# Patient Record
Sex: FEMALE | Race: WHITE | NOT HISPANIC OR LATINO | Employment: OTHER | URBAN - METROPOLITAN AREA
[De-identification: names, ages, dates, MRNs, and addresses within clinical notes are randomized per-mention and may not be internally consistent; named-entity substitution may affect disease eponyms.]

---

## 2018-06-25 ENCOUNTER — APPOINTMENT (OUTPATIENT)
Dept: GENERAL RADIOLOGY | Facility: HOSPITAL | Age: 83
End: 2018-06-25

## 2018-06-25 ENCOUNTER — APPOINTMENT (OUTPATIENT)
Dept: CT IMAGING | Facility: HOSPITAL | Age: 83
End: 2018-06-25

## 2018-06-25 ENCOUNTER — HOSPITAL ENCOUNTER (INPATIENT)
Facility: HOSPITAL | Age: 83
LOS: 2 days | Discharge: ANOTHER HOSPITAL | End: 2018-06-27
Attending: EMERGENCY MEDICINE | Admitting: INTERNAL MEDICINE

## 2018-06-25 DIAGNOSIS — D72.819 LEUKOPENIA, UNSPECIFIED TYPE: ICD-10-CM

## 2018-06-25 DIAGNOSIS — R41.82 ALTERED MENTAL STATUS, UNSPECIFIED ALTERED MENTAL STATUS TYPE: Primary | ICD-10-CM

## 2018-06-25 DIAGNOSIS — E87.1 HYPONATREMIA: ICD-10-CM

## 2018-06-25 DIAGNOSIS — K80.43 CALCULUS OF BILE DUCT WITH ACUTE CHOLECYSTITIS AND OBSTRUCTION: ICD-10-CM

## 2018-06-25 DIAGNOSIS — A41.9 SEPSIS, DUE TO UNSPECIFIED ORGANISM: ICD-10-CM

## 2018-06-25 DIAGNOSIS — M62.82 NON-TRAUMATIC RHABDOMYOLYSIS: ICD-10-CM

## 2018-06-25 PROBLEM — I10 HYPERTENSION: Status: ACTIVE | Noted: 2018-06-25

## 2018-06-25 PROBLEM — E86.0 DEHYDRATION: Status: ACTIVE | Noted: 2018-06-25

## 2018-06-25 PROBLEM — I63.9 STROKE (HCC): Status: ACTIVE | Noted: 2018-06-25

## 2018-06-25 PROBLEM — N39.0 UTI (URINARY TRACT INFECTION): Status: ACTIVE | Noted: 2018-06-25

## 2018-06-25 PROBLEM — R74.8 ELEVATED LIVER ENZYMES: Status: ACTIVE | Noted: 2018-06-25

## 2018-06-25 LAB
ALBUMIN SERPL-MCNC: 3.58 G/DL (ref 3.2–4.8)
ALBUMIN/GLOB SERPL: 1.5 G/DL (ref 1.5–2.5)
ALP SERPL-CCNC: 299 U/L (ref 25–100)
ALT SERPL W P-5'-P-CCNC: 97 U/L (ref 7–40)
AMORPH URATE CRY URNS QL MICRO: ABNORMAL /HPF
AMPHET+METHAMPHET UR QL: NEGATIVE
AMPHETAMINES UR QL: NEGATIVE
ANION GAP SERPL CALCULATED.3IONS-SCNC: 20 MMOL/L (ref 3–11)
APAP SERPL-MCNC: <10 MCG/ML (ref 0–30)
AST SERPL-CCNC: 153 U/L (ref 0–33)
BACTERIA UR QL AUTO: ABNORMAL /HPF
BARBITURATES UR QL SCN: NEGATIVE
BASOPHILS # BLD AUTO: 0.01 10*3/MM3 (ref 0–0.2)
BASOPHILS NFR BLD AUTO: 0.4 % (ref 0–1)
BENZODIAZ UR QL SCN: NEGATIVE
BILIRUB SERPL-MCNC: 2 MG/DL (ref 0.3–1.2)
BILIRUB UR QL STRIP: NEGATIVE
BUN BLD-MCNC: 33 MG/DL (ref 9–23)
BUN/CREAT SERPL: 42.3 (ref 7–25)
BUPRENORPHINE SERPL-MCNC: NEGATIVE NG/ML
CALCIUM SPEC-SCNC: 8.4 MG/DL (ref 8.7–10.4)
CANNABINOIDS SERPL QL: NEGATIVE
CHLORIDE SERPL-SCNC: 96 MMOL/L (ref 99–109)
CK SERPL-CCNC: 601 U/L (ref 26–174)
CLARITY UR: ABNORMAL
CO2 SERPL-SCNC: 13 MMOL/L (ref 20–31)
COCAINE UR QL: NEGATIVE
COLOR UR: ABNORMAL
CREAT BLD-MCNC: 0.78 MG/DL (ref 0.6–1.3)
D-LACTATE SERPL-SCNC: 7.1 MMOL/L (ref 0.5–2)
D-LACTATE SERPL-SCNC: 7.4 MMOL/L (ref 0.5–2)
DEPRECATED RDW RBC AUTO: 37.8 FL (ref 37–54)
EOSINOPHIL # BLD AUTO: 0.02 10*3/MM3 (ref 0–0.3)
EOSINOPHIL NFR BLD AUTO: 0.8 % (ref 0–3)
ERYTHROCYTE [DISTWIDTH] IN BLOOD BY AUTOMATED COUNT: 12.2 % (ref 11.3–14.5)
ETHANOL BLD-MCNC: <10 MG/DL (ref 0–10)
GFR SERPL CREATININE-BSD FRML MDRD: 71 ML/MIN/1.73
GLOBULIN UR ELPH-MCNC: 2.4 GM/DL
GLUCOSE BLD-MCNC: 129 MG/DL (ref 70–100)
GLUCOSE UR STRIP-MCNC: NEGATIVE MG/DL
HBA1C MFR BLD: 6 % (ref 4.8–5.6)
HCT VFR BLD AUTO: 36.3 % (ref 34.5–44)
HGB BLD-MCNC: 13 G/DL (ref 11.5–15.5)
HGB UR QL STRIP.AUTO: ABNORMAL
HOLD SPECIMEN: NORMAL
HYALINE CASTS UR QL AUTO: ABNORMAL /LPF
IMM GRANULOCYTES # BLD: 0.01 10*3/MM3 (ref 0–0.03)
IMM GRANULOCYTES NFR BLD: 0.4 % (ref 0–0.6)
INR PPP: 1.15 (ref 0.91–1.09)
KETONES UR QL STRIP: NEGATIVE
LEUKOCYTE ESTERASE UR QL STRIP.AUTO: NEGATIVE
LYMPHOCYTES # BLD AUTO: 0.23 10*3/MM3 (ref 0.6–4.8)
LYMPHOCYTES NFR BLD AUTO: 9.2 % (ref 24–44)
MAGNESIUM SERPL-MCNC: 2 MG/DL (ref 1.3–2.7)
MCH RBC QN AUTO: 30.4 PG (ref 27–31)
MCHC RBC AUTO-ENTMCNC: 35.8 G/DL (ref 32–36)
MCV RBC AUTO: 85 FL (ref 80–99)
METHADONE UR QL SCN: NEGATIVE
MONOCYTES # BLD AUTO: 0.27 10*3/MM3 (ref 0–1)
MONOCYTES NFR BLD AUTO: 10.8 % (ref 0–12)
NEUTROPHILS # BLD AUTO: 1.96 10*3/MM3 (ref 1.5–8.3)
NEUTROPHILS NFR BLD AUTO: 78.4 % (ref 41–71)
NITRITE UR QL STRIP: NEGATIVE
OPIATES UR QL: NEGATIVE
OXYCODONE UR QL SCN: NEGATIVE
PCP UR QL SCN: NEGATIVE
PH UR STRIP.AUTO: <=5 [PH] (ref 5–8)
PLATELET # BLD AUTO: 135 10*3/MM3 (ref 150–450)
PMV BLD AUTO: 10.7 FL (ref 6–12)
POTASSIUM BLD-SCNC: 3.6 MMOL/L (ref 3.5–5.5)
PROCALCITONIN SERPL-MCNC: 6.19 NG/ML
PROPOXYPH UR QL: NEGATIVE
PROT SERPL-MCNC: 6 G/DL (ref 5.7–8.2)
PROT UR QL STRIP: ABNORMAL
PROTHROMBIN TIME: 12.1 SECONDS (ref 9.6–11.5)
RBC # BLD AUTO: 4.27 10*6/MM3 (ref 3.89–5.14)
RBC # UR: ABNORMAL /HPF
REF LAB TEST METHOD: ABNORMAL
SALICYLATES SERPL-MCNC: 2 MG/DL (ref 0–29)
SODIUM BLD-SCNC: 129 MMOL/L (ref 132–146)
SP GR UR STRIP: 1.05 (ref 1–1.03)
SQUAMOUS #/AREA URNS HPF: ABNORMAL /HPF
T4 FREE SERPL-MCNC: 1.24 NG/DL (ref 0.89–1.76)
TRICYCLICS UR QL SCN: NEGATIVE
TSH SERPL DL<=0.05 MIU/L-ACNC: 0.51 MIU/ML (ref 0.35–5.35)
UROBILINOGEN UR QL STRIP: ABNORMAL
WBC NRBC COR # BLD: 2.5 10*3/MM3 (ref 3.5–10.8)
WBC UR QL AUTO: ABNORMAL /HPF

## 2018-06-25 PROCEDURE — 80307 DRUG TEST PRSMV CHEM ANLYZR: CPT | Performed by: EMERGENCY MEDICINE

## 2018-06-25 PROCEDURE — 93005 ELECTROCARDIOGRAM TRACING: CPT | Performed by: EMERGENCY MEDICINE

## 2018-06-25 PROCEDURE — 87040 BLOOD CULTURE FOR BACTERIA: CPT | Performed by: EMERGENCY MEDICINE

## 2018-06-25 PROCEDURE — 83605 ASSAY OF LACTIC ACID: CPT | Performed by: INTERNAL MEDICINE

## 2018-06-25 PROCEDURE — 81015 MICROSCOPIC EXAM OF URINE: CPT | Performed by: EMERGENCY MEDICINE

## 2018-06-25 PROCEDURE — 0 IOPAMIDOL PER 1 ML: Performed by: EMERGENCY MEDICINE

## 2018-06-25 PROCEDURE — 71045 X-RAY EXAM CHEST 1 VIEW: CPT

## 2018-06-25 PROCEDURE — P9046 ALBUMIN (HUMAN), 25%, 20 ML: HCPCS | Performed by: INTERNAL MEDICINE

## 2018-06-25 PROCEDURE — 84443 ASSAY THYROID STIM HORMONE: CPT | Performed by: EMERGENCY MEDICINE

## 2018-06-25 PROCEDURE — 82533 TOTAL CORTISOL: CPT | Performed by: INTERNAL MEDICINE

## 2018-06-25 PROCEDURE — 83605 ASSAY OF LACTIC ACID: CPT | Performed by: EMERGENCY MEDICINE

## 2018-06-25 PROCEDURE — 80053 COMPREHEN METABOLIC PANEL: CPT | Performed by: EMERGENCY MEDICINE

## 2018-06-25 PROCEDURE — 87150 DNA/RNA AMPLIFIED PROBE: CPT | Performed by: EMERGENCY MEDICINE

## 2018-06-25 PROCEDURE — 85025 COMPLETE CBC W/AUTO DIFF WBC: CPT | Performed by: EMERGENCY MEDICINE

## 2018-06-25 PROCEDURE — 80306 DRUG TEST PRSMV INSTRMNT: CPT | Performed by: EMERGENCY MEDICINE

## 2018-06-25 PROCEDURE — 25010000002 ALBUMIN HUMAN 25% PER 50 ML: Performed by: INTERNAL MEDICINE

## 2018-06-25 PROCEDURE — 81003 URINALYSIS AUTO W/O SCOPE: CPT | Performed by: EMERGENCY MEDICINE

## 2018-06-25 PROCEDURE — 82550 ASSAY OF CK (CPK): CPT | Performed by: EMERGENCY MEDICINE

## 2018-06-25 PROCEDURE — P9612 CATHETERIZE FOR URINE SPEC: HCPCS

## 2018-06-25 PROCEDURE — 25010000002 HEPARIN (PORCINE) PER 1000 UNITS: Performed by: NURSE PRACTITIONER

## 2018-06-25 PROCEDURE — 70496 CT ANGIOGRAPHY HEAD: CPT

## 2018-06-25 PROCEDURE — 84145 PROCALCITONIN (PCT): CPT | Performed by: EMERGENCY MEDICINE

## 2018-06-25 PROCEDURE — 25010000002 VANCOMYCIN PER 500 MG: Performed by: EMERGENCY MEDICINE

## 2018-06-25 PROCEDURE — 85610 PROTHROMBIN TIME: CPT | Performed by: EMERGENCY MEDICINE

## 2018-06-25 PROCEDURE — 99285 EMERGENCY DEPT VISIT HI MDM: CPT

## 2018-06-25 PROCEDURE — 0042T HC CT CEREBRAL PERFUSION W/WO CONTRAST: CPT

## 2018-06-25 PROCEDURE — 25010000002 PIPERACILLIN SOD-TAZOBACTAM PER 1 G: Performed by: INTERNAL MEDICINE

## 2018-06-25 PROCEDURE — 25010000002 PIPERACILLIN SOD-TAZOBACTAM PER 1 G: Performed by: EMERGENCY MEDICINE

## 2018-06-25 PROCEDURE — 70498 CT ANGIOGRAPHY NECK: CPT

## 2018-06-25 PROCEDURE — 83036 HEMOGLOBIN GLYCOSYLATED A1C: CPT | Performed by: INTERNAL MEDICINE

## 2018-06-25 PROCEDURE — 70450 CT HEAD/BRAIN W/O DYE: CPT

## 2018-06-25 PROCEDURE — 99291 CRITICAL CARE FIRST HOUR: CPT | Performed by: INTERNAL MEDICINE

## 2018-06-25 PROCEDURE — 83735 ASSAY OF MAGNESIUM: CPT | Performed by: EMERGENCY MEDICINE

## 2018-06-25 PROCEDURE — 84439 ASSAY OF FREE THYROXINE: CPT | Performed by: EMERGENCY MEDICINE

## 2018-06-25 PROCEDURE — 87186 SC STD MICRODIL/AGAR DIL: CPT | Performed by: EMERGENCY MEDICINE

## 2018-06-25 RX ORDER — ACETAMINOPHEN 650 MG/1
650 SUPPOSITORY RECTAL ONCE
Status: DISCONTINUED | OUTPATIENT
Start: 2018-06-25 | End: 2018-06-25 | Stop reason: SDUPTHER

## 2018-06-25 RX ORDER — HEPARIN SODIUM 5000 [USP'U]/ML
5000 INJECTION, SOLUTION INTRAVENOUS; SUBCUTANEOUS EVERY 12 HOURS SCHEDULED
Status: DISCONTINUED | OUTPATIENT
Start: 2018-06-25 | End: 2018-06-27 | Stop reason: HOSPADM

## 2018-06-25 RX ORDER — SODIUM CHLORIDE, SODIUM LACTATE, POTASSIUM CHLORIDE, CALCIUM CHLORIDE 600; 310; 30; 20 MG/100ML; MG/100ML; MG/100ML; MG/100ML
50 INJECTION, SOLUTION INTRAVENOUS CONTINUOUS
Status: DISCONTINUED | OUTPATIENT
Start: 2018-06-25 | End: 2018-06-27 | Stop reason: HOSPADM

## 2018-06-25 RX ORDER — LISINOPRIL AND HYDROCHLOROTHIAZIDE 20; 12.5 MG/1; MG/1
1 TABLET ORAL DAILY
COMMUNITY
End: 2020-02-16 | Stop reason: HOSPADM

## 2018-06-25 RX ORDER — ACETAMINOPHEN 500 MG
1000 TABLET ORAL ONCE
Status: DISCONTINUED | OUTPATIENT
Start: 2018-06-25 | End: 2018-06-25

## 2018-06-25 RX ORDER — ACETAMINOPHEN 325 MG/1
650 TABLET ORAL EVERY 4 HOURS PRN
Status: DISCONTINUED | OUTPATIENT
Start: 2018-06-25 | End: 2018-06-27 | Stop reason: HOSPADM

## 2018-06-25 RX ORDER — ASPIRIN 81 MG/1
81 TABLET ORAL DAILY
COMMUNITY

## 2018-06-25 RX ORDER — CEFTRIAXONE SODIUM 2 G/50ML
2 INJECTION, SOLUTION INTRAVENOUS EVERY 24 HOURS
Status: DISCONTINUED | OUTPATIENT
Start: 2018-06-25 | End: 2018-06-25

## 2018-06-25 RX ORDER — ALENDRONATE SODIUM 70 MG/1
70 TABLET ORAL
COMMUNITY
End: 2020-02-16 | Stop reason: HOSPADM

## 2018-06-25 RX ORDER — ALBUMIN (HUMAN) 12.5 G/50ML
50 SOLUTION INTRAVENOUS ONCE
Status: COMPLETED | OUTPATIENT
Start: 2018-06-25 | End: 2018-06-25

## 2018-06-25 RX ORDER — LISINOPRIL 10 MG/1
10 TABLET ORAL DAILY
COMMUNITY
End: 2020-02-16 | Stop reason: HOSPADM

## 2018-06-25 RX ORDER — FENOFIBRATE 145 MG/1
145 TABLET, COATED ORAL DAILY
COMMUNITY

## 2018-06-25 RX ORDER — SODIUM CHLORIDE 0.9 % (FLUSH) 0.9 %
1-10 SYRINGE (ML) INJECTION AS NEEDED
Status: DISCONTINUED | OUTPATIENT
Start: 2018-06-25 | End: 2018-06-27 | Stop reason: HOSPADM

## 2018-06-25 RX ORDER — VANCOMYCIN HYDROCHLORIDE 1 G/200ML
20 INJECTION, SOLUTION INTRAVENOUS ONCE
Status: COMPLETED | OUTPATIENT
Start: 2018-06-25 | End: 2018-06-25

## 2018-06-25 RX ORDER — ACETAMINOPHEN 650 MG/1
650 SUPPOSITORY RECTAL ONCE
Status: COMPLETED | OUTPATIENT
Start: 2018-06-25 | End: 2018-06-25

## 2018-06-25 RX ADMIN — SODIUM CHLORIDE 1000 ML: 9 INJECTION, SOLUTION INTRAVENOUS at 19:50

## 2018-06-25 RX ADMIN — SODIUM CHLORIDE 1000 ML: 9 INJECTION, SOLUTION INTRAVENOUS at 17:09

## 2018-06-25 RX ADMIN — SODIUM CHLORIDE 1000 ML: 9 INJECTION, SOLUTION INTRAVENOUS at 23:05

## 2018-06-25 RX ADMIN — NOREPINEPHRINE BITARTRATE 0.06 MCG/KG/MIN: 8 SOLUTION at 20:51

## 2018-06-25 RX ADMIN — HEPARIN SODIUM 5000 UNITS: 5000 INJECTION, SOLUTION INTRAVENOUS; SUBCUTANEOUS at 20:10

## 2018-06-25 RX ADMIN — ACETAMINOPHEN 650 MG: 650 SUPPOSITORY RECTAL at 17:09

## 2018-06-25 RX ADMIN — TAZOBACTAM SODIUM AND PIPERACILLIN SODIUM 4.5 G: 500; 4 INJECTION, SOLUTION INTRAVENOUS at 17:45

## 2018-06-25 RX ADMIN — ALBUMIN HUMAN 50 G: 0.25 SOLUTION INTRAVENOUS at 20:51

## 2018-06-25 RX ADMIN — SODIUM CHLORIDE 500 ML: 9 INJECTION, SOLUTION INTRAVENOUS at 18:51

## 2018-06-25 RX ADMIN — TAZOBACTAM SODIUM AND PIPERACILLIN SODIUM 2.25 G: 250; 2 INJECTION, SOLUTION INTRAVENOUS at 20:10

## 2018-06-25 RX ADMIN — IOPAMIDOL 115 ML: 755 INJECTION, SOLUTION INTRAVENOUS at 16:49

## 2018-06-25 RX ADMIN — VANCOMYCIN HYDROCHLORIDE 1000 MG: 1 INJECTION, SOLUTION INTRAVENOUS at 18:50

## 2018-06-25 RX ADMIN — ACETAMINOPHEN 650 MG: 325 TABLET ORAL at 22:10

## 2018-06-25 RX ADMIN — SODIUM CHLORIDE, POTASSIUM CHLORIDE, SODIUM LACTATE AND CALCIUM CHLORIDE 150 ML/HR: 600; 310; 30; 20 INJECTION, SOLUTION INTRAVENOUS at 19:58

## 2018-06-25 NOTE — ED PROVIDER NOTES
Subjective   83-year-old female presents for evaluation of altered mental status.  The patient is an extremely limited historian at this time and history is obtained from EMS.  According to EMS, they were called to the patient's home earlier this morning when the patient was noted by family to be lying on the floor.  She refused transport at that time.  Later this afternoon, they were called again by the patient's family for increased altered mental status and return to the patient's home where she was still lying in the floor, and she was subsequently brought to our facility for further evaluation and treatment.  Glucose normal.  She was noted to be febrile and tachycardic in route.        History provided by:  EMS personnel  History limited by:  Mental status change  Altered Mental Status   Presenting symptoms: confusion, disorientation and partial responsiveness    Severity:  Moderate  Most recent episode:  Today  Episode history:  Continuous  Duration:  2 hours  Timing:  Constant  Progression:  Unchanged  Chronicity:  New      Review of Systems   Unable to perform ROS: Mental status change   Psychiatric/Behavioral: Positive for confusion.       No past medical history on file.    Allergies not on file    No past surgical history on file.    No family history on file.    Social History     Social History Main Topics   • Drug use: Unknown     Other Topics Concern   • Not on file         Objective   Physical Exam   Constitutional: She appears well-developed and well-nourished. No distress.   Confused appearing elderly female   HENT:   Head: Normocephalic and atraumatic.   Mouth/Throat: Oropharynx is clear and moist.   Eyes: EOM are normal. Pupils are equal, round, and reactive to light.   Crosses midline with extraocular movements, no nystagmus   Neck: Normal range of motion. Neck supple. No JVD present.   Cardiovascular: Regular rhythm and normal heart sounds.  Exam reveals no gallop and no friction rub.    No  murmur heard.  Tachycardic   Pulmonary/Chest: Effort normal and breath sounds normal. No respiratory distress. She has no wheezes. She has no rales.   Abdominal: Soft. Bowel sounds are normal. She exhibits no distension and no mass. There is no tenderness. There is no rebound and no guarding.   Neurological:   Patient appears quite confused and is not alert to person, place, or time, moving all fours, following commands, global weakness noted in all fours with 4/5 strength, unable to assess finger to nose and heel to shin secondary to confusion, unable to answer questions and responding with nonsensical syllables and words, GCS of 12 (E-4, M-6, V-3)   Skin: Skin is warm and dry. No rash noted. She is not diaphoretic. No erythema.   Psychiatric: She has a normal mood and affect. Judgment and thought content normal.   Nursing note and vitals reviewed.      Critical Care  Performed by: SERENA MC  Authorized by: YUSEF MAK     Critical care provider statement:     Critical care time (minutes):  40    Critical care was necessary to treat or prevent imminent or life-threatening deterioration of the following conditions:  Sepsis and CNS failure or compromise    Critical care was time spent personally by me on the following activities:  Development of treatment plan with patient or surrogate, discussions with consultants, evaluation of patient's response to treatment, examination of patient, obtaining history from patient or surrogate, re-evaluation of patient's condition, pulse oximetry, ordering and review of radiographic studies, ordering and review of laboratory studies and ordering and performing treatments and interventions             ED Course  ED Course as of Jun 25 1921 Mon Jun 25, 2018   1710 83-year-old female presents via EMS for evaluation of altered mental status.  EMS was reportedly called to the patient's home earlier this morning but she refused transport.  Later this afternoon, the  patient's family called EMS once again for altered mental status.  On their arrival, the patient was still lying in the floor and appeared much more altered.  She was noted to be febrile and tachycardic as well as was subsequently brought to our facility for further evaluation and treatment.  Glucose normal.  On arrival to the ED, patient appears quite confused with a GCS of 13.  She is protecting her airway.  She is febrile and tachycardic.  IV fluids administered.  Antipyretics administered.  Exam remarkable for global confusion and global weakness but no focal or lateralizing neurological deficits.  [DD]   1712 CT head negative for acute emergent process.  CT perfusion studies negative.  We will obtain further imaging and labs and reassess after initial interventions.  The patient's initial EKG revealed sinus tachycardia with a heart rate of 125.  [DD]   1830 Labs consistent with leukopenia, severe sepsis, mild hyponatremia, and rhabdomyolysis.  30 cc per KG of crystalloid administered.  Broad-spectrum antibiotics administered.  Blood and urine cultures pending.  Upon reevaluation, patient's vital signs improved.  I discussed her case with Dr. Baird and will admit for further evaluation and treatment.  She is hemodynamically stable at this time and family is aware/agreeable with the plan.  [DD]      ED Course User Index  [DD] Jayy Navas MD                 Recent Results (from the past 24 hour(s))   Protime-INR    Collection Time: 06/25/18  4:46 PM   Result Value Ref Range    Protime 12.1 (H) 9.6 - 11.5 Seconds    INR 1.15 (H) 0.91 - 1.09   Lactic Acid, Plasma    Collection Time: 06/25/18  4:46 PM   Result Value Ref Range    Lactate 7.4 (C) 0.5 - 2.0 mmol/L   Procalcitonin    Collection Time: 06/25/18  4:46 PM   Result Value Ref Range    Procalcitonin 6.19 (H) <=0.25 ng/mL   CBC Auto Differential    Collection Time: 06/25/18  4:46 PM   Result Value Ref Range    WBC 2.50 (L) 3.50 - 10.80 10*3/mm3    RBC  4.27 3.89 - 5.14 10*6/mm3    Hemoglobin 13.0 11.5 - 15.5 g/dL    Hematocrit 36.3 34.5 - 44.0 %    MCV 85.0 80.0 - 99.0 fL    MCH 30.4 27.0 - 31.0 pg    MCHC 35.8 32.0 - 36.0 g/dL    RDW 12.2 11.3 - 14.5 %    RDW-SD 37.8 37.0 - 54.0 fl    MPV 10.7 6.0 - 12.0 fL    Platelets 135 (L) 150 - 450 10*3/mm3    Neutrophil % 78.4 (H) 41.0 - 71.0 %    Lymphocyte % 9.2 (L) 24.0 - 44.0 %    Monocyte % 10.8 0.0 - 12.0 %    Eosinophil % 0.8 0.0 - 3.0 %    Basophil % 0.4 0.0 - 1.0 %    Immature Grans % 0.4 0.0 - 0.6 %    Neutrophils, Absolute 1.96 1.50 - 8.30 10*3/mm3    Lymphocytes, Absolute 0.23 (L) 0.60 - 4.80 10*3/mm3    Monocytes, Absolute 0.27 0.00 - 1.00 10*3/mm3    Eosinophils, Absolute 0.02 0.00 - 0.30 10*3/mm3    Basophils, Absolute 0.01 0.00 - 0.20 10*3/mm3    Immature Grans, Absolute 0.01 0.00 - 0.03 10*3/mm3   Comprehensive Metabolic Panel    Collection Time: 06/25/18  4:47 PM   Result Value Ref Range    Glucose 129 (H) 70 - 100 mg/dL    BUN 33 (H) 9 - 23 mg/dL    Creatinine 0.78 0.60 - 1.30 mg/dL    Sodium 129 (L) 132 - 146 mmol/L    Potassium 3.6 3.5 - 5.5 mmol/L    Chloride 96 (L) 99 - 109 mmol/L    CO2 13.0 (L) 20.0 - 31.0 mmol/L    Calcium 8.4 (L) 8.7 - 10.4 mg/dL    Total Protein 6.0 5.7 - 8.2 g/dL    Albumin 3.58 3.20 - 4.80 g/dL    ALT (SGPT) 97 (H) 7 - 40 U/L    AST (SGOT) 153 (H) 0 - 33 U/L    Alkaline Phosphatase 299 (H) 25 - 100 U/L    Total Bilirubin 2.0 (H) 0.3 - 1.2 mg/dL    eGFR Non African Amer 71 >60 mL/min/1.73    Globulin 2.4 gm/dL    A/G Ratio 1.5 1.5 - 2.5 g/dL    BUN/Creatinine Ratio 42.3 (H) 7.0 - 25.0    Anion Gap 20.0 (H) 3.0 - 11.0 mmol/L   TSH    Collection Time: 06/25/18  4:47 PM   Result Value Ref Range    TSH 0.510 0.350 - 5.350 mIU/mL   T4, Free    Collection Time: 06/25/18  4:47 PM   Result Value Ref Range    Free T4 1.24 0.89 - 1.76 ng/dL   Magnesium    Collection Time: 06/25/18  4:47 PM   Result Value Ref Range    Magnesium 2.0 1.3 - 2.7 mg/dL   Acetaminophen Level    Collection Time:  06/25/18  4:47 PM   Result Value Ref Range    Acetaminophen <10.0 0.0 - 30.0 mcg/mL   Ethanol    Collection Time: 06/25/18  4:47 PM   Result Value Ref Range    Ethanol <10 0 - 10 mg/dL   Salicylate Level    Collection Time: 06/25/18  4:47 PM   Result Value Ref Range    Salicylate 2.0 0.0 - 29.0 mg/dL   CK    Collection Time: 06/25/18  4:47 PM   Result Value Ref Range    Creatine Kinase 601 (H) 26 - 174 U/L   Urinalysis With / Culture If Indicated - Urine, Catheter    Collection Time: 06/25/18  5:37 PM   Result Value Ref Range    Color, UA Dark Yellow (A) Yellow, Straw    Appearance, UA Cloudy (A) Clear    pH, UA <=5.0 5.0 - 8.0    Specific Gravity, UA 1.048 (H) 1.001 - 1.030    Glucose, UA Negative Negative    Ketones, UA Negative Negative    Bilirubin, UA Negative Negative    Blood, UA Moderate (2+) (A) Negative    Protein,  mg/dL (2+) (A) Negative    Leuk Esterase, UA Negative Negative    Nitrite, UA Negative Negative    Urobilinogen, UA 2.0 E.U./dL (A) 0.2 - 1.0 E.U./dL   Urinalysis, Microscopic Only - Urine, Clean Catch    Collection Time: 06/25/18  5:37 PM   Result Value Ref Range    RBC, UA 21-30 (A) None Seen, 0-2 /HPF    WBC, UA 3-5 (A) None Seen, 0-2 /HPF    Bacteria, UA None Seen None Seen, Trace /HPF    Squamous Epithelial Cells, UA 7-12 (A) None Seen, 0-2 /HPF    Hyaline Casts, UA 0-6 0 - 6 /LPF    Amorphous Crystals, UA Small/1+ None Seen /HPF    Methodology Manual Light Microscopy      Note: In addition to lab results from this visit, the labs listed above may include labs taken at another facility or during a different encounter within the last 24 hours. Please correlate lab times with ED admission and discharge times for further clarification of the services performed during this visit.    CT Head Without Contrast Stroke Protocol   Preliminary Result   1. Age-indeterminate lacunar infarct right cerebellar hemisphere.   2. No intracranial hemorrhage.       Findings were communicated with the  stroke team at 4:42 PM on   06/25/2018.       D:  06/25/2018   E:  06/25/2018                      CT Cerebral Perfusion With & Without Contrast    (Results Pending)   CT Angiogram Neck With & Without Contrast    (Results Pending)   CT Angiogram Head With & Without Contrast    (Results Pending)   XR Chest 1 View    (Results Pending)   XR Chest 1 View    (Results Pending)     Vitals:    06/25/18 1830 06/25/18 1850 06/25/18 1900 06/25/18 1905   BP: 102/95  (!) 79/50 (!) 84/48   BP Location:       Pulse: 110  112 (!) 121   Resp:       Temp:  (!) 100.6 °F (38.1 °C)     TempSrc:  Oral     SpO2: 94%  95% 96%   Weight:       Height:         Medications   vancomycin (VANCOCIN) in iso-osmotic dextrose IVPB 1 g (premix) 200 mL (1,000 mg Intravenous New Bag 6/25/18 1850)   sodium chloride 0.9 % flush 1-10 mL (not administered)   heparin (porcine) 5000 UNIT/ML injection 5,000 Units (not administered)   lactated ringers infusion (not administered)   sodium chloride 0.9 % bolus 1,000 mL (0 mL Intravenous Stopped 6/25/18 1836)   iopamidol (ISOVUE-370) 76 % injection 150 mL (115 mL Intravenous Given 6/25/18 1649)   acetaminophen (TYLENOL) suppository 650 mg (650 mg Rectal Given 6/25/18 1709)   piperacillin-tazobactam (ZOSYN) 4.5 g in iso-osmotic dextrose 100 mL IVPB (premix) (0 g Intravenous Stopped 6/25/18 1836)   sodium chloride 0.9 % bolus 650 mL (500 mL Intravenous New Bag 6/25/18 1851)     ECG/EMG Results (last 24 hours)     Procedure Component Value Units Date/Time    ECG 12 Lead [[id number]] Collected:  06/25/18 1707     Updated:  06/25/18 1721          Recent Results (from the past 24 hour(s))   Protime-INR    Collection Time: 06/25/18  4:46 PM   Result Value Ref Range    Protime 12.1 (H) 9.6 - 11.5 Seconds    INR 1.15 (H) 0.91 - 1.09   Lactic Acid, Plasma    Collection Time: 06/25/18  4:46 PM   Result Value Ref Range    Lactate 7.4 (C) 0.5 - 2.0 mmol/L   Procalcitonin    Collection Time: 06/25/18  4:46 PM   Result Value Ref  Range    Procalcitonin 6.19 (H) <=0.25 ng/mL   CBC Auto Differential    Collection Time: 06/25/18  4:46 PM   Result Value Ref Range    WBC 2.50 (L) 3.50 - 10.80 10*3/mm3    RBC 4.27 3.89 - 5.14 10*6/mm3    Hemoglobin 13.0 11.5 - 15.5 g/dL    Hematocrit 36.3 34.5 - 44.0 %    MCV 85.0 80.0 - 99.0 fL    MCH 30.4 27.0 - 31.0 pg    MCHC 35.8 32.0 - 36.0 g/dL    RDW 12.2 11.3 - 14.5 %    RDW-SD 37.8 37.0 - 54.0 fl    MPV 10.7 6.0 - 12.0 fL    Platelets 135 (L) 150 - 450 10*3/mm3    Neutrophil % 78.4 (H) 41.0 - 71.0 %    Lymphocyte % 9.2 (L) 24.0 - 44.0 %    Monocyte % 10.8 0.0 - 12.0 %    Eosinophil % 0.8 0.0 - 3.0 %    Basophil % 0.4 0.0 - 1.0 %    Immature Grans % 0.4 0.0 - 0.6 %    Neutrophils, Absolute 1.96 1.50 - 8.30 10*3/mm3    Lymphocytes, Absolute 0.23 (L) 0.60 - 4.80 10*3/mm3    Monocytes, Absolute 0.27 0.00 - 1.00 10*3/mm3    Eosinophils, Absolute 0.02 0.00 - 0.30 10*3/mm3    Basophils, Absolute 0.01 0.00 - 0.20 10*3/mm3    Immature Grans, Absolute 0.01 0.00 - 0.03 10*3/mm3   Comprehensive Metabolic Panel    Collection Time: 06/25/18  4:47 PM   Result Value Ref Range    Glucose 129 (H) 70 - 100 mg/dL    BUN 33 (H) 9 - 23 mg/dL    Creatinine 0.78 0.60 - 1.30 mg/dL    Sodium 129 (L) 132 - 146 mmol/L    Potassium 3.6 3.5 - 5.5 mmol/L    Chloride 96 (L) 99 - 109 mmol/L    CO2 13.0 (L) 20.0 - 31.0 mmol/L    Calcium 8.4 (L) 8.7 - 10.4 mg/dL    Total Protein 6.0 5.7 - 8.2 g/dL    Albumin 3.58 3.20 - 4.80 g/dL    ALT (SGPT) 97 (H) 7 - 40 U/L    AST (SGOT) 153 (H) 0 - 33 U/L    Alkaline Phosphatase 299 (H) 25 - 100 U/L    Total Bilirubin 2.0 (H) 0.3 - 1.2 mg/dL    eGFR Non African Amer 71 >60 mL/min/1.73    Globulin 2.4 gm/dL    A/G Ratio 1.5 1.5 - 2.5 g/dL    BUN/Creatinine Ratio 42.3 (H) 7.0 - 25.0    Anion Gap 20.0 (H) 3.0 - 11.0 mmol/L   TSH    Collection Time: 06/25/18  4:47 PM   Result Value Ref Range    TSH 0.510 0.350 - 5.350 mIU/mL   T4, Free    Collection Time: 06/25/18  4:47 PM   Result Value Ref Range     Free T4 1.24 0.89 - 1.76 ng/dL   Magnesium    Collection Time: 06/25/18  4:47 PM   Result Value Ref Range    Magnesium 2.0 1.3 - 2.7 mg/dL   Acetaminophen Level    Collection Time: 06/25/18  4:47 PM   Result Value Ref Range    Acetaminophen <10.0 0.0 - 30.0 mcg/mL   Ethanol    Collection Time: 06/25/18  4:47 PM   Result Value Ref Range    Ethanol <10 0 - 10 mg/dL   Salicylate Level    Collection Time: 06/25/18  4:47 PM   Result Value Ref Range    Salicylate 2.0 0.0 - 29.0 mg/dL   CK    Collection Time: 06/25/18  4:47 PM   Result Value Ref Range    Creatine Kinase 601 (H) 26 - 174 U/L   Urinalysis With / Culture If Indicated - Urine, Catheter    Collection Time: 06/25/18  5:37 PM   Result Value Ref Range    Color, UA Dark Yellow (A) Yellow, Straw    Appearance, UA Cloudy (A) Clear    pH, UA <=5.0 5.0 - 8.0    Specific Gravity, UA 1.048 (H) 1.001 - 1.030    Glucose, UA Negative Negative    Ketones, UA Negative Negative    Bilirubin, UA Negative Negative    Blood, UA Moderate (2+) (A) Negative    Protein,  mg/dL (2+) (A) Negative    Leuk Esterase, UA Negative Negative    Nitrite, UA Negative Negative    Urobilinogen, UA 2.0 E.U./dL (A) 0.2 - 1.0 E.U./dL   Urinalysis, Microscopic Only - Urine, Clean Catch    Collection Time: 06/25/18  5:37 PM   Result Value Ref Range    RBC, UA 21-30 (A) None Seen, 0-2 /HPF    WBC, UA 3-5 (A) None Seen, 0-2 /HPF    Bacteria, UA None Seen None Seen, Trace /HPF    Squamous Epithelial Cells, UA 7-12 (A) None Seen, 0-2 /HPF    Hyaline Casts, UA 0-6 0 - 6 /LPF    Amorphous Crystals, UA Small/1+ None Seen /HPF    Methodology Manual Light Microscopy      Note: In addition to lab results from this visit, the labs listed above may include labs taken at another facility or during a different encounter within the last 24 hours. Please correlate lab times with ED admission and discharge times for further clarification of the services performed during this visit.    CT Head Without Contrast  Stroke Protocol   Preliminary Result   1. Age-indeterminate lacunar infarct right cerebellar hemisphere.   2. No intracranial hemorrhage.       Findings were communicated with the stroke team at 4:42 PM on   06/25/2018.       D:  06/25/2018   E:  06/25/2018                      CT Cerebral Perfusion With & Without Contrast    (Results Pending)   CT Angiogram Neck With & Without Contrast    (Results Pending)   CT Angiogram Head With & Without Contrast    (Results Pending)   XR Chest 1 View    (Results Pending)   XR Chest 1 View    (Results Pending)     Vitals:    06/25/18 1850 06/25/18 1900 06/25/18 1905 06/25/18 1931   BP:  (!) 79/50 (!) 84/48    BP Location:       Pulse:  112 (!) 121 99   Resp:    18   Temp: (!) 100.6 °F (38.1 °C)   (!) 101.6 °F (38.7 °C)   TempSrc: Oral   Oral   SpO2:  95% 96%    Weight:       Height:         Medications   sodium chloride 0.9 % flush 1-10 mL (not administered)   heparin (porcine) 5000 UNIT/ML injection 5,000 Units (not administered)   lactated ringers infusion (not administered)   piperacillin-tazobactam (ZOSYN) in iso-osmotic dextrose IVPB 2.25 g (premix) (not administered)   sodium chloride 0.9 % bolus 1,000 mL (1,000 mL Intravenous New Bag 6/25/18 1950)   sodium chloride 0.9 % bolus 1,000 mL (0 mL Intravenous Stopped 6/25/18 1836)   iopamidol (ISOVUE-370) 76 % injection 150 mL (115 mL Intravenous Given 6/25/18 1649)   acetaminophen (TYLENOL) suppository 650 mg (650 mg Rectal Given 6/25/18 1709)   vancomycin (VANCOCIN) in iso-osmotic dextrose IVPB 1 g (premix) 200 mL (1,000 mg Intravenous New Bag 6/25/18 1850)   piperacillin-tazobactam (ZOSYN) 4.5 g in iso-osmotic dextrose 100 mL IVPB (premix) (0 g Intravenous Stopped 6/25/18 1836)   sodium chloride 0.9 % bolus 650 mL (500 mL Intravenous New Bag 6/25/18 1851)     ECG/EMG Results (last 24 hours)     Procedure Component Value Units Date/Time    ECG 12 Lead [[id number]] Collected:  06/25/18 1707     Updated:  06/25/18 172           MDM    Final diagnoses:   Altered mental status, unspecified altered mental status type   Sepsis, due to unspecified organism   Leukopenia, unspecified type   Hyponatremia   Non-traumatic rhabdomyolysis       Documentation assistance provided by john Crews.  Information recorded by the scribe was done at my direction and has been verified and validated by me.     Zeyad Crews  06/25/18 1642       Jayy Navas MD  06/25/18 1921       Jayy Navas MD  06/25/18 1951

## 2018-06-25 NOTE — ED PROVIDER NOTES
Subjective   History of Present Illness    Review of Systems    Past Medical History:   Diagnosis Date   • Hypertension    • Stroke        No Known Allergies    History reviewed. No pertinent surgical history.    History reviewed. No pertinent family history.    Social History     Social History   • Marital status:      Social History Main Topics   • Smoking status: Never Smoker   • Smokeless tobacco: Never Used   • Alcohol use No   • Drug use: No     Other Topics Concern   • Not on file         Objective   Physical Exam    Critical Care  Performed by: YUSEF MAK  Authorized by: YUSEF MAK     Critical care provider statement:     Critical care time (minutes):  40    Critical care time was exclusive of:  Separately billable procedures and treating other patients    Critical care was necessary to treat or prevent imminent or life-threatening deterioration of the following conditions:  Sepsis and CNS failure or compromise    Critical care was time spent personally by me on the following activities:  Blood draw for specimens, development of treatment plan with patient or surrogate, discussions with consultants, evaluation of patient's response to treatment, examination of patient, obtaining history from patient or surrogate, ordering and performing treatments and interventions, ordering and review of laboratory studies, ordering and review of radiographic studies, pulse oximetry, re-evaluation of patient's condition, review of old charts and interpretation of cardiac output measurements             ED Course  ED Course as of Jun 25 1949   Mon Jun 25, 2018   1710 83-year-old female presents via EMS for evaluation of altered mental status.  EMS was reportedly called to the patient's home earlier this morning but she refused transport.  Later this afternoon, the patient's family called EMS once again for altered mental status.  On their arrival, the patient was still lying in the floor and appeared much  more altered.  She was noted to be febrile and tachycardic as well as was subsequently brought to our facility for further evaluation and treatment.  Glucose normal.  On arrival to the ED, patient appears quite confused with a GCS of 13.  She is protecting her airway.  She is febrile and tachycardic.  IV fluids administered.  Antipyretics administered.  Exam remarkable for global confusion and global weakness but no focal or lateralizing neurological deficits.  [DD]   1712 CT head negative for acute emergent process.  CT perfusion studies negative.  We will obtain further imaging and labs and reassess after initial interventions.  The patient's initial EKG revealed sinus tachycardia with a heart rate of 125.  [DD]   1830 Labs consistent with leukopenia, severe sepsis, mild hyponatremia, and rhabdomyolysis.  30 cc per KG of crystalloid administered.  Broad-spectrum antibiotics administered.  Blood and urine cultures pending.  Upon reevaluation, patient's vital signs improved.  I discussed her case with Dr. Baird and will admit for further evaluation and treatment.  She is hemodynamically stable at this time and family is aware/agreeable with the plan.  [DD]      ED Course User Index  [DD] Jayy Navas MD                     MDM  Number of Diagnoses or Management Options  Altered mental status, unspecified altered mental status type:   Hyponatremia:   Leukopenia, unspecified type:   Non-traumatic rhabdomyolysis:   Sepsis, due to unspecified organism:   Critical Care  Total time providing critical care: 30-74 minutes      Final diagnoses:   Altered mental status, unspecified altered mental status type   Sepsis, due to unspecified organism   Leukopenia, unspecified type   Hyponatremia   Non-traumatic rhabdomyolysis       Documentation assistance provided by john Crews.  Information recorded by the john was done at my direction and has been verified and validated by me.     Zeyad Crews  06/25/18  1949

## 2018-06-25 NOTE — H&P
INTENSIVIST   HOSPITAL VISIT NOTE     Hospital:  LOS: 0 days     Ms. Susan Tucker, 83 y.o. female is seen for:      Sepsis    AMS    As an Intensivist, we provide an integrated approach to the ICU patient and family, medical management of comorbid conditions, lead interdisciplinary rounds and coordinate the care with all other services, including those from other specialists.     Steffen Tucker is a 83 y.o. female,  who presented to [facility] ED by EMS due to AMS.     The patient has apparently been ill the past 2 weeks but she had not discussed this with her family.  The time of arrival information was obtained via emergency services due to altered mental status.      This morning the patient was found lying on the floor by family emergency services were notified and upon arrival she refused transport.  Later this afternoon emergency services returned due to increased altered mental status, she would continue to be lying on the floor, and she was brought to the emergency department.      She was found to be febrile and tachycardic in route.  CTA and CT perfusion were obtained which were noted to be negative.  Urinalysis showed UTI.  She was given Zosyn and vancomycin emergency department.  Temperature in the emergency department was 104°F.    Patient is seen in the emergency department, family is present and the patient is much more awake and alert.  She is able to answer questions and purchase patient in her care. She was able to tell me what happened today, and how she thought she did not need to come to the Hospital, at least during the first visit of the paramedics.      Patient states her desire to be DNR/DNI.  Patient has passed Julian history significant for hypertension and CVA.      She denies smoking, alcohol, or illicit drug use.       PMH: She  has a past medical history of Hypertension and Stroke.   PSxH: She  has no past surgical history on file.       Medications:  ASA  Lisinopril    Allergies: She has No Known Allergies.   FH: Her family history is not on file.   SH: She  reports that she has never smoked. She has never used smokeless tobacco. She reports that she does not drink alcohol or use drugs.     ROS (14):   All systems reviewed and negative except noted in HPI.    Objective   O     Vitals:  Temp: (!) 100.6 °F (38.1 °C) (06/25/18 1850) Temp  Min: 100.6 °F (38.1 °C)  Max: 103.8 °F (39.9 °C)   BP: (!) 84/48 (06/25/18 1905) BP  Min: 79/50  Max: 142/80   Pulse: (!) 121 (06/25/18 1905) Pulse  Min: 108  Max: 126   Resp: 24 (06/25/18 1716) Resp  Min: 24  Max: 24   SpO2: 96 % (06/25/18 1905) SpO2  Min: 94 %  Max: 98 %   Device: room air (06/25/18 1716)    Flow Rate:   No Data Recorded     I/O 24 hrs (7:00AM - 6:59 AM)  Intake/Output       06/24/18 0700 - 06/25/18 0659 06/25/18 0700 - 06/26/18 0659    Intake (ml) 0 1100    Output (ml) -- --    Net (ml) 0 1100    Last Weight  --  54.4 kg (120 lb)          Medications (drips):    lactated ringers       Physical Examination    Telemetry: Rhythm: sinus tachycardia (06/25/18 1700)   Constitutional:  No acute distress.  Conversant.   HEENT: Normocephalic and atraumatic.   Neck:  Normal range of motion. Neck supple.   No JVD present.   No thyromegaly.    Cardiovascular: Regular rate and rhythm.  No murmurs, rub or gallop.  No peripheral edema.   Respiratory: Normal respiratory effort.  Clear to auscultation.  Clear to percussion.    Abdominal:  Soft. No masses.   Non-tender. No distension.   No hepatosplenomegaly.   MSK: Normal muscle strength and tone.   Extremities: No digital cyanosis or clubbing.   Skin: Skin is warm and dry.  No rashes, lesions or ulcers noted.    Neurological:   Alert and Oriented to person, place, and time.   Moves all extremities.   Psychiatric:  Normal affect.   Normal judgment.   Lines/Drains/Airways: Peripheral IV(s)       Hematology:    Results from last 7 days  Lab Units 06/25/18  0865    WBC 10*3/mm3 2.50*   HEMOGLOBIN g/dL 13.0   MCV fL 85.0   PLATELETS 10*3/mm3 135*     Electrolytes, Magnesium and Phosphorus:    Results from last 7 days  Lab Units 06/25/18  1647   SODIUM mmol/L 129*   POTASSIUM mmol/L 3.6   CO2 mmol/L 13.0*   MAGNESIUM mg/dL 2.0     Renal:    Results from last 7 days  Lab Units 06/25/18  1647   CREATININE mg/dL 0.78   BUN mg/dL 33*     Estimated Creatinine Clearance: 46.6 mL/min (by C-G formula based on SCr of 0.78 mg/dL).  Hepatic:    Results from last 7 days  Lab Units 06/25/18  1647   ALK PHOS U/L 299*   BILIRUBIN mg/dL 2.0*   ALT (SGPT) U/L 97*   AST (SGOT) U/L 153*       Lab Results  Lab Value Date/Time   PROCALCITO 6.19 (H) 06/25/2018 1646       Lab Results  Lab Value Date/Time   LACTATE 7.4 (C) 06/25/2018 1646        Images:    Ct Head Without Contrast Stroke Protocol    Result Date: 6/25/2018  1. Age-indeterminate lacunar infarct right cerebellar hemisphere. 2. No intracranial hemorrhage.  Findings were communicated with the stroke team at 4:42 PM on 06/25/2018.  D:  06/25/2018 E:  06/25/2018           I reviewed the patient's new laboratory and imaging results.  I independently reviewed the patient's new images.  Assessment/Plan   A / P     83 y.o.female, admitted on 6/25/2018 with Sepsis [A41.9]:     1. Probable Sepsis  1. Source: Unknown, although Urinary tract, suspect.  2. No infiltrates in her CXR, but this may change once rehydrated.  2. Leukopenia  3. Lactic Acidosis  4. Mild elevation of transaminases, T Maury and ALKP  5. Mild Thrombocytopenia  6. HTN on ACEI at home.  7. Previous CVA  8. No h/o DM    Nutrition:  NPO Diet    No active supplement orders     Advance Directives: Code Status and Medical Interventions:   Ordered at: 06/25/18 1918     Limited Support to NOT Include:    Intubation     Level Of Support Discussed With:    Patient    Next of Kin (If No Surrogate)     Code Status:    No CPR     Medical Interventions (Level of Support Prior to Arrest):     Limited          Assessment / Plan:    1. Discussed with Dr. Navas.  2. ICU Admission.  3. Broad spectrum abs.  4. Rehydration  5. Follow Lactic acid, and LFTs in AM  6. Check  A1c  7. Monitor I and Os. Hourly.  8. Discussed with patient and family, she has a living will:  1. She does not want to be connected to machines - M Ventilator  2. If her heart stops, she does not want to be revived.    Plan of care and goals reviewed during interdisciplinary rounds.  I discussed the patient's findings and my recommendations with patient, family and nursing staff    Time: was greater than 45 minutes.    (This excludes time spent performing separately reportable procedures and services).  Patient was at high risk of imminent or life-threatening deterioration in her condition due to CNS dysfunction, Metabolic acidosis and sepsis.     JOESPH Quintanilla, [facility]-BC  Pulmonary and Critical Care Service  6/25/2018 7:21 PM     I have personally seen, interviewed and examined the patient and verified all the key components of the history, physical examination, assessment and plan with JOESPH Armstrong and reviewed the note, which reflects my changes and contributions.      Lorne Baird MD, FACP, FCCP, CNSC  Intensive Care Medicine, Nutrition Support and Pulmonary Medicine

## 2018-06-26 ENCOUNTER — APPOINTMENT (OUTPATIENT)
Dept: GENERAL RADIOLOGY | Facility: HOSPITAL | Age: 83
End: 2018-06-26

## 2018-06-26 ENCOUNTER — APPOINTMENT (OUTPATIENT)
Dept: ULTRASOUND IMAGING | Facility: HOSPITAL | Age: 83
End: 2018-06-26

## 2018-06-26 ENCOUNTER — APPOINTMENT (OUTPATIENT)
Dept: CARDIOLOGY | Facility: HOSPITAL | Age: 83
End: 2018-06-26
Attending: INTERNAL MEDICINE

## 2018-06-26 ENCOUNTER — APPOINTMENT (OUTPATIENT)
Dept: CT IMAGING | Facility: HOSPITAL | Age: 83
End: 2018-06-26
Attending: INTERNAL MEDICINE

## 2018-06-26 PROBLEM — K80.43 CALCULUS OF BILE DUCT WITH ACUTE CHOLECYSTITIS AND OBSTRUCTION: Status: ACTIVE | Noted: 2018-06-25

## 2018-06-26 LAB
ALBUMIN SERPL-MCNC: 3.63 G/DL (ref 3.2–4.8)
ALBUMIN/GLOB SERPL: 1.6 G/DL (ref 1.5–2.5)
ALP SERPL-CCNC: 137 U/L (ref 25–100)
ALT SERPL W P-5'-P-CCNC: 98 U/L (ref 7–40)
ANION GAP SERPL CALCULATED.3IONS-SCNC: 15 MMOL/L (ref 3–11)
AST SERPL-CCNC: 185 U/L (ref 0–33)
BACTERIA BLD CULT: ABNORMAL
BASOPHILS # BLD AUTO: 0.03 10*3/MM3 (ref 0–0.2)
BASOPHILS # BLD MANUAL: 0 10*3/MM3 (ref 0–0.2)
BASOPHILS NFR BLD AUTO: 0 % (ref 0–1)
BASOPHILS NFR BLD AUTO: 0.1 % (ref 0–1)
BH CV ECHO MEAS - AO ROOT AREA (BSA CORRECTED): 1.4
BH CV ECHO MEAS - AO ROOT AREA: 3.6 CM^2
BH CV ECHO MEAS - AO ROOT DIAM: 2.2 CM
BH CV ECHO MEAS - ASC AORTA: 1.9 CM
BH CV ECHO MEAS - BSA(HAYCOCK): 1.6 M^2
BH CV ECHO MEAS - BSA: 1.5 M^2
BH CV ECHO MEAS - BZI_BMI: 23.6 KILOGRAMS/M^2
BH CV ECHO MEAS - BZI_METRIC_HEIGHT: 154.9 CM
BH CV ECHO MEAS - BZI_METRIC_WEIGHT: 56.7 KG
BH CV ECHO MEAS - CONTRAST EF (2CH): 60.6 ML/M^2
BH CV ECHO MEAS - CONTRAST EF 4CH: 62.2 ML/M^2
BH CV ECHO MEAS - EDV(CUBED): 59.3 ML
BH CV ECHO MEAS - EDV(MOD-SP2): 33 ML
BH CV ECHO MEAS - EDV(MOD-SP4): 37 ML
BH CV ECHO MEAS - EDV(TEICH): 65.9 ML
BH CV ECHO MEAS - EF(CUBED): 73 %
BH CV ECHO MEAS - EF(MOD-BP): 62 %
BH CV ECHO MEAS - EF(MOD-SP2): 60.6 %
BH CV ECHO MEAS - EF(MOD-SP4): 62.2 %
BH CV ECHO MEAS - EF(TEICH): 65.4 %
BH CV ECHO MEAS - ESV(CUBED): 16 ML
BH CV ECHO MEAS - ESV(MOD-SP2): 13 ML
BH CV ECHO MEAS - ESV(MOD-SP4): 14 ML
BH CV ECHO MEAS - ESV(TEICH): 22.8 ML
BH CV ECHO MEAS - FS: 35.3 %
BH CV ECHO MEAS - IVS/LVPW: 0.85
BH CV ECHO MEAS - IVSD: 1.1 CM
BH CV ECHO MEAS - LAT PEAK E' VEL: 5.8 CM/SEC
BH CV ECHO MEAS - LV DIASTOLIC VOL/BSA (35-75): 23.9 ML/M^2
BH CV ECHO MEAS - LV MASS(C)D: 126.6 GRAMS
BH CV ECHO MEAS - LV MASS(C)DI: 81.8 GRAMS/M^2
BH CV ECHO MEAS - LV SYSTOLIC VOL/BSA (12-30): 9 ML/M^2
BH CV ECHO MEAS - LVIDD: 3.9 CM
BH CV ECHO MEAS - LVIDS: 2.6 CM
BH CV ECHO MEAS - LVLD AP2: 6.4 CM
BH CV ECHO MEAS - LVLD AP4: 6 CM
BH CV ECHO MEAS - LVLS AP2: 5.4 CM
BH CV ECHO MEAS - LVLS AP4: 4.9 CM
BH CV ECHO MEAS - LVOT AREA (M): 3.1 CM^2
BH CV ECHO MEAS - LVOT AREA: 3 CM^2
BH CV ECHO MEAS - LVOT DIAM: 2 CM
BH CV ECHO MEAS - LVPWD: 1.1 CM
BH CV ECHO MEAS - MED PEAK E' VEL: 5.9 CM/SEC
BH CV ECHO MEAS - MV A MAX VEL: 81.4 CM/SEC
BH CV ECHO MEAS - MV DEC TIME: 0.19 SEC
BH CV ECHO MEAS - MV E MAX VEL: 94.3 CM/SEC
BH CV ECHO MEAS - MV E/A: 1.2
BH CV ECHO MEAS - PA ACC SLOPE: 363 CM/SEC^2
BH CV ECHO MEAS - PA ACC TIME: 0.14 SEC
BH CV ECHO MEAS - PA MAX PG: 1 MMHG
BH CV ECHO MEAS - PA PR(ACCEL): 17.2 MMHG
BH CV ECHO MEAS - PA V2 MAX: 50.3 CM/SEC
BH CV ECHO MEAS - RAP SYSTOLE: 5 MMHG
BH CV ECHO MEAS - RVSP: 29 MMHG
BH CV ECHO MEAS - SI(CUBED): 28 ML/M^2
BH CV ECHO MEAS - SI(MOD-SP2): 12.9 ML/M^2
BH CV ECHO MEAS - SI(MOD-SP4): 14.9 ML/M^2
BH CV ECHO MEAS - SI(TEICH): 27.8 ML/M^2
BH CV ECHO MEAS - SV(CUBED): 43.2 ML
BH CV ECHO MEAS - SV(MOD-SP2): 20 ML
BH CV ECHO MEAS - SV(MOD-SP4): 23 ML
BH CV ECHO MEAS - SV(TEICH): 43.1 ML
BH CV ECHO MEAS - TAPSE (>1.6): 2.73 CM2
BH CV ECHO MEAS - TR MAX VEL: 243 CM/SEC
BH CV ECHO MEASUREMENTS AVERAGE E/E' RATIO: 16.12
BH CV XLRA - RV BASE: 2.3 CM
BH CV XLRA - RV LENGTH: 4.8 CM
BH CV XLRA - RV MID: 1.4 CM
BH CV XLRA - TDI S': 14.8 CM/SEC
BILIRUB SERPL-MCNC: 2.2 MG/DL (ref 0.3–1.2)
BUN BLD-MCNC: 30 MG/DL (ref 9–23)
BUN/CREAT SERPL: 27.8 (ref 7–25)
CALCIUM SPEC-SCNC: 7.9 MG/DL (ref 8.7–10.4)
CHLORIDE SERPL-SCNC: 103 MMOL/L (ref 99–109)
CO2 SERPL-SCNC: 15 MMOL/L (ref 20–31)
CORTIS PM SERPL-MCNC: 88.76 MCG/DL (ref 3.1–16.7)
CREAT BLD-MCNC: 1.08 MG/DL (ref 0.6–1.3)
D-LACTATE SERPL-SCNC: 3.6 MMOL/L (ref 0.5–2)
D-LACTATE SERPL-SCNC: 6 MMOL/L (ref 0.5–2)
DEPRECATED RDW RBC AUTO: 39 FL (ref 37–54)
EOSINOPHIL # BLD AUTO: 0.01 10*3/MM3 (ref 0–0.3)
EOSINOPHIL # BLD MANUAL: 0 10*3/MM3 (ref 0.1–0.3)
EOSINOPHIL NFR BLD AUTO: 0 % (ref 0–3)
EOSINOPHIL NFR BLD MANUAL: 0 % (ref 0–3)
ERYTHROCYTE [DISTWIDTH] IN BLOOD BY AUTOMATED COUNT: 12.6 % (ref 11.3–14.5)
GFR SERPL CREATININE-BSD FRML MDRD: 49 ML/MIN/1.73
GLOBULIN UR ELPH-MCNC: 2.3 GM/DL
GLUCOSE BLD-MCNC: 75 MG/DL (ref 70–100)
HCT VFR BLD AUTO: 32.8 % (ref 34.5–44)
HGB BLD-MCNC: 11.7 G/DL (ref 11.5–15.5)
IMM GRANULOCYTES # BLD: 2.46 10*3/MM3 (ref 0–0.03)
IMM GRANULOCYTES NFR BLD: 8.3 % (ref 0–0.6)
LEFT ATRIUM VOLUME INDEX: 13.5 ML/M2
LV EF 2D ECHO EST: 65 %
LYMPHOCYTES # BLD AUTO: 0.4 10*3/MM3 (ref 0.6–4.8)
LYMPHOCYTES # BLD MANUAL: 1.18 10*3/MM3 (ref 0.6–4.8)
LYMPHOCYTES NFR BLD AUTO: 1.4 % (ref 24–44)
LYMPHOCYTES NFR BLD MANUAL: 0 % (ref 0–12)
LYMPHOCYTES NFR BLD MANUAL: 4 % (ref 24–44)
MAGNESIUM SERPL-MCNC: 1.9 MG/DL (ref 1.3–2.7)
MAXIMAL PREDICTED HEART RATE: 138 BPM
MCH RBC QN AUTO: 30.5 PG (ref 27–31)
MCHC RBC AUTO-ENTMCNC: 35.7 G/DL (ref 32–36)
MCV RBC AUTO: 85.6 FL (ref 80–99)
MONOCYTES # BLD AUTO: 0 10*3/MM3 (ref 0–1)
MONOCYTES # BLD AUTO: 1.08 10*3/MM3 (ref 0–1)
MONOCYTES NFR BLD AUTO: 3.7 % (ref 0–12)
MRSA DNA SPEC QL NAA+PROBE: NEGATIVE
NEUTROPHILS # BLD AUTO: 27.96 10*3/MM3 (ref 1.5–8.3)
NEUTROPHILS # BLD AUTO: 28.3 10*3/MM3 (ref 1.5–8.3)
NEUTROPHILS NFR BLD AUTO: 94.8 % (ref 41–71)
NEUTROPHILS NFR BLD MANUAL: 86 % (ref 41–71)
NEUTS BAND NFR BLD MANUAL: 10 % (ref 0–5)
PHOSPHATE SERPL-MCNC: 1.8 MG/DL (ref 2.4–5.1)
PLAT MORPH BLD: NORMAL
PLATELET # BLD AUTO: 150 10*3/MM3 (ref 150–450)
PMV BLD AUTO: 11.3 FL (ref 6–12)
POTASSIUM BLD-SCNC: 3 MMOL/L (ref 3.5–5.5)
PROT SERPL-MCNC: 5.9 G/DL (ref 5.7–8.2)
RBC # BLD AUTO: 3.83 10*6/MM3 (ref 3.89–5.14)
RBC MORPH BLD: NORMAL
SODIUM BLD-SCNC: 133 MMOL/L (ref 132–146)
STRESS TARGET HR: 117 BPM
VANCOMYCIN SERPL-MCNC: 11.5 MCG/ML (ref 5–40)
WBC MORPH BLD: NORMAL
WBC NRBC COR # BLD: 29.48 10*3/MM3 (ref 3.5–10.8)

## 2018-06-26 PROCEDURE — 99291 CRITICAL CARE FIRST HOUR: CPT | Performed by: INTERNAL MEDICINE

## 2018-06-26 PROCEDURE — 25010000002 IOPAMIDOL 61 % SOLUTION: Performed by: INTERNAL MEDICINE

## 2018-06-26 PROCEDURE — 25010000002 MAGNESIUM SULFATE 2 GM/50ML SOLUTION: Performed by: NURSE PRACTITIONER

## 2018-06-26 PROCEDURE — 74178 CT ABD&PLV WO CNTR FLWD CNTR: CPT

## 2018-06-26 PROCEDURE — 25010000002 HEPARIN (PORCINE) PER 1000 UNITS: Performed by: NURSE PRACTITIONER

## 2018-06-26 PROCEDURE — 25010000002 VANCOMYCIN

## 2018-06-26 PROCEDURE — 84100 ASSAY OF PHOSPHORUS: CPT | Performed by: NURSE PRACTITIONER

## 2018-06-26 PROCEDURE — 71045 X-RAY EXAM CHEST 1 VIEW: CPT

## 2018-06-26 PROCEDURE — 80053 COMPREHEN METABOLIC PANEL: CPT | Performed by: NURSE PRACTITIONER

## 2018-06-26 PROCEDURE — 85025 COMPLETE CBC W/AUTO DIFF WBC: CPT | Performed by: NURSE PRACTITIONER

## 2018-06-26 PROCEDURE — 93306 TTE W/DOPPLER COMPLETE: CPT | Performed by: INTERNAL MEDICINE

## 2018-06-26 PROCEDURE — 25010000002 PIPERACILLIN SOD-TAZOBACTAM PER 1 G: Performed by: INTERNAL MEDICINE

## 2018-06-26 PROCEDURE — 83605 ASSAY OF LACTIC ACID: CPT | Performed by: INTERNAL MEDICINE

## 2018-06-26 PROCEDURE — 83735 ASSAY OF MAGNESIUM: CPT | Performed by: NURSE PRACTITIONER

## 2018-06-26 PROCEDURE — 76705 ECHO EXAM OF ABDOMEN: CPT

## 2018-06-26 PROCEDURE — 99223 1ST HOSP IP/OBS HIGH 75: CPT | Performed by: INTERNAL MEDICINE

## 2018-06-26 PROCEDURE — 93306 TTE W/DOPPLER COMPLETE: CPT

## 2018-06-26 PROCEDURE — 80202 ASSAY OF VANCOMYCIN: CPT | Performed by: INTERNAL MEDICINE

## 2018-06-26 PROCEDURE — 87641 MR-STAPH DNA AMP PROBE: CPT | Performed by: INTERNAL MEDICINE

## 2018-06-26 RX ORDER — POTASSIUM CHLORIDE 1.5 G/1.77G
40 POWDER, FOR SOLUTION ORAL ONCE
Status: COMPLETED | OUTPATIENT
Start: 2018-06-26 | End: 2018-06-26

## 2018-06-26 RX ORDER — ASPIRIN 81 MG/1
81 TABLET, CHEWABLE ORAL DAILY
Status: DISCONTINUED | OUTPATIENT
Start: 2018-06-26 | End: 2018-06-27 | Stop reason: HOSPADM

## 2018-06-26 RX ORDER — POTASSIUM CHLORIDE 750 MG/1
40 CAPSULE, EXTENDED RELEASE ORAL EVERY 4 HOURS
Status: DISCONTINUED | OUTPATIENT
Start: 2018-06-26 | End: 2018-06-26 | Stop reason: CLARIF

## 2018-06-26 RX ORDER — MIDODRINE HYDROCHLORIDE 5 MG/1
10 TABLET ORAL EVERY 8 HOURS
Status: DISCONTINUED | OUTPATIENT
Start: 2018-06-26 | End: 2018-06-27 | Stop reason: HOSPADM

## 2018-06-26 RX ORDER — MAGNESIUM SULFATE HEPTAHYDRATE 40 MG/ML
2 INJECTION, SOLUTION INTRAVENOUS ONCE
Status: COMPLETED | OUTPATIENT
Start: 2018-06-26 | End: 2018-06-26

## 2018-06-26 RX ADMIN — ASPIRIN 81 MG CHEWABLE TABLET 81 MG: 81 TABLET CHEWABLE at 09:43

## 2018-06-26 RX ADMIN — TAZOBACTAM SODIUM AND PIPERACILLIN SODIUM 4.5 G: 500; 4 INJECTION, SOLUTION INTRAVENOUS at 22:10

## 2018-06-26 RX ADMIN — IOPAMIDOL 75 ML: 612 INJECTION, SOLUTION INTRAVENOUS at 11:15

## 2018-06-26 RX ADMIN — NOREPINEPHRINE BITARTRATE 0.24 MCG/KG/MIN: 8 SOLUTION at 04:05

## 2018-06-26 RX ADMIN — SODIUM CHLORIDE, POTASSIUM CHLORIDE, SODIUM LACTATE AND CALCIUM CHLORIDE 150 ML/HR: 600; 310; 30; 20 INJECTION, SOLUTION INTRAVENOUS at 03:51

## 2018-06-26 RX ADMIN — POTASSIUM PHOSPHATE, MONOBASIC AND POTASSIUM PHOSPHATE, DIBASIC 15 MMOL: 224; 236 INJECTION, SOLUTION INTRAVENOUS at 11:45

## 2018-06-26 RX ADMIN — TAZOBACTAM SODIUM AND PIPERACILLIN SODIUM 2.25 G: 250; 2 INJECTION, SOLUTION INTRAVENOUS at 02:04

## 2018-06-26 RX ADMIN — TAZOBACTAM SODIUM AND PIPERACILLIN SODIUM 4.5 G: 500; 4 INJECTION, SOLUTION INTRAVENOUS at 14:21

## 2018-06-26 RX ADMIN — TAZOBACTAM SODIUM AND PIPERACILLIN SODIUM 2.25 G: 250; 2 INJECTION, SOLUTION INTRAVENOUS at 08:22

## 2018-06-26 RX ADMIN — HEPARIN SODIUM 5000 UNITS: 5000 INJECTION, SOLUTION INTRAVENOUS; SUBCUTANEOUS at 22:10

## 2018-06-26 RX ADMIN — POTASSIUM CHLORIDE 40 MEQ: 1.5 POWDER, FOR SOLUTION ORAL at 14:21

## 2018-06-26 RX ADMIN — VANCOMYCIN HYDROCHLORIDE 750 MG: 750 INJECTION, POWDER, LYOPHILIZED, FOR SOLUTION INTRAVENOUS at 17:02

## 2018-06-26 RX ADMIN — MAGNESIUM SULFATE HEPTAHYDRATE 2 G: 40 INJECTION, SOLUTION INTRAVENOUS at 08:23

## 2018-06-26 RX ADMIN — SODIUM CHLORIDE, POTASSIUM CHLORIDE, SODIUM LACTATE AND CALCIUM CHLORIDE 150 ML/HR: 600; 310; 30; 20 INJECTION, SOLUTION INTRAVENOUS at 18:52

## 2018-06-26 RX ADMIN — SODIUM CHLORIDE, POTASSIUM CHLORIDE, SODIUM LACTATE AND CALCIUM CHLORIDE 1000 ML: 600; 310; 30; 20 INJECTION, SOLUTION INTRAVENOUS at 09:42

## 2018-06-26 RX ADMIN — POTASSIUM CHLORIDE 40 MEQ: 750 CAPSULE, EXTENDED RELEASE ORAL at 08:22

## 2018-06-26 RX ADMIN — MIDODRINE HYDROCHLORIDE 10 MG: 5 TABLET ORAL at 17:02

## 2018-06-26 RX ADMIN — MIDODRINE HYDROCHLORIDE 10 MG: 5 TABLET ORAL at 09:43

## 2018-06-26 RX ADMIN — HEPARIN SODIUM 5000 UNITS: 5000 INJECTION, SOLUTION INTRAVENOUS; SUBCUTANEOUS at 08:22

## 2018-06-26 NOTE — PLAN OF CARE
Problem: Skin Injury Risk (Adult)  Goal: Identify Related Risk Factors and Signs and Symptoms  Outcome: Outcome(s) achieved Date Met: 06/26/18

## 2018-06-26 NOTE — PROGRESS NOTES
Clinical Nutrition Note      Patient Name: Susan Tucker  MRN: [id number]  Admission date: 6/25/2018      Reason for Assessment:  Multidisciplinary Rounds    Additional information obtained during MDR: RN reports pt adm w/ sepsis , UTI, fever and confusion; weaning levophed,K+, phos, Mg++ replaced; tests pending including CT of abdomen.    Current diet: NPO Diet    Pertinent medical data reviewed    Intervention:  Plan of care and goals reviewed    Monitor:  RD to follow per protocol      Aishwarya Freedman RD  06/26/18 10:18 AM  Time: 20min

## 2018-06-26 NOTE — PROGRESS NOTES
Discharge Planning Assessment  [facility]     Patient Name: Susan Tucker  MRN: [id number]  Today's Date: 6/26/2018    Admit Date: 6/25/2018          Discharge Needs Assessment     Row Name 06/26/18 0950       Living Environment    Lives With alone    Current Living Arrangements home/apartment/condo   Lives in 1 level home in [place] alone.  Her family live close and can assist her.    Primary Care Provided by self    Provides Primary Care For no one, unable/limited ability to care for self    Family Caregiver if Needed none    Quality of Family Relationships supportive       Resource/Environmental Concerns    Resource/Environmental Concerns none    Transportation Concerns car, none       Transition Planning    Patient/Family Anticipates Transition to home with family    Patient/Family Anticipated Services at Transition none    Transportation Anticipated family or friend will provide       Discharge Needs Assessment    Readmission Within the Last 30 Days no previous admission in last 30 days    Concerns to be Addressed adjustment to diagnosis/illness;basic needs    Equipment Currently Used at Home none            Discharge Plan     Row Name 06/26/18 0952       Plan    Plan Home c family    Patient/Family in Agreement with Plan yes    Plan Comments Talked to Ms. Tucker at .  She lives alone in [place], but large family live nearby.  Her daughter works at [employer].   She is independent c ADL's.  She has never used/needed DME, HH or Home O2.  Her pharmacy is [address]  Her insurance covers cost of her medications.  Her PCP is Dr. Marita George.  Her goal is to return home c assist from family.  We discussed possible need for rehab and she is in agreement to go to rehab facility if needed.   CM will follow for needs.     Final Discharge Disposition Code 01 - home or self-care        Destination     No service coordination in this encounter.      Durable Medical Equipment     No service coordination in this  encounter.      Dialysis/Infusion     No service coordination in this encounter.      Home Medical Care     No service coordination in this encounter.      Social Care     No service coordination in this encounter.                Demographic Summary     Row Name 06/26/18 0738       General Information    Admission Type inpatient    Arrived From emergency department    Referral Source admission list    Reason for Consult discharge planning    Preferred Language English    General Information Comments His PCP is Dr. Marita George.       Contact Information    Permission Granted to Share Info With             Functional Status     Row Name 06/26/18 0949       Functional Status    Usual Activity Tolerance good    Current Activity Tolerance fair       Functional Status, IADL    Medications independent    Meal Preparation independent    Housekeeping independent    Laundry independent    Shopping independent       Mental Status Summary    Recent Changes in Mental Status/Cognitive Functioning ability to speak clearly       Employment/    Employment Status retired            Psychosocial    No documentation.           Abuse/Neglect    No documentation.           Legal    No documentation.           Substance Abuse    No documentation.           Patient Forms    No documentation.         Nanci Shirley RN

## 2018-06-26 NOTE — PROGRESS NOTES
INTENSIVIST / PULMONARY FOLLOW UP NOTE     Hospital:  LOS: 1 day   Ms. Susan Tucker, 83 y.o. female is followed for:   Principal Problem:    Sepsis  Active Problems:    Elevated liver enzymes    Dehydration    Hypertension    History of Stroke    UTI (urinary tract infection)          SUBJECTIVE   Feeling better, more alert, remains on levophed    The patient's relevant past medical, surgical, family, and social history were reviewed    Allergies and medications were reviewed    ROS:  Per subjective, all other systems were reviewed and were negative        OBJECTIVE     Vital Sign Min/Max for last 24 hours:  Temp  Min: 97.8 °F (36.6 °C)  Max: 103.8 °F (39.9 °C)   BP  Min: 63/30  Max: 179/67   Pulse  Min: 78  Max: 126   Resp  Min: 16  Max: 24   SpO2  Min: 89 %  Max: 100 %   No Data Recorded     Physical Exam:  General Appearance:  Conversant, in no acute distress  Eyes:  No scleral icterus or pallor, pupils normal  Ears, Nose, Mouth, Throat:  Atraumatic, oropharynx clear  Neck:  Trachea midline, thyroid normal  Respiratory:  Clear to auscultation bilaterally, normal effort, no tenderness to palpation  Cardiovascular:  Regular rate and rhythm, no murmurs, no peripheral edema, no thrill  Gastrointestinal:  Soft, non-tender, non-distended, no hepatosplenomegaly  Skin:  Normal temperature, no rash  Psychiatric:  Alert, confused  Neuro:  No new focal neurologic deficits observed    Telemetry:              Hemodynamics:   CVP:     PAP:     PAOP:     CO:     CI:     SVI:     SVR:       SpO2: 99 % SpO2  Min: 89 %  Max: 100 %   Device:      Flow Rate:   No Data Recorded     Mechanical Ventilator Settings:                                         Intake/Ouptut 24 hrs (7:00AM - 6:59 AM)  Intake & Output (last 3 days)       06/23 0701 - 06/24 0700 06/24 0701 - 06/25 0700 06/25 0701 - 06/26 0700 06/26 0701 - 06/27 0700    I.V. (mL/kg)   2386.2 (41.8)     IV Piggyback   2719.6     Total Intake(mL/kg)   5105.8 (89.4)     Urine  (mL/kg/hr)   580     Stool   0     Total Output     580      Net     +4525.8              Unmeasured Stool Occurrence   1 x           Lines, Drains & Airways    Active LDAs     Name:   Placement date:   Placement time:   Site:   Days:    Peripheral IV 06/25/18 1935 Right Forearm  06/25/18 1935    Forearm    less than 1    Peripheral IV 06/26/18 0015 Right Antecubital  06/26/18 0015    Antecubital    less than 1    Urethral Catheter Temperature probe  06/25/18 1945      less than 1                Hematology:    Results from last 7 days  Lab Units 06/26/18  0431 06/25/18  1646   WBC 10*3/mm3 29.48* 2.50*   HEMOGLOBIN g/dL 11.7 13.0   HEMATOCRIT % 32.8* 36.3   PLATELETS 10*3/mm3 150 135*   MONOCYTES % % 0.0  --      Electrolytes, Magnesium and Phosphorus:    Results from last 7 days  Lab Units 06/26/18  0431 06/25/18  1647   SODIUM mmol/L 133 129*   CHLORIDE mmol/L 103 96*   POTASSIUM mmol/L 3.0* 3.6   CO2 mmol/L 15.0* 13.0*   MAGNESIUM mg/dL 1.9 2.0   PHOSPHORUS mg/dL 1.8*  --      Renal:    Results from last 7 days  Lab Units 06/26/18  0431 06/25/18  1647   CREATININE mg/dL 1.08 0.78   BUN mg/dL 30* 33*     Estimated Creatinine Clearance: 36.2 mL/min (by C-G formula based on SCr of 1.08 mg/dL).  Hepatic:    Results from last 7 days  Lab Units 06/26/18  0431 06/25/18  1647   ALK PHOS U/L 137* 299*   BILIRUBIN mg/dL 2.2* 2.0*   ALT (SGPT) U/L 98* 97*   AST (SGOT) U/L 185* 153*     Arterial Blood Gases:          Results from last 7 days  Lab Units 06/25/18  1646   HEMOGLOBIN A1C % 6.00*       Lab Results   Component Value Date    LACTATE 3.6 (C) 06/26/2018       Relevant imaging studies and labs from 06/26/18 were reviewed and interpreted by me    Medications (drips):    lactated ringers Last Rate: 150 mL/hr (06/26/18 0351)   norepinephrine Last Rate: 0.07 mcg/kg/min (06/26/18 0823)         heparin (porcine) 5,000 Units Subcutaneous Q12H   lactated ringers 1,000 mL Intravenous Once   midodrine 10 mg Oral Q8H    piperacillin-tazobactam 2.25 g Intravenous Q6H   potassium chloride 40 mEq Oral Q4H   potassium phosphate 15 mmol Intravenous Once       Assessment/Plan   IMPRESSION / PLAN     Inpatient Problem List:  83 y.o.female:  Hospital Problem List     * (Principal)Sepsis    Elevated liver enzymes    Dehydration    Hypertension    History of Stroke    UTI (urinary tract infection)           Impression:  83 y.o.female with relevant PMH of HTN & CVA admitted 6/25/2018 w/ fever of 104, altered mental status, and septic shock.  UTI vs other etiologies being considered.    Plan:  Septic Shock - give additional 1 liter bolus of LR, try to wean off levophed, if not get PICC and start vit C protocol and fixed dose vaso.  Start po midodrine.  MRSA PCR if neg stop Vanco.  We don't have a clear source (UA contained squamous cells), she is hyperbilirubinemic will check RUQ US and CT Abdomen/Pelvis.    Encephalopathy - probably from underling sepsis, if no clear etiology found may end up needing LP    DVT/GI prophylaxis    Nutrition - NPO Diet    Plan of care and goals reviewed with mulitdisciplinary team at daily rounds    Critical Care time spent in direct patient care: 45 minutes (excluding procedure time, if applicable) including high complexity decision making to assess, manipulate, and support vital organ system failure in this individual who has impairment of one or more vital organ systems such that there is a high probability of imminent or life threatening deterioration in the patient’s condition.       Peter Jovel MD  Intensive Care Medicine  06/26/18 8:41 AM     Addendum:    Imaging c/w cholangitis / cholecystitis.  Fortunately she is responding to resuscitation, is on appropriate abx, and is now off pressors.  Will consult GI / Surgery for consideration of ERCP, Myra, etc at some point.    Peter Jovel MD  Pulmonology and Critical Care Medicine  06/26/18 1:26 PM  Electronically Signed

## 2018-06-26 NOTE — CONSULTS
[facility]    Referring Provider: No ref. provider found    Primary Care Provider: Marita George MD    Reason for Consultation: Choledocholithiasis    Chief complaint : Mental status changes    History of present illness:  Susan Tucker is a 83 y.o. female who is admitted with mental status changes and sepsis.  She was found have elevated LFTs and AST of 185 ALT 98 bilirubin of 2.2.  She subsequently underwent CT scan which showed common bile duct dilation as well as stones in common bile duct.  She is felt to have and hepatic abscess.  There was markedly gallbladder wall thickening.  She was found to have perinephric stranding and a right-sided hydroureter without obstructing focus identified.  She denies abdominal pain.  She denies fever and chills.  2 weeks ago she had 1 episode of vomiting.  No diarrhea.  Denies chest pain or shortness of breath.    Allergies:  Patient has no known allergies.    Scheduled Meds:    [START ON 6/27/2018] Pharmacy Consult  Does not apply Once   aspirin 81 mg Oral Daily   heparin (porcine) 5,000 Units Subcutaneous Q12H   midodrine 10 mg Oral Q8H   piperacillin-tazobactam 4.5 g Intravenous Q8H   vancomycin (dosing per levels)  Does not apply Daily   vancomycin 750 mg Intravenous Once        Infusions:    lactated ringers 150 mL/hr Last Rate: 150 mL/hr (06/26/18 0351)   norepinephrine 0.02-0.3 mcg/kg/min Last Rate: Stopped (06/26/18 1219)   Pharmacy to dose vancomycin         PRN Meds:  acetaminophen  •  Pharmacy to dose vancomycin  •  sodium chloride    Home Meds:  Prescriptions Prior to Admission   Medication Sig Dispense Refill Last Dose   • alendronate (FOSAMAX) 70 MG tablet Take 70 mg by mouth Every 7 (Seven) Days.      • aspirin 81 MG EC tablet Take 81 mg by mouth Daily.      • fenofibrate (TRICOR) 145 MG tablet Take 145 mg by mouth Daily.      • lisinopril-hydrochlorothiazide (PRINZIDE,ZESTORETIC) 20-12.5 MG per tablet Take 1 tablet by mouth Daily.      • lisinopril  "(PRINIVIL,ZESTRIL) 10 MG tablet Take 10 mg by mouth Daily.          ROS: Review of Systems   Gastrointestinal: Positive for nausea and vomiting.   Psychiatric/Behavioral: Positive for confusion.   All other systems reviewed and are negative.      PAST MED HX: Pt  has a past medical history of Hypertension and Stroke.  PAST SURG HX: Pt  has no past surgical history on file.  FAM HX: family history is not on file.  SOC HX: Pt  reports that she has never smoked. She has never used smokeless tobacco. She reports that she does not drink alcohol or use drugs.    /71   Pulse 77   Temp 98.1 °F (36.7 °C) (Oral)   Resp 18   Ht 154.9 cm (60.98\")   Wt 56.7 kg (125 lb)   SpO2 94%   BMI 23.63 kg/m²     Physical Exam  Wt Readings from Last 3 Encounters:   06/26/18 56.7 kg (125 lb)   ,body mass index is 23.63 kg/m².    General Well developed; well nourished; no acute distress.   ENT  Oral mucosa pink & moist without thrush or lesions.    Neck Neck supple; trachea midline. No thyromegaly  Resp CTA; no rhonchi, rales, or wheezes.  Respiration effort normal  CV RRR; ; no M/R/G. No lower extremity edema  GI Abd soft, NT, ND, normal active bowel sounds.  No HSM.  No abd hernia  Skin No rash; no lesions; no bruises.  Skin turgor normal  Musc No clubbing; no cyanosis.    Psych Oriented to time, place, and person.  Appropriate affect      Results Review:   I reviewed the patient's new clinical results.  I reviewed the patient's new imaging results and agree with the interpretation.    Lab Results   Component Value Date    WBC 29.48 (H) 06/26/2018    HGB 11.7 06/26/2018    HCT 32.8 (L) 06/26/2018    MCV 85.6 06/26/2018     06/26/2018       Lab Results   Component Value Date    GLUCOSE 75 06/26/2018    BUN 30 (H) 06/26/2018    CREATININE 1.08 06/26/2018    EGFRIFNONA 49 (L) 06/26/2018    BCR 27.8 (H) 06/26/2018    CO2 15.0 (L) 06/26/2018    CALCIUM 7.9 (L) 06/26/2018    ALBUMIN 3.63 06/26/2018    LABIL2 1.6 06/26/2018    "  (H) 06/26/2018    ALT 98 (H) 06/26/2018       ASSESSMENTS/PLANS    #1 sepsis  #2 choledocholithiasis/cholelithiasis  #3 cholecystitis  #4 hepatic abscess      Patient looks remarkably well considering her laboratory and CT  studies.  Will plan ERCP for tomorrow.  Risk and benefits explained in detail including incomplete cannulation, reaction to medicine, bleeding, perforation, pancreatitis with associated one in 1000 risk of death.  Will need aspiration and drain tube of liver abscess tomorrow after ERCP.  I reviewed films with radiologist.        I discussed the patients findings and my recommendations with patient and nursing staff    Liam Manning MD  06/26/18  3:53 PM

## 2018-06-26 NOTE — PROGRESS NOTES
Pharmacy Consult-Vancomycin Dosing  Susan Tucker is a  83 y.o. female receiving vancomycin therapy with relevant PMH of HTN & CVA admitted 6/25/2018 w/ fever of 104, altered mental status, and septic shock.  UTI vs other etiologies being considered.    Indication: sepsis  Consulting Provider:  Dr SALLIE Jovel  ID Consult: no  Goal Trough: 15-20    Current Antimicrobial Therapy  Anti-Infectives       Ordered     Dose/Rate Route Frequency Start Stop    06/26/18 0925  piperacillin-tazobactam (ZOSYN) 4.5 g in iso-osmotic dextrose 100 mL IVPB (premix)     Ordering Provider:  Peter Jovel MD    4.5 g  over 4 Hours Intravenous Every 8 Hours 06/26/18 1400 07/05/18 1359    06/26/18 0922  vancomycin (dosing per levels)     Ordering Provider:  Melina Lee [facility]     Does not apply Daily 06/26/18 1000 07/10/18 0859    06/26/18 0922  Pharmacy to dose vancomycin     Ordering Provider:  Peter Jovel MD     Does not apply Continuous PRN 06/26/18 0921 07/10/18 0920    06/25/18 1730  vancomycin (VANCOCIN) in iso-osmotic dextrose IVPB 1 g (premix) 200 mL     Ordering Provider:  Jayy Navas MD    20 mg/kg × 54.4 kg  over 60 Minutes Intravenous Once 06/25/18 1732 06/25/18 1950    06/25/18 1730  piperacillin-tazobactam (ZOSYN) 4.5 g in iso-osmotic dextrose 100 mL IVPB (premix)     Ordering Provider:  Jayy Navas MD    4.5 g  over 30 Minutes Intravenous Once 06/25/18 1732 06/25/18 1836          Allergies  Allergies as of 06/25/2018   • (No Known Allergies)     Labs    Results from last 7 days     Lab Units 06/26/18  0431 06/25/18  1647   BUN mg/dL 30* 33*   CREATININE mg/dL 1.08 0.78   Serum creatinine: 1.08 mg/dL 06/26/18 0431  Estimated creatinine clearance: 36.2 mL/min    Results from last 7 days     Lab Units 06/26/18  0431 06/25/18  1646   WBC 10*3/mm3 29.48* 2.50*   Evaluation of Dosing     Last Dose Received in the ED/Outside Facility:   Vancomycin 1000 mg IV 6/25 at 1850    Ht - 154.9 cm  "(61\")  Wt - 57.1 kg (125 lb 14.1 oz)  I/O last 3 completed shifts:  In: 5105.8 [I.V.:2386.2; IV Piggyback:2719.6]  Out: 580 [Urine:580]    Microbiology and Radiology  Microbiology Results (last 10 days)       Procedure Component Value - Date/Time    Blood Culture - Blood, [[id number]]  (Normal) Collected:  06/25/18 1715    Lab Status:  Preliminary result Specimen:  Blood from Arm, Right Updated:  06/26/18 0530     Blood Culture No growth at less than 24 hours    Blood Culture - Blood, [[id number]]  (Normal) Collected:  06/25/18 1700    Lab Status:  Preliminary result Specimen:  Blood from Arm, Left Updated:  06/26/18 0530     Blood Culture No growth at less than 24 hours          Evaluation of Level    Results from last 7 days     Lab Units 06/26/18  0431   VANCOMYCIN RM mcg/mL 11.50     Doses Received:  Vancomycin 1000 mg IV 6/25 at 1850    Assessment/Plan:    1. Patient's SCr increasing, monitor renal function; septic shock; minimal UOP  NOTE:  MRSA PCR if neg stop Vanco.  Unclear etiology.  2. Patient received vancomycin last pm in ED  3. Random vancomycin level today 11.5  Recommend vancomycin 15 mg/kg dose today; recheck vancomycin level 6/27.  4. Pharmacy will follow closely and make adjustments as needed.    Thank you for consult,  Melina Lee, PharmD      "

## 2018-06-27 ENCOUNTER — APPOINTMENT (OUTPATIENT)
Dept: CT IMAGING | Facility: HOSPITAL | Age: 83
End: 2018-06-27

## 2018-06-27 ENCOUNTER — ANESTHESIA (OUTPATIENT)
Dept: GASTROENTEROLOGY | Facility: HOSPITAL | Age: 83
End: 2018-06-27

## 2018-06-27 ENCOUNTER — APPOINTMENT (OUTPATIENT)
Dept: GENERAL RADIOLOGY | Facility: HOSPITAL | Age: 83
End: 2018-06-27

## 2018-06-27 ENCOUNTER — ANESTHESIA EVENT (OUTPATIENT)
Dept: GASTROENTEROLOGY | Facility: HOSPITAL | Age: 83
End: 2018-06-27

## 2018-06-27 VITALS
BODY MASS INDEX: 23.6 KG/M2 | WEIGHT: 125 LBS | SYSTOLIC BLOOD PRESSURE: 171 MMHG | TEMPERATURE: 97.9 F | OXYGEN SATURATION: 97 % | HEIGHT: 61 IN | RESPIRATION RATE: 18 BRPM | HEART RATE: 82 BPM | DIASTOLIC BLOOD PRESSURE: 101 MMHG

## 2018-06-27 LAB
ALBUMIN SERPL-MCNC: 3.3 G/DL (ref 3.2–4.8)
ALBUMIN/GLOB SERPL: 1.6 G/DL (ref 1.5–2.5)
ALP SERPL-CCNC: 115 U/L (ref 25–100)
ALT SERPL W P-5'-P-CCNC: 104 U/L (ref 7–40)
ANION GAP SERPL CALCULATED.3IONS-SCNC: 9 MMOL/L (ref 3–11)
APTT PPP: 33.6 SECONDS (ref 55–70)
AST SERPL-CCNC: 148 U/L (ref 0–33)
BASOPHILS # BLD AUTO: 0.01 10*3/MM3 (ref 0–0.2)
BASOPHILS NFR BLD AUTO: 0 % (ref 0–1)
BILIRUB SERPL-MCNC: 1.1 MG/DL (ref 0.3–1.2)
BUN BLD-MCNC: 20 MG/DL (ref 9–23)
BUN/CREAT SERPL: 36.4 (ref 7–25)
CALCIUM SPEC-SCNC: 8.3 MG/DL (ref 8.7–10.4)
CHLORIDE SERPL-SCNC: 105 MMOL/L (ref 99–109)
CO2 SERPL-SCNC: 20 MMOL/L (ref 20–31)
CREAT BLD-MCNC: 0.55 MG/DL (ref 0.6–1.3)
D-LACTATE SERPL-SCNC: 1.5 MMOL/L (ref 0.5–2)
DEPRECATED RDW RBC AUTO: 41.7 FL (ref 37–54)
EOSINOPHIL # BLD AUTO: 0.01 10*3/MM3 (ref 0–0.3)
EOSINOPHIL NFR BLD AUTO: 0 % (ref 0–3)
ERYTHROCYTE [DISTWIDTH] IN BLOOD BY AUTOMATED COUNT: 13 % (ref 11.3–14.5)
GFR SERPL CREATININE-BSD FRML MDRD: 106 ML/MIN/1.73
GLOBULIN UR ELPH-MCNC: 2.1 GM/DL
GLUCOSE BLD-MCNC: 63 MG/DL (ref 70–100)
GLUCOSE BLDC GLUCOMTR-MCNC: 132 MG/DL (ref 70–130)
GLUCOSE BLDC GLUCOMTR-MCNC: 80 MG/DL (ref 70–130)
HCT VFR BLD AUTO: 30.4 % (ref 34.5–44)
HGB BLD-MCNC: 10.5 G/DL (ref 11.5–15.5)
IMM GRANULOCYTES # BLD: 2.48 10*3/MM3 (ref 0–0.03)
IMM GRANULOCYTES NFR BLD: 9.8 % (ref 0–0.6)
INR PPP: 1.44 (ref 0.91–1.09)
LYMPHOCYTES # BLD AUTO: 0.67 10*3/MM3 (ref 0.6–4.8)
LYMPHOCYTES NFR BLD AUTO: 2.6 % (ref 24–44)
MAGNESIUM SERPL-MCNC: 2.5 MG/DL (ref 1.3–2.7)
MCH RBC QN AUTO: 29.6 PG (ref 27–31)
MCHC RBC AUTO-ENTMCNC: 34.5 G/DL (ref 32–36)
MCV RBC AUTO: 85.6 FL (ref 80–99)
MONOCYTES # BLD AUTO: 1.13 10*3/MM3 (ref 0–1)
MONOCYTES NFR BLD AUTO: 4.4 % (ref 0–12)
NEUTROPHILS # BLD AUTO: 23.6 10*3/MM3 (ref 1.5–8.3)
NEUTROPHILS NFR BLD AUTO: 93 % (ref 41–71)
PHOSPHATE SERPL-MCNC: 1.6 MG/DL (ref 2.4–5.1)
PLAT MORPH BLD: NORMAL
PLATELET # BLD AUTO: 154 10*3/MM3 (ref 150–450)
PMV BLD AUTO: 11.7 FL (ref 6–12)
POTASSIUM BLD-SCNC: 3.9 MMOL/L (ref 3.5–5.5)
PROCALCITONIN SERPL-MCNC: >100 NG/ML
PROT SERPL-MCNC: 5.4 G/DL (ref 5.7–8.2)
PROTHROMBIN TIME: 15.1 SECONDS (ref 9.6–11.5)
RBC # BLD AUTO: 3.55 10*6/MM3 (ref 3.89–5.14)
RBC MORPH BLD: NORMAL
SODIUM BLD-SCNC: 134 MMOL/L (ref 132–146)
WBC MORPH BLD: NORMAL
WBC NRBC COR # BLD: 25.42 10*3/MM3 (ref 3.5–10.8)

## 2018-06-27 PROCEDURE — 85730 THROMBOPLASTIN TIME PARTIAL: CPT | Performed by: INTERNAL MEDICINE

## 2018-06-27 PROCEDURE — 25010000002 PROPOFOL 10 MG/ML EMULSION: Performed by: NURSE ANESTHETIST, CERTIFIED REGISTERED

## 2018-06-27 PROCEDURE — 83605 ASSAY OF LACTIC ACID: CPT | Performed by: INTERNAL MEDICINE

## 2018-06-27 PROCEDURE — 87077 CULTURE AEROBIC IDENTIFY: CPT

## 2018-06-27 PROCEDURE — 25010000002 PIPERACILLIN SOD-TAZOBACTAM PER 1 G: Performed by: INTERNAL MEDICINE

## 2018-06-27 PROCEDURE — 85007 BL SMEAR W/DIFF WBC COUNT: CPT | Performed by: INTERNAL MEDICINE

## 2018-06-27 PROCEDURE — 80053 COMPREHEN METABOLIC PANEL: CPT | Performed by: INTERNAL MEDICINE

## 2018-06-27 PROCEDURE — C1769 GUIDE WIRE: HCPCS | Performed by: INTERNAL MEDICINE

## 2018-06-27 PROCEDURE — 25010000002 DEXAMETHASONE PER 1 MG: Performed by: NURSE ANESTHETIST, CERTIFIED REGISTERED

## 2018-06-27 PROCEDURE — 99291 CRITICAL CARE FIRST HOUR: CPT | Performed by: INTERNAL MEDICINE

## 2018-06-27 PROCEDURE — 84100 ASSAY OF PHOSPHORUS: CPT | Performed by: INTERNAL MEDICINE

## 2018-06-27 PROCEDURE — 83735 ASSAY OF MAGNESIUM: CPT | Performed by: INTERNAL MEDICINE

## 2018-06-27 PROCEDURE — 25010000002 NEOSTIGMINE 10 MG/10ML SOLUTION: Performed by: NURSE ANESTHETIST, CERTIFIED REGISTERED

## 2018-06-27 PROCEDURE — 74330 X-RAY BILE/PANC ENDOSCOPY: CPT

## 2018-06-27 PROCEDURE — 87181 SC STD AGAR DILUTION PER AGT: CPT

## 2018-06-27 PROCEDURE — 85610 PROTHROMBIN TIME: CPT | Performed by: INTERNAL MEDICINE

## 2018-06-27 PROCEDURE — 82962 GLUCOSE BLOOD TEST: CPT

## 2018-06-27 PROCEDURE — 25010000002 ONDANSETRON PER 1 MG: Performed by: NURSE ANESTHETIST, CERTIFIED REGISTERED

## 2018-06-27 PROCEDURE — 85025 COMPLETE CBC W/AUTO DIFF WBC: CPT | Performed by: INTERNAL MEDICINE

## 2018-06-27 PROCEDURE — 84145 PROCALCITONIN (PCT): CPT | Performed by: INTERNAL MEDICINE

## 2018-06-27 PROCEDURE — 25010000002 HEPARIN (PORCINE) PER 1000 UNITS: Performed by: NURSE PRACTITIONER

## 2018-06-27 PROCEDURE — 0FJD8ZZ INSPECTION OF PANCREATIC DUCT, VIA NATURAL OR ARTIFICIAL OPENING ENDOSCOPIC: ICD-10-PCS | Performed by: INTERNAL MEDICINE

## 2018-06-27 RX ORDER — PROPOFOL 10 MG/ML
VIAL (ML) INTRAVENOUS AS NEEDED
Status: DISCONTINUED | OUTPATIENT
Start: 2018-06-27 | End: 2018-06-27 | Stop reason: SURG

## 2018-06-27 RX ORDER — LIDOCAINE HYDROCHLORIDE 10 MG/ML
INJECTION, SOLUTION EPIDURAL; INFILTRATION; INTRACAUDAL; PERINEURAL AS NEEDED
Status: DISCONTINUED | OUTPATIENT
Start: 2018-06-27 | End: 2018-06-27 | Stop reason: SURG

## 2018-06-27 RX ORDER — LIDOCAINE HYDROCHLORIDE 10 MG/ML
0.5 INJECTION, SOLUTION EPIDURAL; INFILTRATION; INTRACAUDAL; PERINEURAL ONCE AS NEEDED
Status: DISCONTINUED | OUTPATIENT
Start: 2018-06-27 | End: 2018-06-27 | Stop reason: HOSPADM

## 2018-06-27 RX ORDER — GLYCOPYRROLATE 0.2 MG/ML
INJECTION INTRAMUSCULAR; INTRAVENOUS AS NEEDED
Status: DISCONTINUED | OUTPATIENT
Start: 2018-06-27 | End: 2018-06-27 | Stop reason: SURG

## 2018-06-27 RX ORDER — NEOSTIGMINE METHYLSULFATE 1 MG/ML
INJECTION, SOLUTION INTRAVENOUS AS NEEDED
Status: DISCONTINUED | OUTPATIENT
Start: 2018-06-27 | End: 2018-06-27 | Stop reason: SURG

## 2018-06-27 RX ORDER — DEXAMETHASONE SODIUM PHOSPHATE 4 MG/ML
INJECTION, SOLUTION INTRA-ARTICULAR; INTRALESIONAL; INTRAMUSCULAR; INTRAVENOUS; SOFT TISSUE AS NEEDED
Status: DISCONTINUED | OUTPATIENT
Start: 2018-06-27 | End: 2018-06-27 | Stop reason: SURG

## 2018-06-27 RX ORDER — ATRACURIUM BESYLATE 10 MG/ML
INJECTION, SOLUTION INTRAVENOUS AS NEEDED
Status: DISCONTINUED | OUTPATIENT
Start: 2018-06-27 | End: 2018-06-27 | Stop reason: SURG

## 2018-06-27 RX ORDER — DEXTROSE MONOHYDRATE 25 G/50ML
25 INJECTION, SOLUTION INTRAVENOUS
Status: DISCONTINUED | OUTPATIENT
Start: 2018-06-27 | End: 2018-06-27 | Stop reason: HOSPADM

## 2018-06-27 RX ORDER — HYDROMORPHONE HYDROCHLORIDE 1 MG/ML
0.5 INJECTION, SOLUTION INTRAMUSCULAR; INTRAVENOUS; SUBCUTANEOUS
Status: CANCELLED | OUTPATIENT
Start: 2018-06-27

## 2018-06-27 RX ORDER — SODIUM CHLORIDE 0.9 % (FLUSH) 0.9 %
1-10 SYRINGE (ML) INJECTION AS NEEDED
Status: DISCONTINUED | OUTPATIENT
Start: 2018-06-27 | End: 2018-06-27 | Stop reason: HOSPADM

## 2018-06-27 RX ORDER — FENTANYL CITRATE 50 UG/ML
50 INJECTION, SOLUTION INTRAMUSCULAR; INTRAVENOUS
Status: CANCELLED | OUTPATIENT
Start: 2018-06-27

## 2018-06-27 RX ORDER — ONDANSETRON 2 MG/ML
INJECTION INTRAMUSCULAR; INTRAVENOUS AS NEEDED
Status: DISCONTINUED | OUTPATIENT
Start: 2018-06-27 | End: 2018-06-27 | Stop reason: SURG

## 2018-06-27 RX ORDER — SODIUM CHLORIDE, SODIUM LACTATE, POTASSIUM CHLORIDE, CALCIUM CHLORIDE 600; 310; 30; 20 MG/100ML; MG/100ML; MG/100ML; MG/100ML
INJECTION, SOLUTION INTRAVENOUS CONTINUOUS PRN
Status: DISCONTINUED | OUTPATIENT
Start: 2018-06-27 | End: 2018-06-27 | Stop reason: SURG

## 2018-06-27 RX ORDER — NICOTINE POLACRILEX 4 MG
15 LOZENGE BUCCAL
Status: DISCONTINUED | OUTPATIENT
Start: 2018-06-27 | End: 2018-06-27 | Stop reason: HOSPADM

## 2018-06-27 RX ORDER — DEXTROSE MONOHYDRATE 100 MG/ML
50 INJECTION, SOLUTION INTRAVENOUS CONTINUOUS
Status: DISCONTINUED | OUTPATIENT
Start: 2018-06-27 | End: 2018-06-27 | Stop reason: HOSPADM

## 2018-06-27 RX ORDER — SODIUM CHLORIDE, SODIUM LACTATE, POTASSIUM CHLORIDE, CALCIUM CHLORIDE 600; 310; 30; 20 MG/100ML; MG/100ML; MG/100ML; MG/100ML
9 INJECTION, SOLUTION INTRAVENOUS CONTINUOUS
Status: DISCONTINUED | OUTPATIENT
Start: 2018-06-27 | End: 2018-06-27 | Stop reason: HOSPADM

## 2018-06-27 RX ORDER — ONDANSETRON 2 MG/ML
4 INJECTION INTRAMUSCULAR; INTRAVENOUS ONCE AS NEEDED
Status: CANCELLED | OUTPATIENT
Start: 2018-06-27

## 2018-06-27 RX ORDER — FAMOTIDINE 20 MG/1
20 TABLET, FILM COATED ORAL ONCE
Status: COMPLETED | OUTPATIENT
Start: 2018-06-27 | End: 2018-06-27

## 2018-06-27 RX ORDER — ESMOLOL HYDROCHLORIDE 10 MG/ML
INJECTION INTRAVENOUS AS NEEDED
Status: DISCONTINUED | OUTPATIENT
Start: 2018-06-27 | End: 2018-06-27 | Stop reason: SURG

## 2018-06-27 RX ADMIN — TAZOBACTAM SODIUM AND PIPERACILLIN SODIUM 4.5 G: 500; 4 INJECTION, SOLUTION INTRAVENOUS at 05:53

## 2018-06-27 RX ADMIN — GLYCOPYRROLATE 0.4 MG: 0.2 INJECTION, SOLUTION INTRAMUSCULAR; INTRAVENOUS at 09:18

## 2018-06-27 RX ADMIN — PROPOFOL 120 MG: 10 INJECTION, EMULSION INTRAVENOUS at 07:42

## 2018-06-27 RX ADMIN — MIDODRINE HYDROCHLORIDE 10 MG: 5 TABLET ORAL at 00:12

## 2018-06-27 RX ADMIN — SODIUM CHLORIDE, POTASSIUM CHLORIDE, SODIUM LACTATE AND CALCIUM CHLORIDE: 600; 310; 30; 20 INJECTION, SOLUTION INTRAVENOUS at 07:33

## 2018-06-27 RX ADMIN — NEOSTIGMINE METHYLSULFATE 3 MG: 1 INJECTION, SOLUTION INTRAVENOUS at 09:18

## 2018-06-27 RX ADMIN — ONDANSETRON 4 MG: 2 INJECTION INTRAMUSCULAR; INTRAVENOUS at 07:50

## 2018-06-27 RX ADMIN — ATRACURIUM BESYLATE 30 MG: 10 INJECTION, SOLUTION INTRAVENOUS at 07:42

## 2018-06-27 RX ADMIN — INDOMETHACIN 100 MG: 50 SUPPOSITORY RECTAL at 09:31

## 2018-06-27 RX ADMIN — DEXAMETHASONE SODIUM PHOSPHATE 8 MG: 4 INJECTION, SOLUTION INTRAMUSCULAR; INTRAVENOUS at 07:50

## 2018-06-27 RX ADMIN — FAMOTIDINE 20 MG: 20 TABLET, FILM COATED ORAL at 11:47

## 2018-06-27 RX ADMIN — HEPARIN SODIUM 5000 UNITS: 5000 INJECTION, SOLUTION INTRAVENOUS; SUBCUTANEOUS at 09:53

## 2018-06-27 RX ADMIN — LIDOCAINE HYDROCHLORIDE 50 MG: 10 INJECTION, SOLUTION EPIDURAL; INFILTRATION; INTRACAUDAL; PERINEURAL at 07:42

## 2018-06-27 RX ADMIN — ESMOLOL HYDROCHLORIDE 20 MG: 10 INJECTION INTRAVENOUS at 07:42

## 2018-06-27 RX ADMIN — ASPIRIN 81 MG CHEWABLE TABLET 81 MG: 81 TABLET CHEWABLE at 11:47

## 2018-06-27 RX ADMIN — DEXTROSE MONOHYDRATE 50 ML/HR: 100 INJECTION, SOLUTION INTRAVENOUS at 09:39

## 2018-06-27 NOTE — PROGRESS NOTES
Continued Stay Note  [facility]     Patient Name: Susan uTcker  MRN: [id number]  Today's Date: 6/27/2018    Admit Date: 6/25/2018          Discharge Plan     Row Name 06/27/18 1348       Plan    Plan [facility]    Plan Comments Transferring to [facility] [address], Rm # [id number] today at 1530 via [facility] ambulance.  Staff have ph # to call report.  Transferring for higher level care.      Final Discharge Disposition Code 02 - short term hospital for  care    Final Note Transferring to [facility] for higher level care via [facility] ambulance at 1530.               Discharge Codes    No documentation.       Expected Discharge Date and Time     Expected Discharge Date Expected Discharge Time    Jun 30, 2018             Nanci Shirley RN

## 2018-06-27 NOTE — PROGRESS NOTES
Clinical Nutrition Note      Patient Name: Susan Tucker  MRN: [id number]  Admission date: 6/25/2018      Reason for Assessment:  Multidisciplinary Rounds    Additional information obtained during MDR:  Pt in endo for ERCP  w/ probable CT guided drain of hepatic abscess afterwards.  Pt left unit prior to phosphorus replacement.  Plans for transfer to [facility] noted.    Current diet:  NPO    Pertinent medical data reviewed    Intervention:  Plan of care and goals reviewed    Monitor:  RD to follow per protocol      Aishwarya Freedman RD  06/27/18 2:25 PM  Time: 20min     NPO

## 2018-06-27 NOTE — CONSULTS
General Surgery Consultation Note    Date of Service: 6/26/2018    Susan Tucker  [id number]  1935      Referring Provider: Peter Jovel    Location of Consult: [facility]     Reason for Consultation: Gallbladder wall thickening       History of Present Illness:  I am seeing, Susan Tucker, in consultation for Peter Jovel regarding gallbladder wall thickening concerning for cholecystitis.  The patient is an 83 year old female admitted to [facility] on 6.25.2018.  On the morning of presentation, the patient developed altered mental status and was transported to the ED.  On presentation, she was found to be febrile and tachycardic with a clinical picture concerning for sepsis.  Bloodwork showed a mild transaminitis and hyperbilirubinemia (2.0).  Urinalysis was concerning for UTI.  She was admitted to the ICU with presumed sepsis of unknown etiology and started on pressors.  Subsequent CT scan performed today for evaluation of an infectious source showed a septated lesion in the posterior aspect of the right lobe, concerning for liver abscess.  There was a thickened gallbladder with surrounding pericholecystic fluid along with a dilated common bile duct with abrupt cut-off, concerning for choledocholithiasis.  On discussion with the patient, she denies any abdominal pain or discomfort.  There has been mild nausea without recent emesis.  She denies any recent travel out of the country.  She also denies any recent interaction with farm animals, including sheep.      Past Medical History:   Diagnosis Date   • Hypertension    • Stroke        Past Surgical History  Hysterectomy    Allergies  No Known Allergies    No current facility-administered medications on file prior to encounter.      No current outpatient prescriptions on file prior to encounter.         Current Facility-Administered Medications:   •  [START ON 6/27/2018] ! Nursing: Vancomycin trough due on 6/27 at 1700. Please hold 6/27 at 1800 dose  until notified by Pharmacy., , Does not apply, Once, Melina Lee [facility]  •  acetaminophen (TYLENOL) tablet 650 mg, 650 mg, Oral, Q4H PRN, Lonre Baird MD, 650 mg at 06/25/18 2210  •  aspirin chewable tablet 81 mg, 81 mg, Oral, Daily, Peter Jovel MD, 81 mg at 06/26/18 0943  •  heparin (porcine) 5000 UNIT/ML injection 5,000 Units, 5,000 Units, Subcutaneous, Q12H, JOESPH Hargrove, 5,000 Units at 06/26/18 0822  •  lactated ringers infusion, 150 mL/hr, Intravenous, Continuous, JOESPH Hargrove, Last Rate: 150 mL/hr at 06/26/18 1852, 150 mL/hr at 06/26/18 1852  •  midodrine (PROAMATINE) tablet 10 mg, 10 mg, Oral, Q8H, Peter Jovel MD, 10 mg at 06/26/18 1702  •  norepinephrine (LEVOPHED) 8 mg/250 mL (32 mcg/mL) in sodium chloride 0.9% infusion (premix), 0.02-0.3 mcg/kg/min, Intravenous, Titrated, Lorne Baird MD, Stopped at 06/26/18 1219  •  Pharmacy to dose vancomycin, , Does not apply, Continuous PRN, Peter Jovel MD  •  piperacillin-tazobactam (ZOSYN) 4.5 g in iso-osmotic dextrose 100 mL IVPB (premix), 4.5 g, Intravenous, Q8H, Peter Jovel MD, 4.5 g at 06/26/18 1421  •  sodium chloride 0.9 % flush 1-10 mL, 1-10 mL, Intravenous, PRN, JOESPH Hargrove  •  vancomycin (dosing per levels), , Does not apply, Daily, Melina Lee [facility]    Family History  History reviewed. No pertinent family history.    Social History     Social History   • Marital status:      Spouse name: N/A   • Number of children: N/A   • Years of education: N/A     Occupational History   • Not on file.     Social History Main Topics   • Smoking status: Never Smoker   • Smokeless tobacco: Never Used   • Alcohol use No   • Drug use: No   • Sexual activity: Not on file     Other Topics Concern   • Not on file     Social History Narrative   • No narrative on file       Review of Systems:  Review of Systems   Constitutional: Positive for appetite change and  "chills. Negative for fever.   Eyes: Negative.    Respiratory: Negative.    Cardiovascular: Negative.    Gastrointestinal: Positive for nausea and vomiting. Negative for abdominal pain.   Endocrine: Negative.    Genitourinary: Negative.    Musculoskeletal: Negative.    Skin: Negative.    Allergic/Immunologic: Negative.    Neurological: Negative.    Hematological: Negative.    Psychiatric/Behavioral: Negative.        /66 (BP Location: Right arm, Patient Position: Lying)   Pulse 79   Temp 98 °F (36.7 °C) (Oral)   Resp 22   Ht 154.9 cm (60.98\")   Wt 56.7 kg (125 lb)   SpO2 99%   BMI 23.63 kg/m²   Body mass index is 23.63 kg/m².    General: NAD, AAO x 3   HEENT: PER, no icterus, normal sclera.  Mucus membranes moist.  Neck supple without adenopathy.  Cardiac: Regular rate and rhythm without murmurs, rubs, or gallops.  Pulmonary: Clear to auscultation bilaterally without rales, ronchi, or wheezes.  Abdominal: Soft, tender to palpation in the epigastrium, ND.  Neurologic: CN II - XII grossly intact.  No focal neuro deficits.  Extremities: Warm and well perfused.  No edema.  Skin: No obvious rashes or worrisome lesions noted.    CBC    Results from last 7 days  Lab Units 06/26/18  0431   WBC 10*3/mm3 29.48*   HEMOGLOBIN g/dL 11.7   HEMATOCRIT % 32.8*   PLATELETS 10*3/mm3 150       CMP    Results from last 7 days  Lab Units 06/26/18  0431   SODIUM mmol/L 133   POTASSIUM mmol/L 3.0*   CHLORIDE mmol/L 103   CO2 mmol/L 15.0*   BUN mg/dL 30*   CREATININE mg/dL 1.08   CALCIUM mg/dL 7.9*   BILIRUBIN mg/dL 2.2*   ALK PHOS U/L 137*   ALT (SGPT) U/L 98*   AST (SGOT) U/L 185*   GLUCOSE mg/dL 75       Radiology  Imaging Results (last 72 hours)     Procedure Component Value Units Date/Time    US Gallbladder [[id number]] Collected:  06/26/18 1529     Updated:  06/26/18 1644    Narrative:       EXAMINATION: US GALLBLADDER- 06/26/2018     INDICATION: sepsis, hyperbilirubinemia; R41.82-Altered mental status,  unspecified; " A41.9-Sepsis, unspecified organism; D72.819-Decreased white  blood cell count, unspecified; E87.3-Mwcj-aqgitjdvfr and hyponatremia;  M62.82-Rhabdomyolysis         TECHNIQUE: Ultrasound of the right quadrant was performed in the  longitudinal and transverse planes.     COMPARISON: NONE     FINDINGS: The pancreas is normal. Liver reveals a normal and uniform  echogenic pattern.  There is activity within the gallbladder producing  acoustical shadowing consistent with stones. There is mild edema of the  gallbladder wall measuring 7 mm. The common bile duct is normal. The  right kidney measures 11.2 cm in length without evidence of mass, stone  or obstruction.       Impression:       Cholelithiasis with edema of the gallbladder wall measuring  7 mm.     D:  06/26/2018  E:  06/26/2018         This report was finalized on 6/26/2018 4:42 PM by Dr. Tristan aLy MD.       CT Abdomen Pelvis With & Without Contrast [[id number]] Collected:  06/26/18 1221     Updated:  06/26/18 1256    Narrative:       EXAMINATION: CT ABDOMEN AND PELVIS W WO CONTRAST-      INDICATION: R41.82-Altered mental status, unspecified; A41.9-Sepsis,  unspecified organism; D72.819-Decreased white blood cell count,  unspecified; E87.6-Dxbf-xnnqdmrszx and hyponatremia;  M62.82-Rhabdomyolysis.     TECHNIQUE:  Axial CT data of the abdomen and pelvis were acquired  helically before and following IV contrast administration. Multiplanar  reformatted images were generated and reviewed. The radiation dose  reduction device was turned on for each scan per the ALARA (As Low as  Reasonably Achievable) protocol     COMPARISONS:  None.     FINDINGS:  Cholelithiasis is noted with marked gallbladder wall  thickening. There are stones in the common bile duct which is dilated.  There is a large abscess in the posterior segment of the liver measuring  almost 7 cm. There is bilateral perinephric stranding, greater on the  right. There is mild right-sided hydroureter without  obstructing focus  identified. Excreted contrast in both kidneys and the urinary bladder  from prior administration noted. Alberts catheter is seen in the urinary  bladder. Pancreas, adrenal glands, and spleen are unremarkable. Status  post hysterectomy. No dilated small or large bowel. Mild low density  free fluid. Atherosclerosis. Small bilateral pleural effusions and  calcified granulomas in the lung bases. Mild body wall edema.       Impression:       1. Constellation of findings including gallbladder inflammatory change,  common bile duct dilatation and hepatic abscess compatible with  cholecystitis and ascending cholangitis.  2. Discussed with patient's nurse at 11:23 AM on 06/26/2018.     D:  06/26/2018  E:  06/26/2018     This report was finalized on 6/26/2018 12:54 PM by Brandt Dale.       XR Chest 1 View [[id number]] Collected:  06/26/18 0905     Updated:  06/26/18 1050    Narrative:       EXAMINATION: XR CHEST 1 VW- 06/26/2018     INDICATION: r/o infiltrates; R41.82-Altered mental status, unspecified;  A41.9-Sepsis, unspecified organism; D72.819-Decreased white blood cell  count, unspecified; E87.6-Agow-xyqbiklobs and hyponatremia;  M62.82-Rhabdomyolysis     TECHNIQUE:  Single view frontal chest.     COMPARISONS: 06/25/2018     FINDINGS:      Lungs are without focal opacity. No pleural effusion or  pneumothorax. Cardiomediastinal silhouette is unchanged.        Impression:       Stable exam without acute finding.     D:  06/26/2018  E:  06/26/2018     This report was finalized on 6/26/2018 10:48 AM by Brandt Dale.       XR Chest 1 View [[id number]] Collected:  06/26/18 0807     Updated:  06/26/18 0847    Narrative:       EXAMINATION: XR CHEST 1 VW-      INDICATION:  Altered mental status, stroke.     TECHNIQUE:  Single view frontal chest.     COMPARISONS:  None.     FINDINGS:  Lungs are without focal abnormality. No pleural effusion or  pneumothorax. Cardiomediastinal silhouette is within normal limits.        Impression:       No acute cardiopulmonary abnormality.     D:  06/25/2018  E:  06/26/2018     This report was finalized on 6/26/2018 8:45 AM by Brandt Dale.       CT Head Without Contrast Stroke Protocol [[id number]] Collected:  06/25/18 1644     Updated:  06/26/18 0844    Narrative:       EXAMINATION: CT HEAD WO CONTRAST, STROKE PROTOCOL-06/25/2018:      INDICATION:  Stroke.     TECHNIQUE:  Axial CT data of the head were acquired helically without  contrast. Multiplanar reformatted images were generated and reviewed.      The radiation dose reduction device was turned on for each scan per the  ALARA (As Low as Reasonably Achievable) protocol.     COMPARISONS:  NONE.     FINDINGS: Age-indeterminate lacunar infarcts noted in the right  cerebellar hemisphere. There is no CT evidence of sizable acute  territorial infarct or intracranial hemorrhage. There is no mass effect.  No calvarial destructive lesion or fracture. No sinus fluid levels.       Impression:       1. Age-indeterminate lacunar infarct right cerebellar hemisphere.  2. No intracranial hemorrhage.     Findings were communicated with the stroke team at 4:42 PM on  06/25/2018.     D:  06/25/2018  E:  06/25/2018        This report was finalized on 6/26/2018 8:42 AM by Brandt Dale.       CT Cerebral Perfusion With & Without Contrast [[id number]] Collected:  06/26/18 0811     Updated:  06/26/18 0840    Narrative:       EXAMINATION: CT ANGIOGRAM HEAD W WO CONTRAST-, CT CEREBRAL PERFUSION W  WO CONTRAST-, CT ANGIOGRAM NECK W WO CONTRAST-      INDICATION: Stroke.     TECHNIQUE:  CT brain perfusion was performed followed by CTA of the head  and neck without and with contrast. Cerebral perfusion analysis was  performed using computed tomography with contrast administration,  including postprocessing of parametric maps with determination of  cerebral blood flow, cerebral blood volume, and mean transit time. MPR,  MIP and 3-D volume rendered images were generated and  reviewed.      The radiation dose reduction device was turned on for each scan per the  ALARA (As Low as Reasonably Achievable) protocol     COMPARISONS:  None.     FINDINGS:  CT PERFUSION: No perfusion abnormality identified.     CTA NECK: Variant aortic arch branching pattern with widely patent great  vessel origins; the left common carotid and innominate artery have  shared arch origin. The right common carotid system is remarkable for  moderate plaque at the bifurcation without significant narrowing of the  cervical right internal carotid artery using NASCET criteria. The left  common carotid system is remarkable for moderate plaque at the  bifurcation with less than 50% narrowing of the proximal left cervical  internal carotid artery using NASCET criteria. The vertebral arteries  originate from their respective subclavian arteries and are patent  throughout their cervical courses; the right is dominant. Emphysematous  change is noted at the lung apices. Multinodular thyroid goiter noted.  No acute/aggressive bone lesion is seen.      CTA HEAD: The intracranial internal carotid segments are remarkable for  atherosclerotic calcifications of the cavernous segments without  significant narrowing. The anterior cerebral, posterior cerebral and the  middle cerebral arteries are normal; left PCA origin is fetal. Dominant  right vertebral artery continues as a normal basilar artery. The  nondominant left vertebral artery occludes before anastomosing with the  basilar but the left PICA is normal. There is no evidence of  intracranial aneurysm.        Impression:       1. No significant cervical internal carotid artery narrowing.  2. No intracranial large vessel occlusion.  3. The nondominant left vertebral artery occludes prior to its  anastomosis with the basilar, but the basilar and left PICA are normal.     D:  06/25/2018  E:  06/26/2018     This report was finalized on 6/26/2018 8:38 AM by Brandt Dale.       CT  Angiogram Head With & Without Contrast [[id number]] Collected:  06/26/18 0811     Updated:  06/26/18 0840    Narrative:       EXAMINATION: CT ANGIOGRAM HEAD W WO CONTRAST-, CT CEREBRAL PERFUSION W  WO CONTRAST-, CT ANGIOGRAM NECK W WO CONTRAST-      INDICATION: Stroke.     TECHNIQUE:  CT brain perfusion was performed followed by CTA of the head  and neck without and with contrast. Cerebral perfusion analysis was  performed using computed tomography with contrast administration,  including postprocessing of parametric maps with determination of  cerebral blood flow, cerebral blood volume, and mean transit time. MPR,  MIP and 3-D volume rendered images were generated and reviewed.      The radiation dose reduction device was turned on for each scan per the  ALARA (As Low as Reasonably Achievable) protocol     COMPARISONS:  None.     FINDINGS:  CT PERFUSION: No perfusion abnormality identified.     CTA NECK: Variant aortic arch branching pattern with widely patent great  vessel origins; the left common carotid and innominate artery have  shared arch origin. The right common carotid system is remarkable for  moderate plaque at the bifurcation without significant narrowing of the  cervical right internal carotid artery using NASCET criteria. The left  common carotid system is remarkable for moderate plaque at the  bifurcation with less than 50% narrowing of the proximal left cervical  internal carotid artery using NASCET criteria. The vertebral arteries  originate from their respective subclavian arteries and are patent  throughout their cervical courses; the right is dominant. Emphysematous  change is noted at the lung apices. Multinodular thyroid goiter noted.  No acute/aggressive bone lesion is seen.      CTA HEAD: The intracranial internal carotid segments are remarkable for  atherosclerotic calcifications of the cavernous segments without  significant narrowing. The anterior cerebral, posterior cerebral and  the  middle cerebral arteries are normal; left PCA origin is fetal. Dominant  right vertebral artery continues as a normal basilar artery. The  nondominant left vertebral artery occludes before anastomosing with the  basilar but the left PICA is normal. There is no evidence of  intracranial aneurysm.        Impression:       1. No significant cervical internal carotid artery narrowing.  2. No intracranial large vessel occlusion.  3. The nondominant left vertebral artery occludes prior to its  anastomosis with the basilar, but the basilar and left PICA are normal.     D:  06/25/2018  E:  06/26/2018     This report was finalized on 6/26/2018 8:38 AM by Brandt Dale.       CT Angiogram Neck With & Without Contrast [[id number]] Collected:  06/26/18 0811     Updated:  06/26/18 0840    Narrative:       EXAMINATION: CT ANGIOGRAM HEAD W WO CONTRAST-, CT CEREBRAL PERFUSION W  WO CONTRAST-, CT ANGIOGRAM NECK W WO CONTRAST-      INDICATION: Stroke.     TECHNIQUE:  CT brain perfusion was performed followed by CTA of the head  and neck without and with contrast. Cerebral perfusion analysis was  performed using computed tomography with contrast administration,  including postprocessing of parametric maps with determination of  cerebral blood flow, cerebral blood volume, and mean transit time. MPR,  MIP and 3-D volume rendered images were generated and reviewed.      The radiation dose reduction device was turned on for each scan per the  ALARA (As Low as Reasonably Achievable) protocol     COMPARISONS:  None.     FINDINGS:  CT PERFUSION: No perfusion abnormality identified.     CTA NECK: Variant aortic arch branching pattern with widely patent great  vessel origins; the left common carotid and innominate artery have  shared arch origin. The right common carotid system is remarkable for  moderate plaque at the bifurcation without significant narrowing of the  cervical right internal carotid artery using NASCET criteria. The  left  common carotid system is remarkable for moderate plaque at the  bifurcation with less than 50% narrowing of the proximal left cervical  internal carotid artery using NASCET criteria. The vertebral arteries  originate from their respective subclavian arteries and are patent  throughout their cervical courses; the right is dominant. Emphysematous  change is noted at the lung apices. Multinodular thyroid goiter noted.  No acute/aggressive bone lesion is seen.      CTA HEAD: The intracranial internal carotid segments are remarkable for  atherosclerotic calcifications of the cavernous segments without  significant narrowing. The anterior cerebral, posterior cerebral and the  middle cerebral arteries are normal; left PCA origin is fetal. Dominant  right vertebral artery continues as a normal basilar artery. The  nondominant left vertebral artery occludes before anastomosing with the  basilar but the left PICA is normal. There is no evidence of  intracranial aneurysm.        Impression:       1. No significant cervical internal carotid artery narrowing.  2. No intracranial large vessel occlusion.  3. The nondominant left vertebral artery occludes prior to its  anastomosis with the basilar, but the basilar and left PICA are normal.     D:  06/25/2018  E:  06/26/2018     This report was finalized on 6/26/2018 8:38 AM by Brandt Dale.             Assessment:  83 year old female admitted with mental status changes and fevers concerning for sepsis.  Work-up with CT scan demonstrated a right posterior liver lesion, dilated CBD with abrupt cut-off concerning for stone, and thickened gallbladder wall concerning for cholecystitis. She will be taken tomorrow for ERCP.  Agree with percutaneous drain of liver abscess, as it is likely a pyogenic abscess.  Would recommend a HIDA scan tomorrow following her procedures.  Continue intravenous antibiotics tailored to cultures from liver abscess.  Will follow.    Plan:  - GI for ERCP  tomorrow  - Agree with drainage of liver abscess  - Would check HIDA scan after procedures tomorrow to definitively rule out cholecystitis  - Continue IV antibiotics  - Will follow    Mena Calvert MD  06/26/18  8:54 PM

## 2018-06-27 NOTE — NURSING NOTE
Patient in the room and about to scan, surgeon has called Dr. Dale and posed the question about doing this procedure at  with her transfer tomorrow. On hold in the room, Dr. Dale to call back.

## 2018-06-27 NOTE — PROGRESS NOTES
"General Surgery Daily Progress Note    Subjective:  ERCP performed today; unable to cannulate CBD.  Patient afebrile.  No complaints.    Objective:  /86   Pulse 73   Temp 98.4 °F (36.9 °C) (Axillary)   Resp 20   Ht 154.9 cm (60.98\")   Wt 56.7 kg (125 lb)   SpO2 90%   BMI 23.63 kg/m²     Physical Exam:  General: NAD, Awake and alert  Abdomen:  Soft, NT, ND.      Imaging Results (last 24 hours)     Procedure Component Value Units Date/Time    FL ERCP pancreatic and biliary ducts [[id number]] Updated:  06/27/18 0928    US Gallbladder [[id number]] Collected:  06/26/18 1529     Updated:  06/26/18 1644    Narrative:       EXAMINATION: US GALLBLADDER- 06/26/2018     INDICATION: sepsis, hyperbilirubinemia; R41.82-Altered mental status,  unspecified; A41.9-Sepsis, unspecified organism; D72.819-Decreased white  blood cell count, unspecified; E87.0-Ydrm-qhzcmzxqex and hyponatremia;  M62.82-Rhabdomyolysis         TECHNIQUE: Ultrasound of the right quadrant was performed in the  longitudinal and transverse planes.     COMPARISON: NONE     FINDINGS: The pancreas is normal. Liver reveals a normal and uniform  echogenic pattern.  There is activity within the gallbladder producing  acoustical shadowing consistent with stones. There is mild edema of the  gallbladder wall measuring 7 mm. The common bile duct is normal. The  right kidney measures 11.2 cm in length without evidence of mass, stone  or obstruction.       Impression:       Cholelithiasis with edema of the gallbladder wall measuring  7 mm.     D:  06/26/2018  E:  06/26/2018         This report was finalized on 6/26/2018 4:42 PM by Dr. Tristan Lay MD.       CT Abdomen Pelvis With & Without Contrast [[id number]] Collected:  06/26/18 1221     Updated:  06/26/18 1256    Narrative:       EXAMINATION: CT ABDOMEN AND PELVIS W WO CONTRAST-      INDICATION: R41.82-Altered mental status, unspecified; A41.9-Sepsis,  unspecified organism; D72.819-Decreased white blood cell " count,  unspecified; E87.8-Vdva-pgantjgoxz and hyponatremia;  M62.82-Rhabdomyolysis.     TECHNIQUE:  Axial CT data of the abdomen and pelvis were acquired  helically before and following IV contrast administration. Multiplanar  reformatted images were generated and reviewed. The radiation dose  reduction device was turned on for each scan per the ALARA (As Low as  Reasonably Achievable) protocol     COMPARISONS:  None.     FINDINGS:  Cholelithiasis is noted with marked gallbladder wall  thickening. There are stones in the common bile duct which is dilated.  There is a large abscess in the posterior segment of the liver measuring  almost 7 cm. There is bilateral perinephric stranding, greater on the  right. There is mild right-sided hydroureter without obstructing focus  identified. Excreted contrast in both kidneys and the urinary bladder  from prior administration noted. Alberts catheter is seen in the urinary  bladder. Pancreas, adrenal glands, and spleen are unremarkable. Status  post hysterectomy. No dilated small or large bowel. Mild low density  free fluid. Atherosclerosis. Small bilateral pleural effusions and  calcified granulomas in the lung bases. Mild body wall edema.       Impression:       1. Constellation of findings including gallbladder inflammatory change,  common bile duct dilatation and hepatic abscess compatible with  cholecystitis and ascending cholangitis.  2. Discussed with patient's nurse at 11:23 AM on 06/26/2018.     D:  06/26/2018  E:  06/26/2018     This report was finalized on 6/26/2018 12:54 PM by Brandt Dale.       XR Chest 1 View [[id number]] Collected:  06/26/18 0905     Updated:  06/26/18 1050    Narrative:       EXAMINATION: XR CHEST 1 VW- 06/26/2018     INDICATION: r/o infiltrates; R41.82-Altered mental status, unspecified;  A41.9-Sepsis, unspecified organism; D72.819-Decreased white blood cell  count, unspecified; E87.0-Yfjm-trxdaercam and hyponatremia;  M62.82-Rhabdomyolysis      TECHNIQUE:  Single view frontal chest.     COMPARISONS: 06/25/2018     FINDINGS:      Lungs are without focal opacity. No pleural effusion or  pneumothorax. Cardiomediastinal silhouette is unchanged.        Impression:       Stable exam without acute finding.     D:  06/26/2018  E:  06/26/2018     This report was finalized on 6/26/2018 10:48 AM by Brandt Dale.             CBC:    Results from last 7 days  Lab Units 06/27/18  0547   WBC 10*3/mm3 25.42*   HEMOGLOBIN g/dL 10.5*   HEMATOCRIT % 30.4*   PLATELETS 10*3/mm3 154       CMP:    Results from last 7 days  Lab Units 06/27/18  0547   SODIUM mmol/L 134   POTASSIUM mmol/L 3.9   CHLORIDE mmol/L 105   CO2 mmol/L 20.0   BUN mg/dL 20   CREATININE mg/dL 0.55*   CALCIUM mg/dL 8.3*   BILIRUBIN mg/dL 1.1   ALK PHOS U/L 115*   ALT (SGPT) U/L 104*   AST (SGOT) U/L 148*   GLUCOSE mg/dL 63*         Assessment  HD #2 for 83 year old female admitted with sepsis found to have cholangitis, right posterior lobe liver abscess and gallbladder wall thickening concerning for cholecystitis.  Unable to cannulate CBD during ERCP today; plans will be made for transfer to .  If patient will be transferred to , the remainder of her work-up, including drainage of liver abscess could be performed there as patient is clinically stable, off pressors, and with a decreasing WBC.  Would hold off on additional studies (including HIDA) pending potential transfer to .  There is no need for acute surgical intervention of her gallbladder.  Continue antibiotics.  Will follow.    Plan:   - Continue IV antibiotics  - Plan to hold off on HIDA scan  - Potential transfer to  per Dr. Manning  - Will follow    Mena Calvert MD - 6/27/2018, 10:49 AM

## 2018-06-27 NOTE — CONSULTS
Case Management Discharge Note    Final Note: Transferring to [facility] for higher level care via [facility] ambulance at 1530.     Destination     No service has been selected for the patient.      Durable Medical Equipment     No service has been selected for the patient.      Dialysis/Infusion     No service has been selected for the patient.      Home Medical Care     No service has been selected for the patient.      Social Care     No service has been selected for the patient.             Final Discharge Disposition Code: 02 - short term hospital for [facility]

## 2018-06-27 NOTE — ANESTHESIA POSTPROCEDURE EVALUATION
Patient: Susan Tucker    Procedure Summary     Date:  06/27/18 Room / Location:   [facility] /  [facility]    Anesthesia Start:  0733 Anesthesia Stop:      Procedure:  ENDOSCOPIC RETROGRADE CHOLANGIOPANCREATOGRAPHY (N/A ) Diagnosis:       Calculus of bile duct with acute cholecystitis and obstruction      (Calculus of bile duct with acute cholecystitis and obstruction [K80.43])    Surgeon:  Liam Manning MD Provider:      Anesthesia Type:  general ASA Status:  3          Anesthesia Type: general  Last vitals  BP 92/52   98   98.4 °F (36.9 °C) (06/27/18 0400)   Pulse 78   Resp 18   SpO2 94     Post Anesthesia Care and Evaluation    Patient location during evaluation: PACU  Patient participation: waiting for patient participation  Level of consciousness: awake and alert and responsive to light touch  Pain score: 0  Pain management: adequate  Airway patency: patent  Anesthetic complications: No anesthetic complications  PONV Status: none  Cardiovascular status: hemodynamically stable and acceptable  Respiratory status: nonlabored ventilation, acceptable and nasal cannula  Hydration status: acceptable

## 2018-06-27 NOTE — NURSING NOTE
Dr. Dale spoke will surgical staff and cancelled per their discussion. Dr. Dale will notify Dr. Manning.

## 2018-06-27 NOTE — ANESTHESIA PREPROCEDURE EVALUATION
Anesthesia Evaluation     Patient summary reviewed and Nursing notes reviewed                Airway   Mallampati: I  TM distance: >3 FB  Neck ROM: full  no difficulty expected  Dental - normal exam     Pulmonary - negative pulmonary ROS and normal exam   Cardiovascular - normal exam    (+) hypertension,       Neuro/Psych  (+) CVA,     GI/Hepatic/Renal/Endo    (+)   liver disease (biliary obstruction with elevated LFTs),     Musculoskeletal (-) negative ROS    Abdominal  - normal exam    Bowel sounds: normal.   Substance History - negative use     OB/GYN negative ob/gyn ROS         Other                        Anesthesia Plan    ASA 3     general     intravenous induction   Anesthetic plan and risks discussed with patient.    Plan discussed with CRNA.

## 2018-06-27 NOTE — DISCHARGE SUMMARY
Transfer Summary    Patient name: Susan Tucker     CSN: [id number]     MRN: [id number]     : 1935     Today's date: 2018     Date of Admission: 2018     Date of Discharge:  2018    Admitting Physician:  Peter Jovel MD    Primary Care Provider: Marita George MD     Consultations: Liam Manning MD, Gastroenterology              Mena Calvert MD, Surgery      Admission Diagnosis: Sepsis     Transfer Diagnoses:   Hospital Problem List     * (Principal)Sepsis    Elevated liver enzymes    Dehydration    Hypertension    History of Stroke    UTI (urinary tract infection)    Calculus of bile duct with acute cholecystitis and obstruction    Overview Signed 2018  3:57 PM by Liam Manning MD     Added automatically from request for surgery [id number]               Allergies:  Patient has no known allergies.    Code Status:  Code Status and Medical Interventions:   Ordered at: 18 1918     Limited Support to NOT Include:    Intubation     Level Of Support Discussed With:    Patient    Next of Kin (If No Surrogate)     Code Status:    No CPR     Medical Interventions (Level of Support Prior to Arrest):    Limited       Procedures:  Procedure(s):  ENDOSCOPIC RETROGRADE CHOLANGIOPANCREATOGRAPHY  714 ERCP     CT Abdomen / Pelvis 18:    EXAMINATION: CT ABDOMEN AND PELVIS W WO CONTRAST-      INDICATION: R41.82-Altered mental status, unspecified; A41.9-Sepsis,  unspecified organism; D72.819-Decreased white blood cell count,  unspecified; E87.1-Lflj-jieabkxiaz and hyponatremia;  M62.82-Rhabdomyolysis.     TECHNIQUE:  Axial CT data of the abdomen and pelvis were acquired  helically before and following IV contrast administration. Multiplanar  reformatted images were generated and reviewed. The radiation dose  reduction device was turned on for each scan per the ALARA (As Low as  Reasonably Achievable) protocol     COMPARISONS:  None.     FINDINGS:  Cholelithiasis is noted with  marked gallbladder wall  thickening. There are stones in the common bile duct which is dilated.  There is a large abscess in the posterior segment of the liver measuring  almost 7 cm. There is bilateral perinephric stranding, greater on the  right. There is mild right-sided hydroureter without obstructing focus  identified. Excreted contrast in both kidneys and the urinary bladder  from prior administration noted. Alberts catheter is seen in the urinary  bladder. Pancreas, adrenal glands, and spleen are unremarkable. Status  post hysterectomy. No dilated small or large bowel. Mild low density  free fluid. Atherosclerosis. Small bilateral pleural effusions and  calcified granulomas in the lung bases. Mild body wall edema.     IMPRESSION:  1. Constellation of findings including gallbladder inflammatory change,  common bile duct dilatation and hepatic abscess compatible with  cholecystitis and ascending cholangitis.  2. Discussed with patient's nurse at 11:23 AM on 06/26/2018.     D:  06/26/2018  E:  06/26/2018     This report was finalized on 6/26/2018 12:54 PM by Brandt Dale.     EXAMINATION: US GALLBLADDER- 06/26/2018     INDICATION: sepsis, hyperbilirubinemia; R41.82-Altered mental status,  unspecified; A41.9-Sepsis, unspecified organism; D72.819-Decreased white  blood cell count, unspecified; E87.9-Wbsz-znpsjajdgf and hyponatremia;  M62.82-Rhabdomyolysis         TECHNIQUE: Ultrasound of the right quadrant was performed in the  longitudinal and transverse planes.     COMPARISON: NONE     FINDINGS: The pancreas is normal. Liver reveals a normal and uniform  echogenic pattern.  There is activity within the gallbladder producing  acoustical shadowing consistent with stones. There is mild edema of the  gallbladder wall measuring 7 mm. The common bile duct is normal. The  right kidney measures 11.2 cm in length without evidence of mass, stone  or obstruction.     IMPRESSION:  Cholelithiasis with edema of the gallbladder  wall measuring  7 mm.     D:  06/26/2018  E:  06/26/2018      EXAMINATION: CT HEAD WO CONTRAST, STROKE PROTOCOL-06/25/2018:      INDICATION:  Stroke.     TECHNIQUE:  Axial CT data of the head were acquired helically without  contrast. Multiplanar reformatted images were generated and reviewed.      The radiation dose reduction device was turned on for each scan per the  ALARA (As Low as Reasonably Achievable) protocol.     COMPARISONS:  NONE.     FINDINGS: Age-indeterminate lacunar infarcts noted in the right  cerebellar hemisphere. There is no CT evidence of sizable acute  territorial infarct or intracranial hemorrhage. There is no mass effect.  No calvarial destructive lesion or fracture. No sinus fluid levels.     IMPRESSION:  1. Age-indeterminate lacunar infarct right cerebellar hemisphere.  2. No intracranial hemorrhage.     Findings were communicated with the stroke team at 4:42 PM on  06/25/2018.     D:  06/25/2018  E:  06/25/2018     EXAMINATION: CT ANGIOGRAM HEAD W WO CONTRAST-, CT CEREBRAL PERFUSION W  WO CONTRAST-, CT ANGIOGRAM NECK W WO CONTRAST-      INDICATION: Stroke.     TECHNIQUE:  CT brain perfusion was performed followed by CTA of the head  and neck without and with contrast. Cerebral perfusion analysis was  performed using computed tomography with contrast administration,  including postprocessing of parametric maps with determination of  cerebral blood flow, cerebral blood volume, and mean transit time. MPR,  MIP and 3-D volume rendered images were generated and reviewed.      The radiation dose reduction device was turned on for each scan per the  ALARA (As Low as Reasonably Achievable) protocol     COMPARISONS:  None.     FINDINGS:  CT PERFUSION: No perfusion abnormality identified.     CTA NECK: Variant aortic arch branching pattern with widely patent great  vessel origins; the left common carotid and innominate artery have  shared arch origin. The right common carotid system is remarkable  for  moderate plaque at the bifurcation without significant narrowing of the  cervical right internal carotid artery using NASCET criteria. The left  common carotid system is remarkable for moderate plaque at the  bifurcation with less than 50% narrowing of the proximal left cervical  internal carotid artery using NASCET criteria. The vertebral arteries  originate from their respective subclavian arteries and are patent  throughout their cervical courses; the right is dominant. Emphysematous  change is noted at the lung apices. Multinodular thyroid goiter noted.  No acute/aggressive bone lesion is seen.      CTA HEAD: The intracranial internal carotid segments are remarkable for  atherosclerotic calcifications of the cavernous segments without  significant narrowing. The anterior cerebral, posterior cerebral and the  middle cerebral arteries are normal; left PCA origin is fetal. Dominant  right vertebral artery continues as a normal basilar artery. The  nondominant left vertebral artery occludes before anastomosing with the  basilar but the left PICA is normal. There is no evidence of  intracranial aneurysm.      IMPRESSION:  1. No significant cervical internal carotid artery narrowing.  2. No intracranial large vessel occlusion.  3. The nondominant left vertebral artery occludes prior to its  anastomosis with the basilar, but the basilar and left PICA are normal.     D:  06/25/2018  E:  06/26/2018     This report was finalized on 6/26/2018 8:38 AM by Brandt Dale.      History of Present Illness:    Susan Tucker is a 83 y.o. female,  who presented to [facility] ED by EMS due to AMS.      The patient has apparently been ill the past 2 weeks but she had not discussed this with her family.  The time of arrival information was obtained via emergency services due to altered mental status.       This morning the patient was found lying on the floor by family emergency services were notified and upon arrival she refused transport.   "Later this afternoon emergency services returned due to increased altered mental status, she would continue to be lying on the floor, and she was brought to the emergency department.       She was found to be febrile and tachycardic in route.  CTA and CT perfusion were obtained which were noted to be negative.  Urinalysis showed UTI.  She was given Zosyn and vancomycin emergency department.  Temperature in the emergency department was 104°F.     Patient is seen in the emergency department, family is present and the patient is much more awake and alert.  She is able to answer questions and purchase patient in her care. She was able to tell me what happened today, and how she thought she did not need to come to the Hospital, at least during the first visit of the paramedics.       Patient states her desire to be DNR/DNI.  Patient has passed Julian history significant for hypertension and CVA.       She denies smoking, alcohol, or illicit drug use.    Hospital Course:    The patient was admitted to the ICU and started on broad spectrum antibiotics, given IVF resuscitation, and supported with vasopressors < 24 hours. LFT's & Bilirubin were elevated and CT scan of the abdomen and pelvis showed common bile duct dilation, liver abscess, GB wall thickening, and stones in the common bile duct. She was diagnosed with cholangitis, liver abscess, and possible cholecystitis and gastroenterology & surgery were consulted. She underwent an ERCP on 6/26 but the common bile duct was not able to be cannulated and she was recommended for transfer to [facility] for a percutaneous drain of liver abscess and re-attempt at ERCP. She will be transferred to [address] room 733 via ambulance today at 1530.     Vitals:  /81   Pulse 71   Temp 98.2 °F (36.8 °C) (Axillary)   Resp 20   Ht 154.9 cm (60.98\")   Wt 56.7 kg (125 lb)   SpO2 97%   BMI 23.63 kg/m²       Physical Exam:  General Appearance:  Conversant, in no acute distress  Eyes:  " No scleral icterus or pallor, pupils normal  Ears, Nose, Mouth, Throat:  Atraumatic, oropharynx clear  Neck:  Trachea midline, thyroid normal  Respiratory:  Clear to auscultation bilaterally, normal effort, no tenderness to palpation  Cardiovascular:  Regular rate and rhythm, no murmurs, no peripheral edema, no thrill  Gastrointestinal:  Soft, non-tender, non-distended, no hepatosplenomegaly  Skin:  Normal temperature, no rash  Psychiatric:  Alert, confused  Neuro:  No new focal neurologic deficits observed    Labs:    Results from last 7 days  Lab Units 06/27/18  0547   WBC 10*3/mm3 25.42*   HEMOGLOBIN g/dL 10.5*   HEMATOCRIT % 30.4*   PLATELETS 10*3/mm3 154       Results from last 7 days  Lab Units 06/27/18  0547   SODIUM mmol/L 134   POTASSIUM mmol/L 3.9   CHLORIDE mmol/L 105   CO2 mmol/L 20.0   BUN mg/dL 20   CREATININE mg/dL 0.55*   CALCIUM mg/dL 8.3*   BILIRUBIN mg/dL 1.1   ALK PHOS U/L 115*   ALT (SGPT) U/L 104*   AST (SGOT) U/L 148*   GLUCOSE mg/dL 63*         Magnesium   Date Value Ref Range Status   06/27/2018 2.5 1.3 - 2.7 mg/dL Final   06/26/2018 1.9 1.3 - 2.7 mg/dL Final   06/25/2018 2.0 1.3 - 2.7 mg/dL Final     Phosphorus   Date Value Ref Range Status   06/27/2018 1.6 (L) 2.4 - 5.1 mg/dL Final   06/26/2018 1.8 (L) 2.4 - 5.1 mg/dL Final        Transfer Medications:     Discharge Medications      New Medications      Instructions Start Date   piperacillin-tazobactam 4-0.5 GM/100ML solution IVPB  Commonly known as:  ZOSYN   4.5 g, Intravenous, Every 8 Hours         Continue These Medications      Instructions Start Date   alendronate 70 MG tablet  Commonly known as:  FOSAMAX   70 mg, Oral, Every 7 Days      aspirin 81 MG EC tablet   81 mg, Oral, Daily      fenofibrate 145 MG tablet  Commonly known as:  TRICOR   145 mg, Oral, Daily      lisinopril 10 MG tablet  Commonly known as:  PRINIVIL,ZESTRIL   10 mg, Oral, Daily      lisinopril-hydrochlorothiazide 20-12.5 MG per tablet  Commonly known as:   PRINZIDE,ZESTORETIC   1 tablet, Oral, Daily             Diet:   Diet Instructions     Diet: Nothing By Mouth       Discharge Diet:  Nothing By Mouth          Activity at Transfer:    Activity Instructions     Activity as Tolerated             Follow-up Appointments  No future appointments.      Transfer Instructions:  Transfer to [facility] via ambulance today       JOESPH Goodman, ACNP-BC  Pulmonary & Critical Care Medicine      Time: Discharge 30 min    CC: Marita George MD           .

## 2018-06-27 NOTE — POST-PROCEDURE NOTE
ERCP  PD normal .  Unable to cannulate CBD.  Will arrange transfer to [facility]. Hopefully tomorrow.  Will proceed with IR drain

## 2018-06-27 NOTE — PLAN OF CARE
Problem: Fall Risk (Adult)  Goal: Identify Related Risk Factors and Signs and Symptoms  Outcome: Ongoing (interventions implemented as appropriate)    Goal: Absence of Fall  Outcome: Ongoing (interventions implemented as appropriate)      Problem: Skin Injury Risk (Adult)  Goal: Skin Health and Integrity  Outcome: Ongoing (interventions implemented as appropriate)      Problem: Sepsis/Septic Shock (Adult)  Goal: Signs and Symptoms of Listed Potential Problems Will be Absent, Minimized or Managed (Sepsis/Septic Shock)  Outcome: Ongoing (interventions implemented as appropriate)      Problem: Patient Care Overview  Goal: Plan of Care Review  Outcome: Ongoing (interventions implemented as appropriate)

## 2018-06-27 NOTE — PROGRESS NOTES
INTENSIVIST / PULMONARY FOLLOW UP NOTE     Hospital:  LOS: 2 days   Ms. Susan Tucker, 83 y.o. female is followed for:   Principal Problem:    Sepsis  Active Problems:    Elevated liver enzymes    Dehydration    Hypertension    History of Stroke    UTI (urinary tract infection)    Calculus of bile duct with acute cholecystitis and obstruction          SUBJECTIVE   S/p ERCP, GI unable to access bile duct due to angle/anatomy    The patient's relevant past medical, surgical, family, and social history were reviewed    Allergies and medications were reviewed    ROS:  Per subjective, all other systems were reviewed and were negative        OBJECTIVE     Vital Sign Min/Max for last 24 hours:  Temp  Min: 97.7 °F (36.5 °C)  Max: 98.5 °F (36.9 °C)   BP  Min: 92/65  Max: 137/68   Pulse  Min: 73  Max: 83   Resp  Min: 16  Max: 22   SpO2  Min: 89 %  Max: 100 %   No Data Recorded     Physical Exam:  General Appearance:  Conversant, in no acute distress  Eyes:  No scleral icterus or pallor, pupils normal  Ears, Nose, Mouth, Throat:  Atraumatic, oropharynx clear  Neck:  Trachea midline, thyroid normal  Respiratory:  Clear to auscultation bilaterally, normal effort, no tenderness to palpation  Cardiovascular:  Regular rate and rhythm, no murmurs, no peripheral edema, no thrill  Gastrointestinal:  Soft, non-tender, non-distended, no hepatosplenomegaly  Skin:  Normal temperature, no rash  Psychiatric:  Alert, confused  Neuro:  No new focal neurologic deficits observed    Telemetry:              Hemodynamics:   CVP:     PAP:     PAOP:     CO:     CI:     SVI:     SVR:       SpO2: 90 % SpO2  Min: 89 %  Max: 100 %   Device:      Flow Rate:   No Data Recorded     Mechanical Ventilator Settings:                                         Intake/Ouptut 24 hrs (7:00AM - 6:59 AM)  Intake & Output (last 3 days)       06/24 0701 - 06/25 0700 06/25 0701 - 06/26 0700 06/26 0701 - 06/27 0700 06/27 0701 - 06/28 0700    I.V. (mL/kg)  2386.2 (41.8) 2124.4  (37.5) 500 (8.8)    IV Piggyback  2719.6 124.8     Total Intake(mL/kg)  5105.8 (89.4) 2249.2 (39.7) 500 (8.8)    Urine (mL/kg/hr)  580 1485 (1.1) 60 (0.3)    Stool  0      Blood    0 (0)    Total Output   580 1485 60    Net   +4525.8 +764.2 +440            Unmeasured Stool Occurrence  1 x            Lines, Drains & Airways    Active LDAs     Name:   Placement date:   Placement time:   Site:   Days:    Peripheral IV 06/25/18 1935 Right Forearm  06/25/18 1935    Forearm    less than 1    Peripheral IV 06/26/18 0015 Right Antecubital  06/26/18 0015    Antecubital    less than 1    Urethral Catheter Temperature probe  06/25/18 1945      less than 1                Hematology:    Results from last 7 days  Lab Units 06/27/18  0547 06/26/18 0431 06/25/18  1646   WBC 10*3/mm3 25.42* 29.48* 2.50*   HEMOGLOBIN g/dL 10.5* 11.7 13.0   HEMATOCRIT % 30.4* 32.8* 36.3   PLATELETS 10*3/mm3 154 150 135*   MONOCYTES % %  --  0.0  --      Electrolytes, Magnesium and Phosphorus:    Results from last 7 days  Lab Units 06/27/18  0547 06/26/18 0431 06/25/18  1647   SODIUM mmol/L 134 133 129*   CHLORIDE mmol/L 105 103 96*   POTASSIUM mmol/L 3.9 3.0* 3.6   CO2 mmol/L 20.0 15.0* 13.0*   MAGNESIUM mg/dL 2.5 1.9 2.0   PHOSPHORUS mg/dL 1.6* 1.8*  --      Renal:    Results from last 7 days  Lab Units 06/27/18  0547 06/26/18  0431 06/25/18  1647   CREATININE mg/dL 0.55* 1.08 0.78   BUN mg/dL 20 30* 33*     Estimated Creatinine Clearance: 48.5 mL/min (A) (by C-G formula based on SCr of 0.55 mg/dL (L)).  Hepatic:    Results from last 7 days  Lab Units 06/27/18  0547 06/26/18  0431 06/25/18  1647   ALK PHOS U/L 115* 137* 299*   BILIRUBIN mg/dL 1.1 2.2* 2.0*   ALT (SGPT) U/L 104* 98* 97*   AST (SGOT) U/L 148* 185* 153*     Arterial Blood Gases:          Results from last 7 days  Lab Units 06/25/18  1646   HEMOGLOBIN A1C % 6.00*       Lab Results   Component Value Date    LACTATE 1.5 06/27/2018       Relevant imaging studies and labs from  06/27/18 were reviewed and interpreted by me    Medications (drips):    dextrose Last Rate: 50 mL/hr (06/27/18 0939)   lactated ringers Last Rate: 50 mL/hr (06/27/18 0951)   lactated ringers    norepinephrine Last Rate: Stopped (06/26/18 1219)         aspirin 81 mg Oral Daily   famotidine 20 mg Oral Once   heparin (porcine) 5,000 Units Subcutaneous Q12H   insulin regular 0-7 Units Subcutaneous Q4H   midodrine 10 mg Oral Q8H   piperacillin-tazobactam 4.5 g Intravenous Q8H       Assessment/Plan   IMPRESSION / PLAN     Inpatient Problem List:  83 y.o.female:  Hospital Problem List     * (Principal)Sepsis    Elevated liver enzymes    Dehydration    Hypertension    History of Stroke    UTI (urinary tract infection)    Calculus of bile duct with acute cholecystitis and obstruction    Overview Signed 6/26/2018  3:57 PM by Liam Manning MD     Added automatically from request for surgery [id number]                Impression:  83 y.o.female with relevant PMH of HTN & CVA admitted 6/25/2018 w/ fever of 104, altered mental status, and septic shock.  Found to have liver abscess, cholecystitis, & cholangitis.    Plan:  Septic Shock / Liver Abscess / Cholecystitis / Cholangitis - responded to resuscitation and abx.  Strep in blood, final organism & sensitivities pending.  D/c Vanco.  Cut down IVF.  Per Dr. Manning may need to go to  today for repeat ERCP.  Off pressors, wean midodrine as tolerated.    Encephalopathy - probably from underling sepsis, monitor    Hypoglycemia - D10    DVT/GI prophylaxis    Nutrition - NPO Diet    Plan of care and goals reviewed with mulitdisciplinary team at daily rounds    Critical Care time spent in direct patient care: 45 minutes (excluding procedure time, if applicable) including high complexity decision making to assess, manipulate, and support vital organ system failure in this individual who has impairment of one or more vital organ systems such that there is a high probability of imminent  or life threatening deterioration in the patient’s condition.       Peter Jovel MD  Intensive Care Medicine  06/27/18 10:35 AM

## 2018-07-02 LAB
BACTERIA SPEC AEROBE CULT: ABNORMAL
BACTERIA SPEC AEROBE CULT: ABNORMAL
GRAM STN SPEC: ABNORMAL
ISOLATED FROM: ABNORMAL
ISOLATED FROM: ABNORMAL

## 2020-02-09 ENCOUNTER — HOSPITAL ENCOUNTER (INPATIENT)
Facility: HOSPITAL | Age: 85
LOS: 7 days | Discharge: HOME HEALTH | End: 2020-02-16
Attending: EMERGENCY MEDICINE | Admitting: FAMILY MEDICINE

## 2020-02-09 ENCOUNTER — APPOINTMENT (OUTPATIENT)
Dept: CT IMAGING | Facility: HOSPITAL | Age: 85
End: 2020-02-09

## 2020-02-09 ENCOUNTER — APPOINTMENT (OUTPATIENT)
Dept: GENERAL RADIOLOGY | Facility: HOSPITAL | Age: 85
End: 2020-02-09

## 2020-02-09 ENCOUNTER — APPOINTMENT (OUTPATIENT)
Dept: ULTRASOUND IMAGING | Facility: HOSPITAL | Age: 85
End: 2020-02-09

## 2020-02-09 DIAGNOSIS — K63.9 DISORDER OF JEJUNUM: ICD-10-CM

## 2020-02-09 DIAGNOSIS — A41.9 SEPSIS, DUE TO UNSPECIFIED ORGANISM, UNSPECIFIED WHETHER ACUTE ORGAN DYSFUNCTION PRESENT (HCC): ICD-10-CM

## 2020-02-09 DIAGNOSIS — K52.9 COLITIS: Primary | ICD-10-CM

## 2020-02-09 DIAGNOSIS — Z78.9 IMPAIRED MOBILITY AND ADLS: ICD-10-CM

## 2020-02-09 DIAGNOSIS — K56.3 GALLSTONE ILEUS OF SMALL INTESTINE (HCC): ICD-10-CM

## 2020-02-09 DIAGNOSIS — R11.2 NAUSEA AND VOMITING, INTRACTABILITY OF VOMITING NOT SPECIFIED, UNSPECIFIED VOMITING TYPE: ICD-10-CM

## 2020-02-09 DIAGNOSIS — R10.9 ACUTE ABDOMINAL PAIN: ICD-10-CM

## 2020-02-09 DIAGNOSIS — Z74.09 IMPAIRED MOBILITY AND ADLS: ICD-10-CM

## 2020-02-09 LAB
ALBUMIN SERPL-MCNC: 4.4 G/DL (ref 3.5–5.2)
ALBUMIN/GLOB SERPL: 1.2 G/DL
ALP SERPL-CCNC: 44 U/L (ref 39–117)
ALT SERPL W P-5'-P-CCNC: 10 U/L (ref 1–33)
ANION GAP SERPL CALCULATED.3IONS-SCNC: 16 MMOL/L (ref 5–15)
AST SERPL-CCNC: 24 U/L (ref 1–32)
BACTERIA UR QL AUTO: ABNORMAL /HPF
BASOPHILS # BLD AUTO: 0.03 10*3/MM3 (ref 0–0.2)
BASOPHILS NFR BLD AUTO: 0.2 % (ref 0–1.5)
BILIRUB SERPL-MCNC: 0.9 MG/DL (ref 0.2–1.2)
BILIRUB UR QL STRIP: NEGATIVE
BUN BLD-MCNC: 16 MG/DL (ref 8–23)
BUN/CREAT SERPL: 24.6 (ref 7–25)
CALCIUM SPEC-SCNC: 9.9 MG/DL (ref 8.6–10.5)
CHLORIDE SERPL-SCNC: 98 MMOL/L (ref 98–107)
CLARITY UR: CLEAR
CO2 SERPL-SCNC: 24 MMOL/L (ref 22–29)
COLOR UR: YELLOW
CREAT BLD-MCNC: 0.65 MG/DL (ref 0.57–1)
D-LACTATE SERPL-SCNC: 1.7 MMOL/L (ref 0.5–2)
DEPRECATED RDW RBC AUTO: 42.9 FL (ref 37–54)
EOSINOPHIL # BLD AUTO: 0.02 10*3/MM3 (ref 0–0.4)
EOSINOPHIL NFR BLD AUTO: 0.2 % (ref 0.3–6.2)
ERYTHROCYTE [DISTWIDTH] IN BLOOD BY AUTOMATED COUNT: 12.8 % (ref 12.3–15.4)
GFR SERPL CREATININE-BSD FRML MDRD: 87 ML/MIN/1.73
GLOBULIN UR ELPH-MCNC: 3.7 GM/DL
GLUCOSE BLD-MCNC: 157 MG/DL (ref 65–99)
GLUCOSE UR STRIP-MCNC: NEGATIVE MG/DL
HCT VFR BLD AUTO: 45.4 % (ref 34–46.6)
HGB BLD-MCNC: 15.1 G/DL (ref 12–15.9)
HGB UR QL STRIP.AUTO: ABNORMAL
HOLD SPECIMEN: NORMAL
HOLD SPECIMEN: NORMAL
HYALINE CASTS UR QL AUTO: ABNORMAL /LPF
IMM GRANULOCYTES # BLD AUTO: 0.04 10*3/MM3 (ref 0–0.05)
IMM GRANULOCYTES NFR BLD AUTO: 0.3 % (ref 0–0.5)
KETONES UR QL STRIP: ABNORMAL
LEUKOCYTE ESTERASE UR QL STRIP.AUTO: NEGATIVE
LIPASE SERPL-CCNC: 104 U/L (ref 13–60)
LYMPHOCYTES # BLD AUTO: 0.58 10*3/MM3 (ref 0.7–3.1)
LYMPHOCYTES NFR BLD AUTO: 4.8 % (ref 19.6–45.3)
MCH RBC QN AUTO: 30.3 PG (ref 26.6–33)
MCHC RBC AUTO-ENTMCNC: 33.3 G/DL (ref 31.5–35.7)
MCV RBC AUTO: 91 FL (ref 79–97)
MONOCYTES # BLD AUTO: 0.42 10*3/MM3 (ref 0.1–0.9)
MONOCYTES NFR BLD AUTO: 3.5 % (ref 5–12)
NEUTROPHILS # BLD AUTO: 11.01 10*3/MM3 (ref 1.7–7)
NEUTROPHILS NFR BLD AUTO: 91 % (ref 42.7–76)
NITRITE UR QL STRIP: NEGATIVE
NRBC BLD AUTO-RTO: 0 /100 WBC (ref 0–0.2)
PH UR STRIP.AUTO: <=5 [PH] (ref 5–8)
PLATELET # BLD AUTO: 290 10*3/MM3 (ref 140–450)
PMV BLD AUTO: 10.1 FL (ref 6–12)
POTASSIUM BLD-SCNC: 3.3 MMOL/L (ref 3.5–5.2)
PROCALCITONIN SERPL-MCNC: 0.91 NG/ML (ref 0.1–0.25)
PROT SERPL-MCNC: 8.1 G/DL (ref 6–8.5)
PROT UR QL STRIP: NEGATIVE
RBC # BLD AUTO: 4.99 10*6/MM3 (ref 3.77–5.28)
RBC # UR: ABNORMAL /HPF
REF LAB TEST METHOD: ABNORMAL
SODIUM BLD-SCNC: 138 MMOL/L (ref 136–145)
SP GR UR STRIP: 1.05 (ref 1–1.03)
SQUAMOUS #/AREA URNS HPF: ABNORMAL /HPF
UROBILINOGEN UR QL STRIP: ABNORMAL
WBC NRBC COR # BLD: 12.1 10*3/MM3 (ref 3.4–10.8)
WBC UR QL AUTO: ABNORMAL /HPF
WHOLE BLOOD HOLD SPECIMEN: NORMAL
WHOLE BLOOD HOLD SPECIMEN: NORMAL

## 2020-02-09 PROCEDURE — 81001 URINALYSIS AUTO W/SCOPE: CPT | Performed by: NURSE PRACTITIONER

## 2020-02-09 PROCEDURE — 84145 PROCALCITONIN (PCT): CPT | Performed by: NURSE PRACTITIONER

## 2020-02-09 PROCEDURE — 83605 ASSAY OF LACTIC ACID: CPT | Performed by: EMERGENCY MEDICINE

## 2020-02-09 PROCEDURE — 71045 X-RAY EXAM CHEST 1 VIEW: CPT

## 2020-02-09 PROCEDURE — 25010000002 ONDANSETRON PER 1 MG: Performed by: NURSE PRACTITIONER

## 2020-02-09 PROCEDURE — 76705 ECHO EXAM OF ABDOMEN: CPT

## 2020-02-09 PROCEDURE — P9612 CATHETERIZE FOR URINE SPEC: HCPCS

## 2020-02-09 PROCEDURE — 25010000002 IOPAMIDOL 61 % SOLUTION: Performed by: EMERGENCY MEDICINE

## 2020-02-09 PROCEDURE — 87040 BLOOD CULTURE FOR BACTERIA: CPT | Performed by: EMERGENCY MEDICINE

## 2020-02-09 PROCEDURE — 25010000002 VANCOMYCIN PER 500 MG: Performed by: NURSE PRACTITIONER

## 2020-02-09 PROCEDURE — 99222 1ST HOSP IP/OBS MODERATE 55: CPT | Performed by: FAMILY MEDICINE

## 2020-02-09 PROCEDURE — 25010000002 PIPERACILLIN SOD-TAZOBACTAM PER 1 G: Performed by: NURSE PRACTITIONER

## 2020-02-09 PROCEDURE — 85025 COMPLETE CBC W/AUTO DIFF WBC: CPT | Performed by: EMERGENCY MEDICINE

## 2020-02-09 PROCEDURE — 80053 COMPREHEN METABOLIC PANEL: CPT | Performed by: EMERGENCY MEDICINE

## 2020-02-09 PROCEDURE — 74177 CT ABD & PELVIS W/CONTRAST: CPT

## 2020-02-09 PROCEDURE — 99284 EMERGENCY DEPT VISIT MOD MDM: CPT

## 2020-02-09 PROCEDURE — 83690 ASSAY OF LIPASE: CPT | Performed by: EMERGENCY MEDICINE

## 2020-02-09 RX ORDER — SODIUM CHLORIDE 9 MG/ML
75 INJECTION, SOLUTION INTRAVENOUS CONTINUOUS
Status: ACTIVE | OUTPATIENT
Start: 2020-02-09 | End: 2020-02-10

## 2020-02-09 RX ORDER — ONDANSETRON 2 MG/ML
4 INJECTION INTRAMUSCULAR; INTRAVENOUS EVERY 6 HOURS PRN
Status: DISCONTINUED | OUTPATIENT
Start: 2020-02-09 | End: 2020-02-16 | Stop reason: HOSPADM

## 2020-02-09 RX ORDER — POTASSIUM CHLORIDE 750 MG/1
40 CAPSULE, EXTENDED RELEASE ORAL AS NEEDED
Status: DISCONTINUED | OUTPATIENT
Start: 2020-02-09 | End: 2020-02-16 | Stop reason: HOSPADM

## 2020-02-09 RX ORDER — ONDANSETRON 4 MG/1
4 TABLET, FILM COATED ORAL EVERY 6 HOURS PRN
Status: DISCONTINUED | OUTPATIENT
Start: 2020-02-09 | End: 2020-02-16 | Stop reason: HOSPADM

## 2020-02-09 RX ORDER — LISINOPRIL 10 MG/1
10 TABLET ORAL DAILY
Status: DISCONTINUED | OUTPATIENT
Start: 2020-02-10 | End: 2020-02-14

## 2020-02-09 RX ORDER — POTASSIUM CHLORIDE 7.45 MG/ML
10 INJECTION INTRAVENOUS
Status: DISCONTINUED | OUTPATIENT
Start: 2020-02-09 | End: 2020-02-16 | Stop reason: HOSPADM

## 2020-02-09 RX ORDER — SODIUM CHLORIDE 0.9 % (FLUSH) 0.9 %
10 SYRINGE (ML) INJECTION AS NEEDED
Status: DISCONTINUED | OUTPATIENT
Start: 2020-02-09 | End: 2020-02-16 | Stop reason: HOSPADM

## 2020-02-09 RX ORDER — ACETAMINOPHEN 160 MG/5ML
650 SOLUTION ORAL EVERY 4 HOURS PRN
Status: DISCONTINUED | OUTPATIENT
Start: 2020-02-09 | End: 2020-02-10

## 2020-02-09 RX ORDER — POTASSIUM CHLORIDE 1.5 G/1.77G
40 POWDER, FOR SOLUTION ORAL AS NEEDED
Status: DISCONTINUED | OUTPATIENT
Start: 2020-02-09 | End: 2020-02-16 | Stop reason: HOSPADM

## 2020-02-09 RX ORDER — VANCOMYCIN HYDROCHLORIDE 1 G/200ML
20 INJECTION, SOLUTION INTRAVENOUS ONCE
Status: COMPLETED | OUTPATIENT
Start: 2020-02-09 | End: 2020-02-09

## 2020-02-09 RX ORDER — ACETAMINOPHEN 650 MG/1
650 SUPPOSITORY RECTAL EVERY 4 HOURS PRN
Status: DISCONTINUED | OUTPATIENT
Start: 2020-02-09 | End: 2020-02-16 | Stop reason: HOSPADM

## 2020-02-09 RX ORDER — ACETAMINOPHEN 325 MG/1
650 TABLET ORAL EVERY 4 HOURS PRN
Status: DISCONTINUED | OUTPATIENT
Start: 2020-02-09 | End: 2020-02-10

## 2020-02-09 RX ORDER — ONDANSETRON 2 MG/ML
4 INJECTION INTRAMUSCULAR; INTRAVENOUS ONCE
Status: COMPLETED | OUTPATIENT
Start: 2020-02-09 | End: 2020-02-09

## 2020-02-09 RX ORDER — ASPIRIN 81 MG/1
81 TABLET ORAL DAILY
Status: DISCONTINUED | OUTPATIENT
Start: 2020-02-10 | End: 2020-02-10

## 2020-02-09 RX ADMIN — VANCOMYCIN HYDROCHLORIDE 1000 MG: 1 INJECTION, SOLUTION INTRAVENOUS at 18:35

## 2020-02-09 RX ADMIN — IOPAMIDOL 75 ML: 612 INJECTION, SOLUTION INTRAVENOUS at 17:59

## 2020-02-09 RX ADMIN — SODIUM CHLORIDE 1000 ML: 9 INJECTION, SOLUTION INTRAVENOUS at 19:56

## 2020-02-09 RX ADMIN — SODIUM CHLORIDE 75 ML/HR: 9 INJECTION, SOLUTION INTRAVENOUS at 21:27

## 2020-02-09 RX ADMIN — TAZOBACTAM SODIUM AND PIPERACILLIN SODIUM 3.38 G: 375; 3 INJECTION, SOLUTION INTRAVENOUS at 18:00

## 2020-02-09 RX ADMIN — SODIUM CHLORIDE 1000 ML: 9 INJECTION, SOLUTION INTRAVENOUS at 16:46

## 2020-02-09 RX ADMIN — ONDANSETRON 4 MG: 2 INJECTION INTRAMUSCULAR; INTRAVENOUS at 16:46

## 2020-02-09 NOTE — ED PROVIDER NOTES
Steffen Tucker is a 84 y.o. female who presents to the ED with c/o abdominal pain. The patient has reportedly been experiencing dizziness for a few days, and today she complains of abdominal pain, chills, and vomiting. The patient's daughter states that she experienced a similar episode in June 2018 and was diagnosed with sepsis and cholecystitis. The family states that she had a tumor removed near her gallbladder during this episode but denies a cholecystectomy. She reports a medical history of hypertension and hyperlipidemia but she denies congestive heart failure. There are no other acute complaints at this time.      History provided by:  Patient and relative  Abdominal Pain   Pain location:  Generalized  Pain radiates to:  Does not radiate  Pain severity:  Moderate  Onset quality:  Sudden  Timing:  Constant  Progression:  Unchanged  Chronicity:  Recurrent  Associated symptoms: chills and vomiting        Review of Systems   Constitutional: Positive for chills.   Gastrointestinal: Positive for abdominal pain and vomiting.   Neurological: Positive for dizziness.   All other systems reviewed and are negative.      Past Medical History:   Diagnosis Date   • Hypertension    • Stroke (CMS/HCC)        No Known Allergies    Past Surgical History:   Procedure Laterality Date   • ERCP N/A 6/27/2018    Procedure: ENDOSCOPIC RETROGRADE CHOLANGIOPANCREATOGRAPHY;  Surgeon: Liam Manning MD;  Location: [facility];  Service: Gastroenterology       History reviewed. No pertinent family history.    Social History     Socioeconomic History   • Marital status:      Spouse name: Not on file   • Number of children: Not on file   • Years of education: Not on file   • Highest education level: Not on file   Tobacco Use   • Smoking status: Never Smoker   • Smokeless tobacco: Never Used   Substance and Sexual Activity   • Alcohol use: No   • Drug use: No         Objective   Physical Exam   Constitutional: She is  oriented to person, place, and time. She appears well-developed. She appears distressed.   The patient is frail and ill appearing.    HENT:   Head: Normocephalic and atraumatic.   Nose: Nose normal.   Eyes: Conjunctivae are normal. No scleral icterus.   Neck: Normal range of motion. Neck supple.   Cardiovascular: Normal rate, regular rhythm and normal heart sounds.   Pulmonary/Chest: Effort normal and breath sounds normal. No respiratory distress.   Abdominal: Soft. There is tenderness.   Diffuse abdominal tenderness.   Musculoskeletal: Normal range of motion.   Neurological: She is alert and oriented to person, place, and time.   Skin: Skin is warm and dry. There is pallor.   Psychiatric: She has a normal mood and affect. Her behavior is normal.   Nursing note and vitals reviewed.      Critical Care  Performed by: Genaro Herrera APRN  Authorized by: Osbaldo Umanzor MD     Critical care provider statement:     Critical care time (minutes):  35    Critical care was necessary to treat or prevent imminent or life-threatening deterioration of the following conditions:  Sepsis    Critical care was time spent personally by me on the following activities:  Ordering and review of laboratory studies, ordering and review of radiographic studies, pulse oximetry, re-evaluation of patient's condition, review of old charts, obtaining history from patient or surrogate, examination of patient, evaluation of patient's response to treatment, discussions with consultants and development of treatment plan with patient or surrogate             ED Course  ED Course as of Feb 09 1931   Sun Feb 09, 2020 1901 POC with Dr. Umanzor. Hospitalist sanjeev.    []   1928 I spoke with Dr. Hurst who will admit.    [JM]      ED Course User Index  [JM] Genaro Herrera APRN     Recent Results (from the past 24 hour(s))   Lactic Acid, Plasma    Collection Time: 02/09/20  4:37 PM   Result Value Ref Range    Lactate 1.7 0.5 - 2.0 mmol/L    Comprehensive Metabolic Panel    Collection Time: 02/09/20  4:37 PM   Result Value Ref Range    Glucose 157 (H) 65 - 99 mg/dL    BUN 16 8 - 23 mg/dL    Creatinine 0.65 0.57 - 1.00 mg/dL    Sodium 138 136 - 145 mmol/L    Potassium 3.3 (L) 3.5 - 5.2 mmol/L    Chloride 98 98 - 107 mmol/L    CO2 24.0 22.0 - 29.0 mmol/L    Calcium 9.9 8.6 - 10.5 mg/dL    Total Protein 8.1 6.0 - 8.5 g/dL    Albumin 4.40 3.50 - 5.20 g/dL    ALT (SGPT) 10 1 - 33 U/L    AST (SGOT) 24 1 - 32 U/L    Alkaline Phosphatase 44 39 - 117 U/L    Total Bilirubin 0.9 0.2 - 1.2 mg/dL    eGFR Non African Amer 87 >60 mL/min/1.73    Globulin 3.7 gm/dL    A/G Ratio 1.2 g/dL    BUN/Creatinine Ratio 24.6 7.0 - 25.0    Anion Gap 16.0 (H) 5.0 - 15.0 mmol/L   Lipase    Collection Time: 02/09/20  4:37 PM   Result Value Ref Range    Lipase 104 (H) 13 - 60 U/L   Light Blue Top    Collection Time: 02/09/20  4:37 PM   Result Value Ref Range    Extra Tube hold for add-on    Green Top (Gel)    Collection Time: 02/09/20  4:37 PM   Result Value Ref Range    Extra Tube Hold for add-ons.    Lavender Top    Collection Time: 02/09/20  4:37 PM   Result Value Ref Range    Extra Tube hold for add-on    Gold Top - SST    Collection Time: 02/09/20  4:37 PM   Result Value Ref Range    Extra Tube Hold for add-ons.    CBC Auto Differential    Collection Time: 02/09/20  4:37 PM   Result Value Ref Range    WBC 12.10 (H) 3.40 - 10.80 10*3/mm3    RBC 4.99 3.77 - 5.28 10*6/mm3    Hemoglobin 15.1 12.0 - 15.9 g/dL    Hematocrit 45.4 34.0 - 46.6 %    MCV 91.0 79.0 - 97.0 fL    MCH 30.3 26.6 - 33.0 pg    MCHC 33.3 31.5 - 35.7 g/dL    RDW 12.8 12.3 - 15.4 %    RDW-SD 42.9 37.0 - 54.0 fl    MPV 10.1 6.0 - 12.0 fL    Platelets 290 140 - 450 10*3/mm3    Neutrophil % 91.0 (H) 42.7 - 76.0 %    Lymphocyte % 4.8 (L) 19.6 - 45.3 %    Monocyte % 3.5 (L) 5.0 - 12.0 %    Eosinophil % 0.2 (L) 0.3 - 6.2 %    Basophil % 0.2 0.0 - 1.5 %    Immature Grans % 0.3 0.0 - 0.5 %    Neutrophils, Absolute 11.01  (H) 1.70 - 7.00 10*3/mm3    Lymphocytes, Absolute 0.58 (L) 0.70 - 3.10 10*3/mm3    Monocytes, Absolute 0.42 0.10 - 0.90 10*3/mm3    Eosinophils, Absolute 0.02 0.00 - 0.40 10*3/mm3    Basophils, Absolute 0.03 0.00 - 0.20 10*3/mm3    Immature Grans, Absolute 0.04 0.00 - 0.05 10*3/mm3    nRBC 0.0 0.0 - 0.2 /100 WBC   Procalcitonin    Collection Time: 02/09/20  4:37 PM   Result Value Ref Range    Procalcitonin 0.91 (H) 0.10 - 0.25 ng/mL     Note: In addition to lab results from this visit, the labs listed above may include labs taken at another facility or during a different encounter within the last 24 hours. Please correlate lab times with ED admission and discharge times for further clarification of the services performed during this visit.    CT Abdomen Pelvis With Contrast   Preliminary Result   There is abnormal appearing bowel within both the abdomen   and pelvis involving the duodenum and the second portion as it courses   near the gallbladder fossa with some stranding identified in the   gallbladder fossa and fluid with wall thickening of the bowel. There is   also a question of small amount of air within the lumen of the bowel in   the gallbladder fossa. Findings per history of cholecystitis. There is   also abnormal small bowel identified within the pelvis as well as   abnormal colon in the a sending colon with wall thickening and   surrounding stranding. Findings can be seen in inflammatory bowel   disease such as Crohn's and clinical correlation is needed. An   infectious or ischemic process at this time cannot be completely   completely excluded and continued follow-up recommended as indicated.   There is no evidence of obvious obstruction.              XR Chest 1 View   Preliminary Result   No acute cardiopulmonary disease                Vitals:    02/09/20 1604 02/09/20 1730 02/09/20 1830 02/09/20 1900   BP: (!) 185/89 (!) 191/84 174/83    BP Location: Right arm      Patient Position: Sitting      Pulse:  "(!) 128 97 93 94   Resp: 22      Temp: 98.8 °F (37.1 °C)      TempSrc: Oral      SpO2: 97% 98% 97% 95%   Weight: 48.1 kg (106 lb)      Height: 154.9 cm (61\")        Medications   sodium chloride 0.9 % flush 10 mL (has no administration in time range)   sodium chloride 0.9 % bolus 1,000 mL (has no administration in time range)   sodium chloride 0.9 % bolus 1,000 mL (0 mL Intravenous Stopped 2/9/20 1820)   ondansetron (ZOFRAN) injection 4 mg (4 mg Intravenous Given 2/9/20 1646)   piperacillin-tazobactam (ZOSYN) 3.375 g in iso-osmotic dextrose 50 ml (premix) (0 g Intravenous Stopped 2/9/20 1835)   vancomycin (VANCOCIN) in iso-osmotic dextrose IVPB 1 g (premix) 200 mL (1,000 mg Intravenous New Bag 2/9/20 1835)   iopamidol (ISOVUE-300) 61 % injection 100 mL (75 mL Intravenous Given 2/9/20 1759)     ECG/EMG Results (last 24 hours)     ** No results found for the last 24 hours. **        No orders to display                                                   MDM    Final diagnoses:   Colitis   Sepsis, due to unspecified organism, unspecified whether acute organ dysfunction present (CMS/HCC)   Acute abdominal pain   Nausea and vomiting, intractability of vomiting not specified, unspecified vomiting type       Documentation assistance provided by john Escobar.  Information recorded by the scribe was done at my direction and has been verified and validated by me.     Mayra Escobar  02/09/20 7182       Genaro Herrera APRN  02/09/20 1931    "

## 2020-02-10 ENCOUNTER — APPOINTMENT (OUTPATIENT)
Dept: GENERAL RADIOLOGY | Facility: HOSPITAL | Age: 85
End: 2020-02-10

## 2020-02-10 ENCOUNTER — ANESTHESIA (OUTPATIENT)
Dept: PERIOP | Facility: HOSPITAL | Age: 85
End: 2020-02-10

## 2020-02-10 ENCOUNTER — ANESTHESIA EVENT (OUTPATIENT)
Dept: PERIOP | Facility: HOSPITAL | Age: 85
End: 2020-02-10

## 2020-02-10 PROBLEM — K56.3 GALLSTONE ILEUS OF SMALL INTESTINE (HCC): Status: ACTIVE | Noted: 2020-02-10

## 2020-02-10 LAB
ALBUMIN SERPL-MCNC: 3.3 G/DL (ref 3.5–5.2)
ALBUMIN/GLOB SERPL: 1.2 G/DL
ALP SERPL-CCNC: 34 U/L (ref 39–117)
ALT SERPL W P-5'-P-CCNC: 10 U/L (ref 1–33)
ANION GAP SERPL CALCULATED.3IONS-SCNC: 14 MMOL/L (ref 5–15)
AST SERPL-CCNC: 27 U/L (ref 1–32)
BASOPHILS # BLD AUTO: 0.02 10*3/MM3 (ref 0–0.2)
BASOPHILS NFR BLD AUTO: 0.1 % (ref 0–1.5)
BILIRUB SERPL-MCNC: 0.9 MG/DL (ref 0.2–1.2)
BUN BLD-MCNC: 13 MG/DL (ref 8–23)
BUN/CREAT SERPL: 22 (ref 7–25)
CALCIUM SPEC-SCNC: 8.8 MG/DL (ref 8.6–10.5)
CHLORIDE SERPL-SCNC: 104 MMOL/L (ref 98–107)
CO2 SERPL-SCNC: 22 MMOL/L (ref 22–29)
CREAT BLD-MCNC: 0.59 MG/DL (ref 0.57–1)
CRP SERPL-MCNC: 12.48 MG/DL (ref 0–0.5)
D-LACTATE SERPL-SCNC: 0.9 MMOL/L (ref 0.5–2)
DEPRECATED RDW RBC AUTO: 46.4 FL (ref 37–54)
EOSINOPHIL # BLD AUTO: 0 10*3/MM3 (ref 0–0.4)
EOSINOPHIL NFR BLD AUTO: 0 % (ref 0.3–6.2)
ERYTHROCYTE [DISTWIDTH] IN BLOOD BY AUTOMATED COUNT: 13 % (ref 12.3–15.4)
ERYTHROCYTE [SEDIMENTATION RATE] IN BLOOD: >130 MM/HR (ref 0–30)
GFR SERPL CREATININE-BSD FRML MDRD: 97 ML/MIN/1.73
GLOBULIN UR ELPH-MCNC: 2.8 GM/DL
GLUCOSE BLD-MCNC: 131 MG/DL (ref 65–99)
HCT VFR BLD AUTO: 39.7 % (ref 34–46.6)
HGB BLD-MCNC: 12.6 G/DL (ref 12–15.9)
IMM GRANULOCYTES # BLD AUTO: 0.06 10*3/MM3 (ref 0–0.05)
IMM GRANULOCYTES NFR BLD AUTO: 0.4 % (ref 0–0.5)
LYMPHOCYTES # BLD AUTO: 0.57 10*3/MM3 (ref 0.7–3.1)
LYMPHOCYTES NFR BLD AUTO: 3.9 % (ref 19.6–45.3)
MAGNESIUM SERPL-MCNC: 1.7 MG/DL (ref 1.6–2.4)
MCH RBC QN AUTO: 30.7 PG (ref 26.6–33)
MCHC RBC AUTO-ENTMCNC: 31.7 G/DL (ref 31.5–35.7)
MCV RBC AUTO: 96.6 FL (ref 79–97)
MONOCYTES # BLD AUTO: 0.57 10*3/MM3 (ref 0.1–0.9)
MONOCYTES NFR BLD AUTO: 3.9 % (ref 5–12)
NEUTROPHILS # BLD AUTO: 13.34 10*3/MM3 (ref 1.7–7)
NEUTROPHILS NFR BLD AUTO: 91.7 % (ref 42.7–76)
NRBC BLD AUTO-RTO: 0 /100 WBC (ref 0–0.2)
PLAT MORPH BLD: NORMAL
PLATELET # BLD AUTO: 244 10*3/MM3 (ref 140–450)
PMV BLD AUTO: 10 FL (ref 6–12)
POTASSIUM BLD-SCNC: 3.8 MMOL/L (ref 3.5–5.2)
PROT SERPL-MCNC: 6.1 G/DL (ref 6–8.5)
RBC # BLD AUTO: 4.11 10*6/MM3 (ref 3.77–5.28)
RBC MORPH BLD: NORMAL
SODIUM BLD-SCNC: 140 MMOL/L (ref 136–145)
WBC MORPH BLD: NORMAL
WBC NRBC COR # BLD: 14.56 10*3/MM3 (ref 3.4–10.8)

## 2020-02-10 PROCEDURE — 85025 COMPLETE CBC W/AUTO DIFF WBC: CPT | Performed by: NURSE PRACTITIONER

## 2020-02-10 PROCEDURE — 0DN80ZZ RELEASE SMALL INTESTINE, OPEN APPROACH: ICD-10-PCS | Performed by: SURGERY

## 2020-02-10 PROCEDURE — 99232 SBSQ HOSP IP/OBS MODERATE 35: CPT | Performed by: HOSPITALIST

## 2020-02-10 PROCEDURE — 25010000002 DIAZEPAM PER 5 MG: Performed by: HOSPITALIST

## 2020-02-10 PROCEDURE — 99222 1ST HOSP IP/OBS MODERATE 55: CPT | Performed by: PHYSICIAN ASSISTANT

## 2020-02-10 PROCEDURE — 25010000002 PROPOFOL 10 MG/ML EMULSION: Performed by: NURSE ANESTHETIST, CERTIFIED REGISTERED

## 2020-02-10 PROCEDURE — 83735 ASSAY OF MAGNESIUM: CPT | Performed by: NURSE PRACTITIONER

## 2020-02-10 PROCEDURE — 0DB80ZZ EXCISION OF SMALL INTESTINE, OPEN APPROACH: ICD-10-PCS | Performed by: SURGERY

## 2020-02-10 PROCEDURE — 25010000002 DEXAMETHASONE SODIUM PHOSPHATE 10 MG/ML SOLUTION: Performed by: ANESTHESIOLOGY

## 2020-02-10 PROCEDURE — 25010000002 PIPERACILLIN SOD-TAZOBACTAM PER 1 G: Performed by: NURSE PRACTITIONER

## 2020-02-10 PROCEDURE — 25010000002 HYDROMORPHONE PER 4 MG: Performed by: INTERNAL MEDICINE

## 2020-02-10 PROCEDURE — 80053 COMPREHEN METABOLIC PANEL: CPT | Performed by: NURSE PRACTITIONER

## 2020-02-10 PROCEDURE — 85652 RBC SED RATE AUTOMATED: CPT | Performed by: PHYSICIAN ASSISTANT

## 2020-02-10 PROCEDURE — 86140 C-REACTIVE PROTEIN: CPT | Performed by: PHYSICIAN ASSISTANT

## 2020-02-10 PROCEDURE — 83605 ASSAY OF LACTIC ACID: CPT | Performed by: PHYSICIAN ASSISTANT

## 2020-02-10 PROCEDURE — 88307 TISSUE EXAM BY PATHOLOGIST: CPT | Performed by: SURGERY

## 2020-02-10 PROCEDURE — 25010000002 MORPHINE PER 10 MG: Performed by: SURGERY

## 2020-02-10 PROCEDURE — 25010000002 HEPARIN (PORCINE) PER 1000 UNITS: Performed by: SURGERY

## 2020-02-10 PROCEDURE — 74018 RADEX ABDOMEN 1 VIEW: CPT

## 2020-02-10 PROCEDURE — 25010000002 VANCOMYCIN PER 500 MG

## 2020-02-10 PROCEDURE — 85007 BL SMEAR W/DIFF WBC COUNT: CPT | Performed by: NURSE PRACTITIONER

## 2020-02-10 PROCEDURE — 25010000002 ONDANSETRON PER 1 MG: Performed by: NURSE PRACTITIONER

## 2020-02-10 DEVICE — PROXIMATE LINEAR CUTTER  RELOAD (THICK)
Type: IMPLANTABLE DEVICE | Site: ASCENDING COLON | Status: FUNCTIONAL
Brand: PROXIMATE

## 2020-02-10 DEVICE — PROXIMATE RELOADABLE LINEAR CUTTER WITH SAFETY LOCK-OUT.  75MM LINEAR CUTTER.
Type: IMPLANTABLE DEVICE | Site: ASCENDING COLON | Status: FUNCTIONAL
Brand: PROXIMATE

## 2020-02-10 RX ORDER — SODIUM CHLORIDE 0.9 % (FLUSH) 0.9 %
10 SYRINGE (ML) INJECTION AS NEEDED
Status: DISCONTINUED | OUTPATIENT
Start: 2020-02-10 | End: 2020-02-10 | Stop reason: HOSPADM

## 2020-02-10 RX ORDER — ONDANSETRON 2 MG/ML
4 INJECTION INTRAMUSCULAR; INTRAVENOUS ONCE AS NEEDED
Status: DISCONTINUED | OUTPATIENT
Start: 2020-02-10 | End: 2020-02-10 | Stop reason: HOSPADM

## 2020-02-10 RX ORDER — PROMETHAZINE HYDROCHLORIDE 25 MG/1
25 SUPPOSITORY RECTAL ONCE AS NEEDED
Status: DISCONTINUED | OUTPATIENT
Start: 2020-02-10 | End: 2020-02-10 | Stop reason: HOSPADM

## 2020-02-10 RX ORDER — SODIUM CHLORIDE, SODIUM LACTATE, POTASSIUM CHLORIDE, CALCIUM CHLORIDE 600; 310; 30; 20 MG/100ML; MG/100ML; MG/100ML; MG/100ML
9 INJECTION, SOLUTION INTRAVENOUS CONTINUOUS
Status: DISCONTINUED | OUTPATIENT
Start: 2020-02-10 | End: 2020-02-13

## 2020-02-10 RX ORDER — HYDROMORPHONE HYDROCHLORIDE 1 MG/ML
0.5 INJECTION, SOLUTION INTRAMUSCULAR; INTRAVENOUS; SUBCUTANEOUS
Status: DISCONTINUED | OUTPATIENT
Start: 2020-02-10 | End: 2020-02-10 | Stop reason: HOSPADM

## 2020-02-10 RX ORDER — SODIUM CHLORIDE, SODIUM LACTATE, POTASSIUM CHLORIDE, CALCIUM CHLORIDE 600; 310; 30; 20 MG/100ML; MG/100ML; MG/100ML; MG/100ML
9 INJECTION, SOLUTION INTRAVENOUS CONTINUOUS PRN
Status: DISCONTINUED | OUTPATIENT
Start: 2020-02-10 | End: 2020-02-16 | Stop reason: HOSPADM

## 2020-02-10 RX ORDER — ROCURONIUM BROMIDE 10 MG/ML
INJECTION, SOLUTION INTRAVENOUS AS NEEDED
Status: DISCONTINUED | OUTPATIENT
Start: 2020-02-10 | End: 2020-02-10 | Stop reason: SURG

## 2020-02-10 RX ORDER — PROMETHAZINE HYDROCHLORIDE 25 MG/ML
6.25 INJECTION, SOLUTION INTRAMUSCULAR; INTRAVENOUS ONCE AS NEEDED
Status: DISCONTINUED | OUTPATIENT
Start: 2020-02-10 | End: 2020-02-10 | Stop reason: HOSPADM

## 2020-02-10 RX ORDER — PROPOFOL 10 MG/ML
VIAL (ML) INTRAVENOUS AS NEEDED
Status: DISCONTINUED | OUTPATIENT
Start: 2020-02-10 | End: 2020-02-10 | Stop reason: SURG

## 2020-02-10 RX ORDER — DEXAMETHASONE SODIUM PHOSPHATE 10 MG/ML
INJECTION, SOLUTION INTRAMUSCULAR; INTRAVENOUS
Status: COMPLETED | OUTPATIENT
Start: 2020-02-10 | End: 2020-02-10

## 2020-02-10 RX ORDER — SODIUM CHLORIDE 0.9 % (FLUSH) 0.9 %
10 SYRINGE (ML) INJECTION EVERY 12 HOURS SCHEDULED
Status: DISCONTINUED | OUTPATIENT
Start: 2020-02-10 | End: 2020-02-10 | Stop reason: HOSPADM

## 2020-02-10 RX ORDER — DIAZEPAM 5 MG/ML
5 INJECTION, SOLUTION INTRAMUSCULAR; INTRAVENOUS ONCE
Status: COMPLETED | OUTPATIENT
Start: 2020-02-10 | End: 2020-02-10

## 2020-02-10 RX ORDER — FENTANYL CITRATE 50 UG/ML
50 INJECTION, SOLUTION INTRAMUSCULAR; INTRAVENOUS
Status: DISCONTINUED | OUTPATIENT
Start: 2020-02-10 | End: 2020-02-10 | Stop reason: HOSPADM

## 2020-02-10 RX ORDER — NALOXONE HCL 0.4 MG/ML
0.1 VIAL (ML) INJECTION
Status: DISCONTINUED | OUTPATIENT
Start: 2020-02-10 | End: 2020-02-16 | Stop reason: HOSPADM

## 2020-02-10 RX ORDER — HYDROMORPHONE HYDROCHLORIDE 1 MG/ML
0.25 INJECTION, SOLUTION INTRAMUSCULAR; INTRAVENOUS; SUBCUTANEOUS
Status: DISCONTINUED | OUTPATIENT
Start: 2020-02-10 | End: 2020-02-14

## 2020-02-10 RX ORDER — BUPIVACAINE HYDROCHLORIDE 2.5 MG/ML
INJECTION, SOLUTION EPIDURAL; INFILTRATION; INTRACAUDAL
Status: COMPLETED | OUTPATIENT
Start: 2020-02-10 | End: 2020-02-10

## 2020-02-10 RX ORDER — SODIUM CHLORIDE 9 MG/ML
75 INJECTION, SOLUTION INTRAVENOUS CONTINUOUS
Status: ACTIVE | OUTPATIENT
Start: 2020-02-10 | End: 2020-02-11

## 2020-02-10 RX ORDER — LIDOCAINE HYDROCHLORIDE 10 MG/ML
INJECTION, SOLUTION EPIDURAL; INFILTRATION; INTRACAUDAL; PERINEURAL AS NEEDED
Status: DISCONTINUED | OUTPATIENT
Start: 2020-02-10 | End: 2020-02-10 | Stop reason: SURG

## 2020-02-10 RX ORDER — ASPIRIN 81 MG/1
81 TABLET, CHEWABLE ORAL DAILY
Status: DISCONTINUED | OUTPATIENT
Start: 2020-02-11 | End: 2020-02-16 | Stop reason: HOSPADM

## 2020-02-10 RX ORDER — LIDOCAINE HYDROCHLORIDE 10 MG/ML
0.5 INJECTION, SOLUTION EPIDURAL; INFILTRATION; INTRACAUDAL; PERINEURAL ONCE AS NEEDED
Status: DISCONTINUED | OUTPATIENT
Start: 2020-02-10 | End: 2020-02-10 | Stop reason: HOSPADM

## 2020-02-10 RX ORDER — FAMOTIDINE 10 MG/ML
20 INJECTION, SOLUTION INTRAVENOUS
Status: COMPLETED | OUTPATIENT
Start: 2020-02-10 | End: 2020-02-10

## 2020-02-10 RX ORDER — PROMETHAZINE HYDROCHLORIDE 25 MG/1
25 TABLET ORAL ONCE AS NEEDED
Status: DISCONTINUED | OUTPATIENT
Start: 2020-02-10 | End: 2020-02-10 | Stop reason: HOSPADM

## 2020-02-10 RX ORDER — MORPHINE SULFATE 1 MG/ML
INJECTION INTRAVENOUS CONTINUOUS
Status: DISCONTINUED | OUTPATIENT
Start: 2020-02-10 | End: 2020-02-13

## 2020-02-10 RX ORDER — HEPARIN SODIUM 5000 [USP'U]/ML
5000 INJECTION, SOLUTION INTRAVENOUS; SUBCUTANEOUS EVERY 8 HOURS SCHEDULED
Status: DISCONTINUED | OUTPATIENT
Start: 2020-02-10 | End: 2020-02-16 | Stop reason: HOSPADM

## 2020-02-10 RX ORDER — MAGNESIUM HYDROXIDE 1200 MG/15ML
LIQUID ORAL AS NEEDED
Status: DISCONTINUED | OUTPATIENT
Start: 2020-02-10 | End: 2020-02-10 | Stop reason: HOSPADM

## 2020-02-10 RX ADMIN — DEXAMETHASONE SODIUM PHOSPHATE 4 MG: 10 INJECTION, SOLUTION INTRAMUSCULAR; INTRAVENOUS at 16:57

## 2020-02-10 RX ADMIN — BUPIVACAINE HYDROCHLORIDE 45 ML: 2.5 INJECTION, SOLUTION EPIDURAL; INFILTRATION; INTRACAUDAL; PERINEURAL at 16:57

## 2020-02-10 RX ADMIN — FAMOTIDINE 20 MG: 10 INJECTION INTRAVENOUS at 15:47

## 2020-02-10 RX ADMIN — HEPARIN SODIUM 5000 UNITS: 5000 INJECTION, SOLUTION INTRAVENOUS; SUBCUTANEOUS at 20:45

## 2020-02-10 RX ADMIN — Medication: at 18:50

## 2020-02-10 RX ADMIN — PROPOFOL 70 MG: 10 INJECTION, EMULSION INTRAVENOUS at 16:55

## 2020-02-10 RX ADMIN — TAZOBACTAM SODIUM AND PIPERACILLIN SODIUM 3.38 G: 375; 3 INJECTION, SOLUTION INTRAVENOUS at 02:17

## 2020-02-10 RX ADMIN — TAZOBACTAM SODIUM AND PIPERACILLIN SODIUM 3.38 G: 375; 3 INJECTION, SOLUTION INTRAVENOUS at 09:43

## 2020-02-10 RX ADMIN — SUGAMMADEX 192 MG: 100 INJECTION, SOLUTION INTRAVENOUS at 18:19

## 2020-02-10 RX ADMIN — SODIUM CHLORIDE, POTASSIUM CHLORIDE, SODIUM LACTATE AND CALCIUM CHLORIDE: 600; 310; 30; 20 INJECTION, SOLUTION INTRAVENOUS at 16:48

## 2020-02-10 RX ADMIN — POTASSIUM CHLORIDE 40 MEQ: 750 CAPSULE, EXTENDED RELEASE ORAL at 04:55

## 2020-02-10 RX ADMIN — Medication 750 MG: at 17:30

## 2020-02-10 RX ADMIN — SODIUM CHLORIDE 75 ML/HR: 9 INJECTION, SOLUTION INTRAVENOUS at 20:46

## 2020-02-10 RX ADMIN — LIDOCAINE HYDROCHLORIDE 30 MG: 10 INJECTION, SOLUTION EPIDURAL; INFILTRATION; INTRACAUDAL; PERINEURAL at 16:55

## 2020-02-10 RX ADMIN — ONDANSETRON 4 MG: 2 INJECTION INTRAMUSCULAR; INTRAVENOUS at 09:46

## 2020-02-10 RX ADMIN — ACETAMINOPHEN 650 MG: 325 TABLET, FILM COATED ORAL at 12:22

## 2020-02-10 RX ADMIN — ROCURONIUM BROMIDE 50 MG: 10 SOLUTION INTRAVENOUS at 16:55

## 2020-02-10 RX ADMIN — TAZOBACTAM SODIUM AND PIPERACILLIN SODIUM 3.38 G: 375; 3 INJECTION, SOLUTION INTRAVENOUS at 16:48

## 2020-02-10 RX ADMIN — HYDROMORPHONE HYDROCHLORIDE 0.25 MG: 1 INJECTION, SOLUTION INTRAMUSCULAR; INTRAVENOUS; SUBCUTANEOUS at 14:43

## 2020-02-10 RX ADMIN — ASPIRIN 81 MG: 81 TABLET, COATED ORAL at 08:21

## 2020-02-10 RX ADMIN — LISINOPRIL 10 MG: 10 TABLET ORAL at 08:22

## 2020-02-10 RX ADMIN — DIAZEPAM 5 MG: 5 INJECTION, SOLUTION INTRAMUSCULAR; INTRAVENOUS at 12:22

## 2020-02-10 NOTE — CONSULTS
General Surgery Consultation Note    Date of Service: 2/10/2020  Susan Tucker  [id number]  1935      Referring Provider: Gerardo Hatfield MD    Location of Consult: Hospital floor     Reason for Consultation: Abdominal pain, internal hernia       History of Present Illness:  I am seeing, Susan Tucker, in consultation for Gerardo Hatfield MD regarding an internal hernia with ischemic enteritis.  84-year-old lady presents with central abdominal pain beginning on Sunday.  Her pain is colicky in nature, in maximal intensity 10 out of 10, and is associated with nausea and vomiting which is bilious.  She has had no pain like this prior.    Problem List Items Addressed This Visit        Other    * (Principal) Sepsis (CMS/HCC)      Other Visit Diagnoses     Colitis    -  Primary    Acute abdominal pain        Nausea and vomiting, intractability of vomiting not specified, unspecified vomiting type              Past Medical History:   Diagnosis Date   • Elevated cholesterol    • Hypertension    • Stroke (CMS/HCC)        Past Surgical History:   • ERCP    Procedure: ENDOSCOPIC RETROGRADE CHOLANGIOPANCREATOGRAPHY;  Surgeon: Liam Manning MD;  Location: [facility] ENDOSCOPY;  Service: Gastroenterology   • HYSTERECTOMY       Allergies   Allergen Reactions   • Amoxicillin Hives   • Atorvastatin Nausea Only   • Cephalexin Rash       No current facility-administered medications on file prior to encounter.      Current Outpatient Medications on File Prior to Encounter   Medication Sig Dispense Refill   • aspirin 81 MG EC tablet Take 81 mg by mouth Daily.     • fenofibrate (TRICOR) 145 MG tablet Take 145 mg by mouth Daily.     • lisinopril (PRINIVIL,ZESTRIL) 10 MG tablet Take 10 mg by mouth Daily.     • alendronate (FOSAMAX) 70 MG tablet Take 70 mg by mouth Every 7 (Seven) Days.     • lisinopril-hydrochlorothiazide (PRINZIDE,ZESTORETIC) 20-12.5 MG per tablet Take 1 tablet by mouth Daily.           Current Facility-Administered  Medications:   •  [MAR Hold] !Vancomycin Trough scheduled for 1700 on 2/11--Please Hold 1800 dose til level reviewed by pharmacy, , Does not apply, Once, Bright Garber, [facility]  •  [MAR Hold] acetaminophen (TYLENOL) tablet 650 mg, 650 mg, Oral, Q4H PRN, 650 mg at 02/10/20 1222 **OR** [MAR Hold] acetaminophen (TYLENOL) 160 MG/5ML solution 650 mg, 650 mg, Oral, Q4H PRN **OR** [MAR Hold] acetaminophen (TYLENOL) suppository 650 mg, 650 mg, Rectal, Q4H PRN, Aurora, Luda, APRN  •  [MAR Hold] aspirin EC tablet 81 mg, 81 mg, Oral, Daily, Aurora, Luda, APRN, 81 mg at 02/10/20 0821  •  [MAR Hold] heparin (porcine) 5000 UNIT/ML injection 5,000 Units, 5,000 Units, Subcutaneous, Q8H, Gerardo Hatfield MD  •  [MAR Hold] HYDROmorphone (DILAUDID) injection 0.25 mg, 0.25 mg, Intravenous, Q2H PRN, Brunner, Mark I, MD, 0.25 mg at 02/10/20 1443  •  lactated ringers infusion, 9 mL/hr, Intravenous, Continuous PRN, Glenn Lopez MD  •  lactated ringers infusion, 9 mL/hr, Intravenous, Continuous, Tristan Turcios MD  •  lidocaine PF 1% (XYLOCAINE) injection 0.5 mL, 0.5 mL, Injection, Once PRN, Tristan Turcios MD  •  lisinopril (PRINIVIL,ZESTRIL) tablet 10 mg, 10 mg, Oral, Daily, Aurora, Luda, APRN, 10 mg at 02/10/20 0822  •  [MAR Hold] ondansetron (ZOFRAN) tablet 4 mg, 4 mg, Oral, Q6H PRN **OR** [MAR Hold] ondansetron (ZOFRAN) injection 4 mg, 4 mg, Intravenous, Q6H PRN, Aurora, Luda, APRN, 4 mg at 02/10/20 0946  •  Pharmacy to dose vancomycin, , Does not apply, Continuous PRN, Aurora, Luda, APRN  •  [MAR Hold] piperacillin-tazobactam (ZOSYN) 3.375 g in iso-osmotic dextrose 50 ml (premix), 3.375 g, Intravenous, Q8H, Aurora, Luda, APRN, 3.375 g at 02/10/20 0943  •  potassium chloride (MICRO-K) CR capsule 40 mEq, 40 mEq, Oral, PRN, 40 mEq at 02/10/20 0455 **OR** potassium chloride (KLOR-CON) packet 40 mEq, 40 mEq, Oral, PRN **OR** potassium chloride 10 mEq in 100 mL IVPB, 10 mEq, Intravenous, Q1H PRN, Aurora,  JOESPH Lares  •  [MAR Hold] sodium chloride 0.9 % flush 10 mL, 10 mL, Intravenous, PRN, Osbaldo Umanzor MD  •  sodium chloride 0.9 % flush 10 mL, 10 mL, Intravenous, Q12H, Tristan Turcios MD  •  sodium chloride 0.9 % flush 10 mL, 10 mL, Intravenous, PRN, Tristan Turcios MD  •  sodium chloride 0.9 % infusion, 75 mL/hr, Intravenous, Continuous, Ana Ramires PA  •  [MAR Hold] vancomycin in dextrose 5% 150 mL (VANCOCIN) IVPB 750 mg, 15 mg/kg, Intravenous, Q24H, Bright Garber, [facility]    Family History   Problem Relation Age of Onset   • Hypertension Mother    • Hypertension Father    • Early death Brother    • Hypertension Maternal Grandmother    • Hypertension Paternal Grandmother    • Learning disabilities Other      Social History     Socioeconomic History   • Marital status:      Spouse name: Not on file   • Number of children: Not on file   • Years of education: Not on file   • Highest education level: Not on file   Tobacco Use   • Smoking status: Never Smoker   • Smokeless tobacco: Never Used   Substance and Sexual Activity   • Alcohol use: No   • Drug use: No       Review of Systems:  Review of Systems   Constitutional: Positive for appetite change and fatigue. Negative for chills.   HENT: Negative for ear pain, nosebleeds and sinus pain.    Eyes: Negative for photophobia and visual disturbance.   Respiratory: Negative for cough, chest tightness and shortness of breath.    Cardiovascular: Negative for chest pain and leg swelling.   Gastrointestinal: Positive for abdominal distention, abdominal pain, constipation, nausea and vomiting.   Endocrine: Negative for polyphagia and polyuria.   Genitourinary: Negative for difficulty urinating, dysuria and hematuria.   Musculoskeletal: Negative for arthralgias, gait problem and myalgias.   Skin: Negative for color change and rash.   Allergic/Immunologic: Negative for immunocompromised state.   Neurological: Negative for facial asymmetry, speech  "difficulty and light-headedness.   Hematological: Negative for adenopathy.   Psychiatric/Behavioral: Negative for agitation, decreased concentration and hallucinations.     Otherwise the 12 point review of systems is negative.    /75 (BP Location: Right arm, Patient Position: Lying)   Pulse 88   Temp 97.5 °F (36.4 °C) (Tympanic)   Resp 16   Ht 154.9 cm (61\")   Wt 48.1 kg (106 lb)   SpO2 95%   BMI 20.03 kg/m²   Body mass index is 20.03 kg/m².    General: Mild distress with NG tube in place, bilious  HEENT: PER, no icterus, normal sclerae  Cardiac: regular rhythm,  no audible rubs  Pulmonary: bilateral breath sounds, non labored  Abdominal: Peritonitis upon palpation, no palpable hepatosplenomegaly  Neurologic: awake, alert, no obvious focal deficits  Extremities: warm, no edema  Skin: no obvious rashes nor worrisome lesions seen     CBC  Results from last 7 days   Lab Units 02/10/20  0411   WBC 10*3/mm3 14.56*   HEMOGLOBIN g/dL 12.6   HEMATOCRIT % 39.7   PLATELETS 10*3/mm3 244       CMP  Results from last 7 days   Lab Units 02/10/20  0411   SODIUM mmol/L 140   POTASSIUM mmol/L 3.8   CHLORIDE mmol/L 104   CO2 mmol/L 22.0   BUN mg/dL 13   CREATININE mg/dL 0.59   CALCIUM mg/dL 8.8   BILIRUBIN mg/dL 0.9   ALK PHOS U/L 34*   ALT (SGPT) U/L 10   AST (SGOT) U/L 27   GLUCOSE mg/dL 131*       Radiology  Imaging Results (Last 72 Hours)     Procedure Component Value Units Date/Time    XR Abdomen KUB [[id number]] Collected:  02/10/20 1615     Updated:  02/10/20 1616    Narrative:          EXAMINATION: XR ABDOMEN KUB-      INDICATION: SBO. Check NGT placement.; K52.9-Noninfective  gastroenteritis and colitis, unspecified; A41.9-Sepsis, unspecified  organism; R10.9-Unspecified abdominal pain; R11.2-Nausea with vomiting,  unspecified      COMPARISON: NONE     FINDINGS: KUB reveals nasogastric tube identified tip in the stomach.  The bowel gas pattern is unremarkable. No evidence of obvious  obstruction. The bony " structures are unremarkable. Contrast seen within  the bladder which is mildly distended           Impression:       Nasogastric tube identified tip in the stomach.          CT Abdomen Pelvis With Contrast [[id number]] Collected:  02/09/20 1829     Updated:  02/10/20 0946    Narrative:       EXAMINATION: CT ABDOMEN AND PELVIS W CONTRAST-      INDICATION: Abdominal pain, nausea and vomiting, history of sepsis with  abscess near gallbladder.     TECHNIQUE: Multiple axial CT imaging was obtained of the abdomen and  pelvis following the administration of intravenous contrast.     The radiation dose reduction device was turned on for each scan per the  ALARA (As Low as Reasonably Achievable) protocol.     COMPARISON: 06/26/2018.     FINDINGS: ABDOMEN/PELVIS: There is some scarring identified at the left  lung base with calcified granuloma identified at the right lung base.  There is abnormal wall thickening identified of the duodenum with a  small area of stranding identified within the gallbladder fossa likely  related to the abnormal duodenal wall thickening. The gallbladder per  history has been surgically removed. There is no evidence of  intrahepatic or extrahepatic biliary ductal dilatation. There is  abnormal wall thickening identified of the small bowel within the mid  aspect of the abdomen. There is also abnormal wall thickening identified  of the ascending colon. Findings also demonstrate abnormal stranding  throughout the mesentery adjacent to the abnormal wall thickening of the  bowel. Findings can be seen in inflammatory bowel disease such as  Crohn's or possibly an enteritis and colitis related to possibly an  infectious or ischemic situation. Vascular calcification is seen within  the abdominal aorta and iliac vessels, however, there is good  opacification of contrast seen within the mesenteric vessels. There is  some stool seen in the colon. There is no significant fluid identified  within the right  paracolic gutter with a small amount of fluid in the  left paracolic gutter. Abnormal fluid identified in the pelvis.  Degenerative changes identified within the spine and pelvis.     Delayed imaging reveals contrast seen in the renal collecting systems  bilaterally as well as within the ureters and bladder with no evidence  of obvious obstruction.       Impression:       There is abnormal appearing bowel within both the abdomen  and pelvis involving the duodenum and the second portion as it courses  near the gallbladder fossa with some stranding identified in the  gallbladder fossa and fluid with wall thickening of the bowel. There is  also a question of small amount of air within the lumen of the bowel in  the gallbladder fossa. Findings per history of cholecystitis. There is  also abnormal small bowel identified within the pelvis as well as  abnormal colon in the ascending colon with wall thickening and  surrounding stranding. Findings can be seen in inflammatory bowel  disease such as Crohn's and clinical correlation is needed. An  infectious or ischemic process at this time cannot be completely  excluded and continued followup recommended as indicated. There is no  evidence of obvious obstruction.     D:  02/09/2020  E:  02/10/2020       XR Chest 1 View [[id number]] Collected:  02/09/20 1721     Updated:  02/10/20 0924    Narrative:       EXAMINATION: XR CHEST 1 VW- 02/09/2020     INDICATION: Weakness, nausea, vomiting, diarrhea     COMPARISON: 06/26/2018     FINDINGS: Portable chest reveals cardiac and mediastinal silhouettes  within normal limits. The lung fields are grossly clear. No focal  parenchymal opacification present.  No pleural effusion or pneumothorax.  Bony structures are unremarkable. Pulmonary vascularity is within normal  limits.           Impression:       No acute cardiopulmonary disease.     D:  02/09/2020  E:  02/10/2020          US Abdomen Limited [[id number]] Collected:  02/10/20 0908      Updated:  02/10/20 0912    Narrative:       EXAMINATION: US ABDOMEN LIMITED- 02/09/2020     INDICATION: abdominal pain, history of ascending cholangitis;  K52.9-Noninfective gastroenteritis and colitis, unspecified;  A41.9-Sepsis, unspecified organism; R10.9-Unspecified abdominal pain;  R11.2-Nausea with vomiting, unspecified; upper abdominal pain     TECHNIQUE: Sonographic imaging was obtained of the right upper quadrant  in both the sagittal and transverse planes.     COMPARISON: NONE     FINDINGS: Pancreas is suboptimal in its visualization. Liver is  homogeneous in its visualized portions. No focal mass or intrahepatic  biliary ductal dilatation. The gallbladder fossa is filled with  fluid-filled loops of bowel. No abnormal mass or fluid collection  identified in this area. The common bile duct is somewhat difficult to  clear out due to the overlying bowel gas measuring approximately 8 mm.  The right kidney is normal in size, configuration, and texture measuring  in length from pole to pole 9.1 cm. No hydronephrosis identified with  only mild prominence of the renal pelvis. No solid mass or renal  cortical cyst identified. No nephrolithiasis.       Impression:       Bile duct measures 8 mm and is somewhat difficult to clear  due to the overlying bowel gas. The remainder of the right upper  quadrant ultrasound is unremarkable.     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  I discussed this with the family and patient.  I think that surgical intervention is appropriate expediently.  I discussed the risks and benefits of surgical intervention including, but not limited to: bleeding, infection, injury to adjacent viscera, anastomotic leak, need for  re-intervention, and medical issues from a cardiopulmonary and deep venous thrombosis standpoint.  The patient and her family understand and wish to proceed with surgical intervention.    Carlos Zurita MD  02/10/20  4:24 PM

## 2020-02-10 NOTE — ANESTHESIA PROCEDURE NOTES
Airway  Urgency: elective    Date/Time: 2/10/2020 4:56 PM  Airway not difficult    General Information and Staff    Patient location during procedure: OR  CRNA: Sampson Zelaya CRNA    Indications and Patient Condition  Indications for airway management: airway protection    Preoxygenated: yes  MILS not maintained throughout  Mask difficulty assessment: 1 - vent by mask    Final Airway Details  Final airway type: endotracheal airway      Successful airway: ETT  Cuffed: yes   Successful intubation technique: direct laryngoscopy  Facilitating devices/methods: intubating stylet  Endotracheal tube insertion site: oral  Blade: Rohan  Blade size: 3  ETT size (mm): 7.0  Cormack-Lehane Classification: grade III - view of epiglottis only  Placement verified by: chest auscultation and capnometry   Measured from: lips  ETT/EBT  to lips (cm): 20  Number of attempts at approach: 1    Additional Comments  Negative epigastric sounds, Breath sound equal bilaterally with symmetric chest rise and fall

## 2020-02-10 NOTE — H&P
[facility] Medicine Services  HISTORY AND PHYSICAL    Patient Name: Susan Tucker  : 1935  MRN: [id number]  Primary Care Physician: Marita George MD    Subjective   Subjective     Chief Complaint:  Abdominal pain   HPI:  Susan Tucker is a 84 y.o. female with a past medical history significant for essential hypertension, hyperlipidemia and cva presents to the ED with complaints of abdominal pain. Patient reports feeling well up until today she had an acute episode of nausea/vomiting then shortly afterwards 10/10 sharp generalized abdominal pain.  She notes that her abdominal pain lasted until arrival to the ED.  Currently she is rating her abdominal pain a 4/10.She does report at times dizziness but denies any fever, chills, LOC, syncope,  chest pain, shortness of air, diarrhea, melena or hematochezia.  Patient reports her last colonoscopy was approximately 3 years ago and was unremarkable.      Of note patient was admitted to the intensive care unit 2018 secondary to sepsis.  At that time her liver enzymes and bilirubin were elevated with CT of abdomen and pelvis noted GB wall thickening, liver abscess and stones in the common bile duct.  She was diagnosed with cholangitis, liver abscess and possible cholecystitis.  GI was consulted and performed ERCP on 18 but the CBD was not able to be cannulated and she was transferred to [facility] for percutaneous drain of liver abscess.  Family  Notes that the liver abscess was successfully removed but at [facility] they did not perform cholecystectomy.  Since then she completed rehab at [facility] and has been home and doing well up until today.  Patient does report she has had a 20 lb weight loss since 2018 with inability to gain weight.  She reports early satiety and at one point having to be started on Marinol due to loss of appetite.  Patient will be admitted to [facility] under the care of the Hospitalist for further evaluation and treatment.        Review of Systems   Constitutional: Positive for appetite change and unexpected weight change. Negative for activity change, chills, diaphoresis, fatigue and fever.   HENT: Negative.    Eyes: Negative for photophobia and visual disturbance.   Respiratory: Negative for cough and shortness of breath.    Cardiovascular: Negative for chest pain, palpitations and leg swelling.   Gastrointestinal: Positive for abdominal pain, nausea and vomiting. Negative for abdominal distention, anal bleeding, blood in stool, constipation and diarrhea.   Genitourinary: Negative for difficulty urinating, dysuria, flank pain, frequency and hematuria.   Musculoskeletal: Negative.    Skin: Negative.    Neurological: Positive for dizziness. Negative for speech difficulty, weakness, light-headedness and headaches.   Psychiatric/Behavioral: Negative.         Otherwise 10-system ROS reviewed and is negative except as mentioned in the HPI.    Personal History     Past Medical History:   Diagnosis Date   • Hypertension    • Stroke (CMS/HCC)        Past Surgical History:   Procedure Laterality Date   • ERCP N/A 6/27/2018    Procedure: ENDOSCOPIC RETROGRADE CHOLANGIOPANCREATOGRAPHY;  Surgeon: Liam Manning MD;  Location: [facility] ENDOSCOPY;  Service: Gastroenterology       Family History: family history is not on file.     Social History:  reports that she has never smoked. She has never used smokeless tobacco. She reports that she does not drink alcohol or use drugs.    Medications:    (Not in a hospital admission)    No Known Allergies    Objective   Objective     Vital Signs:   Temp:  [98.8 °F (37.1 °C)] 98.8 °F (37.1 °C)  Heart Rate:  [] 94  Resp:  [22] 22  BP: (174-191)/(83-89) 174/83        Physical Exam   Constitutional: She is oriented to person, place, and time. She appears well-developed and well-nourished. No distress.   Alert and oriented x4   HENT:   Head: Normocephalic and atraumatic.   Eyes: Pupils are equal, round, and  reactive to light. Conjunctivae and EOM are normal. Right eye exhibits no discharge. Left eye exhibits no discharge. No scleral icterus.   Neck: Normal range of motion. Neck supple. No JVD present. No tracheal deviation present. No thyromegaly present.   Cardiovascular: Normal rate, regular rhythm, normal heart sounds and intact distal pulses. Exam reveals no gallop and no friction rub.   No murmur heard.  Pulmonary/Chest: Effort normal and breath sounds normal. No stridor. No respiratory distress. She has no wheezes. She has no rales. She exhibits no tenderness.   Abdominal: Soft. She exhibits no distension and no mass. There is tenderness. There is no rebound and no guarding. No hernia.   Hypoactive bowel sounds throughout.  Tenderness to suprapubic region with palpation.    Musculoskeletal: Normal range of motion. She exhibits no edema, tenderness or deformity.   Lymphadenopathy:     She has no cervical adenopathy.   Neurological: She is alert and oriented to person, place, and time.   Skin: Skin is warm and dry. No rash noted. She is not diaphoretic. No erythema. No pallor.   Psychiatric: She has a normal mood and affect. Her behavior is normal. Judgment and thought content normal.   Nursing note and vitals reviewed.       Results Reviewed:  I have personally reviewed current lab, radiology, and data and agree.    Results from last 7 days   Lab Units 02/09/20  1637   WBC 10*3/mm3 12.10*   HEMOGLOBIN g/dL 15.1   HEMATOCRIT % 45.4   PLATELETS 10*3/mm3 290     Results from last 7 days   Lab Units 02/09/20  1637   SODIUM mmol/L 138   POTASSIUM mmol/L 3.3*   CHLORIDE mmol/L 98   CO2 mmol/L 24.0   BUN mg/dL 16   CREATININE mg/dL 0.65   GLUCOSE mg/dL 157*   CALCIUM mg/dL 9.9   ALT (SGPT) U/L 10   AST (SGOT) U/L 24     No results found for: BNP  No results found for: PHART, PCO2  Imaging Results (Last 24 Hours)     Procedure Component Value Units Date/Time    CT Abdomen Pelvis With Contrast [[id number]] Collected:   02/09/20 1829     Updated:  02/09/20 1837    Narrative:       EXAMINATION: CT ABDOMEN PELVIS W CONTRAST-      INDICATION: abd pain. nv. hx of sepsis with abscess near gallbladder.  Generalized abdominal pain     TECHNIQUE: Multiple axial CT imaging is obtained of the abdomen and  pelvis spine the ministration of intravenous contrast.     The radiation dose reduction device was turned on for each scan per the  ALARA (As Low as Reasonably Achievable) protocol.     COMPARISON: 06/26/2018     FINDINGS: Abdomen/pelvis: There is some scarring identified at the left  lung base with calcified granuloma identified at the right lung base.  There is abnormal wall thickening identified of the duodenum with a  small area of stranding identified within the gallbladder fossa likely  related to the abnormal duodenal wall thickening. The gallbladder per  history has been surgically removed. There is no evidence of  intrahepatic fracture pelvic bili ductal dilatation. There is abnormal  wall thickening identified of the small bowel within the mid aspect of  the abdomen. There is also abnormal wall thickening identified the a  sending colon. Findings also demonstrate abnormal stranding throughout  the mesentery adjacent to the abnormal wall thickening of the bowel.  Findings can be seen in inflammatory bowel disease such as Crohn's or  possibly an enteritis and colitis related to possibly an infectious or  ischemic situation. Vascular calcification seen within the abdominal  aorta and iliac vessels however there is good opacification of contrast  seen within the mesenteric vessels. There is some stool seen in the  colon. There is no significant fluid identified within the right  paracolic gutter with a small amount of fluid in the left paracolic  gutter. Abnormal fluid identified in the pelvis. Degenerative changes  identified within the spine and pelvis.     Delayed imaging reveals contrast seen in the renal collecting  systems  bilaterally as well as within the ureters and bladder with no evidence  of obvious obstruction.             Impression:       There is abnormal appearing bowel within both the abdomen  and pelvis involving the duodenum and the second portion as it courses  near the gallbladder fossa with some stranding identified in the  gallbladder fossa and fluid with wall thickening of the bowel. There is  also a question of small amount of air within the lumen of the bowel in  the gallbladder fossa. Findings per history of cholecystitis. There is  also abnormal small bowel identified within the pelvis as well as  abnormal colon in the a sending colon with wall thickening and  surrounding stranding. Findings can be seen in inflammatory bowel  disease such as Crohn's and clinical correlation is needed. An  infectious or ischemic process at this time cannot be completely  completely excluded and continued follow-up recommended as indicated.  There is no evidence of obvious obstruction.          XR Chest 1 View [[id number]] Collected:  02/09/20 1721     Updated:  02/09/20 1724    Narrative:          EXAMINATION: XR CHEST 1 VW-      INDICATION: WEAKNESS. NV.      COMPARISON: 06/26/2018     FINDINGS: Portable chest reveals cardiac and mediastinal silhouettes  within normal limits. The lung fields are grossly clear. No focal right  opacification present. A pleural effusion or pneumothorax. Bony  structures are unremarkable. Pulmonary vascularity is within normal  limits.           Impression:       No acute cardiopulmonary disease              Results for orders placed during the hospital encounter of 06/25/18   Adult Transthoracic Echo Complete W/ Cont if Necessary Per Protocol    Narrative · Mild to moderate tricuspid valve regurgitation is present.  · Left ventricular systolic function is normal. Estimated EF = 65%.  · There is no evidence of pericardial effusion.  · No evidence of pulmonary hypertension is present.  · Mild  MAC is present.  · The aortic valve exhibits sclerosis.  · Normal right ventricular cavity size, wall thickness, systolic function   and septal motion noted.  · Left ventricular diastolic dysfunction is abnormal consistent with: age.          Assessment/Plan   Assessment / Plan     Active Hospital Problems    Diagnosis   • **Sepsis (CMS/HCC)   • Enterocolitis   • History of Stroke   • Hypertension         Assessment & Plan:  84  Year old female presents to the ED accompanied by family due to abdominal pain that began today with associated nausea, vomiting and dizziness.       1) Sepsis  ? Secondary to enterocolitis   -WBC 12.10, procal 0.91,   -ua negative, CXR negative  -CT of abdomen and pelvis notes abnormal appearing bowel within both the abdomen and pelvis involving the duodenum and the second portion as it courses near the gallbladder fossa with some stranding identified in the gallbladder fossa and fluid with wall thickening of the bowel.  ? Small amount of air within the lumen of the bowel.  Good opacification of the mesenteric vessels with contrast  -continue vancomycin and zosyn for now  -blood cultures pending  -with known history of cholecystitis without cholecystectomy (per family, but gallbladder no seen on CT) will obtain US gallbladder   -AM consult to GI  -IVF  -prn anti-emetics  -pain control    2) ? Enterocolitis  -CT of abdomen and pelvis as mentioned above  -continue vancomcyin and zosyn  -blood cultures pending  -consider GI vs general surgery consult in am  -IVF  -NPO for now    3) Essential hypertension  -on lisinopril hctz     4) history of CVA    5) Hyperlipidemia  -on tricor       DVT prophylaxis:scds    CODE STATUS:  Full code   There are no questions and answers to display.       Admission Status:  I believe this patient meets INPATIENT status due to sepsis possibly due to enterocolitis requiring IV antibiotics and specialty consultation.  I feel patient’s risk for adverse outcomes  and need for care warrant INPATIENT evaluation and I predict the patient’s care encounter to likely last beyond 2 midnights.    Luda Aurora, JOESPH   02/09/20   7:49 PM        Attending   Admission Attestation       I have seen and examined the patient, performing an independent face-to-face diagnostic evaluation with plan of care reviewed and developed with the advanced practice clinician (APC).      Brief Summary Statement:   Susan Tucker is a 84 y.o. female with PMH signiciant for HTN, HLD, CVA and sepsis secondary to ascending cholangitis complicated by liver abscess and stones in the CBD in June 2018 ([facility]).  The liver abscess was successfully drained percutaneously. The patient and family state that a CCY was NOT done at that time.  THe patient presented to the ER today for abdominal pain, N/V for 4 days.  She has had lightheadedness.  SHe denies fever, chills, diarrhea, melena or hematochezia.      Here in the ER, procalcitonin 0.91.  WBC 12.1K.  Lipase 104.  Lactic 1.7.    CT abdomen and pelvis showed abnormal appearing bowel within both the abdomen and pelvis involving the duodenum and the second portion as it courses near the gallbladder fossa with some stranding identified in the gallbladder fossa and fluid with wall thickening of the bowel.  ? Small amount of air within the lumen of the bowel within the gallbladder fossa.  There is good opacification of the mesenteric vessels with contrast.    Remainder of detailed HPI is as noted by APC and has been reviewed and/or edited by me for completeness.    Attending Physical Exam:  Constitutional: Awake, alert  Eyes: PERRLA, sclerae anicteric, no conjunctival injection  HENT: NCAT, mucous membranes very dry  Neck: Supple, no thyromegaly, no lymphadenopathy, trachea midline  Respiratory: Clear to auscultation bilaterally, nonlabored respirations   Cardiovascular: RRR, no murmurs, rubs, or gallops, palpable pedal pulses bilaterally  Gastrointestinal:  Hypoactive bowel sounds, mildly distended abdomen, TTP but no guarding or rebound.   Musculoskeletal: No bilateral ankle edema, no clubbing or cyanosis to extremities  Psychiatric: Appropriate affect, cooperative  Neurologic: Oriented x 3, strength symmetric in all extremities, Cranial Nerves grossly intact to confrontation, speech clear  Skin: No rashes    Brief Assessment/Plan :  See detailed assessment and plan developed with APC which I have reviewed and/or edited for completeness.    Electronically signed by Jada Hurst MD, 02/09/20, 11:41 PM.

## 2020-02-10 NOTE — PROGRESS NOTES
"Patient seen and examined.  Exam is significant for absence of bowel sounds, abdominal distention, and exquisite tenderness.  No apparent rebound.  Patient has severe pain in the central abdomen.  Continues to have nausea.  Personal review of CT films shows evidence of bowel obstruction with a \"whorl sign\" at the transition point.  There is significant bowel and mesenteric inflammation near the site of obstruction, suspected to be from ischemia.  The distal bowel and colon is decompressed.  Personally placed an NG tube to 60 cm, with recovery of 200 mL's of bilious fluid.  Consulted Dr. Teixeira for consideration of surgery.    Will give opiate analgesia.  "

## 2020-02-10 NOTE — PROGRESS NOTES
[facility] Medicine Services  PROGRESS NOTE    Patient Name: Susan Tucker  : 1935  MRN: [id number]    Date of Admission: 2020  Primary Care Physician: Marita George MD    Subjective   Subjective     CC:   Abdominal pain    HPI: Since reporting sharp abdominal pains.  Appears to be the whole abdomen.  She has somewhat poutchy and distended abdomen.  When she is distracted she is not complaining of pain    Review of Systems    Gen- No fevers, chills  CV- No chest pain, palpitations  Resp- No cough, dyspnea  GI- No N/V/D, ++abd pain    Objective   Objective     Vital Signs:   Temp:  [98.2 °F (36.8 °C)-101.5 °F (38.6 °C)] 99.5 °F (37.5 °C)  Heart Rate:  [] 86  Resp:  [15-22] 15  BP: (133-191)/(60-89) 144/65        Physical Exam:    Constitutional: No acute distress, awake, alert  HENT: NCAT, mucous membranes moist  Respiratory: Clear to auscultation bilaterally, respiratory effort normal   Cardiovascular: RRR, s1 and s2  Gastrointestinal: Positive bowel sounds, soft, mild distention, no guarding, tenderness to palpation of whole abdomen  Musculoskeletal: No bilateral ankle edema  Psychiatric: Appropriate affect, cooperative  Neurologic: Oriented x 3, strength symmetric in all extremities, Cranial Nerves grossly intact to confrontation, speech clear  Skin: dry, + skin tenting    Results Reviewed:  Results from last 7 days   Lab Units 02/10/20  04120  1637   WBC 10*3/mm3 14.56* 12.10*   HEMOGLOBIN g/dL 12.6 15.1   HEMATOCRIT % 39.7 45.4   PLATELETS 10*3/mm3 244 290   PROCALCITONIN ng/mL  --  0.91*     Results from last 7 days   Lab Units 02/10/20  0411 20  1637   SODIUM mmol/L 140 138   POTASSIUM mmol/L 3.8 3.3*   CHLORIDE mmol/L 104 98   CO2 mmol/L 22.0 24.0   BUN mg/dL 13 16   CREATININE mg/dL 0.59 0.65   GLUCOSE mg/dL 131* 157*   CALCIUM mg/dL 8.8 9.9   ALT (SGPT) U/L 10 10   AST (SGOT) U/L 27 24     Estimated Creatinine Clearance: 40.5 mL/min (by C-G  formula based on SCr of 0.59 mg/dL).    Microbiology Results Abnormal     Procedure Component Value - Date/Time    Blood Culture - Blood, Arm, Right [[id number]] Collected:  02/09/20 1630    Lab Status:  Preliminary result Specimen:  Blood from Arm, Right Updated:  02/10/20 0501     Blood Culture No growth at less than 24 hours    Blood Culture - Blood, Arm, Left [[id number]] Collected:  02/09/20 1635    Lab Status:  Preliminary result Specimen:  Blood from Arm, Left Updated:  02/10/20 0501     Blood Culture No growth at less than 24 hours          Imaging Results (Last 24 Hours)     Procedure Component Value Units Date/Time    CT Abdomen Pelvis With Contrast [[id number]] Collected:  02/09/20 1829     Updated:  02/10/20 0946    Narrative:       EXAMINATION: CT ABDOMEN AND PELVIS W CONTRAST-      INDICATION: Abdominal pain, nausea and vomiting, history of sepsis with  abscess near gallbladder.     TECHNIQUE: Multiple axial CT imaging was obtained of the abdomen and  pelvis following the administration of intravenous contrast.     The radiation dose reduction device was turned on for each scan per the  ALARA (As Low as Reasonably Achievable) protocol.     COMPARISON: 06/26/2018.     FINDINGS: ABDOMEN/PELVIS: There is some scarring identified at the left  lung base with calcified granuloma identified at the right lung base.  There is abnormal wall thickening identified of the duodenum with a  small area of stranding identified within the gallbladder fossa likely  related to the abnormal duodenal wall thickening. The gallbladder per  history has been surgically removed. There is no evidence of  intrahepatic or extrahepatic biliary ductal dilatation. There is  abnormal wall thickening identified of the small bowel within the mid  aspect of the abdomen. There is also abnormal wall thickening identified  of the ascending colon. Findings also demonstrate abnormal stranding  throughout the mesentery adjacent to the abnormal  wall thickening of the  bowel. Findings can be seen in inflammatory bowel disease such as  Crohn's or possibly an enteritis and colitis related to possibly an  infectious or ischemic situation. Vascular calcification is seen within  the abdominal aorta and iliac vessels, however, there is good  opacification of contrast seen within the mesenteric vessels. There is  some stool seen in the colon. There is no significant fluid identified  within the right paracolic gutter with a small amount of fluid in the  left paracolic gutter. Abnormal fluid identified in the pelvis.  Degenerative changes identified within the spine and pelvis.     Delayed imaging reveals contrast seen in the renal collecting systems  bilaterally as well as within the ureters and bladder with no evidence  of obvious obstruction.       Impression:       There is abnormal appearing bowel within both the abdomen  and pelvis involving the duodenum and the second portion as it courses  near the gallbladder fossa with some stranding identified in the  gallbladder fossa and fluid with wall thickening of the bowel. There is  also a question of small amount of air within the lumen of the bowel in  the gallbladder fossa. Findings per history of cholecystitis. There is  also abnormal small bowel identified within the pelvis as well as  abnormal colon in the ascending colon with wall thickening and  surrounding stranding. Findings can be seen in inflammatory bowel  disease such as Crohn's and clinical correlation is needed. An  infectious or ischemic process at this time cannot be completely  excluded and continued followup recommended as indicated. There is no  evidence of obvious obstruction.     D:  02/09/2020  E:  02/10/2020       XR Chest 1 View [[id number]] Collected:  02/09/20 1721     Updated:  02/10/20 0924    Narrative:       EXAMINATION: XR CHEST 1 VW- 02/09/2020     INDICATION: Weakness, nausea, vomiting, diarrhea     COMPARISON: 06/26/2018      FINDINGS: Portable chest reveals cardiac and mediastinal silhouettes  within normal limits. The lung fields are grossly clear. No focal  parenchymal opacification present.  No pleural effusion or pneumothorax.  Bony structures are unremarkable. Pulmonary vascularity is within normal  limits.           Impression:       No acute cardiopulmonary disease.     D:  02/09/2020  E:  02/10/2020          US Abdomen Limited [[id number]] Collected:  02/10/20 0908     Updated:  02/10/20 0912    Narrative:       EXAMINATION: US ABDOMEN LIMITED- 02/09/2020     INDICATION: abdominal pain, history of ascending cholangitis;  K52.9-Noninfective gastroenteritis and colitis, unspecified;  A41.9-Sepsis, unspecified organism; R10.9-Unspecified abdominal pain;  R11.2-Nausea with vomiting, unspecified; upper abdominal pain     TECHNIQUE: Sonographic imaging was obtained of the right upper quadrant  in both the sagittal and transverse planes.     COMPARISON: NONE     FINDINGS: Pancreas is suboptimal in its visualization. Liver is  homogeneous in its visualized portions. No focal mass or intrahepatic  biliary ductal dilatation. The gallbladder fossa is filled with  fluid-filled loops of bowel. No abnormal mass or fluid collection  identified in this area. The common bile duct is somewhat difficult to  clear out due to the overlying bowel gas measuring approximately 8 mm.  The right kidney is normal in size, configuration, and texture measuring  in length from pole to pole 9.1 cm. No hydronephrosis identified with  only mild prominence of the renal pelvis. No solid mass or renal  cortical cyst identified. No nephrolithiasis.       Impression:       Bile duct measures 8 mm and is somewhat difficult to clear  due to the overlying bowel gas. The remainder of the right upper  quadrant ultrasound is unremarkable.     D:  02/09/2020  E:  02/10/2020               Results for orders placed during the hospital encounter of 06/25/18   Adult  Transthoracic Echo Complete W/ Cont if Necessary Per Protocol    Narrative · Mild to moderate tricuspid valve regurgitation is present.  · Left ventricular systolic function is normal. Estimated EF = 65%.  · There is no evidence of pericardial effusion.  · No evidence of pulmonary hypertension is present.  · Mild MAC is present.  · The aortic valve exhibits sclerosis.  · Normal right ventricular cavity size, wall thickness, systolic function   and septal motion noted.  · Left ventricular diastolic dysfunction is abnormal consistent with: age.        I have reviewed the medications:  Scheduled Meds:  [START ON 2/11/2020] Pharmacy Consult  Does not apply Once   aspirin 81 mg Oral Daily   lisinopril 10 mg Oral Daily   piperacillin-tazobactam 3.375 g Intravenous Q8H   vancomycin 15 mg/kg Intravenous Q24H     Continuous Infusions:  Pharmacy to dose vancomycin    sodium chloride 75 mL/hr     PRN Meds:.acetaminophen **OR** acetaminophen **OR** acetaminophen  •  ondansetron **OR** ondansetron  •  Pharmacy to dose vancomycin  •  potassium chloride **OR** potassium chloride **OR** potassium chloride  •  sodium chloride    Assessment/Plan   Assessment & Plan     Active Hospital Problems    Diagnosis  POA   • **Sepsis (CMS/HCC) [A41.9]  Yes   • Enterocolitis [K52.9]  Yes   • History of Stroke [I63.9]  Yes   • Hypertension [I10]  Yes      Resolved Hospital Problems   No resolved problems to display.     Brief Hospital Course to date:  Susan Tucker is a 84 y.o. female who presented to the ER with Abdominal Pain.    Abdominal Pain  - unclear  - seen by GI in summer 2018 while in ICU with failed ERCP  - GI consultation  - ? enterocoloitis  Sepsis  - suspected intraabdominal source  - empiric IV abx  - she had Sepsis and ICU admit in Summer 2018 too    US Abd Limited reviewed / CT reviewed    DVT Prophylaxis:  Heparin SC    Disposition: I expect the patient to be discharged TBD    CODE STATUS:   Code Status and Medical Interventions:    Ordered at: 02/09/20 2055     Level Of Support Discussed With:    Patient     Code Status:    CPR     Medical Interventions (Level of Support Prior to Arrest):    Full         Electronically signed by Gerardo Hatfield MD, 02/10/20, 1:36 PM.

## 2020-02-10 NOTE — CONSULTS
"[facility] Consult    Referring Provider:Gerardo Hatfield MD   PCP: Marita George MD    Reason for Consultation: Enterocolitis     Chief complaint: Abdominal pain     History of present illness:    Susan Tucker is a 84 y.o. female who is admitted with abdominal pain of one day duration.   She is known to our service from previous encounter in June 2018 for liver abscess and common bile duct dilation.   An ERCP was attempted but common bile duct was unable to be cannulated.  She was sent for  for remainder of her care.  She states she had the liver abscess drained and has since done fairly well until yesterday.   She awoke with acute severe abdominal pain that waxes and wanes.   She has associated nausea and vomiting.   She feels her stools have been \"more frequent\" this week but not watery.   No melena nor hematochezia.     CT abdomen/pelvis shows abnormal appearance to small bowel as well as colon concerning for enterocolitis.  She was febrile last night with a temperature of 101.       Allergies:  Amoxicillin; Atorvastatin; and Cephalexin    Scheduled Meds:    [START ON 2/11/2020] Pharmacy Consult  Does not apply Once   aspirin 81 mg Oral Daily   lisinopril 10 mg Oral Daily   piperacillin-tazobactam 3.375 g Intravenous Q8H   vancomycin 15 mg/kg Intravenous Q24H        Infusions:    Pharmacy to dose vancomycin        PRN Meds:  acetaminophen **OR** acetaminophen **OR** acetaminophen  •  ondansetron **OR** ondansetron  •  Pharmacy to dose vancomycin  •  potassium chloride **OR** potassium chloride **OR** potassium chloride  •  sodium chloride    Home Meds:  Medications Prior to Admission   Medication Sig Dispense Refill Last Dose   • aspirin 81 MG EC tablet Take 81 mg by mouth Daily.      • fenofibrate (TRICOR) 145 MG tablet Take 145 mg by mouth Daily.      • lisinopril (PRINIVIL,ZESTRIL) 10 MG tablet Take 10 mg by mouth Daily.      • alendronate (FOSAMAX) 70 MG tablet Take 70 mg by mouth Every 7 " "(Seven) Days.      • lisinopril-hydrochlorothiazide (PRINZIDE,ZESTORETIC) 20-12.5 MG per tablet Take 1 tablet by mouth Daily.          ROS: Review of Systems   Constitutional: Positive for fatigue.   HENT: Negative.    Eyes: Negative.    Respiratory: Negative.    Cardiovascular: Negative.    Gastrointestinal: Positive for abdominal pain, nausea and vomiting.   Endocrine: Negative.    Genitourinary: Negative.    Musculoskeletal: Positive for gait problem.   Skin: Negative.    Neurological: Positive for weakness.   Hematological: Negative.    Psychiatric/Behavioral: Negative.        PAST MED HX:  Past Medical History:   Diagnosis Date   • Elevated cholesterol    • Hypertension    • Stroke (CMS/HCC)        PAST SURG HX:  Past Surgical History:   Procedure Laterality Date   • ERCP N/A 6/27/2018    Procedure: ENDOSCOPIC RETROGRADE CHOLANGIOPANCREATOGRAPHY;  Surgeon: Liam Manning MD;  Location: [facility] ENDOSCOPY;  Service: Gastroenterology   • HYSTERECTOMY         FAM HX:  Family History   Problem Relation Age of Onset   • Hypertension Mother    • Hypertension Father    • Early death Brother    • Hypertension Maternal Grandmother    • Hypertension Paternal Grandmother    • Learning disabilities Other        SOC HX:  Social History     Socioeconomic History   • Marital status:      Spouse name: Not on file   • Number of children: Not on file   • Years of education: Not on file   • Highest education level: Not on file   Tobacco Use   • Smoking status: Never Smoker   • Smokeless tobacco: Never Used   Substance and Sexual Activity   • Alcohol use: No   • Drug use: No       PHYSICAL EXAM  /68 (BP Location: Right arm, Patient Position: Lying)   Pulse 88   Temp 99.7 °F (37.6 °C) (Oral)   Resp 15   Ht 154.9 cm (61\")   Wt 48.1 kg (106 lb)   SpO2 96%   BMI 20.03 kg/m²   Wt Readings from Last 3 Encounters:   02/09/20 48.1 kg (106 lb)   06/26/18 56.7 kg (125 lb)   ,body mass index is 20.03 kg/m².  Physical Exam "   Constitutional: She is oriented to person, place, and time.   Elderly female that appears uncomfortable in bed.      HENT:   Head: Normocephalic and atraumatic.   Eyes: No scleral icterus.   Neck: Normal range of motion.   Cardiovascular: Normal rate and regular rhythm.   Pulmonary/Chest: Effort normal and breath sounds normal. No respiratory distress.   Abdominal: Soft. She exhibits distension. There is tenderness.   Significant tenderness to palpation.   Decreased bowel sounds.      Musculoskeletal: Normal range of motion. She exhibits no edema.   Neurological: She is alert and oriented to person, place, and time.   Skin: Skin is warm and dry.   Psychiatric:   Anxious mood related to pain    Nursing note and vitals reviewed.      Results Review:   I reviewed the patient's new clinical results.    Lab Results   Component Value Date    WBC 14.56 (H) 02/10/2020    HGB 12.6 02/10/2020    HGB 15.1 02/09/2020    HGB 10.5 (L) 06/27/2018    HCT 39.7 02/10/2020    MCV 96.6 02/10/2020     02/10/2020       Lab Results   Component Value Date    INR 1.44 (H) 06/27/2018    INR 1.15 (H) 06/25/2018       Lab Results   Component Value Date    GLUCOSE 131 (H) 02/10/2020    BUN 13 02/10/2020    CREATININE 0.59 02/10/2020    EGFRIFNONA 97 02/10/2020    BCR 22.0 02/10/2020     02/10/2020    K 3.8 02/10/2020    CO2 22.0 02/10/2020    CALCIUM 8.8 02/10/2020    ALBUMIN 3.30 (L) 02/10/2020    ALKPHOS 34 (L) 02/10/2020    BILITOT 0.9 02/10/2020    ALT 10 02/10/2020    AST 27 02/10/2020     Abdominal ultrasound : (as interpreted by radiologist)  FINDINGS: Pancreas is suboptimal in its visualization. Liver is  homogeneous in its visualized portions. No focal mass or intrahepatic  biliary ductal dilatation. The gallbladder fossa is filled with  fluid-filled loops of bowel. No abnormal mass or fluid collection  identified in this area. The common bile duct is somewhat difficult to  clear out due to the overlying bowel gas  measuring approximately 8 mm.  The right kidney is normal in size, configuration, and texture measuring  in length from pole to pole 9.1 cm. No hydronephrosis identified with  only mild prominence of the renal pelvis. No solid mass or renal  cortical cyst identified. No nephrolithiasis.  IMPRESSION:  Bile duct measures 8 mm and is somewhat difficult to clear  due to the overlying bowel gas. The remainder of the right upper  quadrant ultrasound is unremarkable.    CT abdomen/pelvis (as interpreted by radiologist)  FINDINGS: ABDOMEN/PELVIS: There is some scarring identified at the left  lung base with calcified granuloma identified at the right lung base.  There is abnormal wall thickening identified of the duodenum with a  small area of stranding identified within the gallbladder fossa likely  related to the abnormal duodenal wall thickening. The gallbladder per  history has been surgically removed. There is no evidence of  intrahepatic or extrahepatic biliary ductal dilatation. There is  abnormal wall thickening identified of the small bowel within the mid  aspect of the abdomen. There is also abnormal wall thickening identified  of the ascending colon. Findings also demonstrate abnormal stranding  throughout the mesentery adjacent to the abnormal wall thickening of the  bowel. Findings can be seen in inflammatory bowel disease such as  Crohn's or possibly an enteritis and colitis related to possibly an  infectious or ischemic situation. Vascular calcification is seen within  the abdominal aorta and iliac vessels, however, there is good  opacification of contrast seen within the mesenteric vessels. There is  some stool seen in the colon. There is no significant fluid identified  within the right paracolic gutter with a small amount of fluid in the  left paracolic gutter. Abnormal fluid identified in the pelvis.  Degenerative changes identified within the spine and pelvis.   Delayed imaging reveals contrast seen in  "the renal collecting systems  bilaterally as well as within the ureters and bladder with no evidence  of obvious obstruction.   IMPRESSION:  There is abnormal appearing bowel within both the abdomen  and pelvis involving the duodenum and the second portion as it courses  near the gallbladder fossa with some stranding identified in the  gallbladder fossa and fluid with wall thickening of the bowel. There is  also a question of small amount of air within the lumen of the bowel in  the gallbladder fossa. Findings per history of cholecystitis. There is  also abnormal small bowel identified within the pelvis as well as  abnormal colon in the ascending colon with wall thickening and  surrounding stranding. Findings can be seen in inflammatory bowel  disease such as Crohn's and clinical correlation is needed. An  infectious or ischemic process at this time cannot be completely  excluded and continued followup recommended as indicated. There is no  evidence of obvious obstruction.    ASSESSMENTS/PLANS    1. Enterocolitis   2. Abdominal pain, due to above     Rule out infectious etiology given her fever and leukocytosis.   Differential does include ischemic and IBD.  IBD less likely given acute symptoms and lack of anemia.    >>> Obtain GI panel PCR  >>> Will order lactate, CRP and sed rate   >>> Continue antibiotics and IV NS   >>> Serial abdominal exam     I discussed the patients findings and my recommendations with patient     Addendum: Reviewed CT abdomen with radiologist and Dr. Brunner which shows \"whirl sign\" consistent with small bowel obstruction.   NGT placed.   Surgery consulted.   Will contact her daughter.      TOMASZ Lakhani  02/10/20  10:31 AM    "

## 2020-02-10 NOTE — CONSULTS
"                  Clinical Nutrition   Nutrition Assessment  Reason for Visit:   Physician consult, Unintentional weight loss    Patient Name: Susan Tucker  YOB: 1935  MRN: [id number]  Date of Encounter: 02/10/20 10:44 AM  Admission date: 2/9/2020    Nutrition Assessment   Assessment     Admission diagnosis  Sepsis   Enterocolitis     Additional applicable diagnosis/conditions/procedures this adm:  Stage 1 SHANE coccyx PI    Applicable PMH/PSxH:   HTN   HLD  CVA  ERCP    Reported/Observed/Food/Nutrition Related History:     Pt resting in bed, she was writhing in pain during visit, kept calling out and grabbing bed rails during visit. She reports that she has not had any recent weight loss, more so in the summer of 2018 her weight began to decrease. Pt stated she is unable to recall her UBW prior to the weight loss. She stated currently she weighs 106-107 lb.     Pt reports prior to yesterday morning she was eating very well. She was unable to gain weight though even with good PO intake. Yesterday morning, pt began having multiple episodes of emesis that brought her to ER. This morning, pt is experiencing ongoing nausea.     Pt has tried ONS in the hospital before, she reports that she does not love them but is willing to drink if they will assist her until her PO intake is able to return to normal.     Anthropometrics     Height: 154.9 cm (61\")  Last filed wt: Weight: 48.1 kg (106 lb) (02/09/20 1604)  Weight Method: Stated    BMI: BMI (Calculated): 20  Normal: 18.5-24.9kg/m2    Ideal Body Weight (IBW) (kg): 48.15    Weight Change   UBW: pt is unsure   Per EMR, pt weighed 125 lb 14.1 oz on 6/25/2018.   Weight change: 19 lb     % wt change: 15.2%   Time frame of weight loss: 20 months     Care Everywhere Weight hx:  4/11/2019: 110 lb  10/3/2019: 108 lb      Labs reviewed   Yes   Results from last 7 days   Lab Units 02/10/20  0411 02/09/20  1637   GLUCOSE mg/dL 131* 157*   BUN mg/dL 13 16   CREATININE mg/dL " 0.59 0.65   SODIUM mmol/L 140 138   CHLORIDE mmol/L 104 98   POTASSIUM mmol/L 3.8 3.3*   MAGNESIUM mg/dL 1.7  --    ALT (SGPT) U/L 10 10     Results from last 7 days   Lab Units 02/10/20  0411 02/09/20  1637   ALBUMIN g/dL 3.30* 4.40     Lab Results   Lab Value Date/Time    HGBA1C 6.00 (H) 06/25/2018 1646     Medications reviewed   Pertinent: IV zosyn, IV vancomycin   PRN: zofran     Nutrition Focused Physical Exam     Unable to perform exam due to: abdominal pain     Current Nutrition Prescription     PO: NPO Diet    Intake: Insufficient data     Nutrition Diagnosis     2/10  Problem Unintended weight loss   Etiology Enterocolitis    Signs/Symptoms 19 lb (15.2%) in 20 months      2/10  Problem Altered GI function   Etiology Enterocolitis; abdominal pain   Signs/Symptoms Pt has been having N/V > 24 hrs; NPO diet      Nutrition Intervention   1.  Follow treatment progress, Care plan reviewed  2. Interview for preferences, Await begin PO   3. When pt diet is ordered- please order appropriate ONS- pt is willing to consume.     Goal:   General: Nutrition support treatment  PO: Establish PO    Monitoring/Evaluation:   Per protocol, I&O, Pertinent labs, Weight, GI status, Symptoms    Will Continue to follow per protocol    Aliyah Valenzuela RD  Time Spent: 35 minutes

## 2020-02-10 NOTE — PROGRESS NOTES
Discharge Planning Assessment  [facility]     Patient Name: Susan Tucker  MRN: [id number]  Today's Date: 2/10/2020    Admit Date: 2/9/2020    Discharge Needs Assessment     Row Name 02/10/20 5962       Living Environment    Lives With  alone    Current Living Arrangements  home/apartment/condo    Primary Care Provided by  self    Provides Primary Care For  no one    Family Caregiver if Needed  child(david), adult    Family Caregiver Names  Dinah Mcmillan - Dtr.     Quality of Family Relationships  unable to assess    Able to Return to Prior Arrangements  yes       Resource/Environmental Concerns    Resource/Environmental Concerns  none    Transportation Concerns  car, none       Transition Planning    Patient/Family Anticipates Transition to  home;home with help/services    Patient/Family Anticipated Services at Transition      Transportation Anticipated  family or friend will provide       Discharge Needs Assessment    Concerns to be Addressed  discharge planning    Equipment Currently Used at Home  walker, rolling;shower chair;grab bar    Equipment Needed After Discharge  none    Provided post acute provider list?  N/A    N/A Provider List Comment  Plan is home at DC        Discharge Plan     Row Name 02/10/20 4527       Plan    Plan  Home    Patient/Family in Agreement with Plan  yes    Plan Comments  Spoke with pt. at bedside. Pt. lives alone in [facility]. Uses RW, shower chair and grab bars. States her children are always ther to help her. Voices no needs at this time. Plan at present is to dc to home. Will cont. to follow and update.     Final Discharge Disposition Code  01 - home or self-care        Destination      Coordination has not been started for this encounter.      Durable Medical Equipment      Coordination has not been started for this encounter.      Dialysis/Infusion      Coordination has not been started for this encounter.      Home Medical Care      Coordination has not been started  for this encounter.      Therapy      Coordination has not been started for this encounter.      Community Resources      Coordination has not been started for this encounter.          Demographic Summary    No documentation.       Functional Status     Row Name 02/10/20 0947       Functional Status    Usual Activity Tolerance  good       Functional Status, IADL    Medications  independent    Meal Preparation  assistive person    Housekeeping  assistive person    Laundry  independent    Shopping  assistive person        Psychosocial    No documentation.       Abuse/Neglect    No documentation.       Legal    No documentation.       Substance Abuse    No documentation.       Patient Forms    No documentation.           Keyla Sweet RN

## 2020-02-10 NOTE — PLAN OF CARE
Pt resting well. On fluids throughout the night. Will continue to monitor. No complaints at this time.

## 2020-02-10 NOTE — ANESTHESIA PREPROCEDURE EVALUATION
Anesthesia Evaluation                  Airway   Mallampati: II  Dental      Pulmonary    Cardiovascular     (+) hypertension,       Neuro/Psych  GI/Hepatic/Renal/Endo      Musculoskeletal     Abdominal    Substance History      OB/GYN          Other                        Anesthesia Plan    ASA 3     general with block     intravenous induction     Anesthetic plan, all risks, benefits, and alternatives have been provided, discussed and informed consent has been obtained with: patient.

## 2020-02-10 NOTE — ANESTHESIA POSTPROCEDURE EVALUATION
Patient: Susan Tucker    Procedure Summary     Date:  02/10/20 Room / Location:   [facility] 09 /  [facility]    Anesthesia Start:  1648 Anesthesia Stop:  1834    Procedure:  LAPAROTOMY EXPLORATORY BOWEL RESECTION (N/A Abdomen) Diagnosis:      Surgeon:  Carlos Zurita MD Provider:  Tristan Turcios MD    Anesthesia Type:  general with block ASA Status:  3          Anesthesia Type: general with block    Vitals  Vitals Value Taken Time   BP     Temp     Pulse     Resp     SpO2 100 % 2/10/2020  6:33 PM   Vitals shown include unvalidated device data.        Post Anesthesia Care and Evaluation    Patient location during evaluation: PACU  Patient participation: complete - patient participated  Level of consciousness: awake and alert  Pain score: 0  Pain management: adequate  Airway patency: patent  Anesthetic complications: No anesthetic complications  PONV Status: none  Cardiovascular status: hemodynamically stable and acceptable  Respiratory status: nonlabored ventilation, acceptable and nasal cannula  Hydration status: acceptable

## 2020-02-10 NOTE — ANESTHESIA PROCEDURE NOTES
Peripheral Block      Patient reassessed immediately prior to procedure    Patient location during procedure: OR  Start time: 2/10/2020 4:57 PM  Reason for block: at surgeon's request and post-op pain management  Performed by  CRNA: Sampson Zelaya CRNA  Assisted by: Isabella Wheeler PA  Preanesthetic Checklist  Completed: patient identified, site marked, surgical consent, pre-op evaluation, timeout performed, IV checked, risks and benefits discussed and monitors and equipment checked  Prep:  Pt Position: supine  Sterile barriers:cap, gloves, sterile barriers and mask  Prep: ChloraPrep  Patient monitoring: blood pressure monitoring, continuous pulse oximetry and EKG  Procedure  Sedation:yes  Performed under: general  Guidance:ultrasound guided  Images:still images obtained, printed/placed on chart    Laterality:Bilateral  Block Type:TAP  Injection Technique:single-shot  Needle Type:short-bevel and echogenic  Needle Gauge:20 G  Resistance on Injection: none    Medications Used: dexamethasone sodium phosphate injection, 4 mg  bupivacaine PF (MARCAINE) 0.25 % injection, 45 mL  Med admintered at 2/10/2020 4:57 PM      Medications  Comment:Block Injection:  LA dose divided between Right and Left block        Post Assessment  Injection Assessment: negative aspiration for heme, incremental injection and no paresthesia on injection  Patient Tolerance:comfortable throughout block  Complications:no  Additional Notes      Under Ultrasound guidance, a BBraun 4inch 360 degree needle was advanced with Normal Saline hydro dissection of tissue.  The Internal Oblique and Transversus Abdominus muscles where visualized.  At or before the aponeurosis of Internal Oblique, local anesthetic spread was visualized in the Transversus Abdominus Plane. Injection was made incrementally with aspiration every 5 mls.  There was no  intravascular injection,  injection pressure was normal, there was no neural injection, and the procedure was completed  without difficulty.  Thank You.

## 2020-02-10 NOTE — OP NOTE
General Surgery Operative Note    Susan Tucker  [id number]  1935    Date of Surgery:  2/10/2020 6:23 PM    Pre-op Diagnosis: Internal hernia with bowel obstruction    Post-op Diagnosis: Internal hernia with bowel obstruction                         Gallstone ileus    Procedure: Exploratory laparotomy            Lysis of adhesions            Small bowel resection    Surgeon: Carlos Zurita MD    Assistant: TOMASZ Willoughby    Anesthesia: General    Fluids: 1 L of crystalloid    Estimated Blood Loss: Less than 25 mL    Urine Voided: Not recorded    Specimens: Intact jejunum containing a large gallstone                Drains: None    Findings: Multiple areas of full-thickness necrosis in the intraluminal large mass likely consistent with a gallstone, gallstone ileus    Complications: None apparent    History:   84-year-old lady presents with the acute onset of intermittent colicky abdominal pain.  She has a history of biliary disease.       The risks and benefits of exploratory laparotomy with possible bowel resection were rehashed.  Our discussion included but was not limited to: bleeding, infection, injury to adjacent viscera (colon, small bowel, duodenum etc.), anastomotic leak, need for re-intervention, an open operation in general, and medical issues from a cardiopulmonary and deep venous thrombosis standpoint.  All questions were answered and they understand and wish to proceed with surgical intervention.    Procedure:      After informed consent, the patient was taken to the operating room and placed in the supine position.  General anesthesia was induced, bilateral TAP blocks were placed by anesthesia, a Alberts catheter was placed, the abdomen was then prepped and draped in the standard sterile fashion. A timeout was observed.     Midline laparotomy was performed in the standard fashion.  The peritoneum was entered sharply.  There was a large amount of brownish fluid in the abdomen.  This was  suctioned free.  The omentum was scarred down to the retroperitoneum at the level of the sigmoid colon.  This was freed using the [place] LigaSure.  The small bowel was released and run from the ligament of Treitz to the terminal ileum.  The proximal jejunum had evidence of intermittent full-thickness necrosis and at the end a very large lumen filling concretion, likely a large gallstone.  This would be consistent with a gallstone ileus.  The small bowel was transected proximally and distal to the pathologic small bowel with the 75 JEROD stapler.  The staple lines were oversewn with imbricated 3-0 silk suture.  The mesentery was taken with the [place] LigaSure.  This was passed off as specimen.  In a side-to-side fashion the small bowel anastomosis was performed.  The small bowel was opened the Bovie.  A 75 JEROD stapler was used to make the anastomosis.  The enterotomy was closed with a running 3-0 PDS followed by 3-0 silk imbricating suture.  The mesenteric defect was closed with interrupted 3-0 silk.  The abdomen was copiously irrigated with warm irrigation till clear.  The nasogastric tube was noted to be in the gastric lumen.  The midline fascia was then reapproximated with a running looped 1 PDS.  The skin was reapproximated with staples.  All lap and needle counts were correct.        Sterile dressings were placed on the wound.  All lap and needle counts were correct at the end of the procedure ×2.  The patient was then transferred to the PACU.    Carlos Zurita MD     Date: 2/10/2020  Time: 6:23 PM

## 2020-02-11 LAB
ANION GAP SERPL CALCULATED.3IONS-SCNC: 9 MMOL/L (ref 5–15)
BUN BLD-MCNC: 13 MG/DL (ref 8–23)
BUN/CREAT SERPL: 31.7 (ref 7–25)
CALCIUM SPEC-SCNC: 8.1 MG/DL (ref 8.6–10.5)
CHLORIDE SERPL-SCNC: 107 MMOL/L (ref 98–107)
CO2 SERPL-SCNC: 25 MMOL/L (ref 22–29)
CREAT BLD-MCNC: 0.41 MG/DL (ref 0.57–1)
GFR SERPL CREATININE-BSD FRML MDRD: 148 ML/MIN/1.73
GLUCOSE BLD-MCNC: 81 MG/DL (ref 65–99)
POTASSIUM BLD-SCNC: 3.4 MMOL/L (ref 3.5–5.2)
SODIUM BLD-SCNC: 141 MMOL/L (ref 136–145)

## 2020-02-11 PROCEDURE — 99231 SBSQ HOSP IP/OBS SF/LOW 25: CPT | Performed by: PHYSICIAN ASSISTANT

## 2020-02-11 PROCEDURE — 99233 SBSQ HOSP IP/OBS HIGH 50: CPT | Performed by: INTERNAL MEDICINE

## 2020-02-11 PROCEDURE — 25010000002 HEPARIN (PORCINE) PER 1000 UNITS: Performed by: SURGERY

## 2020-02-11 PROCEDURE — 80048 BASIC METABOLIC PNL TOTAL CA: CPT

## 2020-02-11 PROCEDURE — 25010000003 POTASSIUM CHLORIDE 10 MEQ/100ML SOLUTION: Performed by: SURGERY

## 2020-02-11 PROCEDURE — 97162 PT EVAL MOD COMPLEX 30 MIN: CPT

## 2020-02-11 PROCEDURE — 97166 OT EVAL MOD COMPLEX 45 MIN: CPT

## 2020-02-11 PROCEDURE — 25010000002 PIPERACILLIN SOD-TAZOBACTAM PER 1 G: Performed by: SURGERY

## 2020-02-11 RX ORDER — SODIUM CHLORIDE 9 MG/ML
75 INJECTION, SOLUTION INTRAVENOUS CONTINUOUS
Status: DISCONTINUED | OUTPATIENT
Start: 2020-02-11 | End: 2020-02-15

## 2020-02-11 RX ADMIN — SODIUM CHLORIDE 75 ML/HR: 9 INJECTION, SOLUTION INTRAVENOUS at 15:13

## 2020-02-11 RX ADMIN — TAZOBACTAM SODIUM AND PIPERACILLIN SODIUM 3.38 G: 375; 3 INJECTION, SOLUTION INTRAVENOUS at 10:16

## 2020-02-11 RX ADMIN — POTASSIUM CHLORIDE 10 MEQ: 7.46 INJECTION, SOLUTION INTRAVENOUS at 21:18

## 2020-02-11 RX ADMIN — POTASSIUM CHLORIDE 10 MEQ: 7.46 INJECTION, SOLUTION INTRAVENOUS at 18:27

## 2020-02-11 RX ADMIN — HEPARIN SODIUM 5000 UNITS: 5000 INJECTION, SOLUTION INTRAVENOUS; SUBCUTANEOUS at 20:15

## 2020-02-11 RX ADMIN — HEPARIN SODIUM 5000 UNITS: 5000 INJECTION, SOLUTION INTRAVENOUS; SUBCUTANEOUS at 13:17

## 2020-02-11 RX ADMIN — TAZOBACTAM SODIUM AND PIPERACILLIN SODIUM 3.38 G: 375; 3 INJECTION, SOLUTION INTRAVENOUS at 02:20

## 2020-02-11 RX ADMIN — HEPARIN SODIUM 5000 UNITS: 5000 INJECTION, SOLUTION INTRAVENOUS; SUBCUTANEOUS at 05:54

## 2020-02-11 NOTE — PROGRESS NOTES
"GI Daily Progress Note  Subjective:    Chief Complaint:  Follow up gallstone ileus     Patient sitting up in chair; much more comfortable than previous encounter yesterday.  Abdominal pain is improved following surgery.       Objective:    /80 (BP Location: Right arm, Patient Position: Lying)   Pulse 92   Temp 98.5 °F (36.9 °C) (Oral)   Resp 18   Ht 154.9 cm (61\")   Wt 48.1 kg (106 lb)   SpO2 95%   BMI 20.03 kg/m²     Physical Exam   Constitutional: She is oriented to person, place, and time. She appears well-developed and well-nourished.   Cardiovascular: Normal rate and regular rhythm.   Pulmonary/Chest: Effort normal and breath sounds normal.   Abdominal:   Abdominal binder in place   Neurological: She is alert and oriented to person, place, and time.   Skin: Skin is warm and dry.   Nursing note and vitals reviewed.      Lab  Lab Results   Component Value Date    WBC 14.56 (H) 02/10/2020    HGB 12.6 02/10/2020    HGB 15.1 02/09/2020    HGB 10.5 (L) 06/27/2018    MCV 96.6 02/10/2020     02/10/2020    INR 1.44 (H) 06/27/2018    INR 1.15 (H) 06/25/2018       Lab Results   Component Value Date    GLUCOSE 131 (H) 02/10/2020    BUN 13 02/10/2020    CREATININE 0.59 02/10/2020    EGFRIFNONA 97 02/10/2020    BCR 22.0 02/10/2020     02/10/2020    K 3.8 02/10/2020    CO2 22.0 02/10/2020    CALCIUM 8.8 02/10/2020    ALBUMIN 3.30 (L) 02/10/2020    ALKPHOS 34 (L) 02/10/2020    BILITOT 0.9 02/10/2020    ALT 10 02/10/2020    AST 27 02/10/2020       Assessment:    Gallstone ileus s/p exploratory lap with lysis of adhesions and small bowel resection, jejunum.        Plan:    >>> Postoperative care per Dr. Zurita.   PTOT to see today.       Will sign off.  Please call for questions or concerns.       TOMASZ Lakhani  02/11/20  1:31 PM    "

## 2020-02-11 NOTE — PROGRESS NOTES
[facility] Medicine Services  PROGRESS NOTE    Patient Name: Susan Tucker  : 1935  MRN: [id number]    Date of Admission: 2020  Primary Care Physician: Marita George MD    Subjective   Subjective     CC:  Follow-up for gallstone ileus    HPI:  No acute events overnight, pulled NG tube out because it is uncomfortable, currently denies any abdominal pain, nausea, or vomiting.  Has not had anything to eat.  Has not had a bowel movement.    Review of Systems  Gen- No fevers, chills  CV- No chest pain, palpitations  Resp- No cough, dyspnea    Objective   Objective     Vital Signs:   Temp:  [97.5 °F (36.4 °C)-98.5 °F (36.9 °C)] 98.5 °F (36.9 °C)  Heart Rate:  [] 90  Resp:  [16-20] 18  BP: (117-171)/(64-90) 150/80        Physical Exam:  Constitutional: No acute distress, awake, alert  HENT: NCAT, mucous membranes moist  Respiratory: Clear to auscultation bilaterally, respiratory effort normal on room air  Cardiovascular: RRR, no murmurs, no lower extremity edema  Gastrointestinal: Hypoactive bowel sounds, soft, nontender, nondistended, has a waist  on   Psychiatric: Appropriate affect, cooperative  Neurologic: Oriented x 3, strength symmetric in all extremities, Cranial Nerves grossly intact to confrontation, speech clear  Skin: No rashes      Results Reviewed:  Results from last 7 days   Lab Units 02/10/20  04120  1637   WBC 10*3/mm3 14.56* 12.10*   HEMOGLOBIN g/dL 12.6 15.1   HEMATOCRIT % 39.7 45.4   PLATELETS 10*3/mm3 244 290   PROCALCITONIN ng/mL  --  0.91*     Results from last 7 days   Lab Units 02/10/20  0411 20  1637   SODIUM mmol/L 140 138   POTASSIUM mmol/L 3.8 3.3*   CHLORIDE mmol/L 104 98   CO2 mmol/L 22.0 24.0   BUN mg/dL 13 16   CREATININE mg/dL 0.59 0.65   GLUCOSE mg/dL 131* 157*   CALCIUM mg/dL 8.8 9.9   ALT (SGPT) U/L 10 10   AST (SGOT) U/L 27 24     Estimated Creatinine Clearance: 40.5 mL/min (by C-G formula based on SCr of 0.59  mg/dL).    Microbiology Results Abnormal     Procedure Component Value - Date/Time    Blood Culture - Blood, Arm, Right [[id number]] Collected:  02/09/20 1630    Lab Status:  Preliminary result Specimen:  Blood from Arm, Right Updated:  02/10/20 1701     Blood Culture No growth at 24 hours    Blood Culture - Blood, Arm, Left [[id number]] Collected:  02/09/20 1635    Lab Status:  Preliminary result Specimen:  Blood from Arm, Left Updated:  02/10/20 1701     Blood Culture No growth at 24 hours          Imaging Results (Last 24 Hours)     Procedure Component Value Units Date/Time    XR Abdomen KUB [[id number]] Collected:  02/10/20 1615     Updated:  02/10/20 1839    Narrative:       EXAMINATION: XR ABDOMEN/KUB-02/10/2020:      INDICATION: SBO. Check NGT placement.; K52.9-Noninfective  gastroenteritis and colitis, unspecified; A41.9-Sepsis, unspecified  organism; R10.9-Unspecified abdominal pain; R11.2-Nausea with vomiting,  unspecified.      COMPARISON: NONE.     FINDINGS: KUB reveals nasogastric tube identified with tip in the  stomach. The bowel gas pattern is unremarkable. No evidence of obvious  obstruction. The bony structures are unremarkable. Contrast seen within  the bladder which is mildly distended.           Impression:       Nasogastric tube identified with tip in the stomach.     D:  02/10/2020  E:  02/10/2020     This report was finalized on 2/10/2020 6:36 PM by Dr. Reina Truong MD.       US Abdomen Limited [[id number]] Collected:  02/10/20 0908     Updated:  02/10/20 1836    Narrative:       EXAMINATION: US ABDOMEN LIMITED- 02/09/2020     INDICATION: abdominal pain, history of ascending cholangitis;  K52.9-Noninfective gastroenteritis and colitis, unspecified;  A41.9-Sepsis, unspecified organism; R10.9-Unspecified abdominal pain;  R11.2-Nausea with vomiting, unspecified; upper abdominal pain     TECHNIQUE: Sonographic imaging was obtained of the right upper quadrant  in both the sagittal and  transverse planes.     COMPARISON: NONE     FINDINGS: Pancreas is suboptimal in its visualization. Liver is  homogeneous in its visualized portions. No focal mass or intrahepatic  biliary ductal dilatation. The gallbladder fossa is filled with  fluid-filled loops of bowel. No abnormal mass or fluid collection  identified in this area. The common bile duct is somewhat difficult to  clear out due to the overlying bowel gas measuring approximately 8 mm.  The right kidney is normal in size, configuration, and texture measuring  in length from pole to pole 9.1 cm. No hydronephrosis identified with  only mild prominence of the renal pelvis. No solid mass or renal  cortical cyst identified. No nephrolithiasis.       Impression:       Bile duct measures 8 mm and is somewhat difficult to clear  due to the overlying bowel gas. The remainder of the right upper  quadrant ultrasound is unremarkable.     D:  02/09/2020  E:  02/10/2020         This report was finalized on 2/10/2020 6:32 PM by Dr. Reina Truong MD.       CT Abdomen Pelvis With Contrast [[id number]] Collected:  02/09/20 1829     Updated:  02/10/20 1835    Narrative:       EXAMINATION: CT ABDOMEN AND PELVIS W CONTRAST-      INDICATION: Abdominal pain, nausea and vomiting, history of sepsis with  abscess near gallbladder.     TECHNIQUE: Multiple axial CT imaging was obtained of the abdomen and  pelvis following the administration of intravenous contrast.     The radiation dose reduction device was turned on for each scan per the  ALARA (As Low as Reasonably Achievable) protocol.     COMPARISON: 06/26/2018.     FINDINGS: ABDOMEN/PELVIS: There is some scarring identified at the left  lung base with calcified granuloma identified at the right lung base.  There is abnormal wall thickening identified of the duodenum with a  small area of stranding identified within the gallbladder fossa likely  related to the abnormal duodenal wall thickening. The gallbladder  per  history has been surgically removed. There is no evidence of  intrahepatic or extrahepatic biliary ductal dilatation. There is  abnormal wall thickening identified of the small bowel within the mid  aspect of the abdomen. There is also abnormal wall thickening identified  of the ascending colon. Findings also demonstrate abnormal stranding  throughout the mesentery adjacent to the abnormal wall thickening of the  bowel. Findings can be seen in inflammatory bowel disease such as  Crohn's or possibly an enteritis and colitis related to possibly an  infectious or ischemic situation. Vascular calcification is seen within  the abdominal aorta and iliac vessels, however, there is good  opacification of contrast seen within the mesenteric vessels. There is  some stool seen in the colon. There is no significant fluid identified  within the right paracolic gutter with a small amount of fluid in the  left paracolic gutter. Abnormal fluid identified in the pelvis.  Degenerative changes identified within the spine and pelvis.     Delayed imaging reveals contrast seen in the renal collecting systems  bilaterally as well as within the ureters and bladder with no evidence  of obvious obstruction.       Impression:       There is abnormal appearing bowel within both the abdomen  and pelvis involving the duodenum and the second portion as it courses  near the gallbladder fossa with some stranding identified in the  gallbladder fossa and fluid with wall thickening of the bowel. There is  also a question of small amount of air within the lumen of the bowel in  the gallbladder fossa. Findings per history of cholecystitis. There is  also abnormal small bowel identified within the pelvis as well as  abnormal colon in the ascending colon with wall thickening and  surrounding stranding. Findings can be seen in inflammatory bowel  disease such as Crohn's and clinical correlation is needed. An  infectious or ischemic process at this  time cannot be completely  excluded and continued followup recommended as indicated. There is no  evidence of obvious obstruction.     D:  02/09/2020  E:  02/10/2020     This report was finalized on 2/10/2020 6:32 PM by Dr. Reina Truong MD.       XR Chest 1 View [[id number]] Collected:  02/09/20 1721     Updated:  02/10/20 1834    Narrative:       EXAMINATION: XR CHEST 1 VW- 02/09/2020     INDICATION: Weakness, nausea, vomiting, diarrhea     COMPARISON: 06/26/2018     FINDINGS: Portable chest reveals cardiac and mediastinal silhouettes  within normal limits. The lung fields are grossly clear. No focal  parenchymal opacification present.  No pleural effusion or pneumothorax.  Bony structures are unremarkable. Pulmonary vascularity is within normal  limits.           Impression:       No acute cardiopulmonary disease.     D:  02/09/2020  E:  02/10/2020     This report was finalized on 2/10/2020 6:31 PM by Dr. Reina Truong MD.             Results for orders placed during the hospital encounter of 06/25/18   Adult Transthoracic Echo Complete W/ Cont if Necessary Per Protocol    Narrative · Mild to moderate tricuspid valve regurgitation is present.  · Left ventricular systolic function is normal. Estimated EF = 65%.  · There is no evidence of pericardial effusion.  · No evidence of pulmonary hypertension is present.  · Mild MAC is present.  · The aortic valve exhibits sclerosis.  · Normal right ventricular cavity size, wall thickness, systolic function   and septal motion noted.  · Left ventricular diastolic dysfunction is abnormal consistent with: age.          I have reviewed the medications:  Scheduled Meds:  Pharmacy Consult  Does not apply Once   aspirin 81 mg Oral Daily   heparin (porcine) 5,000 Units Subcutaneous Q8H   lisinopril 10 mg Oral Daily   piperacillin-tazobactam 3.375 g Intravenous Q8H   vancomycin 15 mg/kg Intravenous Q24H     Continuous Infusions:  lactated ringers 9 mL/hr   lactated  ringers 9 mL/hr   Morphine    Pharmacy to dose vancomycin    sodium chloride 75 mL/hr     PRN Meds:.[DISCONTINUED] acetaminophen **OR** [DISCONTINUED] acetaminophen **OR** acetaminophen  •  HYDROmorphone  •  lactated ringers  •  naloxone  •  ondansetron **OR** ondansetron  •  Pharmacy to dose vancomycin  •  potassium chloride **OR** potassium chloride **OR** potassium chloride  •  sodium chloride    Assessment/Plan   Assessment & Plan     Active Hospital Problems    Diagnosis  POA   • **Gallstone ileus of small intestine (CMS/HCC) [K56.3]  Unknown   • Enterocolitis [K52.9]  Yes   • Sepsis (CMS/HCC) [A41.9]  Yes   • History of Stroke [I63.9]  Yes   • Hypertension [I10]  Yes      Resolved Hospital Problems   No resolved problems to display.        Brief Hospital Course to date:  Susan Tucker is a 84 y.o. female With PMH significant for HTN, gallbladder disease, and history of CVA who was admitted on 2/9/20 for internal hernia due to gallstone ileus s/p ex-lap and bowel resection on 2/10.    All problems new to me today    Internal hernia with small bowel obstruction  Gallstone ileus   -s/p ex-lap, lysis of adhesions, and small bowel resection on 2/10  -general surgery consultation-n.p.o. except ice chips, they are managing pain and bowel regimen  -Discontinue vancomycin and Zosyn and monitor off antibiotics  -PT OT recommending home with home healh    History of CVA  -continue aspirin    Hypertension  -continue lisionpril 10 mg daily    DVT Prophylaxis: Heparin    Disposition: I expect the patient to be discharged in 1 to 2 days.    CODE STATUS:   Code Status and Medical Interventions:   Ordered at: 02/09/20 2055     Level Of Support Discussed With:    Patient     Code Status:    CPR     Medical Interventions (Level of Support Prior to Arrest):    Full         Electronically signed by Debbi Angel MD, 02/11/20, 3:08 PM.

## 2020-02-11 NOTE — PLAN OF CARE
Problem: Patient Care Overview  Goal: Plan of Care Review  Flowsheets (Taken 2/11/2020 0831)  Progress: improving  Plan of Care Reviewed With: patient  Outcome Summary: OT eval completed Pt presents with deficits in impulsivity and safety awareness, balance, and strength for ADL completion compared to baseline, min A functional mobility at FWW to chair, mod A donning socks AE provided to progress, CGA bed mob, recom IPOT d/c home 24/7 assist and HHOT

## 2020-02-11 NOTE — PROGRESS NOTES
Continued Stay Note  [facility]     Patient Name: Susan Tucker  MRN: [id number]  Today's Date: 2/11/2020    Admit Date: 2/9/2020    Discharge Plan     Row Name 02/11/20 1129       Plan    Plan  Home with [facility]    Patient/Family in Agreement with Plan  yes    Plan Comments  Spoke with pt. as she was ambulating in [place] with PT. Spoke with miguel Nix per phone. Plan is for pt. to dc to home with family who take turns staying with pt. and [facility] for PT/OT. Called referral to Danny with [facility]. Will cont. to follow and update.     Final Discharge Disposition Code  06 - home with home health care        Discharge Codes    No documentation.             Keyla Sweet RN

## 2020-02-11 NOTE — PLAN OF CARE
Problem: Patient Care Overview  Goal: Plan of Care Review  Outcome: Ongoing (interventions implemented as appropriate)  Flowsheets  Taken 2/11/2020 0875  Progress: improving (Pended)  Outcome Summary: PT Eval complete.  Pt presents with deficits in safety awareness, gait, and balance.  Pt demonstrated impulsivity throughout examination.  She was CGA with bed mobility.  CGA with Transfers with increased verbal cueing for safety.  CGA with gait with use of FWW and vc for sequencing.  Recommend DC home with 24/7 assist and home health PT. (Pended)  Taken 2/11/2020 0963  Plan of Care Reviewed With: patient (Pended)

## 2020-02-11 NOTE — THERAPY EVALUATION
Patient Name: Susan Tucker  : 1935    MRN: [id number]                              Today's Date: 2020       Admit Date: 2020    Visit Dx:     ICD-10-CM ICD-9-CM   1. Colitis K52.9 558.9   2. Sepsis, due to unspecified organism, unspecified whether acute organ dysfunction present (CMS/HCC) A41.9 038.9     995.91   3. Acute abdominal pain R10.9 789.00     338.19   4. Nausea and vomiting, intractability of vomiting not specified, unspecified vomiting type R11.2 787.01   5. Disorder of jejunum K63.9 569.9   6. Impaired mobility and ADLs Z74.09 799.89     Patient Active Problem List   Diagnosis   • Sepsis (CMS/HCC)   • Elevated liver enzymes   • Dehydration   • Hypertension   • History of Stroke   • UTI (urinary tract infection)   • Calculus of bile duct with acute cholecystitis and obstruction   • Enterocolitis   • Gallstone ileus of small intestine (CMS/HCC)     Past Medical History:   Diagnosis Date   • Elevated cholesterol    • Hypertension    • Stroke (CMS/HCC)      Past Surgical History:   Procedure Laterality Date   • ERCP N/A 2018    Procedure: ENDOSCOPIC RETROGRADE CHOLANGIOPANCREATOGRAPHY;  Surgeon: Liam Manning MD;  Location: [facility] ENDOSCOPY;  Service: Gastroenterology   • EXPLORATORY LAPAROTOMY N/A 2/10/2020    Procedure: LAPAROTOMY EXPLORATORY BOWEL RESECTION;  Surgeon: Carlos Zurita MD;  Location: [facility] OR;  Service: General;  Laterality: N/A;   • HYSTERECTOMY       General Information     Row Name 20 1042          PT Evaluation Time/Intention    Document Type  evaluation  (Pended)   -KM     Mode of Treatment  physical therapy  (Pended)   -KM     Row Name 20 1042          General Information    Patient Profile Reviewed?  yes  (Pended)   -KM     Prior Level of Function  independent:  (Pended)   -KM     Existing Precautions/Restrictions  other (see comments);fall;NPO  (Pended)  Abdominal Binder Brace worn at all times.  -KM     Barriers to Rehab  hearing deficit   (Pended)   -[facility] Name 02/11/20 1042          Relationship/Environment    Lives With  alone  (Pended)  Intermittent assistance from adult children.    -KM     Row Name 02/11/20 1042          Resource/Environmental Concerns    Current Living Arrangements  home/apartment/condo  (Pended)   -KM     Row Name 02/11/20 1042          Home Main Entrance    Number of Stairs, Main Entrance  none  (Pended)   -KM     Row Name 02/11/20 1042          Stairs Within Home, Primary    Number of Stairs, Within Home, Primary  two  (Pended)   -KM     Stair Railings, Within Home, Primary  railings on both sides of stairs  (Pended)   -[facility] Name 02/11/20 1042          Cognitive Assessment/Intervention- PT/OT    Orientation Status (Cognition)  oriented x 3  (Pended)   -KM     Row Name 02/11/20 1042          Safety Issues, Functional Mobility    Safety Issues Affecting Function (Mobility)  impulsivity;insight into deficits/self awareness;judgment;positioning of assistive device;sequencing abilities;ability to follow commands;at risk behavior observed  (Pended)   -     Impairments Affecting Function (Mobility)  balance;endurance/activity tolerance;strength;cognition  (Pended)   -       User Key  (r) = Recorded By, (t) = Taken By, (c) = Cosigned By    Initials Name Provider Type    Geeta Ramos, PT Student PT Student        Mobility     Row Name 02/11/20 0852          Bed Mobility Assessment/Treatment    Bed Mobility Assessment/Treatment  bed mobility (all) activities  (Pended)   -KM     Rolling Right [place] (Bed Mobility)  contact guard;1 person assist;verbal cues  (Pended)   -KM     Scooting/Bridging [place] (Bed Mobility)  contact guard;1 person assist;verbal cues  (Pended)   -KM     Supine-Sit [place] (Bed Mobility)  verbal cues;contact guard;1 person assist  (Pended)   -KM     Assistive Device (Bed Mobility)  bed rails;head of bed elevated;draw sheet  (Pended)   -KM     Row Name 02/11/20 0838           Bed-Chair Transfer    Bed-Chair [place] (Transfers)  contact guard;verbal cues;1 person assist  (Pended)   -     Assistive Device (Bed-Chair Transfers)  walker, front-wheeled  (Pended)   -     Row Name 02/11/20 0852          Sit-Stand Transfer    Sit-Stand [place] (Transfers)  contact guard;verbal cues;nonverbal cues (demo/gesture);1 person assist  (Pended)   -     Assistive Device (Sit-Stand Transfers)  walker, front-wheeled  (Pended)   -     Row Name 02/11/20 0852          Gait/Stairs Assessment/Training    Gait/Stairs Assessment/Training  gait/ambulation independence;gait/ambulation assistive device;gait pattern;gait deviations  (Pended)   -     [place] Level (Gait)  contact guard;nonverbal cues (demo/gesture);verbal cues;1 person assist  (Pended)   -     Assistive Device (Gait)  walker, front-wheeled  (Pended)   -     Pattern (Gait)  step-through  (Pended)   -     Bilateral Gait Deviations  forward flexed posture  (Pended)   -     [place] Level (Stairs)  not tested  (Pended)   -       User Key  (r) = Recorded By, (t) = Taken By, (c) = Cosigned By    Initials Name Provider Type     Geeta Leblanc, PT Student PT Student        Obj/Interventions     Row Name 02/11/20 1049          General ROM    GENERAL ROM COMMENTS  BLE AROM Grossly WFL  (Pended)   -     Row Name 02/11/20 1049          MMT (Manual Muscle Testing)    General MMT Comments  BLE AROm Grossly 4/5  (Pended)   -     Row Name 02/11/20 1049          Static Sitting Balance    Level of [place] (Unsupported Sitting, Static Balance)  contact guard assist;1 person assist  (Pended)  CGA secondary to impulsivity and safety awareness.    -KM     Sitting Position (Unsupported Sitting, Static Balance)  sitting on edge of bed  (Pended)   -     Time Able to Maintain Position (Unsupported Sitting, Static Balance)  45 to 60 seconds  (Pended)   -     Row Name 02/11/20 1049          Dynamic Sitting Balance    Level  of [place], Reaches Outside Midline (Sitting, Dynamic Balance)  contact guard assist;1 person assist  (Pended)   -KM     Sitting Position, Reaches Outside Midline (Sitting, Dynamic Balance)  sitting on edge of bed  (Pended)   -     Row Name 02/11/20 1049          Sensory Assessment/Intervention    Sensory General Assessment  no sensation deficits identified  (Pended)   -       User Key  (r) = Recorded By, (t) = Taken By, (c) = Cosigned By    Initials Name Provider Type     Geeta Leblanc A, PT Student PT Student        Goals/Plan     Row Name 02/11/20 0852          Bed Mobility Goal 1 (PT)    Activity/Assistive Device (Bed Mobility Goal 1, PT)  bed mobility activities, all  (Pended)   -KM     [place] Level/Cues Needed (Bed Mobility Goal 1, PT)  conditional independence  (Pended)   -KM     Time Frame (Bed Mobility Goal 1, PT)  long term goal (LTG);10 days  (Pended)   -KM     Progress/Outcomes (Bed Mobility Goal 1, PT)  goal ongoing  (Pended)   -     Row Name 02/11/20 0852          Transfer Goal 1 (PT)    Activity/Assistive Device (Transfer Goal 1, PT)  sit-to-stand/stand-to-sit;bed-to-chair/chair-to-bed;toilet;walker, rolling  (Pended)   -KM     [place] Level/Cues Needed (Transfer Goal 1, PT)  conditional independence;1 person assist  (Pended)   -KM     Time Frame (Transfer Goal 1, PT)  long term goal (LTG);10 days  (Pended)   -KM     Progress/Outcome (Transfer Goal 1, PT)  goal ongoing  (Pended)   -     Row Name 02/11/20 0852          Gait Training Goal 1 (PT)    Activity/Assistive Device (Gait Training Goal 1, PT)  gait (walking locomotion);assistive device use;improve balance and speed;increase endurance/gait distance;walker, rolling  (Pended)   -KM     [place] Level (Gait Training Goal 1, PT)  conditional independence  (Pended)   -KM     Time Frame (Gait Training Goal 1, PT)  10 days;long term goal (LTG)  (Pended)   -KM     Progress/Outcome (Gait Training Goal 1, PT)  goal ongoing   (Pended)   -KM     Row Name 02/11/20 0852          Stairs Goal 1 (PT)    Activity/Assistive Device (Stairs Goal 1, PT)  stairs, all skills;ascending stairs;descending stairs;decrease fall risk  (Pended)   -KM     [place] Level/Cues Needed (Stairs Goal 1, PT)  conditional independence  (Pended)   -KM     Time Frame (Stairs Goal 1, PT)  10 days  (Pended)   -KM     Progress/Outcome (Stairs Goal 1, PT)  goal ongoing  (Pended)   -       User Key  (r) = Recorded By, (t) = Taken By, (c) = Cosigned By    Initials Name Provider Type     Geeta Leblanc, PT Student PT Student        Clinical Impression     Row Name 02/11/20 0930          Pain Assessment    Additional Documentation  Pain Scale: FACES Pre/Post-Treatment (Group)  (Pended)   -KM     Row Name 02/11/20 0930          Pain Scale: Numbers Pre/Post-Treatment    Pain Scale: Numbers, Pretreatment  0/10 - no pain  (Pended)   -     Pain Scale: Numbers, Post-Treatment  0/10 - no pain  (Pended)   -KM     Row Name 02/11/20 0930          Plan of Care Review    Plan of Care Reviewed With  patient  (Pended)   -     Progress  improving  (Pended)   -KM     Row Name 02/11/20 0930          Physical Therapy Clinical Impression    Criteria for Skilled Interventions Met (PT Clinical Impression)  yes;treatment indicated  (Pended)   -     Rehab Potential (PT Clinical Summary)  good, to achieve stated therapy goals  (Pended)   -KM     Row Name 02/11/20 0930          Vital Signs    Pre Systolic BP Rehab  150  (Pended)   -KM     Pre Treatment Diastolic BP  80  (Pended)   -KM     Intra Systolic BP Rehab  156  (Pended)   -KM     Intra Treatment Diastolic BP  78  (Pended)   -KM     Pretreatment Heart Rate (beats/min)  89  (Pended)   -KM     Intratreatment Heart Rate (beats/min)  91  (Pended)   -KM     Pre SpO2 (%)  97  (Pended)   -KM     Row Name 02/11/20 0930          Positioning and Restraints    Pre-Treatment Position  in bed  (Pended)   -KM     Post Treatment Position   chair  (Pended)   -KM     In Chair  notified nsg;reclined;with SLP;call light within reach;encouraged to call for assist;exit alarm on  (Pended)   -KM       User Key  (r) = Recorded By, (t) = Taken By, (c) = Cosigned By    Initials Name Provider Type    Geeta Ramos, PT Student PT Student        Outcome Measures     Row Name 02/11/20 0852          How much help from another person do you currently need...    Turning from your back to your side while in flat bed without using bedrails?  3  (Pended)   -KM     Moving from lying on back to sitting on the side of a flat bed without bedrails?  3  (Pended)   -KM     Moving to and from a bed to a chair (including a wheelchair)?  3  (Pended)   -KM     Standing up from a chair using your arms (e.g., wheelchair, bedside chair)?  3  (Pended)   -KM     Climbing 3-5 steps with a railing?  2  (Pended)   -KM     To walk in hospital room?  3  (Pended)   -KM     AM-PAC 6 Clicks Score (PT)  17  (Pended)   -KM     Row Name 02/11/20 0852          Functional Assessment    Outcome Measure Options  AM-PAC 6 Clicks Basic Mobility (PT)  (Pended)   -KM       User Key  (r) = Recorded By, (t) = Taken By, (c) = Cosigned By    Initials Name Provider Type    Geeta Ramos, PT Student PT Student          PT Recommendation and Plan     Outcome Summary/Treatment Plan (PT)  Anticipated Discharge Disposition (PT): (P) home, home with 24/7 care, home with home health  Plan of Care Reviewed With: (P) patient  Progress: (P) improving  Outcome Summary: (P) PT Eval complete.  Pt presents with deficits in safety awareness, impulsivity,balance.  She was CGA with bed mobility.  CGA with Transfers with increased verbal cueing for safety.  CGA with gait with use of FWW and vc for sequencing.  Proffesional oppinion with for patient to DC home with 24/7 assist and home health PT.     Time Calculation:   PT Charges     Row Name 02/11/20 0852             Time Calculation    Start Time  0852  (Pended)    -CECILIO      PT Received On  02/11/20  (Pended)   -CECILIO      PT Goal Re-Cert Due Date  02/21/20  (Pended)   -CECILIO        User Key  (r) = Recorded By, (t) = Taken By, (c) = Cosigned By    Initials Name Provider Type    Geeta Ramos, PT Student PT Student        Therapy Charges for Today     Code Description Service Date Service Provider Modifiers Qty    30622700220 HC PT EVAL MOD COMPLEXITY 4 2/11/2020 Geeta Leblanc, PT Student GP 1          PT G-Codes  Outcome Measure Options: (P) AM-PAC 6 Clicks Basic Mobility (PT)  AM-PAC 6 Clicks Score (PT): (P) 17  AM-PAC 6 Clicks Score (OT): 18    Geeta Leblanc, PT Student  2/11/2020

## 2020-02-11 NOTE — THERAPY EVALUATION
Acute Care - Occupational Therapy Initial Evaluation  [facility]     Patient Name: Susan Tucker  : 1935  MRN: [id number]  Today's Date: 2020  Onset of Illness/Injury or Date of Surgery: 20  Date of Referral to OT: 20       Admit Date: 2020       ICD-10-CM ICD-9-CM   1. Colitis K52.9 558.9   2. Sepsis, due to unspecified organism, unspecified whether acute organ dysfunction present (CMS/HCC) A41.9 038.9     995.91   3. Acute abdominal pain R10.9 789.00     338.19   4. Nausea and vomiting, intractability of vomiting not specified, unspecified vomiting type R11.2 787.01   5. Disorder of jejunum K63.9 569.9   6. Impaired mobility and ADLs Z74.09 799.89     Patient Active Problem List   Diagnosis   • Sepsis (CMS/HCC)   • Elevated liver enzymes   • Dehydration   • Hypertension   • History of Stroke   • UTI (urinary tract infection)   • Calculus of bile duct with acute cholecystitis and obstruction   • Enterocolitis   • Gallstone ileus of small intestine (CMS/HCC)     Past Medical History:   Diagnosis Date   • Elevated cholesterol    • Hypertension    • Stroke (CMS/HCC)      Past Surgical History:   Procedure Laterality Date   • ERCP N/A 2018    Procedure: ENDOSCOPIC RETROGRADE CHOLANGIOPANCREATOGRAPHY;  Surgeon: Liam Manning MD;  Location: [facility] include 234 beds at the [facility] ENDOSCOPY;  Service: Gastroenterology   • EXPLORATORY LAPAROTOMY N/A 2/10/2020    Procedure: LAPAROTOMY EXPLORATORY BOWEL RESECTION;  Surgeon: Carlos Zurita MD;  Location: [facility] include 234 beds at the [facility] OR;  Service: General;  Laterality: N/A;   • HYSTERECTOMY            OT ASSESSMENT FLOWSHEET (last 12 hours)      Occupational Therapy Evaluation     Row Name 20 0851                   OT Evaluation Time/Intention    Subjective Information  complains of;weakness;fatigue  -KF        Document Type  evaluation  -KF        Mode of Treatment  occupational therapy  -KF        Patient Effort  adequate  -KF           General Information    Patient Profile  Reviewed?  yes  -KF        Onset of Illness/Injury or Date of Surgery  02/09/20  -KF        Patient Observations  alert;cooperative;agree to therapy  -KF        Prior Level of Function  independent:;transfer;all household mobility;bed mobility;ADL's;mod assist:;home management  -KF        Equipment Currently Used at Home  bath bench;walker, rolling FWW outside home   -KF        Existing Precautions/Restrictions  fall;other (see comments) abd inc, abd binder  -KF        Limitations/Impairments  safety/cognitive  -KF        Equipment Issued to Patient  reacher;sock aid  -KF        Risks Reviewed  patient:;LOB;nausea/vomiting;dizziness;increased discomfort  -KF        Benefits Reviewed  patient:;improve function;increase independence;increase strength;increase balance;increase knowledge  -KF        Barriers to Rehab  previous functional deficit  -KF           Relationship/Environment    Primary Source of Support/Comfort  child(david)  -KF        Lives With  alone  -KF        Family Caregiver if Needed  child(david), adult  -KF        Concerns About Impact on Relationships  very supportive family children live close by   -KF           Resource/Environmental Concerns    Current Living Arrangements  home/apartment/condo  -KF           Home Main Entrance    Number of Stairs, Main Entrance  one;two  -KF        Stair Railings, Main Entrance  railings on both sides of stairs  -KF           Cognitive Assessment/Interventions    Additional Documentation  Cognitive Assessment/Intervention (Group)  -KF           Cognitive Assessment/Intervention- PT/OT    Affect/Mental Status (Cognitive)  WFL;confused intermittent confusion   -KF        Orientation Status (Cognition)  oriented x 3  -KF        Follows Commands (Cognition)  follows one step commands;over 90% accuracy;repetition of directions required;verbal cues/prompting required  -KF        Cognitive Function (Cognitive)  executive function deficit;safety deficit  -KF         Executive Function Deficit (Cognition)  mild deficit;judgment;organization/sequencing;insight/awareness of deficits  -KF        Safety Deficit (Cognitive)  mild deficit;awareness of need for assistance;impulsivity;insight into deficits/self awareness;judgment;safety precautions awareness  -KF        Cognitive Interventions (Cognitive)  occupation/activity based interventions;process/task specific training  -KF           Safety Issues, Functional Mobility    Safety Issues Affecting Function (Mobility)  insight into deficits/self awareness;judgment;awareness of need for assistance;safety precaution awareness  -KF        Impairments Affecting Function (Mobility)  balance;endurance/activity tolerance;strength;cognition  -KF           Bed Mobility Assessment/Treatment    Bed Mobility Assessment/Treatment  rolling right;supine-sit;scooting/bridging  -KF        Rolling Right [place] (Bed Mobility)  conditional independence  -KF        Scooting/Bridging [place] (Bed Mobility)  contact guard;verbal cues  -KF        Supine-Sit [place] (Bed Mobility)  contact guard;verbal cues  -KF        Bed Mobility, Safety Issues  cognitive deficits limit understanding;decreased use of arms for pushing/pulling;decreased use of legs for bridging/pushing  -KF        Assistive Device (Bed Mobility)  bed rails;head of bed elevated  -KF           Functional Mobility    Functional Mobility- Ind. Level  minimum assist (75% patient effort);verbal cues required;1 person + 1 person to manage equipment  -KF        Functional Mobility- Device  rolling walker  -        Functional Mobility-Distance (Feet)  6  -KF        Functional Mobility- Safety Issues  step length decreased;sequencing ability decreased  -KF        Functional Mobility- Comment  steps to chair   -KF           Transfer Assessment/Treatment    Transfer Assessment/Treatment  sit-stand transfer;stand-sit transfer;bed-chair transfer  -        Comment (Transfers)  cues for  safety and seq required   -KF           Bed-Chair Transfer    Bed-Chair [place] (Transfers)  minimum assist (75% patient effort);verbal cues  -KF        Assistive Device (Bed-Chair Transfers)  walker, front-wheeled  -KF           Sit-Stand Transfer    Sit-Stand [place] (Transfers)  contact guard;verbal cues  -KF        Assistive Device (Sit-Stand Transfers)  walker, front-wheeled  -KF           Stand-Sit Transfer    Stand-Sit [place] (Transfers)  contact guard;verbal cues  -KF        Assistive Device (Stand-Sit Transfers)  walker, front-wheeled  -KF           ADL Assessment/Intervention    BADL Assessment/Intervention  lower body dressing;upper body dressing  -KF           Upper Body Dressing Assessment/Training    Upper Body Dressing [place] Level  don;front opening garment;minimum assist (75% patient effort);verbal cues  -KF        Upper Body Dressing Position  edge of bed sitting  -KF        Comment (Upper Body Dressing)  threading UEs min A   -KF           Lower Body Dressing Assessment/Training    Lower Body Dressing [place] Level  doff;don;socks;moderate assist (50% patient effort)  -KF        Lower Body Dressing Position  unsupported sitting  -KF        Comment (Lower Body Dressing)  requires cues for seq and safety awareness unable to fully complete safely provided AE   -KF           BADL Safety/Performance    Impairments, BADL Safety/Performance  balance;cognition;endurance/activity tolerance;strength;trunk/postural control;pain;range of motion  -KF        Cognitive Impairments, BADL Safety/Performance  insight into deficits/self awareness;judgment;problem solving/reasoning;safety precaution awareness;awareness, need for assistance;impulsivity  -KF        Skilled BADL Treatment/Intervention  BADL process/adaptation training;cognitive/safety deficit modifications  -KF           General ROM    GENERAL ROM COMMENTS  BUE WFL   -KF           MMT (Manual Muscle Testing)    General MMT  Comments  BUE grossly 4/5 shoulder flexion, WFL elbow and hands   -KF           Motor Assessment/Interventions    Additional Documentation  Balance (Group);Balance Interventions (Group)  -KF           Balance    Balance  static sitting balance;static standing balance;dynamic sitting balance;dynamic standing balance  -KF           Static Sitting Balance    Level of [place] (Unsupported Sitting, Static Balance)  supervision  -KF        Sitting Position (Unsupported Sitting, Static Balance)  sitting on edge of bed;sitting in chair  -KF           Dynamic Sitting Balance    Level of [place], Reaches Outside Midline (Sitting, Dynamic Balance)  standby assist  -KF        Sitting Position, Reaches Outside Midline (Sitting, Dynamic Balance)  sitting in chair;sitting on edge of bed  -KF           Static Standing Balance    Level of [place] (Supported Standing, Static Balance)  contact guard assist  -KF        Time Able to Maintain Position (Supported Standing, Static Balance)  1 to 2 minutes  -KF        Assistive Device Utilized (Supported Standing, Static Balance)  walker, rolling  -KF           Dynamic Standing Balance    Level of [place], Reaches Outside Midline (Standing, Dynamic Balance)  minimal assist, 75% patient effort;1 person to manage equipment  -KF        Time Able to Maintain Position, Reaches Outside Midline (Standing, Dynamic Balance)  1 to 2 minutes  -KF        Assistive Device Utilized (Supported Standing, Dynamic Balance)  walker, rolling  -KF        Comment, Reaches Outside Midline (Standing, Dynamic Balance)  min A for FWW management   -KF           Sensory Assessment/Intervention    Sensory General Assessment  no sensation deficits identified  -KF           Positioning and Restraints    Pre-Treatment Position  in bed  -KF        Post Treatment Position  chair  -KF        In Chair  with PT;call light within reach;encouraged to call for assist  -KF           Pain Assessment     Additional Documentation  Pain Scale: Numbers Pre/Post-Treatment (Group)  -KF           Pain Scale: Numbers Pre/Post-Treatment    Pain Scale: Numbers, Pretreatment  0/10 - no pain  -KF        Pain Scale: Numbers, Post-Treatment  0/10 - no pain  -KF        Pain Intervention(s)  Repositioned;Ambulation/increased activity  -KF           Wound 02/09/20 2200 Bilateral medial coccyx Pressure Injury    Wound - Properties Group Date first assessed: 02/09/20  -DC Time first assessed: 2200  -DC Present on Hospital Admission: Y  -DC Side: Bilateral  -DC Orientation: medial  -DC Location: coccyx  -DC Primary Wound Type: Pressure inj  -DC Stage, Pressure Injury: Stage 1  -DC       Wound 02/10/20 lower abdomen Incision    Wound - Properties Group Date first assessed: 02/10/20  -TP Present on Hospital Admission: N  -TP Orientation: lower  -TP Location: abdomen  -TP Primary Wound Type: Incision  -TP       Plan of Care Review    Plan of Care Reviewed With  patient  -KF        Progress  improving  -KF           Clinical Impression (OT)    Date of Referral to OT  02/09/20  -KF        OT Diagnosis  ADL decline   -KF        Patient/Family Goals Statement (OT Eval)  PLOF   -KF        Criteria for Skilled Therapeutic Interventions Met (OT Eval)  yes;treatment indicated  -KF        Rehab Potential (OT Eval)  good, to achieve stated therapy goals  -KF        Therapy Frequency (OT Eval)  daily  -KF        Care Plan Review (OT)  evaluation/treatment results reviewed;care plan/treatment goals reviewed;risks/benefits reviewed;current/potential barriers reviewed;patient/other agree to care plan  -KF        Anticipated Equipment Needs at Discharge (OT)  -- TBD  -KF        Anticipated Discharge Disposition (OT)  home with home health;home with 24/7 care  -KF           Vital Signs    Pre Systolic BP Rehab  150  -KF        Pre Treatment Diastolic BP  80  -KF        Post Systolic BP Rehab  156  -KF        Post Treatment Diastolic BP  78  -KF         Posttreatment Heart Rate (beats/min)  91  -KF        O2 Delivery Pre Treatment  room air  -KF        O2 Delivery Intra Treatment  room air  -KF        O2 Delivery Post Treatment  room air  -KF        Pre Patient Position  Supine  -KF        Intra Patient Position  Standing  -KF        Post Patient Position  Sitting  -KF           Planned OT Interventions    Planned Therapy Interventions (OT Eval)  activity tolerance training;adaptive equipment training;cognitive/visual perception retraining;BADL retraining;functional balance retraining;IADL retraining;neuromuscular control/coordination retraining;occupation/activity based interventions;patient/caregiver education/training;ROM/therapeutic exercise;strengthening exercise;transfer/mobility retraining  -KF           OT Goals    Transfer Goal Selection (OT)  transfer, OT goal 1  -KF        Dressing Goal Selection (OT)  dressing, OT goal 1  -KF        Toileting Goal Selection (OT)  toileting, OT goal 1  -KF           Transfer Goal 1 (OT)    Activity/Assistive Device (Transfer Goal 1, OT)  bed-to-chair/chair-to-bed;commode;walker, rolling  -KF        [place] Level/Cues Needed (Transfer Goal 1, OT)  contact guard assist  -KF        Time Frame (Transfer Goal 1, OT)  long term goal (LTG);by discharge  -KF        Progress/Outcome (Transfer Goal 1, OT)  goal ongoing  -KF           Dressing Goal 1 (OT)    Activity/Assistive Device (Dressing Goal 1, OT)  lower body dressing;sock-aid;reacher socks   -KF        [place]/Cues Needed (Dressing Goal 1, OT)  standby assist;verbal cues required  -KF        Time Frame (Dressing Goal 1, OT)  long term goal (LTG);by discharge  -KF        Progress/Outcome (Dressing Goal 1, OT)  goal ongoing  -KF           Toileting Goal 1 (OT)    Activity/Device (Toileting Goal 1, OT)  adjust/manage clothing;perform perineal hygiene;commode;grab bar/safety frame  -KF        [place] Level/Cues Needed (Toileting Goal 1, OT)  standby assist  -KF         Time Frame (Toileting Goal 1, OT)  long term goal (LTG);by discharge  -KF        Progress/Outcome (Toileting Goal 1, OT)  goal ongoing  -KF           Living Environment    Home Accessibility  stairs to enter home;tub/shower is not walk in  -KF          User Key  (r) = Recorded By, (t) = Taken By, (c) = Cosigned By    Initials Name Effective Dates    KF Rabia Velasco, OT 04/03/18 -     DC Krista Juarez, RN 01/31/19 -     TP Frances La RN 12/10/19 -                OT Recommendation and Plan  Outcome Summary/Treatment Plan (OT)  Anticipated Equipment Needs at Discharge (OT): (TBD)  Anticipated Discharge Disposition (OT): home with home health, home with 24/7 care  Planned Therapy Interventions (OT Eval): activity tolerance training, adaptive equipment training, cognitive/visual perception retraining, BADL retraining, functional balance retraining, IADL retraining, neuromuscular control/coordination retraining, occupation/activity based interventions, patient/caregiver education/training, ROM/therapeutic exercise, strengthening exercise, transfer/mobility retraining  Therapy Frequency (OT Eval): daily  Plan of Care Review  Plan of Care Reviewed With: patient  Plan of Care Reviewed With: patient  Outcome Summary: OT eval completed Pt presents with deficits in impulsivity and safety awareness, balance, and strength for ADL completed compared to baseline, min A functional mobility at FWW to chair, mod A donning socks AE provided to progress, CGA bed mob, recom IPOT d/c home 24/7 assist and HHOT    Outcome Measures     Row Name 02/11/20 0831             How much help from another is currently needed...    Putting on and taking off regular lower body clothing?  2  -KF      Bathing (including washing, rinsing, and drying)  3  -KF      Toileting (which includes using toilet bed pan or urinal)  3  -KF      Putting on and taking off regular upper body clothing  3  -KF      Taking care of personal  grooming (such as brushing teeth)  4  -KF      Eating meals  3 NPO   -KF      AM-PAC 6 Clicks Score (OT)  18  -KF         Functional Assessment    Outcome Measure Options  AM-PAC 6 Clicks Daily Activity (OT)  -KF        User Key  (r) = Recorded By, (t) = Taken By, (c) = Cosigned By    Initials Name Provider Type    Rabia Arana OT Occupational Therapist          Time Calculation:   Time Calculation- OT     Row Name 02/11/20 0831             Time Calculation- OT    OT Start Time  0831  -KF      Total Timed Code Minutes- OT  0 minute(s)  -KF      OT Received On  02/11/20  -KF      OT Goal Re-Cert Due Date  02/21/20  -KF        User Key  (r) = Recorded By, (t) = Taken By, (c) = Cosigned By    Initials Name Provider Type    Rabia Arana OT Occupational Therapist        Therapy Charges for Today     Code Description Service Date Service Provider Modifiers Qty    30412992297 HC OT EVAL MOD COMPLEXITY 4 2/11/2020 Rabia Velasco OT GO 1               Rabia Velasco OT  2/11/2020

## 2020-02-11 NOTE — PLAN OF CARE
VSS. Pt is pleasant and resting well. Pt has complained of incisional pain with activity/movement in bed. Discussed the use of PCA pump. Discussed NPO until further notice with Pt and daughter.

## 2020-02-11 NOTE — PROGRESS NOTES
"General Surgery Post op Follow Up Note    Subjective:   Pulled her NGT out.  Denies nausea.    /80 (BP Location: Right arm, Patient Position: Lying)   Pulse 90   Temp 98.5 °F (36.9 °C) (Oral)   Resp 18   Ht 154.9 cm (61\")   Wt 48.1 kg (106 lb)   SpO2 95%   BMI 20.03 kg/m²     General Appearance:  in no acute distress  Abdomen: incision is clean and dry    CBC  Results from last 7 days   Lab Units 02/10/20  0411   WBC 10*3/mm3 14.56*   HEMOGLOBIN g/dL 12.6   HEMATOCRIT % 39.7   PLATELETS 10*3/mm3 244       CMP/BMP  Results from last 7 days   Lab Units 02/10/20  0411   SODIUM mmol/L 140   POTASSIUM mmol/L 3.8   CHLORIDE mmol/L 104   CO2 mmol/L 22.0   BUN mg/dL 13   CREATININE mg/dL 0.59   CALCIUM mg/dL 8.8   BILIRUBIN mg/dL 0.9   ALK PHOS U/L 34*   ALT (SGPT) U/L 10   AST (SGOT) U/L 27   GLUCOSE mg/dL 131*         ASSESSMENT/PLAN:  POD 1 - small bowel resection    Patient pulled NGT out. Replace if develops nausea or vomiting.  Mobilize.  Await GI function.  Continue IV abx per medicine.    Carlos Zurita MD  2/11/2020  2:47 PM  "

## 2020-02-12 LAB
BASOPHILS # BLD AUTO: 0.04 10*3/MM3 (ref 0–0.2)
BASOPHILS NFR BLD AUTO: 0.5 % (ref 0–1.5)
CYTO UR: NORMAL
DEPRECATED RDW RBC AUTO: 41.7 FL (ref 37–54)
EOSINOPHIL # BLD AUTO: 0.25 10*3/MM3 (ref 0–0.4)
EOSINOPHIL NFR BLD AUTO: 3 % (ref 0.3–6.2)
ERYTHROCYTE [DISTWIDTH] IN BLOOD BY AUTOMATED COUNT: 12.5 % (ref 12.3–15.4)
HCT VFR BLD AUTO: 32.9 % (ref 34–46.6)
HGB BLD-MCNC: 11 G/DL (ref 12–15.9)
IMM GRANULOCYTES # BLD AUTO: 0.03 10*3/MM3 (ref 0–0.05)
IMM GRANULOCYTES NFR BLD AUTO: 0.4 % (ref 0–0.5)
LAB AP CASE REPORT: NORMAL
LAB AP CLINICAL INFORMATION: NORMAL
LAB AP DIAGNOSIS COMMENT: NORMAL
LYMPHOCYTES # BLD AUTO: 0.95 10*3/MM3 (ref 0.7–3.1)
LYMPHOCYTES NFR BLD AUTO: 11.5 % (ref 19.6–45.3)
MCH RBC QN AUTO: 30.4 PG (ref 26.6–33)
MCHC RBC AUTO-ENTMCNC: 33.4 G/DL (ref 31.5–35.7)
MCV RBC AUTO: 90.9 FL (ref 79–97)
MONOCYTES # BLD AUTO: 0.48 10*3/MM3 (ref 0.1–0.9)
MONOCYTES NFR BLD AUTO: 5.8 % (ref 5–12)
NEUTROPHILS # BLD AUTO: 6.51 10*3/MM3 (ref 1.7–7)
NEUTROPHILS NFR BLD AUTO: 78.8 % (ref 42.7–76)
NRBC BLD AUTO-RTO: 0 /100 WBC (ref 0–0.2)
PATH REPORT.FINAL DX SPEC: NORMAL
PATH REPORT.GROSS SPEC: NORMAL
PLATELET # BLD AUTO: 225 10*3/MM3 (ref 140–450)
PMV BLD AUTO: 10.5 FL (ref 6–12)
POTASSIUM BLD-SCNC: 3.3 MMOL/L (ref 3.5–5.2)
RBC # BLD AUTO: 3.62 10*6/MM3 (ref 3.77–5.28)
WBC NRBC COR # BLD: 8.26 10*3/MM3 (ref 3.4–10.8)

## 2020-02-12 PROCEDURE — 84132 ASSAY OF SERUM POTASSIUM: CPT | Performed by: INTERNAL MEDICINE

## 2020-02-12 PROCEDURE — 99232 SBSQ HOSP IP/OBS MODERATE 35: CPT | Performed by: INTERNAL MEDICINE

## 2020-02-12 PROCEDURE — 25010000002 ONDANSETRON PER 1 MG: Performed by: SURGERY

## 2020-02-12 PROCEDURE — 25010000002 HEPARIN (PORCINE) PER 1000 UNITS: Performed by: SURGERY

## 2020-02-12 PROCEDURE — 85025 COMPLETE CBC W/AUTO DIFF WBC: CPT | Performed by: INTERNAL MEDICINE

## 2020-02-12 PROCEDURE — 25010000003 POTASSIUM CHLORIDE 10 MEQ/100ML SOLUTION: Performed by: SURGERY

## 2020-02-12 RX ADMIN — POTASSIUM CHLORIDE 10 MEQ: 7.46 INJECTION, SOLUTION INTRAVENOUS at 00:06

## 2020-02-12 RX ADMIN — POTASSIUM CHLORIDE 10 MEQ: 7.46 INJECTION, SOLUTION INTRAVENOUS at 11:45

## 2020-02-12 RX ADMIN — ONDANSETRON 4 MG: 2 INJECTION INTRAMUSCULAR; INTRAVENOUS at 20:22

## 2020-02-12 RX ADMIN — HEPARIN SODIUM 5000 UNITS: 5000 INJECTION, SOLUTION INTRAVENOUS; SUBCUTANEOUS at 05:36

## 2020-02-12 RX ADMIN — POTASSIUM CHLORIDE 10 MEQ: 7.46 INJECTION, SOLUTION INTRAVENOUS at 17:13

## 2020-02-12 RX ADMIN — SODIUM CHLORIDE 75 ML/HR: 9 INJECTION, SOLUTION INTRAVENOUS at 13:53

## 2020-02-12 RX ADMIN — HEPARIN SODIUM 5000 UNITS: 5000 INJECTION, SOLUTION INTRAVENOUS; SUBCUTANEOUS at 13:53

## 2020-02-12 RX ADMIN — POTASSIUM CHLORIDE 10 MEQ: 7.46 INJECTION, SOLUTION INTRAVENOUS at 13:53

## 2020-02-12 RX ADMIN — HEPARIN SODIUM 5000 UNITS: 5000 INJECTION, SOLUTION INTRAVENOUS; SUBCUTANEOUS at 20:22

## 2020-02-12 RX ADMIN — POTASSIUM CHLORIDE 10 MEQ: 7.46 INJECTION, SOLUTION INTRAVENOUS at 15:22

## 2020-02-12 RX ADMIN — POTASSIUM CHLORIDE 10 MEQ: 7.46 INJECTION, SOLUTION INTRAVENOUS at 02:01

## 2020-02-12 NOTE — PROGRESS NOTES
[facility] Medicine Services  PROGRESS NOTE    Patient Name: Susan Tucker  : 1935  MRN: [id number]    Date of Admission: 2020  Primary Care Physician: Marita George MD    Subjective   Subjective     CC:  Follow up for gallstone ileus    HPI:  No acute events overnight. Has not yet had a bowel movement. Denies nausea or vomiting, has been having abdominal soreness from surgery, but no acute change in pain.    Review of Systems  Gen- No fevers, chills  CV- No chest pain, palpitations  Resp- No cough, dyspnea    Objective   Objective     Vital Signs:   Temp:  [98 °F (36.7 °C)-99.2 °F (37.3 °C)] 98 °F (36.7 °C)  Heart Rate:  [73-90] 73  Resp:  [16-18] 16  BP: (140-174)/(73-96) 140/82        Physical Exam:  Constitutional: No acute distress, awake, alert  HENT: NCAT, mucous membranes moist  Respiratory: Clear to auscultation bilaterally, respiratory effort normal on room air  Cardiovascular: RRR, no murmurs  Gastrointestinal: hypoactive bowel sounds, soft, nontender, nondistended, has waist  on   Psychiatric: Appropriate affect, cooperative  Neurologic: Oriented x 3, strength symmetric in all extremities, Cranial Nerves grossly intact to confrontation, speech clear  Skin: No rashes on exposed skin     Results Reviewed:  Results from last 7 days   Lab Units 20  0409 02/10/20  04120  1637   WBC 10*3/mm3 8.26 14.56* 12.10*   HEMOGLOBIN g/dL 11.0* 12.6 15.1   HEMATOCRIT % 32.9* 39.7 45.4   PLATELETS 10*3/mm3 225 244 290   PROCALCITONIN ng/mL  --   --  0.91*     Results from last 7 days   Lab Units 20  0754 20  1745 02/10/20  0411 20  1637   SODIUM mmol/L  --  141 140 138   POTASSIUM mmol/L 3.3* 3.4* 3.8 3.3*   CHLORIDE mmol/L  --  107 104 98   CO2 mmol/L  --  25.0 22.0 24.0   BUN mg/dL  --  13 13 16   CREATININE mg/dL  --  0.41* 0.59 0.65   GLUCOSE mg/dL  --  81 131* 157*   CALCIUM mg/dL  --  8.1* 8.8 9.9   ALT (SGPT) U/L  --   --  10 10   AST  (SGOT) U/L  --   --  27 24     Estimated Creatinine Clearance: 42 mL/min (A) (by C-G formula based on SCr of 0.41 mg/dL (L)).    Microbiology Results Abnormal     Procedure Component Value - Date/Time    Blood Culture - Blood, Arm, Right [[id number]] Collected:  02/09/20 1630    Lab Status:  Preliminary result Specimen:  Blood from Arm, Right Updated:  02/11/20 1701     Blood Culture No growth at 2 days    Blood Culture - Blood, Arm, Left [[id number]] Collected:  02/09/20 1635    Lab Status:  Preliminary result Specimen:  Blood from Arm, Left Updated:  02/11/20 1701     Blood Culture No growth at 2 days          Imaging Results (Last 24 Hours)     ** No results found for the last 24 hours. **          Results for orders placed during the hospital encounter of 06/25/18   Adult Transthoracic Echo Complete W/ Cont if Necessary Per Protocol    Narrative · Mild to moderate tricuspid valve regurgitation is present.  · Left ventricular systolic function is normal. Estimated EF = 65%.  · There is no evidence of pericardial effusion.  · No evidence of pulmonary hypertension is present.  · Mild MAC is present.  · The aortic valve exhibits sclerosis.  · Normal right ventricular cavity size, wall thickness, systolic function   and septal motion noted.  · Left ventricular diastolic dysfunction is abnormal consistent with: age.          I have reviewed the medications:  Scheduled Meds:  aspirin 81 mg Oral Daily   heparin (porcine) 5,000 Units Subcutaneous Q8H   lisinopril 10 mg Oral Daily     Continuous Infusions:  lactated ringers 9 mL/hr    lactated ringers 9 mL/hr    Morphine     sodium chloride 75 mL/hr Last Rate: 75 mL/hr (02/12/20 1353)     PRN Meds:.•  [DISCONTINUED] acetaminophen **OR** [DISCONTINUED] acetaminophen **OR** acetaminophen  •  HYDROmorphone  •  lactated ringers  •  naloxone  •  ondansetron **OR** ondansetron  •  potassium chloride **OR** potassium chloride **OR** potassium chloride  •  sodium  chloride    Assessment/Plan   Assessment & Plan     Active Hospital Problems    Diagnosis  POA   • **Gallstone ileus of small intestine (CMS/HCC) [K56.3]  Unknown   • Enterocolitis [K52.9]  Yes   • Sepsis (CMS/HCC) [A41.9]  Yes   • History of Stroke [I63.9]  Yes   • Hypertension [I10]  Yes      Resolved Hospital Problems   No resolved problems to display.        Brief Hospital Course to date:  Susan Tucker is a 84 y.o. female with PMH significant for HTN, gallbladder disease, and history of CVA who was admitted on 2/9/20 for internal hernia due to gallstone ileus s/p ex-lap and bowel resection on 2/10.     Internal hernia with small bowel obstruction  Gallstone ileus   -s/p ex-lap, lysis of adhesions, and small bowel resection on 2/10  -general surgery consultation-ok for sips they are managing pain and bowel regimen - still no BM yet  -Discontinue vancomycin and Zosyn and monitor off antibiotics - has been stable     History of CVA - stable   -continue aspirin     Hypertension - stable   -continue lisionpril 10 mg daily     DVT Prophylaxis: Heparin     Disposition: I expect the patient to be discharged in 1 to 2 days to home with home health    CODE STATUS:   Code Status and Medical Interventions:   Ordered at: 02/09/20 2055     Level Of Support Discussed With:    Patient     Code Status:    CPR     Medical Interventions (Level of Support Prior to Arrest):    Full         Electronically signed by Debbi Angel MD, 02/12/20, 1:54 PM.

## 2020-02-12 NOTE — OP NOTE
"General Surgery Post op Follow Up Note    Subjective:   Feeling a bit better. No nausea or vomiting.    /82 (BP Location: Left arm, Patient Position: Lying)   Pulse 77   Temp 98 °F (36.7 °C) (Oral)   Resp 16   Ht 154.9 cm (61\")   Wt 49.9 kg (110 lb)   SpO2 93%   BMI 20.78 kg/m²     General Appearance:  in no acute distress  Abdomen: incision is clean and dry    CBC  Results from last 7 days   Lab Units 02/12/20  0409   WBC 10*3/mm3 8.26   HEMOGLOBIN g/dL 11.0*   HEMATOCRIT % 32.9*   PLATELETS 10*3/mm3 225       CMP/BMP  Results from last 7 days   Lab Units 02/12/20  0754 02/11/20  1745 02/10/20  0411   SODIUM mmol/L  --  141 140   POTASSIUM mmol/L 3.3* 3.4* 3.8   CHLORIDE mmol/L  --  107 104   CO2 mmol/L  --  25.0 22.0   BUN mg/dL  --  13 13   CREATININE mg/dL  --  0.41* 0.59   CALCIUM mg/dL  --  8.1* 8.8   BILIRUBIN mg/dL  --   --  0.9   ALK PHOS U/L  --   --  34*   ALT (SGPT) U/L  --   --  10   AST (SGOT) U/L  --   --  27   GLUCOSE mg/dL  --  81 131*         ASSESSMENT/PLAN:    POD 2 - small bowel resection     Sips.  Mobilize.  Await GI function.  Continue IV abx per medicine (general abdominal contamination).  Dr. MILLER to follow through the weekend.     Carlos Zurita MD  2/12/2020  9:26 AM  "

## 2020-02-13 LAB
POTASSIUM BLD-SCNC: 3.5 MMOL/L (ref 3.5–5.2)
POTASSIUM BLD-SCNC: 4.5 MMOL/L (ref 3.5–5.2)

## 2020-02-13 PROCEDURE — 84132 ASSAY OF SERUM POTASSIUM: CPT | Performed by: INTERNAL MEDICINE

## 2020-02-13 PROCEDURE — 99232 SBSQ HOSP IP/OBS MODERATE 35: CPT | Performed by: INTERNAL MEDICINE

## 2020-02-13 PROCEDURE — 97110 THERAPEUTIC EXERCISES: CPT

## 2020-02-13 PROCEDURE — 25010000002 HEPARIN (PORCINE) PER 1000 UNITS: Performed by: SURGERY

## 2020-02-13 PROCEDURE — 97116 GAIT TRAINING THERAPY: CPT

## 2020-02-13 RX ORDER — HYDROCODONE BITARTRATE AND ACETAMINOPHEN 5; 325 MG/1; MG/1
1 TABLET ORAL EVERY 6 HOURS PRN
Status: DISCONTINUED | OUTPATIENT
Start: 2020-02-13 | End: 2020-02-16 | Stop reason: HOSPADM

## 2020-02-13 RX ADMIN — POTASSIUM CHLORIDE 40 MEQ: 750 CAPSULE, EXTENDED RELEASE ORAL at 16:29

## 2020-02-13 RX ADMIN — ASPIRIN 81 MG 81 MG: 81 TABLET ORAL at 09:19

## 2020-02-13 RX ADMIN — SODIUM CHLORIDE 75 ML/HR: 9 INJECTION, SOLUTION INTRAVENOUS at 18:18

## 2020-02-13 RX ADMIN — POTASSIUM CHLORIDE 40 MEQ: 750 CAPSULE, EXTENDED RELEASE ORAL at 12:16

## 2020-02-13 RX ADMIN — LISINOPRIL 10 MG: 10 TABLET ORAL at 09:19

## 2020-02-13 RX ADMIN — SODIUM CHLORIDE 75 ML/HR: 9 INJECTION, SOLUTION INTRAVENOUS at 04:39

## 2020-02-13 RX ADMIN — HEPARIN SODIUM 5000 UNITS: 5000 INJECTION, SOLUTION INTRAVENOUS; SUBCUTANEOUS at 21:38

## 2020-02-13 RX ADMIN — HEPARIN SODIUM 5000 UNITS: 5000 INJECTION, SOLUTION INTRAVENOUS; SUBCUTANEOUS at 14:48

## 2020-02-13 RX ADMIN — HEPARIN SODIUM 5000 UNITS: 5000 INJECTION, SOLUTION INTRAVENOUS; SUBCUTANEOUS at 05:57

## 2020-02-13 RX ADMIN — SODIUM CHLORIDE, PRESERVATIVE FREE 10 ML: 5 INJECTION INTRAVENOUS at 09:20

## 2020-02-13 NOTE — PROGRESS NOTES
Adult Nutrition  Assessment/PES    Patient Name:  Susan Tucker  YOB: 1935  MRN: [id number]  Admit Date:  2/9/2020    Assessment Date:  2/13/2020    Comments:      Reason for Assessment     Row Name 02/13/20 1144          Reason for Assessment    Reason For Assessment  per organizational policy NPO > 72 HRS     Diagnosis  -- SEPSIS, ENTEROCOLITIS, GALLSTONE ILEUS OF SMALL INTESTINES, S/P EXPLO LAP WITH BOWEL RESECT & DARIUS 2/10. PMH: HTN,, HLP, CVA. PSH: ERCP              Labs/Tests/Procedures/Meds     Row Name 02/13/20 1148          Labs/Procedures/Meds    Lab Results Reviewed  reviewed        Medications    Pertinent Medications Reviewed  reviewed         Physical Findings     Row Name 02/13/20 1148          Physical Findings    Gastrointestinal  other (see comments) TENDER ABD WITH FAINT/HYPO BS     Skin  surgical incision STAGE 1 B/L MEDIAL COCCYX PI & LOWER ABD INCISIONAL WOUND           Nutrition Prescription Ordered     Row Name 02/13/20 1149          Nutrition Prescription PO    Current PO Diet  NPO                 Problem/Interventions:  Problem 1     Row Name 02/13/20 1149          Nutrition Diagnoses Problem 1    Problem 1  Inadequate Intake/Infusion     Inadequate Intake Type  Oral     Etiology (related to)  MNT for Treatment/Condition     Signs/Symptoms (evidenced by)  NPO > 72 HRS               Intervention Goal     Row Name 02/13/20 1149          Intervention Goal    General  Nutrition support treatment     PO  Advance diet AS MEDICALLY APPROPRIATE.          Nutrition Intervention     Row Name 02/13/20 1150          Nutrition Intervention    RD/Tech Action  Await begin PO;Care plan reviewd;Follow Tx progress           Education/Evaluation     Row Name 02/13/20 1150          Monitor/Evaluation    Monitor  Per protocol           Electronically signed by:  Lorena Hall RD  02/13/20 11:50 AM

## 2020-02-13 NOTE — PLAN OF CARE
Problem: Patient Care Overview  Goal: Plan of Care Review  Flowsheets  Taken 2/13/2020 1400  Plan of Care Reviewed With: patient;family  Taken 2/13/2020 1510  Outcome Summary: Pt ambulated in [place] today. Pt's PCA d/c'd this AM. Pt has no complaints of pain. VSS. RN will continue to monitor.

## 2020-02-13 NOTE — PROGRESS NOTES
[facility]  PROGRESS NOTE    Patient Name: Susan Tucker  : 1935  MRN: [id number]    Date of Admission: 2020  Primary Care Physician: Marita George MD    Subjective   Subjective     CC:  Follow up gallstone ileus    HPI:  No acute events overnight. Has been taking sips of water. Denies nausea or vomiting. Has not had a bowel movement. Denies abdominal pain.    Review of Systems  Gen- No fevers, chills  CV- No chest pain, palpitations  Resp- No cough, dyspnea    Objective   Objective     Vital Signs:   Temp:  [98 °F (36.7 °C)-98.7 °F (37.1 °C)] 98 °F (36.7 °C)  Heart Rate:  [66-94] 69  Resp:  [16-18] 18  BP: (158-162)/(72-89) 161/72        Physical Exam:  Constitutional: No acute distress, awake, alert, sitting up to chair   HENT: NCAT, mucous membranes moist  Respiratory: Clear to auscultation bilaterally, respiratory effort normal on room air  Cardiovascular: RRR, no murmurs, rubs, or gallops, palpable pedal pulses bilaterally, no lower extremity edema   Gastrointestinal: hypoactive bowel sounds, soft, nontender, has waist  on   Psychiatric: Appropriate affect, cooperative  Neurologic: Oriented x 3, Cranial Nerves grossly intact to confrontation, speech clear  Skin: No rashes    Results Reviewed:  Results from last 7 days   Lab Units 20  0409 02/10/20  04120  1637   WBC 10*3/mm3 8.26 14.56* 12.10*   HEMOGLOBIN g/dL 11.0* 12.6 15.1   HEMATOCRIT % 32.9* 39.7 45.4   PLATELETS 10*3/mm3 225 244 290   PROCALCITONIN ng/mL  --   --  0.91*     Results from last 7 days   Lab Units 20  0744 20  0754 20  1745 02/10/20  0411 20  1637   SODIUM mmol/L  --   --  141 140 138   POTASSIUM mmol/L 3.5 3.3* 3.4* 3.8 3.3*   CHLORIDE mmol/L  --   --  107 104 98   CO2 mmol/L  --   --  25.0 22.0 24.0   BUN mg/dL  --   --  13 13 16   CREATININE mg/dL  --   --  0.41* 0.59 0.65   GLUCOSE mg/dL  --   --  81 131* 157*   CALCIUM mg/dL  --   --   8.1* 8.8 9.9   ALT (SGPT) U/L  --   --   --  10 10   AST (SGOT) U/L  --   --   --  27 24     Estimated Creatinine Clearance: 42 mL/min (A) (by C-G formula based on SCr of 0.41 mg/dL (L)).    Microbiology Results Abnormal     Procedure Component Value - Date/Time    Blood Culture - Blood, Arm, Right [[id number]] Collected:  02/09/20 1630    Lab Status:  Preliminary result Specimen:  Blood from Arm, Right Updated:  02/12/20 1701     Blood Culture No growth at 3 days    Blood Culture - Blood, Arm, Left [[id number]] Collected:  02/09/20 1635    Lab Status:  Preliminary result Specimen:  Blood from Arm, Left Updated:  02/12/20 1701     Blood Culture No growth at 3 days          Imaging Results (Last 24 Hours)     ** No results found for the last 24 hours. **          Results for orders placed during the hospital encounter of 06/25/18   Adult Transthoracic Echo Complete W/ Cont if Necessary Per Protocol    Narrative · Mild to moderate tricuspid valve regurgitation is present.  · Left ventricular systolic function is normal. Estimated EF = 65%.  · There is no evidence of pericardial effusion.  · No evidence of pulmonary hypertension is present.  · Mild MAC is present.  · The aortic valve exhibits sclerosis.  · Normal right ventricular cavity size, wall thickness, systolic function   and septal motion noted.  · Left ventricular diastolic dysfunction is abnormal consistent with: age.          I have reviewed the medications:  Scheduled Meds:  aspirin 81 mg Oral Daily   heparin (porcine) 5,000 Units Subcutaneous Q8H   lisinopril 10 mg Oral Daily     Continuous Infusions:  lactated ringers 9 mL/hr    lactated ringers 9 mL/hr    sodium chloride 75 mL/hr Last Rate: 75 mL/hr (02/13/20 0439)     PRN Meds:.•  [DISCONTINUED] acetaminophen **OR** [DISCONTINUED] acetaminophen **OR** acetaminophen  •  HYDROcodone-acetaminophen  •  HYDROmorphone  •  lactated ringers  •  naloxone  •  ondansetron **OR** ondansetron  •  potassium chloride  **OR** potassium chloride **OR** potassium chloride  •  sodium chloride    Assessment/Plan   Assessment & Plan     Active Hospital Problems    Diagnosis  POA   • **Gallstone ileus of small intestine (CMS/HCC) [K56.3]  Unknown   • Enterocolitis [K52.9]  Yes   • Sepsis (CMS/HCC) [A41.9]  Yes   • History of Stroke [I63.9]  Yes   • Hypertension [I10]  Yes      Resolved Hospital Problems   No resolved problems to display.        Brief Hospital Course to date:  Susan Tucker is a 84 y.o. female with PMH significant for HTN, gallbladder disease, and history of CVA who was admitted on 2/9/20 for internal hernia due to gallstone ileus s/p ex-lap and bowel resection on 2/10.     Internal hernia with small bowel obstruction  Gallstone ileus   -s/p ex-lap, lysis of adhesions, and small bowel resection on 2/10   -general surgery consultation-they are managing pain and bowel regimen - tolerating sips well, hasn't had a bowel movement yet   -has been stable off antibiotics for the last 24 hrs     History of CVA - no change  -continue aspirin     Hypertension - no change  -continue lisionpril 10 mg daily     DVT Prophylaxis: Heparin     Disposition: I expect the patient to be discharged in 1 to 2 days to home with home health    CODE STATUS:   Code Status and Medical Interventions:   Ordered at: 02/09/20 2055     Level Of Support Discussed With:    Patient     Code Status:    CPR     Medical Interventions (Level of Support Prior to Arrest):    Full         Electronically signed by Debbi Angel MD, 02/13/20, 10:27 AM.

## 2020-02-13 NOTE — PLAN OF CARE
Problem: Patient Care Overview  Goal: Plan of Care Review  Outcome: Ongoing (interventions implemented as appropriate)  Flowsheets (Taken 2/13/2020 1035)  Progress: improving  Plan of Care Reviewed With: patient  Outcome Summary: patient ambulated 6 feet with min assist x1, cues for walker placement and improved posture. Distance limited by weakness and fatigue.

## 2020-02-13 NOTE — THERAPY TREATMENT NOTE
Patient Name: Susan Tucker  : 1935    MRN: [id number]                              Today's Date: 2020       Admit Date: 2020    Visit Dx:     ICD-10-CM ICD-9-CM   1. Colitis K52.9 558.9   2. Sepsis, due to unspecified organism, unspecified whether acute organ dysfunction present (CMS/HCC) A41.9 038.9     995.91   3. Acute abdominal pain R10.9 789.00     338.19   4. Nausea and vomiting, intractability of vomiting not specified, unspecified vomiting type R11.2 787.01   5. Disorder of jejunum K63.9 569.9   6. Impaired mobility and ADLs Z74.09 799.89   7. Gallstone ileus of small intestine (CMS/HCC) K56.3 560.31     Patient Active Problem List   Diagnosis   • Sepsis (CMS/HCC)   • Elevated liver enzymes   • Dehydration   • Hypertension   • History of Stroke   • UTI (urinary tract infection)   • Calculus of bile duct with acute cholecystitis and obstruction   • Enterocolitis   • Gallstone ileus of small intestine (CMS/HCC)     Past Medical History:   Diagnosis Date   • Elevated cholesterol    • Hypertension    • Stroke (CMS/HCC)      Past Surgical History:   Procedure Laterality Date   • ERCP N/A 2018    Procedure: ENDOSCOPIC RETROGRADE CHOLANGIOPANCREATOGRAPHY;  Surgeon: Liam Manning MD;  Location: [facility] ENDOSCOPY;  Service: Gastroenterology   • EXPLORATORY LAPAROTOMY N/A 2/10/2020    Procedure: LAPAROTOMY EXPLORATORY BOWEL RESECTION;  Surgeon: Carlos Zurita MD;  Location: [facility] OR;  Service: General;  Laterality: N/A;   • HYSTERECTOMY       General Information     Row Name 20 1016          PT Evaluation Time/Intention    Document Type  therapy note (daily note)  -AS     Mode of Treatment  physical therapy  -AS     Row Name 20 1016          General Information    Patient Profile Reviewed?  yes  -AS     Existing Precautions/Restrictions  fall abdominal binder brace to be worn at all times, Nsg report pt. able to have sips of water, ice chips and/or popsicles  -AS     Barriers  to Rehab  medically complex;hearing deficit  -AS     [facility] Name 02/13/20 1016          Cognitive Assessment/Intervention- PT/OT    Orientation Status (Cognition)  oriented x 3  -AS     [facility] Name 02/13/20 1016          Safety Issues, Functional Mobility    Safety Issues Affecting Function (Mobility)  awareness of need for assistance;insight into deficits/self awareness;safety precaution awareness;safety precautions follow-through/compliance  -AS     Impairments Affecting Function (Mobility)  balance;endurance/activity tolerance;strength;cognition  -AS       User Key  (r) = Recorded By, (t) = Taken By, (c) = Cosigned By    Initials Name Provider Type    AS Tonya Mccarty PTA Physical Therapy Assistant        Mobility     Row Name 02/13/20 1031          Bed Mobility Assessment/Treatment    Supine-Sit [place] (Bed Mobility)  verbal cues;contact guard  -AS     Assistive Device (Bed Mobility)  bed rails;head of bed elevated  -AS     Comment (Bed Mobility)  verbal cues for sequencing  -AS     [facility] Name 02/13/20 1031          Transfer Assessment/Treatment    Comment (Transfers)  verbal cues for hand placement  -AS     Row Name 02/13/20 1031          Bed-Chair Transfer    Bed-Chair [place] (Transfers)  verbal cues;minimum assist (75% patient effort)  -AS     Assistive Device (Bed-Chair Transfers)  walker, front-wheeled  -AS     [facility] Name 02/13/20 1031          Sit-Stand Transfer    Sit-Stand [place] (Transfers)  verbal cues;contact guard  -AS     Assistive Device (Sit-Stand Transfers)  walker, front-wheeled  -AS     [facility] Name 02/13/20 1031          Gait/Stairs Assessment/Training    Gait/Stairs Assessment/Training  gait/ambulation assistive device  -AS     [place] Level (Gait)  verbal cues;minimum assist (75% patient effort)  -AS     Assistive Device (Gait)  walker, front-wheeled  -AS     Distance in Feet (Gait)  6  -AS     Pattern (Gait)  step-through  -AS     Deviations/Abnormal Patterns (Gait)   bilateral deviations;lisa decreased;gait speed decreased  -AS     Bilateral Gait Deviations  forward flexed posture  -AS     Comment (Gait/Stairs)  patient ambualted 6 feet with min assist x1, cues for walker placement and improved posture. Distance limited by weakness and fatigue.  -AS       User Key  (r) = Recorded By, (t) = Taken By, (c) = Cosigned By    Initials Name Provider Type    AS Tonya Mccatry PTA Physical Therapy Assistant        Obj/Interventions     Row Name 02/13/20 1034          Therapeutic Exercise    Upper Extremity Range of Motion (Therapeutic Exercise)  shoulder flexion/extension, bilateral;shoulder abduction/adduction, bilateral;elbow flexion/extension, bilateral  -AS     Lower Extremity (Therapeutic Exercise)  LAQ (long arc quad), bilateral;marching while seated  -AS     Lower Extremity Range of Motion (Therapeutic Exercise)  ankle dorsiflexion/plantar flexion, bilateral  -AS     Exercise Type (Therapeutic Exercise)  AROM (active range of motion)  -AS     Position (Therapeutic Exercise)  seated  -AS     Sets/Reps (Therapeutic Exercise)  1/10  -AS       User Key  (r) = Recorded By, (t) = Taken By, (c) = Cosigned By    Initials Name Provider Type    AS Tonya Mccarty PTA Physical Therapy Assistant        Goals/Plan    No documentation.       Clinical Impression     Row Name 02/13/20 1035          Pain Assessment    Additional Documentation  Pain Scale: Numbers Pre/Post-Treatment (Group)  -AS     Row Name 02/13/20 1035          Pain Scale: Numbers Pre/Post-Treatment    Pain Scale: Numbers, Pretreatment  0/10 - no pain  -AS     Pain Scale: Numbers, Post-Treatment  0/10 - no pain  -AS       User Key  (r) = Recorded By, (t) = Taken By, (c) = Cosigned By    Initials Name Provider Type    AS Tonya Mccarty PTA Physical Therapy Assistant        Outcome Measures     Row Name 02/13/20 1035          How much help from another person do you currently need...    Turning from your back  to your side while in flat bed without using bedrails?  3  -AS     Moving from lying on back to sitting on the side of a flat bed without bedrails?  3  -AS     Moving to and from a bed to a chair (including a wheelchair)?  3  -AS     Standing up from a chair using your arms (e.g., wheelchair, bedside chair)?  3  -AS     Climbing 3-5 steps with a railing?  2  -AS     To walk in hospital room?  2  -AS     AM-PAC 6 Clicks Score (PT)  16  -AS     Row Name 02/13/20 1035          Functional Assessment    Outcome Measure Options  AM-PAC 6 Clicks Basic Mobility (PT)  -AS       User Key  (r) = Recorded By, (t) = Taken By, (c) = Cosigned By    Initials Name Provider Type    AS Tonya Mccarty PTA Physical Therapy Assistant          PT Recommendation and Plan     Plan of Care Reviewed With: patient  Progress: improving  Outcome Summary: patient ambulated 6 feet with min assist x1, cues for walker placement and improved posture. Distance limited by weakness and fatigue.     Time Calculation:   PT Charges     Row Name 02/13/20 1036             Time Calculation    Start Time  0915  -AS      PT Received On  02/13/20  -AS      PT Goal Re-Cert Due Date  02/21/20  -AS         Timed Charges    72863 - PT Therapeutic Exercise Minutes  15  -AS      71366 - Gait Training Minutes   8  -AS        User Key  (r) = Recorded By, (t) = Taken By, (c) = Cosigned By    Initials Name Provider Type    AS Tonya Mccarty PTA Physical Therapy Assistant        Therapy Charges for Today     Code Description Service Date Service Provider Modifiers Qty    96819871949 HC PT THER PROC EA 15 MIN 2/13/2020 Tonya Mccarty PTA GP 1    46442972521 HC GAIT TRAINING EA 15 MIN 2/13/2020 Tonya Mccarty PTA GP 1          PT G-Codes  Outcome Measure Options: AM-PAC 6 Clicks Basic Mobility (PT)  AM-PAC 6 Clicks Score (PT): 16  AM-PAC 6 Clicks Score (OT): 18    Tonya Mccarty PTA  2/13/2020

## 2020-02-13 NOTE — PLAN OF CARE
Problem: Patient Care Overview  Goal: Plan of Care Review  Outcome: Ongoing (interventions implemented as appropriate)  Flowsheets  Taken 2/13/2020 5386  Progress: improving  Outcome Summary: Pts VSS on RA. No complaints of pain through the night, has only utilized PCA once during shift. Reported some nausea that was resolved with zofran IV. NS going at 75 ml/hr. Small amount of serous drainage coming from incision, overall incision is approximated with staples and appears to be without complications, Island dressing replaced. Pt expresses no appetite but had questions about diet progression in the future. Lab to redraw potassium this AM. Will continue to monitor.  Taken 2/12/2020 2030  Plan of Care Reviewed With: patient

## 2020-02-13 NOTE — PROGRESS NOTES
Continued Stay Note  [facility]     Patient Name: Susan Tucker  MRN: [id number]  Today's Date: 2/13/2020    Admit Date: 2/9/2020    Discharge Plan     Row Name 02/13/20 1503       Plan    Plan  Home with [facility]    Patient/Family in Agreement with Plan  yes    Plan Comments  Plansremain to dc to home with [facility]. Pt. has not had bm yet. She is participating with PT/OT. Will cont. to follow and update.    Final Discharge Disposition Code  06 - home with home health care        Discharge Codes    No documentation.             Keyla Sweet RN

## 2020-02-13 NOTE — PROGRESS NOTES
"Susan Tucker  1935  [id number]    Surgery Progress Note    Date of visit: 2/13/2020    Subjective: Very minimal incisional pain  No flatus  Has not been ambulating    Objective:    /72   Pulse 79   Temp 98 °F (36.7 °C) (Oral)   Resp 18   Ht 154.9 cm (61\")   Wt 49.9 kg (110 lb)   SpO2 96%   BMI 20.78 kg/m²     Intake/Output Summary (Last 24 hours) at 2/13/2020 1409  Last data filed at 2/13/2020 0514  Gross per 24 hour   Intake 1996 ml   Output 1850 ml   Net 146 ml       CV: Regular rate and rhythm  L: normal air entry    Abd: Soft with minimal incisional tenderness  Hypoactive bowel sounds        LABS:    Results from last 7 days   Lab Units 02/12/20  0409   WBC 10*3/mm3 8.26   HEMOGLOBIN g/dL 11.0*   HEMATOCRIT % 32.9*   PLATELETS 10*3/mm3 225     Results from last 7 days   Lab Units 02/13/20  0744  02/11/20 1745 02/10/20  0411   SODIUM mmol/L  --   --  141 140   POTASSIUM mmol/L 3.5   < > 3.4* 3.8   CHLORIDE mmol/L  --   --  107 104   CO2 mmol/L  --   --  25.0 22.0   BUN mg/dL  --   --  13 13   CREATININE mg/dL  --   --  0.41* 0.59   CALCIUM mg/dL  --   --  8.1* 8.8   BILIRUBIN mg/dL  --   --   --  0.9   ALK PHOS U/L  --   --   --  34*   ALT (SGPT) U/L  --   --   --  10   AST (SGOT) U/L  --   --   --  27   GLUCOSE mg/dL  --   --  81 131*    < > = values in this interval not displayed.     Results from last 7 days   Lab Units 02/13/20 0744 02/11/20  1745   SODIUM mmol/L  --   --  141   POTASSIUM mmol/L 3.5   < > 3.4*   CHLORIDE mmol/L  --   --  107   CO2 mmol/L  --   --  25.0   BUN mg/dL  --   --  13   CREATININE mg/dL  --   --  0.41*   GLUCOSE mg/dL  --   --  81   CALCIUM mg/dL  --   --  8.1*    < > = values in this interval not displayed.     Lab Results   Lab Value Date/Time    LIPASE 104 (H) 02/09/2020 1637         Assessment/ Plan: Stable course status post  abdominal exploration and resection of segment of proximal small bowel secondary to impacted stone due to a gallstone ileus  Patient has " been clinically stable  Await GI function  Increase activity and minimize narcotics      Problem List Items Addressed This Visit        Digestive    * (Principal) Gallstone ileus of small intestine (CMS/HCC)    Relevant Orders    Ambulatory Referral to Home Health       Other    Sepsis (CMS/HCC)    Relevant Orders    Ambulatory Referral to Home Health      Other Visit Diagnoses     Colitis    -  Primary    Relevant Orders    Ambulatory Referral to Home Health    Acute abdominal pain        Nausea and vomiting, intractability of vomiting not specified, unspecified vomiting type        Disorder of jejunum        Relevant Orders    Tissue Pathology Exam (Completed)    Impaired mobility and ADLs        Relevant Orders    Ambulatory Referral to Home Health            Tia Samuels MD  2/13/2020  2:09 PM

## 2020-02-14 LAB
BACTERIA SPEC AEROBE CULT: NORMAL
BACTERIA SPEC AEROBE CULT: NORMAL

## 2020-02-14 PROCEDURE — 25010000002 HEPARIN (PORCINE) PER 1000 UNITS: Performed by: SURGERY

## 2020-02-14 PROCEDURE — 99232 SBSQ HOSP IP/OBS MODERATE 35: CPT | Performed by: INTERNAL MEDICINE

## 2020-02-14 RX ORDER — LISINOPRIL 20 MG/1
20 TABLET ORAL DAILY
Status: DISCONTINUED | OUTPATIENT
Start: 2020-02-14 | End: 2020-02-16 | Stop reason: HOSPADM

## 2020-02-14 RX ORDER — HYDROCHLOROTHIAZIDE 12.5 MG/1
12.5 CAPSULE, GELATIN COATED ORAL DAILY
Status: DISCONTINUED | OUTPATIENT
Start: 2020-02-14 | End: 2020-02-16 | Stop reason: HOSPADM

## 2020-02-14 RX ORDER — HYDROMORPHONE HYDROCHLORIDE 1 MG/ML
0.25 INJECTION, SOLUTION INTRAMUSCULAR; INTRAVENOUS; SUBCUTANEOUS EVERY 4 HOURS PRN
Status: DISCONTINUED | OUTPATIENT
Start: 2020-02-14 | End: 2020-02-16 | Stop reason: HOSPADM

## 2020-02-14 RX ADMIN — HEPARIN SODIUM 5000 UNITS: 5000 INJECTION, SOLUTION INTRAVENOUS; SUBCUTANEOUS at 14:28

## 2020-02-14 RX ADMIN — SODIUM CHLORIDE 75 ML/HR: 9 INJECTION, SOLUTION INTRAVENOUS at 21:50

## 2020-02-14 RX ADMIN — HEPARIN SODIUM 5000 UNITS: 5000 INJECTION, SOLUTION INTRAVENOUS; SUBCUTANEOUS at 05:59

## 2020-02-14 RX ADMIN — ASPIRIN 81 MG 81 MG: 81 TABLET ORAL at 08:36

## 2020-02-14 RX ADMIN — LISINOPRIL 20 MG: 20 TABLET ORAL at 08:36

## 2020-02-14 RX ADMIN — HYDROCHLOROTHIAZIDE 12.5 MG: 12.5 CAPSULE ORAL at 08:36

## 2020-02-14 RX ADMIN — HEPARIN SODIUM 5000 UNITS: 5000 INJECTION, SOLUTION INTRAVENOUS; SUBCUTANEOUS at 21:50

## 2020-02-14 NOTE — PLAN OF CARE
Problem: Patient Care Overview  Goal: Plan of Care Review  Flowsheets  Taken 2/14/2020 1200  Plan of Care Reviewed With: patient;family  Taken 2/14/2020 1701  Outcome Summary: Pt ambulated in [place] today. No complaints. Pt tolerating clear liquid diet well. VSS. RN will continue to monitor.

## 2020-02-14 NOTE — PLAN OF CARE
Problem: Patient Care Overview  Goal: Plan of Care Review  Outcome: Ongoing (interventions implemented as appropriate)  Note:   Pts VSS on RA. Alert and oriented x4. Patient had 3 small bowel movements per tech. First BM was liquid but the two following were a dark brown and formed. No further changes. Will continue to monitor.

## 2020-02-14 NOTE — PROGRESS NOTES
Continued Stay Note  [facility]     Patient Name: Susan Tucker  MRN: [id number]  Today's Date: 2/14/2020    Admit Date: 2/9/2020    Discharge Plan     Row Name 02/14/20 1339       Plan    Plan  Home with [facility]    Patient/Family in Agreement with Plan  yes    Plan Comments  Spoke with pt. at bedside. States she is feeling better and has had a bm. States will start on cl. liquids. Plan is for pt. to dc to home with family and [facility] HH.     Final Discharge Disposition Code  06 - home with home health care        Discharge Codes    No documentation.             Keyla Sweet RN

## 2020-02-14 NOTE — PROGRESS NOTES
"Susan Tucker  1935  [id number]    Surgery Progress Note    Date of visit: 2/14/2020    Subjective: Feels better  Having flatus  Ambulating    Objective:    /87   Pulse 79   Temp 98.5 °F (36.9 °C) (Oral)   Resp 16   Ht 154.9 cm (61\")   Wt 53.3 kg (117 lb 8 oz)   SpO2 97%   BMI 22.20 kg/m²     Intake/Output Summary (Last 24 hours) at 2/14/2020 0938  Last data filed at 2/14/2020 0600  Gross per 24 hour   Intake 846 ml   Output 1150 ml   Net -304 ml         L: normal air entry    Abd: Soft with minimal incisional tenderness  Bowel sounds present    LABS:    Results from last 7 days   Lab Units 02/12/20  0409   WBC 10*3/mm3 8.26   HEMOGLOBIN g/dL 11.0*   HEMATOCRIT % 32.9*   PLATELETS 10*3/mm3 225     Results from last 7 days   Lab Units 02/13/20  2033  02/11/20  1745 02/10/20  0411   SODIUM mmol/L  --   --  141 140   POTASSIUM mmol/L 4.5   < > 3.4* 3.8   CHLORIDE mmol/L  --   --  107 104   CO2 mmol/L  --   --  25.0 22.0   BUN mg/dL  --   --  13 13   CREATININE mg/dL  --   --  0.41* 0.59   CALCIUM mg/dL  --   --  8.1* 8.8   BILIRUBIN mg/dL  --   --   --  0.9   ALK PHOS U/L  --   --   --  34*   ALT (SGPT) U/L  --   --   --  10   AST (SGOT) U/L  --   --   --  27   GLUCOSE mg/dL  --   --  81 131*    < > = values in this interval not displayed.     Results from last 7 days   Lab Units 02/13/20 2033 02/11/20 1745   SODIUM mmol/L  --   --  141   POTASSIUM mmol/L 4.5   < > 3.4*   CHLORIDE mmol/L  --   --  107   CO2 mmol/L  --   --  25.0   BUN mg/dL  --   --  13   CREATININE mg/dL  --   --  0.41*   GLUCOSE mg/dL  --   --  81   CALCIUM mg/dL  --   --  8.1*    < > = values in this interval not displayed.     Lab Results   Lab Value Date/Time    LIPASE 104 (H) 02/09/2020 1637         Assessment/ Plan: Stable course status post abdominal exploration and resection of small bowel secondary to gallstone ileus  Plan to start on clear liquid diet and supplements  Increase activity and minimize narcotics    Problem List " Items Addressed This Visit        Digestive    * (Principal) Gallstone ileus of small intestine (CMS/HCC)    Relevant Orders    Ambulatory Referral to Home Health       Other    Sepsis (CMS/HCC)    Relevant Orders    Ambulatory Referral to Home Health      Other Visit Diagnoses     Colitis    -  Primary    Relevant Orders    Ambulatory Referral to Home Health    Acute abdominal pain        Nausea and vomiting, intractability of vomiting not specified, unspecified vomiting type        Disorder of jejunum        Relevant Orders    Tissue Pathology Exam (Completed)    Impaired mobility and ADLs        Relevant Orders    Ambulatory Referral to Home Health            Tia Samuels MD  2/14/2020  9:38 AM

## 2020-02-14 NOTE — PROGRESS NOTES
[facility] Medicine Services  PROGRESS NOTE    Patient Name: Susan Tucker  : 1935  MRN: [id number]    Date of Admission: 2020  Primary Care Physician: Marita George MD    Subjective   Subjective     CC:  Follow up for gallstone ileus    HPI:  Had BM yesterday. Denies nausea, vomiting, or abdominal pain. Had clears for lunch.    Review of Systems  Gen- No fevers, chills  CV- No chest pain, palpitations  Resp- No cough, dyspnea    Objective   Objective     Vital Signs:   Temp:  [98.5 °F (36.9 °C)-98.7 °F (37.1 °C)] 98.5 °F (36.9 °C)  Heart Rate:  [74-86] 74  Resp:  [16-18] 16  BP: (163-175)/(85-87) 167/87        Physical Exam:  Constitutional: No acute distress, awake, alert  HENT: NCAT, mucous membranes moist  Respiratory: Clear to auscultation bilaterally, respiratory effort normal on room air  Cardiovascular: RRR, no murmurs  Gastrointestinal: positive bowel sounds, soft, nontender, nondistended  Psychiatric: Appropriate affect, cooperative  Neurologic: Oriented x 3, Cranial Nerves grossly intact to confrontation, speech clear      Results Reviewed:  Results from last 7 days   Lab Units 20  0409 02/10/20  0411 02/09/20  1637   WBC 10*3/mm3 8.26 14.56* 12.10*   HEMOGLOBIN g/dL 11.0* 12.6 15.1   HEMATOCRIT % 32.9* 39.7 45.4   PLATELETS 10*3/mm3 225 244 290   PROCALCITONIN ng/mL  --   --  0.91*     Results from last 7 days   Lab Units 20  2033 02/13/20  0744 20  0754 20  1745 02/10/20  0411 02/09/20  1637   SODIUM mmol/L  --   --   --  141 140 138   POTASSIUM mmol/L 4.5 3.5 3.3* 3.4* 3.8 3.3*   CHLORIDE mmol/L  --   --   --  107 104 98   CO2 mmol/L  --   --   --  25.0 22.0 24.0   BUN mg/dL  --   --   --  13 13 16   CREATININE mg/dL  --   --   --  0.41* 0.59 0.65   GLUCOSE mg/dL  --   --   --  81 131* 157*   CALCIUM mg/dL  --   --   --  8.1* 8.8 9.9   ALT (SGPT) U/L  --   --   --   --  10 10   AST (SGOT) U/L  --   --   --   --  27 24     Estimated  Creatinine Clearance: 44.8 mL/min (A) (by C-G formula based on SCr of 0.41 mg/dL (L)).    Microbiology Results Abnormal     Procedure Component Value - Date/Time    Blood Culture - Blood, Arm, Right [[id number]] Collected:  02/09/20 1630    Lab Status:  Preliminary result Specimen:  Blood from Arm, Right Updated:  02/13/20 1701     Blood Culture No growth at 4 days    Blood Culture - Blood, Arm, Left [[id number]] Collected:  02/09/20 1635    Lab Status:  Preliminary result Specimen:  Blood from Arm, Left Updated:  02/13/20 1701     Blood Culture No growth at 4 days          Imaging Results (Last 24 Hours)     ** No results found for the last 24 hours. **          Results for orders placed during the hospital encounter of 06/25/18   Adult Transthoracic Echo Complete W/ Cont if Necessary Per Protocol    Narrative · Mild to moderate tricuspid valve regurgitation is present.  · Left ventricular systolic function is normal. Estimated EF = 65%.  · There is no evidence of pericardial effusion.  · No evidence of pulmonary hypertension is present.  · Mild MAC is present.  · The aortic valve exhibits sclerosis.  · Normal right ventricular cavity size, wall thickness, systolic function   and septal motion noted.  · Left ventricular diastolic dysfunction is abnormal consistent with: age.          I have reviewed the medications:  Scheduled Meds:  aspirin 81 mg Oral Daily   heparin (porcine) 5,000 Units Subcutaneous Q8H   hydroCHLOROthiazide Oral 12.5 mg Oral Daily   lisinopril 20 mg Oral Daily     Continuous Infusions:  lactated ringers 9 mL/hr    sodium chloride 75 mL/hr Last Rate: 75 mL/hr (02/13/20 1818)     PRN Meds:.•  [DISCONTINUED] acetaminophen **OR** [DISCONTINUED] acetaminophen **OR** acetaminophen  •  HYDROcodone-acetaminophen  •  HYDROmorphone  •  lactated ringers  •  naloxone  •  ondansetron **OR** ondansetron  •  potassium chloride **OR** potassium chloride **OR** potassium chloride  •  sodium  chloride    Assessment/Plan   Assessment & Plan     Active Hospital Problems    Diagnosis  POA   • **Gallstone ileus of small intestine (CMS/HCC) [K56.3]  Unknown   • Enterocolitis [K52.9]  Yes   • Sepsis (CMS/HCC) [A41.9]  Yes   • History of Stroke [I63.9]  Yes   • Hypertension [I10]  Yes      Resolved Hospital Problems   No resolved problems to display.        Brief Hospital Course to date:  Susan Tucker is a 84 y.o. female with PMH significant for HTN, gallbladder disease, and history of CVA who was admitted on 2/9/20 for internal hernia due to gallstone ileus s/p ex-lap and bowel resection on 2/10.     Internal hernia with small bowel obstruction  Gallstone ileus   -s/p ex-lap, lysis of adhesions, and small bowel resection on 2/10   -general surgery consultation-start CLD today  -  Had BM last night     Hypertension-uncontrolled   -increase lisinopril to 20 mg and HCTZ to 12.5 mg    History of CVA  -continue aspirin       DVT Prophylaxis: Heparin     Disposition: I expect the patient to be discharged in 1 to 2 days to home with home health    CODE STATUS:   Code Status and Medical Interventions:   Ordered at: 02/09/20 2055     Level Of Support Discussed With:    Patient     Code Status:    CPR     Medical Interventions (Level of Support Prior to Arrest):    Full         Electronically signed by Debbi Angel MD, 02/14/20, 12:34 PM.

## 2020-02-15 LAB
ANION GAP SERPL CALCULATED.3IONS-SCNC: 11 MMOL/L (ref 5–15)
BUN BLD-MCNC: 7 MG/DL (ref 8–23)
BUN/CREAT SERPL: 15.9 (ref 7–25)
CALCIUM SPEC-SCNC: 8.7 MG/DL (ref 8.6–10.5)
CHLORIDE SERPL-SCNC: 98 MMOL/L (ref 98–107)
CO2 SERPL-SCNC: 25 MMOL/L (ref 22–29)
CREAT BLD-MCNC: 0.44 MG/DL (ref 0.57–1)
GFR SERPL CREATININE-BSD FRML MDRD: 137 ML/MIN/1.73
GLUCOSE BLD-MCNC: 177 MG/DL (ref 65–99)
MAGNESIUM SERPL-MCNC: 1.6 MG/DL (ref 1.6–2.4)
POTASSIUM BLD-SCNC: 3.8 MMOL/L (ref 3.5–5.2)
SODIUM BLD-SCNC: 134 MMOL/L (ref 136–145)

## 2020-02-15 PROCEDURE — 25010000002 HEPARIN (PORCINE) PER 1000 UNITS: Performed by: SURGERY

## 2020-02-15 PROCEDURE — 80048 BASIC METABOLIC PNL TOTAL CA: CPT | Performed by: INTERNAL MEDICINE

## 2020-02-15 PROCEDURE — 93005 ELECTROCARDIOGRAM TRACING: CPT | Performed by: INTERNAL MEDICINE

## 2020-02-15 PROCEDURE — 93010 ELECTROCARDIOGRAM REPORT: CPT | Performed by: INTERNAL MEDICINE

## 2020-02-15 PROCEDURE — 25010000003 MAGNESIUM SULFATE 4 GM/100ML SOLUTION: Performed by: INTERNAL MEDICINE

## 2020-02-15 PROCEDURE — 83735 ASSAY OF MAGNESIUM: CPT | Performed by: INTERNAL MEDICINE

## 2020-02-15 PROCEDURE — 99232 SBSQ HOSP IP/OBS MODERATE 35: CPT | Performed by: NURSE PRACTITIONER

## 2020-02-15 RX ORDER — MAGNESIUM SULFATE HEPTAHYDRATE 40 MG/ML
2 INJECTION, SOLUTION INTRAVENOUS AS NEEDED
Status: DISCONTINUED | OUTPATIENT
Start: 2020-02-15 | End: 2020-02-16 | Stop reason: HOSPADM

## 2020-02-15 RX ORDER — MAGNESIUM SULFATE HEPTAHYDRATE 40 MG/ML
4 INJECTION, SOLUTION INTRAVENOUS AS NEEDED
Status: DISCONTINUED | OUTPATIENT
Start: 2020-02-15 | End: 2020-02-16 | Stop reason: HOSPADM

## 2020-02-15 RX ADMIN — ASPIRIN 81 MG 81 MG: 81 TABLET ORAL at 08:10

## 2020-02-15 RX ADMIN — LISINOPRIL 20 MG: 20 TABLET ORAL at 08:10

## 2020-02-15 RX ADMIN — HEPARIN SODIUM 5000 UNITS: 5000 INJECTION, SOLUTION INTRAVENOUS; SUBCUTANEOUS at 08:10

## 2020-02-15 RX ADMIN — MAGNESIUM SULFATE HEPTAHYDRATE 4 G: 40 INJECTION, SOLUTION INTRAVENOUS at 17:41

## 2020-02-15 RX ADMIN — HEPARIN SODIUM 5000 UNITS: 5000 INJECTION, SOLUTION INTRAVENOUS; SUBCUTANEOUS at 15:02

## 2020-02-15 RX ADMIN — HYDROCHLOROTHIAZIDE 12.5 MG: 12.5 CAPSULE ORAL at 08:10

## 2020-02-15 NOTE — PROGRESS NOTES
[facility] Medicine Services  PROGRESS NOTE    Patient Name: Susan Tucker  : 1935  MRN: [id number]    Date of Admission: 2020  Length of Stay: 6  Primary Care Physician: Marita George MD    Subjective   Subjective     CC:  Gallstone ileus of small intestine (CMS/HCC)    HPI:  Patient seen resting in bed in no apparent distress. Nursing notes reviewed, no acute events overnight. Patient reports that she is tolerating clear liquid diet well. Denies nausea, vomiting, or abdominal pain. No new complaints at this time.     ROS:  Gen- No fevers, chills  CV- No chest pain, palpitations  Resp- No cough, dyspnea  GI- No N/V/D, abd pain      Objective   Objective     Vital Signs:   Temp:  [98.2 °F (36.8 °C)-99.3 °F (37.4 °C)] 98.2 °F (36.8 °C)  Heart Rate:  [] 89  Resp:  [16-18] 18  BP: (149-164)/(78-86) 149/78        Physical Exam:  Constitutional: No acute distress, awake, alert  HENT: NCAT, mucous membranes moist  Respiratory: Clear to auscultation bilaterally, respiratory effort normal on room air  Cardiovascular: RRR, no murmurs, palpable pedal pulses bilaterally  Gastrointestinal: Positive bowel sounds, soft, appropriately tender s/p exlap with vertical abdominal incision with coverderm in place  Musculoskeletal: No bilateral ankle edema  Psychiatric: Appropriate affect, cooperative  Neurologic: Oriented x 3, strength symmetric in all extremities,  speech clear  Skin: warm, dry, no visible rash    Results Reviewed:  I have personally reviewed current lab, radiology, and data and agree.    Results from last 7 days   Lab Units 20  0409 02/10/20  04120  1637   WBC 10*3/mm3 8.26 14.56* 12.10*   HEMOGLOBIN g/dL 11.0* 12.6 15.1   HEMATOCRIT % 32.9* 39.7 45.4   PLATELETS 10*3/mm3 225 244 290     Results from last 7 days   Lab Units 203 20  0744 20  0754 20  1745 02/10/20  0411 20  1637   SODIUM mmol/L  --   --   --  141 140 138    POTASSIUM mmol/L 4.5 3.5 3.3* 3.4* 3.8 3.3*   CHLORIDE mmol/L  --   --   --  107 104 98   CO2 mmol/L  --   --   --  25.0 22.0 24.0   BUN mg/dL  --   --   --  13 13 16   CREATININE mg/dL  --   --   --  0.41* 0.59 0.65   GLUCOSE mg/dL  --   --   --  81 131* 157*   CALCIUM mg/dL  --   --   --  8.1* 8.8 9.9   ALT (SGPT) U/L  --   --   --   --  10 10   AST (SGOT) U/L  --   --   --   --  27 24     Estimated Creatinine Clearance: 42.9 mL/min (A) (by C-G formula based on SCr of 0.41 mg/dL (L)).  No results found for: BNP    Microbiology Results Abnormal     Procedure Component Value - Date/Time    Blood Culture - Blood, Arm, Right [[id number]] Collected:  02/09/20 1630    Lab Status:  Final result Specimen:  Blood from Arm, Right Updated:  02/14/20 1700     Blood Culture No growth at 5 days    Blood Culture - Blood, Arm, Left [[id number]] Collected:  02/09/20 1635    Lab Status:  Final result Specimen:  Blood from Arm, Left Updated:  02/14/20 1700     Blood Culture No growth at 5 days          Imaging Results (Last 24 Hours)     ** No results found for the last 24 hours. **        Results for orders placed during the hospital encounter of 06/25/18   Adult Transthoracic Echo Complete W/ Cont if Necessary Per Protocol    Narrative · Mild to moderate tricuspid valve regurgitation is present.  · Left ventricular systolic function is normal. Estimated EF = 65%.  · There is no evidence of pericardial effusion.  · No evidence of pulmonary hypertension is present.  · Mild MAC is present.  · The aortic valve exhibits sclerosis.  · Normal right ventricular cavity size, wall thickness, systolic function   and septal motion noted.  · Left ventricular diastolic dysfunction is abnormal consistent with: age.          I have reviewed the medications.  Scheduled Meds:  aspirin 81 mg Oral Daily   heparin (porcine) 5,000 Units Subcutaneous Q8H   hydroCHLOROthiazide Oral 12.5 mg Oral Daily   lisinopril 20 mg Oral Daily     Continuous  Infusions:  lactated ringers 9 mL/hr     PRN Meds:.•  [DISCONTINUED] acetaminophen **OR** [DISCONTINUED] acetaminophen **OR** acetaminophen  •  HYDROcodone-acetaminophen  •  HYDROmorphone  •  lactated ringers  •  naloxone  •  ondansetron **OR** ondansetron  •  potassium chloride **OR** potassium chloride **OR** potassium chloride  •  sodium chloride    Assessment/Plan   Assessment / Plan     Active Hospital Problems    Diagnosis  POA   • **Gallstone ileus of small intestine (CMS/HCC) [K56.3]  Unknown   • Enterocolitis [K52.9]  Yes   • Sepsis (CMS/HCC) [A41.9]  Yes   • History of Stroke [I63.9]  Yes   • Hypertension [I10]  Yes      Resolved Hospital Problems   No resolved problems to display.          Brief Hospital Course to date:  Susan Tucker is a 84 y.o. female  with PMH significant for HTN, gallbladder disease, and history of CVA who was admitted on 2/9/20 for internal hernia due to gallstone ileus s/p ex-lap and bowel resection on 2/10.     Internal hernia with small bowel obstruction  Gallstone ileus   -s/p ex-lap, lysis of adhesions, and small bowel resection on 2/10   -general surgery following  -Tolerated clear liquid diet well  -Advance diet per Dr. MILLER  -Had BM 2/14/20  -BMP/CBC in AM     Hypertension  -improved, 's today   -continue lisinopril at 20 mg daily and HCTZ at 12.5 mg daily      History of CVA  -continue aspirin    DVT Prophylaxis:  Heparin SQ     CODE STATUS:   Code Status and Medical Interventions:   Ordered at: 02/09/20 2055     Level Of Support Discussed With:    Patient     Code Status:    CPR     Medical Interventions (Level of Support Prior to Arrest):    Full       Electronically signed by JOESPH Estrella, 02/15/20, 1:53 PM.

## 2020-02-15 NOTE — PROGRESS NOTES
"Susan Tucker  1935  [id number]    Surgery Progress Note    Date of visit: 2/15/2020    Subjective: Denies any complaints  Tolerating clear liquids well  Few bowel movements  Ambulating better    Objective:    /78   Pulse 88   Temp 98.2 °F (36.8 °C) (Oral)   Resp 18   Ht 154.9 cm (61\")   Wt 51 kg (112 lb 8 oz)   SpO2 96%   BMI 21.26 kg/m²     Intake/Output Summary (Last 24 hours) at 2/15/2020 1020  Last data filed at 2/15/2020 0810  Gross per 24 hour   Intake 800 ml   Output 2000 ml   Net -1200 ml         L: normal air entry    Abd: Nondistended and soft  Dressing is intact and dry  No tenderness      LABS:    Results from last 7 days   Lab Units 02/12/20  0409   WBC 10*3/mm3 8.26   HEMOGLOBIN g/dL 11.0*   HEMATOCRIT % 32.9*   PLATELETS 10*3/mm3 225     Results from last 7 days   Lab Units 02/13/20  2033  02/11/20  1745 02/10/20  0411   SODIUM mmol/L  --   --  141 140   POTASSIUM mmol/L 4.5   < > 3.4* 3.8   CHLORIDE mmol/L  --   --  107 104   CO2 mmol/L  --   --  25.0 22.0   BUN mg/dL  --   --  13 13   CREATININE mg/dL  --   --  0.41* 0.59   CALCIUM mg/dL  --   --  8.1* 8.8   BILIRUBIN mg/dL  --   --   --  0.9   ALK PHOS U/L  --   --   --  34*   ALT (SGPT) U/L  --   --   --  10   AST (SGOT) U/L  --   --   --  27   GLUCOSE mg/dL  --   --  81 131*    < > = values in this interval not displayed.     Results from last 7 days   Lab Units 02/13/20 2033 02/11/20 1745   SODIUM mmol/L  --   --  141   POTASSIUM mmol/L 4.5   < > 3.4*   CHLORIDE mmol/L  --   --  107   CO2 mmol/L  --   --  25.0   BUN mg/dL  --   --  13   CREATININE mg/dL  --   --  0.41*   GLUCOSE mg/dL  --   --  81   CALCIUM mg/dL  --   --  8.1*    < > = values in this interval not displayed.     Lab Results   Lab Value Date/Time    LIPASE 104 (H) 02/09/2020 1637         Assessment/ Plan: Stable course status post abdominal exploration with resection of small bowel secondary to gallstone ileus  Patient is progressing nicely  We will advance " diet    Problem List Items Addressed This Visit        Digestive    * (Principal) Gallstone ileus of small intestine (CMS/HCC)    Relevant Orders    Ambulatory Referral to Home Health       Other    Sepsis (CMS/HCC)    Relevant Orders    Ambulatory Referral to Home Health      Other Visit Diagnoses     Colitis    -  Primary    Relevant Orders    Ambulatory Referral to Home Health    Acute abdominal pain        Nausea and vomiting, intractability of vomiting not specified, unspecified vomiting type        Disorder of jejunum        Relevant Orders    Tissue Pathology Exam (Completed)    Impaired mobility and ADLs        Relevant Orders    Ambulatory Referral to Home Health            Tia Samuels MD  2/15/2020  10:20 AM

## 2020-02-15 NOTE — PLAN OF CARE
Problem: Patient Care Overview  Goal: Plan of Care Review  Outcome: Ongoing (interventions implemented as appropriate)  Flowsheets  Taken 2/15/2020 1622  Progress: improving  Outcome Summary: Pt up to chair, no c/o pain, diet increased to GI soft, ST with junctional complexes, mg 1.6, replacement ordered.  Continue to monitor.  Taken 2/15/2020 0810  Plan of Care Reviewed With: patient

## 2020-02-16 VITALS
SYSTOLIC BLOOD PRESSURE: 134 MMHG | HEART RATE: 100 BPM | HEIGHT: 61 IN | WEIGHT: 115.2 LBS | DIASTOLIC BLOOD PRESSURE: 73 MMHG | BODY MASS INDEX: 21.75 KG/M2 | RESPIRATION RATE: 16 BRPM | OXYGEN SATURATION: 95 % | TEMPERATURE: 98.3 F

## 2020-02-16 LAB
ANION GAP SERPL CALCULATED.3IONS-SCNC: 10 MMOL/L (ref 5–15)
BUN BLD-MCNC: 6 MG/DL (ref 8–23)
BUN/CREAT SERPL: 16.2 (ref 7–25)
CALCIUM SPEC-SCNC: 8.5 MG/DL (ref 8.6–10.5)
CHLORIDE SERPL-SCNC: 100 MMOL/L (ref 98–107)
CO2 SERPL-SCNC: 25 MMOL/L (ref 22–29)
CREAT BLD-MCNC: 0.37 MG/DL (ref 0.57–1)
DEPRECATED RDW RBC AUTO: 39.6 FL (ref 37–54)
ERYTHROCYTE [DISTWIDTH] IN BLOOD BY AUTOMATED COUNT: 12.4 % (ref 12.3–15.4)
GFR SERPL CREATININE-BSD FRML MDRD: >150 ML/MIN/1.73
GLUCOSE BLD-MCNC: 103 MG/DL (ref 65–99)
HCT VFR BLD AUTO: 35.6 % (ref 34–46.6)
HGB BLD-MCNC: 12.1 G/DL (ref 12–15.9)
MAGNESIUM SERPL-MCNC: 2.1 MG/DL (ref 1.6–2.4)
MCH RBC QN AUTO: 29.9 PG (ref 26.6–33)
MCHC RBC AUTO-ENTMCNC: 34 G/DL (ref 31.5–35.7)
MCV RBC AUTO: 87.9 FL (ref 79–97)
PLATELET # BLD AUTO: 292 10*3/MM3 (ref 140–450)
PMV BLD AUTO: 9.8 FL (ref 6–12)
POTASSIUM BLD-SCNC: 3.5 MMOL/L (ref 3.5–5.2)
RBC # BLD AUTO: 4.05 10*6/MM3 (ref 3.77–5.28)
SODIUM BLD-SCNC: 135 MMOL/L (ref 136–145)
WBC NRBC COR # BLD: 7.49 10*3/MM3 (ref 3.4–10.8)

## 2020-02-16 PROCEDURE — 97530 THERAPEUTIC ACTIVITIES: CPT

## 2020-02-16 PROCEDURE — 80048 BASIC METABOLIC PNL TOTAL CA: CPT | Performed by: NURSE PRACTITIONER

## 2020-02-16 PROCEDURE — 99239 HOSP IP/OBS DSCHRG MGMT >30: CPT | Performed by: NURSE PRACTITIONER

## 2020-02-16 PROCEDURE — 97535 SELF CARE MNGMENT TRAINING: CPT

## 2020-02-16 PROCEDURE — 85027 COMPLETE CBC AUTOMATED: CPT | Performed by: NURSE PRACTITIONER

## 2020-02-16 PROCEDURE — 83735 ASSAY OF MAGNESIUM: CPT | Performed by: INTERNAL MEDICINE

## 2020-02-16 RX ORDER — LISINOPRIL 20 MG/1
20 TABLET ORAL DAILY
Qty: 30 TABLET | Refills: 0 | Status: ON HOLD | OUTPATIENT
Start: 2020-02-16 | End: 2022-03-26 | Stop reason: SDUPTHER

## 2020-02-16 RX ORDER — HYDROCHLOROTHIAZIDE 12.5 MG/1
12.5 CAPSULE, GELATIN COATED ORAL DAILY
Qty: 30 CAPSULE | Refills: 0 | Status: SHIPPED | OUTPATIENT
Start: 2020-02-16 | End: 2022-03-24

## 2020-02-16 RX ADMIN — ASPIRIN 81 MG 81 MG: 81 TABLET ORAL at 10:52

## 2020-02-16 RX ADMIN — POTASSIUM CHLORIDE 40 MEQ: 750 CAPSULE, EXTENDED RELEASE ORAL at 10:51

## 2020-02-16 RX ADMIN — LISINOPRIL 20 MG: 20 TABLET ORAL at 10:51

## 2020-02-16 RX ADMIN — HYDROCHLOROTHIAZIDE 12.5 MG: 12.5 CAPSULE ORAL at 10:53

## 2020-02-16 NOTE — THERAPY TREATMENT NOTE
Acute Care - Occupational Therapy Treatment Note  [facility]     Patient Name: Susan Tucker  : 1935  MRN: [id number]  Today's Date: 2020  Onset of Illness/Injury or Date of Surgery: 20  Date of Referral to OT: 20       Admit Date: 2020       ICD-10-CM ICD-9-CM   1. Colitis K52.9 558.9   2. Sepsis, due to unspecified organism, unspecified whether acute organ dysfunction present (CMS/HCC) A41.9 038.9     995.91   3. Acute abdominal pain R10.9 789.00     338.19   4. Nausea and vomiting, intractability of vomiting not specified, unspecified vomiting type R11.2 787.01   5. Disorder of jejunum K63.9 569.9   6. Impaired mobility and ADLs Z74.09 799.89   7. Gallstone ileus of small intestine (CMS/HCC) K56.3 560.31     Patient Active Problem List   Diagnosis   • Sepsis (CMS/HCC)   • Elevated liver enzymes   • Dehydration   • Hypertension   • History of Stroke   • UTI (urinary tract infection)   • Calculus of bile duct with acute cholecystitis and obstruction   • Enterocolitis   • Gallstone ileus of small intestine (CMS/HCC)     Past Medical History:   Diagnosis Date   • Elevated cholesterol    • Hypertension    • Stroke (CMS/HCC)      Past Surgical History:   Procedure Laterality Date   • ERCP N/A 2018    Procedure: ENDOSCOPIC RETROGRADE CHOLANGIOPANCREATOGRAPHY;  Surgeon: Liam Manning MD;  Location: [facility] ENDOSCOPY;  Service: Gastroenterology   • EXPLORATORY LAPAROTOMY N/A 2/10/2020    Procedure: LAPAROTOMY EXPLORATORY BOWEL RESECTION;  Surgeon: Carlos Zurita MD;  Location: [facility] OR;  Service: General;  Laterality: N/A;   • HYSTERECTOMY         Therapy Treatment    Rehabilitation Treatment Summary     Row Name 20 0947             Treatment Time/Intention    Discipline  occupational therapist  -TA      Document Type  therapy note (daily note)  -TA      Subjective Information  no complaints  -TA      Mode of Treatment  occupational therapy  -TA      Patient/Family  Observations  Family present in room; Pt with abdominal binder on, RA, tele  -TA      Care Plan Review  care plan/treatment goals reviewed;risks/benefits reviewed;patient/other agree to care plan  -TA      Care Plan Review, Other Participant(s)  daughter  -TA      Total Minutes, Occupational Therapy Treatment  30  -TA      Patient Effort  good  -TA      Existing Precautions/Restrictions  fall;other (see comments) Abdominal binder on AAT's  -TA      Recorded by [TA] Tristan Villa, OT 02/16/20 1027      Row Name 02/16/20 0947             Vital Signs    Pre Systolic BP Rehab  -- [facility]; RN cleared pt for tx  -TA      Pre SpO2 (%)  97  -TA      O2 Delivery Pre Treatment  room air  -TA      Post SpO2 (%)  96  -TA      O2 Delivery Post Treatment  room air  -TA      Pre Patient Position  Supine  -TA      Intra Patient Position  Standing  -TA      Post Patient Position  Sitting  -TA      Recorded by [TA] Tristan Villa, OT 02/16/20 1027      Row Name 02/16/20 0947             Cognitive Assessment/Intervention- PT/OT    Affect/Mental Status (Cognitive)  WFL  -TA      Orientation Status (Cognition)  oriented x 3  -TA      Follows Commands (Cognition)  follows one step commands;over 90% accuracy  -TA      Safety Deficit (Cognitive)  mild deficit;awareness of need for assistance;insight into deficits/self awareness;safety precautions follow-through/compliance;safety precautions awareness  -TA      Cognitive Interventions (Cognitive)  occupation/activity based interventions  -TA      Recorded by [TA] Tristan Villa, OT 02/16/20 1027      Row Name 02/16/20 0947             Safety Issues, Functional Mobility    Safety Issues Affecting Function (Mobility)  awareness of need for assistance;insight into deficits/self awareness;safety precaution awareness;safety precautions follow-through/compliance  -TA      Impairments Affecting Function (Mobility)  balance;endurance/activity tolerance;strength  -TA      Recorded by  [TA] Tristan Villa, OT 02/16/20 1027      Row Name 02/16/20 0947             Bed Mobility Assessment/Treatment    Bed Mobility Assessment/Treatment  supine-sit  -TA      Supine-Sit [place] (Bed Mobility)  contact guard;verbal cues  -TA      Bed Mobility, Safety Issues  decreased use of arms for pushing/pulling;decreased use of legs for bridging/pushing;impaired trunk control for bed mobility  -TA      Assistive Device (Bed Mobility)  bed rails;head of bed elevated  -TA      Comment (Bed Mobility)  VCs for sequencing  -TA      Recorded by [TA] Tristan Villa, OT 02/16/20 1027      Row Name 02/16/20 0947             Functional Mobility    Functional Mobility- Ind. Level  contact guard assist  -TA      Functional Mobility- Device  rolling walker  -TA      Functional Mobility-Distance (Feet)  -- In room, in hallways  -TA      Functional Mobility- Safety Issues  step length decreased  -TA      Recorded by [TA] Tristan Villa, OT 02/16/20 1027      Row Name 02/16/20 0947             Transfer Assessment/Treatment    Transfer Assessment/Treatment  sit-stand transfer;stand-sit transfer  -TA      Comment (Transfers)  VCs for safe technique  -TA      Recorded by [TA] Tristan Villa, OT 02/16/20 1027      Row Name 02/16/20 0947             Sit-Stand Transfer    Sit-Stand [place] (Transfers)  contact guard;verbal cues  -TA      Assistive Device (Sit-Stand Transfers)  walker, front-wheeled  -TA      Recorded by [TA] Tristan Villa, OT 02/16/20 1027      Row Name 02/16/20 0947             Stand-Sit Transfer    Stand-Sit [place] (Transfers)  contact guard;verbal cues  -TA      Assistive Device (Stand-Sit Transfers)  walker, front-wheeled  -TA      Recorded by [TA] Tristan Villa, OT 02/16/20 1027      Row Name 02/16/20 0947             ADL Assessment/Intervention    51179 - OT Self Care/Mgmt Minutes  13  -TA      BADL Assessment/Intervention  lower body dressing;upper body dressing  -TA       Recorded by [TA] Tristan Villa, OT 02/16/20 1027      Row Name 02/16/20 0947             Upper Body Dressing Assessment/Training    Upper Body Dressing [place] Level  don;front opening garment;contact guard assist;verbal cues  -TA      Upper Body Dressing Position  edge of bed sitting  -TA      Recorded by [TA] Tristan Villa, OT 02/16/20 1027      Row Name 02/16/20 0947             Lower Body Dressing Assessment/Training    Lower Body Dressing [place] Level  doff;don;socks;minimum assist (75% patient effort);verbal cues  -TA      Lower Body Dressing Position  unsupported sitting  -TA      Comment (Lower Body Dressing)  AE training with AE  -TA      Recorded by [TA] Tristan Villa, OT 02/16/20 1027      Row Name 02/16/20 0947             BADL Safety/Performance    Impairments, BADL Safety/Performance  balance;endurance/activity tolerance;strength;trunk/postural control  -TA      Cognitive Impairments, BADL Safety/Performance  insight into deficits/self awareness;awareness, need for assistance;safety precaution awareness;safety precaution follow-through  -TA      Skilled BADL Treatment/Intervention  BADL process/adaptation training;adaptive equipment training  -TA      Progress in BADL Status  improvement noted  -TA      Recorded by [TA] Tristan Villa, OT 02/16/20 1027      Row Name 02/16/20 0947             Motor Skills Assessment/Interventions    Additional Documentation  Balance (Group);Balance Interventions (Group)  -TA      Recorded by [TA] Tristan Villa, OT 02/16/20 1027      Row Name 02/16/20 0947             Balance    Balance  dynamic sitting balance;static standing balance  -TA      Recorded by [TA] Tristan Villa, OT 02/16/20 1027      Row Name 02/16/20 0947             Dynamic Sitting Balance    Level of [place], Reaches Outside Midline (Sitting, Dynamic Balance)  standby assist  -TA      Sitting Position, Reaches Outside Midline (Sitting, Dynamic  Balance)  sitting on edge of bed;sitting in chair  -TA      Comment, Reaches Outside Midline (Sitting, Dynamic Balance)  LBD  -TA      Recorded by [TA] Tristan Villa, OT 02/16/20 1027      Row Name 02/16/20 0947             Static Standing Balance    Level of [place] (Supported Standing, Static Balance)  contact guard assist  -TA      Time Able to Maintain Position (Supported Standing, Static Balance)  1 to 2 minutes  -TA      Assistive Device Utilized (Supported Standing, Static Balance)  walker, rolling  -TA      Recorded by [TA] Tristan Villa, OT 02/16/20 1027      Row Name 02/16/20 0947             Positioning and Restraints    Pre-Treatment Position  in bed  -TA      Post Treatment Position  chair  -TA      In Chair  reclined;call light within reach;encouraged to call for assist;exit alarm on;with family/caregiver;waffle cushion;legs elevated  -TA      Recorded by [TA] Tristan Villa, OT 02/16/20 1027      Row Name 02/16/20 0947             Pain Assessment    Additional Documentation  Pain Scale: Numbers Pre/Post-Treatment (Group)  -TA      Recorded by [TA] Tristan Villa, OT 02/16/20 1027      Row Name 02/16/20 0947             Pain Scale: Numbers Pre/Post-Treatment    Pain Scale: Numbers, Pretreatment  0/10 - no pain  -TA      Pain Scale: Numbers, Post-Treatment  0/10 - no pain  -TA      Pre/Post Treatment Pain Comment  Pt denied pain, tolerated  -TA      Pain Intervention(s)  Ambulation/increased activity;Repositioned  -TA      Recorded by [TA] Tristan Villa, OT 02/16/20 1027      Row Name                Wound 02/09/20 2200 Bilateral medial coccyx Pressure Injury    Wound - Properties Group Date first assessed: 02/09/20 [DC] Time first assessed: 2200 [DC] Present on Hospital Admission: Y [DC] Side: Bilateral [DC] Orientation: medial [DC] Location: coccyx [DC] Primary Wound Type: Pressure inj [DC] Stage, Pressure Injury: Stage 1 [DC] Recorded by:  [DC] Krista Juarez RN  02/09/20 2337    Row Name                Wound 02/10/20 lower abdomen Incision    Wound - Properties Group Date first assessed: 02/10/20 [TP] Present on Hospital Admission: N [TP] Orientation: lower [TP] Location: abdomen [TP] Primary Wound Type: Incision [TP] Recorded by:  [TP] Frances La RN 02/10/20 1714    Row Name 02/16/20 0947             Coping    Observed Emotional State  pleasant;cooperative  -TA      Verbalized Emotional State  acceptance  -TA      Recorded by [TA] Tristan Villa, OT 02/16/20 1027      Row Name 02/16/20 0947             Plan of Care Review    Plan of Care Reviewed With  patient;daughter  -TA      Progress  improving  -TA      Recorded by [TA] Tristan Villa OT 02/16/20 1027      Row Name 02/16/20 0947             Outcome Summary/Treatment Plan (OT)    Daily Summary of Progress (OT)  progress toward functional goals is good  -TA      Plan for Continued Treatment (OT)  Continue per OT POC  -TA      Anticipated Discharge Disposition (OT)  home with assist;home with home health  -TA      Recorded by [TA] Tristan Villa, OT 02/16/20 1027        User Key  (r) = Recorded By, (t) = Taken By, (c) = Cosigned By    Initials Name Effective Dates Discipline    TA Tristan Villa, OT 03/14/16 -  OT    DC Krista Juarez, RN 01/31/19 -  Nurse    TP Frances La RN 12/10/19 -  Nurse        Wound 02/09/20 2200 Bilateral medial coccyx Pressure Injury (Active)   Dressing Appearance open to air 2/15/2020  7:57 PM   Closure Open to air 2/15/2020  7:57 PM   Base pink 2/15/2020  7:57 PM   Periwound pink 2/15/2020  7:57 PM   Periwound Temperature warm 2/15/2020  7:57 PM   Periwound Skin Turgor soft 2/15/2020  7:57 PM   Drainage Amount none 2/15/2020  7:57 PM   Dressing Care, Wound open to air 2/15/2020  7:57 PM   Periwound Care, Wound dry periwound area maintained 2/15/2020  7:57 PM       Wound 02/10/20 lower abdomen Incision (Active)   Dressing Appearance dry;intact 2/15/2020   7:57 PM   Closure LINO 2/15/2020  7:57 PM   Base dressing in place, unable to visualize 2/15/2020  7:57 PM   Periwound Temperature warm 2/15/2020  7:57 PM   Periwound Skin Turgor soft 2/15/2020  7:57 PM   Drainage Amount none 2/15/2020  7:57 PM           OT Recommendation and Plan  Outcome Summary/Treatment Plan (OT)  Daily Summary of Progress (OT): progress toward functional goals is good  Plan for Continued Treatment (OT): Continue per OT POC  Anticipated Discharge Disposition (OT): home with assist, home with home health  Daily Summary of Progress (OT): progress toward functional goals is good  Plan of Care Review  Plan of Care Reviewed With: patient, daughter  Plan of Care Reviewed With: patient, daughter  Outcome Summary: VSS; Pt progressing with ADL performance, Min A for LBD with AE; UBD CGA; bed mobility CGA; CGA STS and to ambulate with RW in hallways. Continue per OT POC.  Outcome Measures     Row Name 02/16/20 0947             How much help from another is currently needed...    Putting on and taking off regular lower body clothing?  3  -TA      Bathing (including washing, rinsing, and drying)  3  -TA      Toileting (which includes using toilet bed pan or urinal)  3  -TA      Putting on and taking off regular upper body clothing  3  -TA      Taking care of personal grooming (such as brushing teeth)  4  -TA      Eating meals  4  -TA      AM-PAC 6 Clicks Score (OT)  20  -TA         Functional Assessment    Outcome Measure Options  AM-PAC 6 Clicks Daily Activity (OT)  -TA        User Key  (r) = Recorded By, (t) = Taken By, (c) = Cosigned By    Initials Name Provider Type    Tristan Simental OT Occupational Therapist           Time Calculation:   Time Calculation- OT     Row Name 02/16/20 1030 02/16/20 0947          Time Calculation- OT    OT Start Time  0947 ttc 30 minutes  -TA  --     Total Timed Code Minutes- OT  30 minute(s)  -TA  --     OT Received On  02/16/20  -TA  --     OT Goal Re-Cert Due Date   02/21/20  -INOCENTE  --        Timed Charges    12775 - OT Self Care/Mgmt Minutes  --  13  -TA       User Key  (r) = Recorded By, (t) = Taken By, (c) = Cosigned By    Initials Name Provider Type    Tristan Simental, OT Occupational Therapist        Therapy Charges for Today     Code Description Service Date Service Provider Modifiers Qty    89906791621 HC OT SELF CARE/MGMT/TRAIN EA 15 MIN 2/16/2020 Tristan Villa, OT GO 1    52546627527 HC OT THERAPEUTIC ACT EA 15 MIN 2/16/2020 Tristan Villa, OT GO 1               Tristan Villa OT  2/16/2020

## 2020-02-16 NOTE — DISCHARGE SUMMARY
[facility] Medicine Services  DISCHARGE SUMMARY    Patient Name: Susan Tucker  : 1935  MRN: [id number]    Date of Admission: 2020  4:12 PM  Date of Discharge:  20  Primary Care Physician: Marita George MD    Consults     Date and Time Order Name Status Description    2/10/2020 1402 Inpatient General Surgery Consult Completed     2/10/2020 1357 Inpatient General Surgery Consult Completed     2/10/2020 0030 Inpatient Gastroenterology Consult Completed           Hospital Course     Presenting Problem:   Sepsis (CMS/HCC) [A41.9]    Active Hospital Problems    Diagnosis  POA   • **Gallstone ileus of small intestine (CMS/HCC) [K56.3]  Yes   • Enterocolitis [K52.9]  Yes   • Sepsis (CMS/HCC) [A41.9]  Yes   • History of Stroke [I63.9]  Yes   • Hypertension [I10]  Yes      Resolved Hospital Problems   No resolved problems to display.          Hospital Course:  Susan Tucker is a 84 y.o. female with a past medical history significant for essential hypertension, hyperlipidemia and cva presents to the ED with complaints of an acute episode of nausea/vomiting then shortly afterwards 10/10 sharp generalized abdominal pain. General surgeon Dr. Zurita was consulted who reviewed the results of the abdominal CT scan and determined that surgical intervention was required. She was found to have an internal hernia with bowel obstruction and gallstone ileus. On 2/10/20, she underwent an exploratory laparotomy with lysis of adhesions and a small bowel resection by Dr. Zurita. She has been cleared for discharge by Dr. MILLER as she has had a stable post op course and has been progressing well. She is tolerating a GI soft diet, and has only minimal pain without nausea or vomiting.  She has had return of bowel function. Blood pressure has improved on current regimen. Plan to continue Lisinopril 20 mg daily and HCTZ 12.5 mg daily. She will see Dr. Zurita in 1 week for post op follow up.    Internal  hernia with small bowel obstruction  Gallstone ileus   -s/p ex-lap, lysis of adhesions, and small bowel resection on 2/10   -Ok for discharge by Dr. MILLER   -tolerating GI Soft diet  -Had BM 2/15/20  -labs stable     Hypertension  -improved, 's today   -continue lisinopril at 20 mg daily and HCTZ at 12.5 mg daily      History of CVA  -continue aspirin    Discharge Follow Up Recommendations for outpatient labs/diagnostics:   Follow up with PCP in 1 week for transition of care.  Follow up with Dr. Zurita in 1 week for post op care.     Day of Discharge     HPI:   Patient resting in chair in no apparent distress. Family present at bedside. Nursing notes reviewed. No acute events overnight. Patient reports that she feels well and is ready to go home. No new complaints at this time.     Review of Systems  Gen- No fevers, chills  CV- No chest pain, palpitations  Resp- No cough, dyspnea  GI- No N/V/D, abd pain    Vital Signs:   Temp:  [98.3 °F (36.8 °C)-98.5 °F (36.9 °C)] 98.3 °F (36.8 °C)  Heart Rate:  [] 89  Resp:  [16-18] 16  BP: (121-174)/(69-94) 135/69     Physical Exam:  Constitutional: No acute distress, awake, alert  HENT: NCAT, mucous membranes moist  Respiratory: Clear to auscultation bilaterally, respiratory effort normal   Cardiovascular: RRR, no murmurs, rubs, or gallops, palpable pedal pulses bilaterally  Gastrointestinal: Positive bowel sounds, soft, appropriately tender  Musculoskeletal: No bilateral ankle edema  Psychiatric: Appropriate affect, cooperative  Neurologic: Oriented x 3, strength symmetric in all extremities, speech clear  Skin: warm, dry, no visible rash    Pertinent  and/or Most Recent Results     Results from last 7 days   Lab Units 02/16/20  0508 02/15/20  1536 02/13/20  2033 02/13/20  0744 02/12/20  0754 02/12/20  0409 02/11/20  1745 02/10/20  0411 02/09/20  1637   WBC 10*3/mm3 7.49  --   --   --   --  8.26  --  14.56* 12.10*   HEMOGLOBIN g/dL 12.1  --   --   --   --  11.0*  --   12.6 15.1   HEMATOCRIT % 35.6  --   --   --   --  32.9*  --  39.7 45.4   PLATELETS 10*3/mm3 292  --   --   --   --  225  --  244 290   SODIUM mmol/L 135* 134*  --   --   --   --  141 140 138   POTASSIUM mmol/L 3.5 3.8 4.5 3.5 3.3*  --  3.4* 3.8 3.3*   CHLORIDE mmol/L 100 98  --   --   --   --  107 104 98   CO2 mmol/L 25.0 25.0  --   --   --   --  25.0 22.0 24.0   BUN mg/dL 6* 7*  --   --   --   --  13 13 16   CREATININE mg/dL 0.37* 0.44*  --   --   --   --  0.41* 0.59 0.65   GLUCOSE mg/dL 103* 177*  --   --   --   --  81 131* 157*   CALCIUM mg/dL 8.5* 8.7  --   --   --   --  8.1* 8.8 9.9     Results from last 7 days   Lab Units 02/10/20  0411 02/09/20  1637   BILIRUBIN mg/dL 0.9 0.9   ALK PHOS U/L 34* 44   ALT (SGPT) U/L 10 10   AST (SGOT) U/L 27 24           Invalid input(s): TG, LDLCALC, LDLREALC  Results from last 7 days   Lab Units 02/10/20  1153 02/09/20  1637   PROCALCITONIN ng/mL  --  0.91*   LACTATE mmol/L 0.9 1.7       Brief Urine Lab Results  (Last result in the past 365 days)      Color   Clarity   Blood   Leuk Est   Nitrite   Protein   CREAT   Urine HCG        02/09/20 1910 Yellow Clear Moderate (2+) Negative Negative Negative               Microbiology Results Abnormal     Procedure Component Value - Date/Time    Blood Culture - Blood, Arm, Right [[id number]] Collected:  02/09/20 1630    Lab Status:  Final result Specimen:  Blood from Arm, Right Updated:  02/14/20 1700     Blood Culture No growth at 5 days    Blood Culture - Blood, Arm, Left [[id number]] Collected:  02/09/20 1635    Lab Status:  Final result Specimen:  Blood from Arm, Left Updated:  02/14/20 1700     Blood Culture No growth at 5 days          Imaging Results (All)     Procedure Component Value Units Date/Time    XR Abdomen KUB [[id number]] Collected:  02/10/20 1615     Updated:  02/10/20 1839    Narrative:       EXAMINATION: XR ABDOMEN/KUB-02/10/2020:      INDICATION: SBO. Check NGT placement.; K52.9-Noninfective  gastroenteritis and  colitis, unspecified; A41.9-Sepsis, unspecified  organism; R10.9-Unspecified abdominal pain; R11.2-Nausea with vomiting,  unspecified.      COMPARISON: NONE.     FINDINGS: KUB reveals nasogastric tube identified with tip in the  stomach. The bowel gas pattern is unremarkable. No evidence of obvious  obstruction. The bony structures are unremarkable. Contrast seen within  the bladder which is mildly distended.           Impression:       Nasogastric tube identified with tip in the stomach.     D:  02/10/2020  E:  02/10/2020     This report was finalized on 2/10/2020 6:36 PM by Dr. Reina Truong MD.       US Abdomen Limited [[id number]] Collected:  02/10/20 0908     Updated:  02/10/20 1836    Narrative:       EXAMINATION: US ABDOMEN LIMITED- 02/09/2020     INDICATION: abdominal pain, history of ascending cholangitis;  K52.9-Noninfective gastroenteritis and colitis, unspecified;  A41.9-Sepsis, unspecified organism; R10.9-Unspecified abdominal pain;  R11.2-Nausea with vomiting, unspecified; upper abdominal pain     TECHNIQUE: Sonographic imaging was obtained of the right upper quadrant  in both the sagittal and transverse planes.     COMPARISON: NONE     FINDINGS: Pancreas is suboptimal in its visualization. Liver is  homogeneous in its visualized portions. No focal mass or intrahepatic  biliary ductal dilatation. The gallbladder fossa is filled with  fluid-filled loops of bowel. No abnormal mass or fluid collection  identified in this area. The common bile duct is somewhat difficult to  clear out due to the overlying bowel gas measuring approximately 8 mm.  The right kidney is normal in size, configuration, and texture measuring  in length from pole to pole 9.1 cm. No hydronephrosis identified with  only mild prominence of the renal pelvis. No solid mass or renal  cortical cyst identified. No nephrolithiasis.       Impression:       Bile duct measures 8 mm and is somewhat difficult to clear  due to the overlying  bowel gas. The remainder of the right upper  quadrant ultrasound is unremarkable.     D:  02/09/2020  E:  02/10/2020         This report was finalized on 2/10/2020 6:32 PM by Dr. Reina Truong MD.       CT Abdomen Pelvis With Contrast [[id number]] Collected:  02/09/20 1829     Updated:  02/10/20 1835    Narrative:       EXAMINATION: CT ABDOMEN AND PELVIS W CONTRAST-      INDICATION: Abdominal pain, nausea and vomiting, history of sepsis with  abscess near gallbladder.     TECHNIQUE: Multiple axial CT imaging was obtained of the abdomen and  pelvis following the administration of intravenous contrast.     The radiation dose reduction device was turned on for each scan per the  ALARA (As Low as Reasonably Achievable) protocol.     COMPARISON: 06/26/2018.     FINDINGS: ABDOMEN/PELVIS: There is some scarring identified at the left  lung base with calcified granuloma identified at the right lung base.  There is abnormal wall thickening identified of the duodenum with a  small area of stranding identified within the gallbladder fossa likely  related to the abnormal duodenal wall thickening. The gallbladder per  history has been surgically removed. There is no evidence of  intrahepatic or extrahepatic biliary ductal dilatation. There is  abnormal wall thickening identified of the small bowel within the mid  aspect of the abdomen. There is also abnormal wall thickening identified  of the ascending colon. Findings also demonstrate abnormal stranding  throughout the mesentery adjacent to the abnormal wall thickening of the  bowel. Findings can be seen in inflammatory bowel disease such as  Crohn's or possibly an enteritis and colitis related to possibly an  infectious or ischemic situation. Vascular calcification is seen within  the abdominal aorta and iliac vessels, however, there is good  opacification of contrast seen within the mesenteric vessels. There is  some stool seen in the colon. There is no significant fluid  identified  within the right paracolic gutter with a small amount of fluid in the  left paracolic gutter. Abnormal fluid identified in the pelvis.  Degenerative changes identified within the spine and pelvis.     Delayed imaging reveals contrast seen in the renal collecting systems  bilaterally as well as within the ureters and bladder with no evidence  of obvious obstruction.       Impression:       There is abnormal appearing bowel within both the abdomen  and pelvis involving the duodenum and the second portion as it courses  near the gallbladder fossa with some stranding identified in the  gallbladder fossa and fluid with wall thickening of the bowel. There is  also a question of small amount of air within the lumen of the bowel in  the gallbladder fossa. Findings per history of cholecystitis. There is  also abnormal small bowel identified within the pelvis as well as  abnormal colon in the ascending colon with wall thickening and  surrounding stranding. Findings can be seen in inflammatory bowel  disease such as Crohn's and clinical correlation is needed. An  infectious or ischemic process at this time cannot be completely  excluded and continued followup recommended as indicated. There is no  evidence of obvious obstruction.     D:  02/09/2020  E:  02/10/2020     This report was finalized on 2/10/2020 6:32 PM by Dr. Reina Truong MD.       XR Chest 1 View [[id number]] Collected:  02/09/20 1721     Updated:  02/10/20 1834    Narrative:       EXAMINATION: XR CHEST 1 VW- 02/09/2020     INDICATION: Weakness, nausea, vomiting, diarrhea     COMPARISON: 06/26/2018     FINDINGS: Portable chest reveals cardiac and mediastinal silhouettes  within normal limits. The lung fields are grossly clear. No focal  parenchymal opacification present.  No pleural effusion or pneumothorax.  Bony structures are unremarkable. Pulmonary vascularity is within normal  limits.           Impression:       No acute cardiopulmonary  disease.     D:  02/09/2020  E:  02/10/2020     This report was finalized on 2/10/2020 6:31 PM by Dr. Reina Truong MD.                       Results for orders placed during the hospital encounter of 06/25/18   Adult Transthoracic Echo Complete W/ Cont if Necessary Per Protocol    Narrative · Mild to moderate tricuspid valve regurgitation is present.  · Left ventricular systolic function is normal. Estimated EF = 65%.  · There is no evidence of pericardial effusion.  · No evidence of pulmonary hypertension is present.  · Mild MAC is present.  · The aortic valve exhibits sclerosis.  · Normal right ventricular cavity size, wall thickness, systolic function   and septal motion noted.  · Left ventricular diastolic dysfunction is abnormal consistent with: age.          Plan for Follow-up of Pending Labs/Results: Follow up with PCP and Dr. Zurita in 1 week.    Discharge Details        Discharge Medications      New Medications      Instructions Start Date   hydroCHLOROthiazide 12.5 MG capsule  Commonly known as:  MICROZIDE   12.5 mg, Oral, Daily         Changes to Medications      Instructions Start Date   lisinopril 20 MG tablet  Commonly known as:  PRINIVIL,ZESTRIL  What changed:    · medication strength  · how much to take   20 mg, Oral, Daily         Continue These Medications      Instructions Start Date   aspirin 81 MG EC tablet   81 mg, Oral, Daily      fenofibrate 145 MG tablet  Commonly known as:  TRICOR   145 mg, Oral, Daily         Stop These Medications    alendronate 70 MG tablet  Commonly known as:  FOSAMAX     lisinopril-hydrochlorothiazide 20-12.5 MG per tablet  Commonly known as:  PRINZIDE,ZESTORETIC            Allergies   Allergen Reactions   • Amoxicillin Hives   • Atorvastatin Nausea Only   • Cephalexin Rash         Discharge Disposition:  Home or Self Care    Diet:  Hospital:  Diet Order   Procedures   • Diet Regular; GI Soft       Activity:  Activity Instructions     Activity as Tolerated                CODE STATUS:    Code Status and Medical Interventions:   Ordered at: 02/09/20 2055     Level Of Support Discussed With:    Patient     Code Status:    CPR     Medical Interventions (Level of Support Prior to Arrest):    Full       No future appointments.    Additional Instructions for the Follow-ups that You Need to Schedule     Ambulatory Referral to Home Health   As directed      Face to Face Visit Date:  2/11/2020    Follow-up provider for Plan of Care?:  I treated the patient in an acute care facility and will not continue treatment after discharge.    Follow-up provider:  MARITA GEORGE [[id number]]    Reason/Clinical Findings:  Gallstone ileus of small intestine, Infection in bloodstream, small bowel resection    Describe mobility limitations that make leaving home difficult:  Impaired functional mobility, gait, balance and endurance.    Nursing/Therapeutic Services Requested:  Physical Therapy Occupational Therapy    PT orders:  Therapeutic exercise Strengthening    Occupational orders:  Activities of daily living Strengthening Home safety assessment    Frequency:  1 Week 1         Discharge Follow-up with PCP   As directed       Currently Documented PCP:    Marita George MD    PCP Phone Number:    [phone]     Follow Up Details:  PCP in 1 week         Discharge Follow-up with Specified Provider: Dr. Zurita; 1 Week   As directed      To:  Dr. Zurita    Follow Up:  1 Week               Time Spent on Discharge:  36 minutes    Electronically signed by JOESPH Estrella, 02/16/20, 10:40 AM.

## 2020-02-16 NOTE — PLAN OF CARE
Problem: Patient Care Overview  Goal: Plan of Care Review  Outcome: Ongoing (interventions implemented as appropriate)  Flowsheets (Taken 2/16/2020 3218)  Outcome Summary: VSS; Pt progressing with ADL performance, Min A for LBD with AE; UBD CGA; bed mobility CGA; CGA STS and to ambulate with RW in hallways. Continue per OT POC.

## 2020-02-16 NOTE — PROGRESS NOTES
"Susan Tucker  1935  [id number]    Surgery Progress Note    Date of visit: 2/16/2020    Subjective: Denies abdominal pain  Tolerating diet well and having bowel movements  Ambulating better    Objective:    /78 (BP Location: Right arm, Patient Position: Lying)   Pulse 76   Temp 98.3 °F (36.8 °C) (Oral)   Resp 16   Ht 154.9 cm (61\")   Wt 52.3 kg (115 lb 3.2 oz)   SpO2 94%   BMI 21.77 kg/m²     Intake/Output Summary (Last 24 hours) at 2/16/2020 0836  Last data filed at 2/16/2020 0559  Gross per 24 hour   Intake 1227 ml   Output 1600 ml   Net -373 ml         L: normal air entry    Abd: Nondistended, soft and nontender  Incision is intact and healing well      LABS:    Results from last 7 days   Lab Units 02/16/20  0508   WBC 10*3/mm3 7.49   HEMOGLOBIN g/dL 12.1   HEMATOCRIT % 35.6   PLATELETS 10*3/mm3 292     Results from last 7 days   Lab Units 02/16/20  0508  02/10/20  0411   SODIUM mmol/L 135*   < > 140   POTASSIUM mmol/L 3.5   < > 3.8   CHLORIDE mmol/L 100   < > 104   CO2 mmol/L 25.0   < > 22.0   BUN mg/dL 6*   < > 13   CREATININE mg/dL 0.37*   < > 0.59   CALCIUM mg/dL 8.5*   < > 8.8   BILIRUBIN mg/dL  --   --  0.9   ALK PHOS U/L  --   --  34*   ALT (SGPT) U/L  --   --  10   AST (SGOT) U/L  --   --  27   GLUCOSE mg/dL 103*   < > 131*    < > = values in this interval not displayed.     Results from last 7 days   Lab Units 02/16/20  0508   SODIUM mmol/L 135*   POTASSIUM mmol/L 3.5   CHLORIDE mmol/L 100   CO2 mmol/L 25.0   BUN mg/dL 6*   CREATININE mg/dL 0.37*   GLUCOSE mg/dL 103*   CALCIUM mg/dL 8.5*     Lab Results   Lab Value Date/Time    LIPASE 104 (H) 02/09/2020 1637         Assessment/ Plan: Stable course status post abdominal exploration and resection of segment of small bowel secondary to gallstone ileus with impacted stone  Patient is progressing nicely  She is ready for discharge from surgical standpoint  No need for narcotics at home  Follow-up with Dr. Zurita in 1 week    Problem List Items " Addressed This Visit        Digestive    * (Principal) Gallstone ileus of small intestine (CMS/HCC)    Relevant Orders    Ambulatory Referral to Home Health       Other    Sepsis (CMS/HCC)    Relevant Orders    Ambulatory Referral to Home Health      Other Visit Diagnoses     Colitis    -  Primary    Relevant Orders    Ambulatory Referral to Home Health    Acute abdominal pain        Nausea and vomiting, intractability of vomiting not specified, unspecified vomiting type        Disorder of jejunum        Relevant Orders    Tissue Pathology Exam (Completed)    Impaired mobility and ADLs        Relevant Orders    Ambulatory Referral to Home Health            Tia Samuels MD  2/16/2020  8:36 AM

## 2020-02-17 ENCOUNTER — READMISSION MANAGEMENT (OUTPATIENT)
Dept: CALL CENTER | Facility: HOSPITAL | Age: 85
End: 2020-02-17

## 2020-02-17 NOTE — OUTREACH NOTE
Prep Survey      Responses   Facility patient discharged from?  [facility]   Is patient eligible?  Yes   Discharge diagnosis  **Gallstone ileus of small intestine    Does the patient have one of the following disease processes/diagnoses(primary or secondary)?  General Surgery   Does the patient have Home health ordered?  Yes   What is the Home health agency?   [facility]    Is there a DME ordered?  No   Prep survey completed?  Yes          Pina Cortez RN

## 2020-02-18 ENCOUNTER — READMISSION MANAGEMENT (OUTPATIENT)
Dept: CALL CENTER | Facility: HOSPITAL | Age: 85
End: 2020-02-18

## 2020-02-18 NOTE — OUTREACH NOTE
General Surgery Week 1 Survey      Responses   Facility patient discharged from?  [place]   Does the patient have one of the following disease processes/diagnoses(primary or secondary)?  General Surgery   Is there a successful TCM telephone encounter documented?  No   Week 1 attempt successful?  Yes   Call start time  1200   Call end time  1206   Discharge diagnosis  Gallstone ileus of small intestine    Meds reviewed with patient/caregiver?  Yes   Is the patient having any side effects they believe may be caused by any medication additions or changes?  No   Does the patient have all medications related to this admission filled (includes all antibiotics, pain medications, etc.)  Yes   Is the patient taking all medications as directed (includes completed medication regime)?  Yes   Does the patient have a follow up appointment scheduled with their surgeon?  No   What is preventing the patient from scheduling follow up appointments?  Waiting on return call   Nursing Interventions  Educated patient on importance of making appointment   Has the patient kept scheduled appointments due by today?  N/A   What is the Home health agency?   [facility]    Has home health visited the patient within 72 hours of discharge?  Unsure   Psychosocial issues?  No   Did the patient receive a copy of their discharge instructions?  Yes   Nursing interventions  Reviewed instructions with patient   What is the patient's perception of their health status since discharge?  Improving   Nursing interventions  Nurse provided patient education   Is the patient /caregiver able to teach back basic post-op care?  No tub bath, swimming, or hot tub until instructed by MD, Take showers only when approved by MD-sponge bathe until then, Lifting as instructed by MD in discharge instructions, Keep incision areas clean,dry and protected   Is the patient/caregiver able to teach back signs and symptoms of incisional infection?  Increased redness, swelling or pain  at the incisonal site, Increased drainage or bleeding, Fever   Is the patient/caregiver able to teach back steps to recovery at home?  Set small, achievable goals for return to baseline health, Rest and rebuild strength, gradually increase activity, Eat a well-balance diet   If the patient is a current smoker, are they able to teach back resources for cessation?  -- [Nonsmoker]   Is the patient/caregiver able to teach back the hierarchy of who to call/visit for symptoms/problems? PCP, Specialist, Home health nurse, Urgent Care, ED, 911  Yes   Week 1 call completed?  Yes          Betsy Chong RN

## 2020-02-25 ENCOUNTER — READMISSION MANAGEMENT (OUTPATIENT)
Dept: CALL CENTER | Facility: HOSPITAL | Age: 85
End: 2020-02-25

## 2020-02-25 NOTE — OUTREACH NOTE
General Surgery Week 2 Survey      Responses   Facility patient discharged from?  [place]   Does the patient have one of the following disease processes/diagnoses(primary or secondary)?  General Surgery   Week 2 attempt successful?  Yes   Call start time  1120   Call end time  1126   Discharge diagnosis  Gallstone ileus of small intestine    Meds reviewed with patient/caregiver?  Yes   Is the patient taking all medications as directed (includes completed medication regime)?  Yes   Has the patient kept scheduled appointments due by today?  N/A   Comments  Pt has an appt on 2/27/20 but unsure the name   What is the Home health agency?   Pt cancelled home health   What is the patient's perception of their health status since discharge?  Improving [Pt reports she's not having any pain]   Nursing interventions  Nurse provided patient education   Is the patient /caregiver able to teach back basic post-op care?  Lifting as instructed by MD in discharge instructions   Is the patient/caregiver able to teach back signs and symptoms of incisional infection?  Pus or odor from incision, Fever   Week 2 call completed?  Yes          Betsy Chong RN

## 2020-03-03 ENCOUNTER — READMISSION MANAGEMENT (OUTPATIENT)
Dept: CALL CENTER | Facility: HOSPITAL | Age: 85
End: 2020-03-03

## 2020-03-03 NOTE — OUTREACH NOTE
General Surgery Week 3 Survey      Responses   [facility] patient discharged from?  [place]   Does the patient have one of the following disease processes/diagnoses(primary or secondary)?  General Surgery   Week 3 attempt successful?  Yes   Call start time  1552   Call end time  1555   Discharge diagnosis  exploratory lap, lysis of adhesions, small bowel resection.    Is patient permission given to speak with other caregiver?  Yes   List who call center can speak with  Dinah jason   Person spoke with today (if not patient) and relationship  daughterDinah   Is the patient taking all medications as directed (includes completed medication regime)?  Yes   Does the patient have a follow up appointment scheduled with their surgeon?  Yes   Has the patient kept scheduled appointments due by today?  Yes   Has home health visited the patient within 72 hours of discharge?  N/A   Psychosocial issues?  No   Did the patient receive a copy of their discharge instructions?  Yes   What is the patient's perception of their health status since discharge?  Improving   Nursing interventions  Nurse provided patient education   Is the patient /caregiver able to teach back basic post-op care?  Keep incision areas clean,dry and protected   Is the patient/caregiver able to teach back signs and symptoms of incisional infection?  Increased redness, swelling or pain at the incisonal site, Increased drainage or bleeding, Incisional warmth, Pus or odor from incision, Fever   Is the patient/caregiver able to teach back steps to recovery at home?  Set small, achievable goals for return to baseline health, Rest and rebuild strength, gradually increase activity, Eat a well-balance diet   Is the patient/caregiver able to teach back the hierarchy of who to call/visit for symptoms/problems? PCP, Specialist, Home health nurse, Urgent Care, ED, 911  Yes   Week 3 call completed?  Yes          Janey Ahuja RN

## 2020-03-03 NOTE — OUTREACH NOTE
General Surgery Week 3 Survey      Responses   [facility] patient discharged from?  [place]   Does the patient have one of the following disease processes/diagnoses(primary or secondary)?  General Surgery   Week 3 attempt successful?  No   Unsuccessful attempts  Attempt 1          Janey Ahuja RN

## 2020-03-11 ENCOUNTER — READMISSION MANAGEMENT (OUTPATIENT)
Dept: CALL CENTER | Facility: HOSPITAL | Age: 85
End: 2020-03-11

## 2020-03-11 NOTE — OUTREACH NOTE
General Surgery Week 4 Survey      Responses   [facility] patient discharged from?  [place]   Does the patient have one of the following disease processes/diagnoses(primary or secondary)?  General Surgery   Week 4 attempt successful?  No          Lauryn Osborne RN

## 2022-03-24 ENCOUNTER — APPOINTMENT (OUTPATIENT)
Dept: CT IMAGING | Facility: HOSPITAL | Age: 87
End: 2022-03-24

## 2022-03-24 ENCOUNTER — HOSPITAL ENCOUNTER (OUTPATIENT)
Facility: HOSPITAL | Age: 87
Setting detail: OBSERVATION
Discharge: HOME | End: 2022-03-26
Attending: EMERGENCY MEDICINE | Admitting: INTERNAL MEDICINE

## 2022-03-24 ENCOUNTER — APPOINTMENT (OUTPATIENT)
Dept: MRI IMAGING | Facility: HOSPITAL | Age: 87
End: 2022-03-24

## 2022-03-24 ENCOUNTER — APPOINTMENT (OUTPATIENT)
Dept: CARDIOLOGY | Facility: HOSPITAL | Age: 87
End: 2022-03-24

## 2022-03-24 ENCOUNTER — APPOINTMENT (OUTPATIENT)
Dept: NEUROLOGY | Facility: HOSPITAL | Age: 87
End: 2022-03-24

## 2022-03-24 DIAGNOSIS — R55 SYNCOPE AND COLLAPSE: Primary | ICD-10-CM

## 2022-03-24 DIAGNOSIS — E86.0 MILD DEHYDRATION: ICD-10-CM

## 2022-03-24 DIAGNOSIS — R41.841 COGNITIVE COMMUNICATION DEFICIT: ICD-10-CM

## 2022-03-24 LAB
ALBUMIN SERPL-MCNC: 3.8 G/DL (ref 3.5–5.2)
ALBUMIN/GLOB SERPL: 1.5 G/DL
ALP SERPL-CCNC: 40 U/L (ref 39–117)
ALT SERPL W P-5'-P-CCNC: 12 U/L (ref 1–33)
ANION GAP SERPL CALCULATED.3IONS-SCNC: 10 MMOL/L (ref 5–15)
AST SERPL-CCNC: 25 U/L (ref 1–32)
BASOPHILS # BLD AUTO: 0.07 10*3/MM3 (ref 0–0.2)
BASOPHILS NFR BLD AUTO: 1.7 % (ref 0–1.5)
BILIRUB SERPL-MCNC: 0.6 MG/DL (ref 0–1.2)
BILIRUB UR QL STRIP: NEGATIVE
BUN SERPL-MCNC: 10 MG/DL (ref 8–23)
BUN/CREAT SERPL: 12.2 (ref 7–25)
CALCIUM SPEC-SCNC: 9.4 MG/DL (ref 8.6–10.5)
CHLORIDE SERPL-SCNC: 105 MMOL/L (ref 98–107)
CLARITY UR: CLEAR
CO2 SERPL-SCNC: 24 MMOL/L (ref 22–29)
COLOR UR: YELLOW
CREAT BLDA-MCNC: 0.8 MG/DL (ref 0.6–1.3)
CREAT SERPL-MCNC: 0.82 MG/DL (ref 0.57–1)
D-LACTATE SERPL-SCNC: 1.1 MMOL/L (ref 0.5–2)
DEPRECATED RDW RBC AUTO: 42.6 FL (ref 37–54)
EGFRCR SERPLBLD CKD-EPI 2021: 70.2 ML/MIN/1.73
EOSINOPHIL # BLD AUTO: 0.19 10*3/MM3 (ref 0–0.4)
EOSINOPHIL NFR BLD AUTO: 4.5 % (ref 0.3–6.2)
ERYTHROCYTE [DISTWIDTH] IN BLOOD BY AUTOMATED COUNT: 12.6 % (ref 12.3–15.4)
GLOBULIN UR ELPH-MCNC: 2.5 GM/DL
GLUCOSE BLDC GLUCOMTR-MCNC: 118 MG/DL (ref 70–130)
GLUCOSE BLDC GLUCOMTR-MCNC: 55 MG/DL (ref 70–130)
GLUCOSE BLDC GLUCOMTR-MCNC: 74 MG/DL (ref 70–130)
GLUCOSE SERPL-MCNC: 94 MG/DL (ref 65–99)
GLUCOSE UR STRIP-MCNC: NEGATIVE MG/DL
HCT VFR BLD AUTO: 39.9 % (ref 34–46.6)
HGB BLD-MCNC: 13.4 G/DL (ref 12–15.9)
HGB UR QL STRIP.AUTO: NEGATIVE
IMM GRANULOCYTES # BLD AUTO: 0.02 10*3/MM3 (ref 0–0.05)
IMM GRANULOCYTES NFR BLD AUTO: 0.5 % (ref 0–0.5)
KETONES UR QL STRIP: NEGATIVE
LEUKOCYTE ESTERASE UR QL STRIP.AUTO: NEGATIVE
LYMPHOCYTES # BLD AUTO: 0.79 10*3/MM3 (ref 0.7–3.1)
LYMPHOCYTES NFR BLD AUTO: 18.9 % (ref 19.6–45.3)
MAGNESIUM SERPL-MCNC: 1.9 MG/DL (ref 1.6–2.4)
MCH RBC QN AUTO: 31 PG (ref 26.6–33)
MCHC RBC AUTO-ENTMCNC: 33.6 G/DL (ref 31.5–35.7)
MCV RBC AUTO: 92.4 FL (ref 79–97)
MONOCYTES # BLD AUTO: 0.39 10*3/MM3 (ref 0.1–0.9)
MONOCYTES NFR BLD AUTO: 9.3 % (ref 5–12)
NEUTROPHILS NFR BLD AUTO: 2.72 10*3/MM3 (ref 1.7–7)
NEUTROPHILS NFR BLD AUTO: 65.1 % (ref 42.7–76)
NITRITE UR QL STRIP: NEGATIVE
NRBC BLD AUTO-RTO: 0 /100 WBC (ref 0–0.2)
PH UR STRIP.AUTO: 7 [PH] (ref 5–8)
PLATELET # BLD AUTO: 239 10*3/MM3 (ref 140–450)
PMV BLD AUTO: 10 FL (ref 6–12)
POTASSIUM SERPL-SCNC: 4 MMOL/L (ref 3.5–5.2)
PROT SERPL-MCNC: 6.3 G/DL (ref 6–8.5)
PROT UR QL STRIP: NEGATIVE
RBC # BLD AUTO: 4.32 10*6/MM3 (ref 3.77–5.28)
SARS-COV-2 RNA PNL SPEC NAA+PROBE: NOT DETECTED
SODIUM SERPL-SCNC: 139 MMOL/L (ref 136–145)
SP GR UR STRIP: 1.08 (ref 1–1.03)
TROPONIN T SERPL-MCNC: 0.01 NG/ML (ref 0–0.03)
TROPONIN T SERPL-MCNC: <0.01 NG/ML (ref 0–0.03)
UROBILINOGEN UR QL STRIP: ABNORMAL
WBC NRBC COR # BLD: 4.18 10*3/MM3 (ref 3.4–10.8)

## 2022-03-24 PROCEDURE — U0005 INFEC AGEN DETEC AMPLI PROBE: HCPCS | Performed by: INTERNAL MEDICINE

## 2022-03-24 PROCEDURE — 0 IOPAMIDOL PER 1 ML: Performed by: EMERGENCY MEDICINE

## 2022-03-24 PROCEDURE — 99220 PR INITIAL OBSERVATION CARE/DAY 70 MINUTES: CPT | Performed by: INTERNAL MEDICINE

## 2022-03-24 PROCEDURE — G0378 HOSPITAL OBSERVATION PER HR: HCPCS

## 2022-03-24 PROCEDURE — 85025 COMPLETE CBC W/AUTO DIFF WBC: CPT | Performed by: PHYSICIAN ASSISTANT

## 2022-03-24 PROCEDURE — 83735 ASSAY OF MAGNESIUM: CPT | Performed by: PHYSICIAN ASSISTANT

## 2022-03-24 PROCEDURE — 96361 HYDRATE IV INFUSION ADD-ON: CPT

## 2022-03-24 PROCEDURE — 36415 COLL VENOUS BLD VENIPUNCTURE: CPT

## 2022-03-24 PROCEDURE — 81003 URINALYSIS AUTO W/O SCOPE: CPT | Performed by: PHYSICIAN ASSISTANT

## 2022-03-24 PROCEDURE — 93880 EXTRACRANIAL BILAT STUDY: CPT

## 2022-03-24 PROCEDURE — 93005 ELECTROCARDIOGRAM TRACING: CPT | Performed by: PHYSICIAN ASSISTANT

## 2022-03-24 PROCEDURE — 0 POTASSIUM CHLORIDE PER 2 MEQ: Performed by: INTERNAL MEDICINE

## 2022-03-24 PROCEDURE — 70498 CT ANGIOGRAPHY NECK: CPT

## 2022-03-24 PROCEDURE — 70551 MRI BRAIN STEM W/O DYE: CPT

## 2022-03-24 PROCEDURE — 84484 ASSAY OF TROPONIN QUANT: CPT | Performed by: PHYSICIAN ASSISTANT

## 2022-03-24 PROCEDURE — 99284 EMERGENCY DEPT VISIT MOD MDM: CPT

## 2022-03-24 PROCEDURE — 70450 CT HEAD/BRAIN W/O DYE: CPT

## 2022-03-24 PROCEDURE — 99214 OFFICE O/P EST MOD 30 MIN: CPT

## 2022-03-24 PROCEDURE — C9803 HOPD COVID-19 SPEC COLLECT: HCPCS

## 2022-03-24 PROCEDURE — 70496 CT ANGIOGRAPHY HEAD: CPT

## 2022-03-24 PROCEDURE — 0042T HC CT CEREBRAL PERFUSION W/WO CONTRAST: CPT

## 2022-03-24 PROCEDURE — 93880 EXTRACRANIAL BILAT STUDY: CPT | Performed by: INTERNAL MEDICINE

## 2022-03-24 PROCEDURE — 82565 ASSAY OF CREATININE: CPT

## 2022-03-24 PROCEDURE — 25010000002 ENOXAPARIN PER 10 MG: Performed by: INTERNAL MEDICINE

## 2022-03-24 PROCEDURE — U0004 COV-19 TEST NON-CDC HGH THRU: HCPCS | Performed by: INTERNAL MEDICINE

## 2022-03-24 PROCEDURE — 82962 GLUCOSE BLOOD TEST: CPT

## 2022-03-24 PROCEDURE — 96360 HYDRATION IV INFUSION INIT: CPT

## 2022-03-24 PROCEDURE — 83605 ASSAY OF LACTIC ACID: CPT | Performed by: PHYSICIAN ASSISTANT

## 2022-03-24 PROCEDURE — P9612 CATHETERIZE FOR URINE SPEC: HCPCS

## 2022-03-24 PROCEDURE — 80053 COMPREHEN METABOLIC PANEL: CPT | Performed by: PHYSICIAN ASSISTANT

## 2022-03-24 PROCEDURE — 96372 THER/PROPH/DIAG INJ SC/IM: CPT

## 2022-03-24 RX ORDER — SODIUM CHLORIDE AND POTASSIUM CHLORIDE 150; 450 MG/100ML; MG/100ML
75 INJECTION, SOLUTION INTRAVENOUS CONTINUOUS
Status: DISPENSED | OUTPATIENT
Start: 2022-03-24 | End: 2022-03-25

## 2022-03-24 RX ORDER — ONDANSETRON 4 MG/1
4 TABLET, FILM COATED ORAL EVERY 6 HOURS PRN
Status: DISCONTINUED | OUTPATIENT
Start: 2022-03-24 | End: 2022-03-26 | Stop reason: HOSPADM

## 2022-03-24 RX ORDER — LANOLIN ALCOHOL/MO/W.PET/CERES
1000 CREAM (GRAM) TOPICAL DAILY
COMMUNITY

## 2022-03-24 RX ORDER — ACETAMINOPHEN 325 MG/1
650 TABLET ORAL EVERY 4 HOURS PRN
Status: DISCONTINUED | OUTPATIENT
Start: 2022-03-24 | End: 2022-03-26 | Stop reason: HOSPADM

## 2022-03-24 RX ORDER — BISACODYL 10 MG
10 SUPPOSITORY, RECTAL RECTAL DAILY PRN
Status: DISCONTINUED | OUTPATIENT
Start: 2022-03-24 | End: 2022-03-25

## 2022-03-24 RX ORDER — LANOLIN ALCOHOL/MO/W.PET/CERES
1000 CREAM (GRAM) TOPICAL DAILY
Status: DISCONTINUED | OUTPATIENT
Start: 2022-03-24 | End: 2022-03-26 | Stop reason: HOSPADM

## 2022-03-24 RX ORDER — SODIUM CHLORIDE 0.9 % (FLUSH) 0.9 %
10 SYRINGE (ML) INJECTION EVERY 12 HOURS SCHEDULED
Status: DISCONTINUED | OUTPATIENT
Start: 2022-03-24 | End: 2022-03-26 | Stop reason: HOSPADM

## 2022-03-24 RX ORDER — SODIUM CHLORIDE 0.9 % (FLUSH) 0.9 %
10 SYRINGE (ML) INJECTION AS NEEDED
Status: DISCONTINUED | OUTPATIENT
Start: 2022-03-24 | End: 2022-03-25 | Stop reason: SDUPTHER

## 2022-03-24 RX ORDER — SODIUM CHLORIDE 0.9 % (FLUSH) 0.9 %
10 SYRINGE (ML) INJECTION EVERY 12 HOURS SCHEDULED
Status: DISCONTINUED | OUTPATIENT
Start: 2022-03-24 | End: 2022-03-25 | Stop reason: SDUPTHER

## 2022-03-24 RX ORDER — ATORVASTATIN CALCIUM 40 MG/1
80 TABLET, FILM COATED ORAL NIGHTLY
Status: DISCONTINUED | OUTPATIENT
Start: 2022-03-24 | End: 2022-03-26 | Stop reason: HOSPADM

## 2022-03-24 RX ORDER — AMOXICILLIN 250 MG
2 CAPSULE ORAL 2 TIMES DAILY
Status: DISCONTINUED | OUTPATIENT
Start: 2022-03-24 | End: 2022-03-25

## 2022-03-24 RX ORDER — ASPIRIN 81 MG/1
81 TABLET ORAL DAILY
Status: DISCONTINUED | OUTPATIENT
Start: 2022-03-24 | End: 2022-03-26 | Stop reason: HOSPADM

## 2022-03-24 RX ORDER — QUETIAPINE FUMARATE 25 MG/1
25 TABLET, FILM COATED ORAL NIGHTLY
Status: ON HOLD | COMMUNITY
End: 2022-11-11 | Stop reason: SDUPTHER

## 2022-03-24 RX ORDER — BISACODYL 5 MG/1
5 TABLET, DELAYED RELEASE ORAL DAILY PRN
Status: DISCONTINUED | OUTPATIENT
Start: 2022-03-24 | End: 2022-03-25

## 2022-03-24 RX ORDER — POLYETHYLENE GLYCOL 3350 17 G/17G
17 POWDER, FOR SOLUTION ORAL DAILY PRN
Status: DISCONTINUED | OUTPATIENT
Start: 2022-03-24 | End: 2022-03-25

## 2022-03-24 RX ORDER — ACETAMINOPHEN 160 MG/5ML
650 SOLUTION ORAL EVERY 4 HOURS PRN
Status: DISCONTINUED | OUTPATIENT
Start: 2022-03-24 | End: 2022-03-26 | Stop reason: HOSPADM

## 2022-03-24 RX ORDER — SODIUM CHLORIDE 0.9 % (FLUSH) 0.9 %
10 SYRINGE (ML) INJECTION AS NEEDED
Status: DISCONTINUED | OUTPATIENT
Start: 2022-03-24 | End: 2022-03-26 | Stop reason: HOSPADM

## 2022-03-24 RX ORDER — ONDANSETRON 2 MG/ML
4 INJECTION INTRAMUSCULAR; INTRAVENOUS EVERY 6 HOURS PRN
Status: DISCONTINUED | OUTPATIENT
Start: 2022-03-24 | End: 2022-03-26 | Stop reason: HOSPADM

## 2022-03-24 RX ORDER — ACETAMINOPHEN 650 MG/1
650 SUPPOSITORY RECTAL EVERY 4 HOURS PRN
Status: DISCONTINUED | OUTPATIENT
Start: 2022-03-24 | End: 2022-03-26 | Stop reason: HOSPADM

## 2022-03-24 RX ADMIN — ASPIRIN 81 MG: 81 TABLET, COATED ORAL at 17:09

## 2022-03-24 RX ADMIN — ATORVASTATIN CALCIUM 80 MG: 40 TABLET, FILM COATED ORAL at 20:20

## 2022-03-24 RX ADMIN — CYANOCOBALAMIN TAB 1000 MCG 1000 MCG: 1000 TAB at 17:09

## 2022-03-24 RX ADMIN — ENOXAPARIN SODIUM 40 MG: 40 INJECTION SUBCUTANEOUS at 17:09

## 2022-03-24 RX ADMIN — POTASSIUM CHLORIDE AND SODIUM CHLORIDE 75 ML/HR: 450; 150 INJECTION, SOLUTION INTRAVENOUS at 17:19

## 2022-03-24 RX ADMIN — POTASSIUM CHLORIDE AND SODIUM CHLORIDE 75 ML/HR: 450; 150 INJECTION, SOLUTION INTRAVENOUS at 20:20

## 2022-03-24 RX ADMIN — SODIUM CHLORIDE 1000 ML: 9 INJECTION, SOLUTION INTRAVENOUS at 14:15

## 2022-03-24 RX ADMIN — IOPAMIDOL 125 ML: 755 INJECTION, SOLUTION INTRAVENOUS at 10:59

## 2022-03-24 RX ADMIN — SENNOSIDES AND DOCUSATE SODIUM 2 TABLET: 50; 8.6 TABLET ORAL at 20:20

## 2022-03-24 RX ADMIN — Medication 10 ML: at 20:21

## 2022-03-25 ENCOUNTER — APPOINTMENT (OUTPATIENT)
Dept: NEUROLOGY | Facility: HOSPITAL | Age: 87
End: 2022-03-25

## 2022-03-25 ENCOUNTER — APPOINTMENT (OUTPATIENT)
Dept: CARDIOLOGY | Facility: HOSPITAL | Age: 87
End: 2022-03-25

## 2022-03-25 LAB
BH CV ECHO MEAS - AO MAX PG (FULL): 1.3 MMHG
BH CV ECHO MEAS - AO MAX PG: 4 MMHG
BH CV ECHO MEAS - AO MEAN PG (FULL): 2 MMHG
BH CV ECHO MEAS - AO MEAN PG: 3 MMHG
BH CV ECHO MEAS - AO V2 MAX: 101 CM/SEC
BH CV ECHO MEAS - AO V2 MEAN: 77 CM/SEC
BH CV ECHO MEAS - AO V2 VTI: 26.3 CM
BH CV ECHO MEAS - AVA(I,A): 2 CM^2
BH CV ECHO MEAS - AVA(I,D): 2 CM^2
BH CV ECHO MEAS - AVA(V,A): 2.3 CM^2
BH CV ECHO MEAS - AVA(V,D): 2.3 CM^2
BH CV ECHO MEAS - BSA(HAYCOCK): 1.4 M^2
BH CV ECHO MEAS - BSA: 1.5 M^2
BH CV ECHO MEAS - BZI_BMI: 19.2 KILOGRAMS/M^2
BH CV ECHO MEAS - BZI_METRIC_HEIGHT: 157.5 CM
BH CV ECHO MEAS - BZI_METRIC_WEIGHT: 47.6 KG
BH CV ECHO MEAS - EDV(CUBED): 19.7 ML
BH CV ECHO MEAS - EDV(MOD-SP2): 70.4 ML
BH CV ECHO MEAS - EDV(MOD-SP4): 51.3 ML
BH CV ECHO MEAS - EDV(TEICH): 27 ML
BH CV ECHO MEAS - EF(CUBED): 70.4 %
BH CV ECHO MEAS - EF(MOD-BP): 58.6 %
BH CV ECHO MEAS - EF(MOD-SP2): 51.4 %
BH CV ECHO MEAS - EF(MOD-SP4): 66.1 %
BH CV ECHO MEAS - EF(TEICH): 64 %
BH CV ECHO MEAS - ESV(CUBED): 5.8 ML
BH CV ECHO MEAS - ESV(MOD-SP2): 34.2 ML
BH CV ECHO MEAS - ESV(MOD-SP4): 17.4 ML
BH CV ECHO MEAS - ESV(TEICH): 9.7 ML
BH CV ECHO MEAS - FS: 33.3 %
BH CV ECHO MEAS - IVS/LVPW: 1
BH CV ECHO MEAS - IVSD: 0.85 CM
BH CV ECHO MEAS - LA DIMENSION: 3.1 CM
BH CV ECHO MEAS - LAD MAJOR: 5.5 CM
BH CV ECHO MEAS - LAT PEAK E' VEL: 6.3 CM/SEC
BH CV ECHO MEAS - LATERAL E/E' RATIO: 11.3
BH CV ECHO MEAS - LV DIASTOLIC VOL/BSA (35-75): 35.3 ML/M^2
BH CV ECHO MEAS - LV MASS(C)D: 55.1 GRAMS
BH CV ECHO MEAS - LV MASS(C)DI: 37.9 GRAMS/M^2
BH CV ECHO MEAS - LV MAX PG: 2.7 MMHG
BH CV ECHO MEAS - LV MEAN PG: 1 MMHG
BH CV ECHO MEAS - LV SYSTOLIC VOL/BSA (12-30): 12 ML/M^2
BH CV ECHO MEAS - LV V1 MAX: 81.6 CM/SEC
BH CV ECHO MEAS - LV V1 MEAN: 55.3 CM/SEC
BH CV ECHO MEAS - LV V1 VTI: 19 CM
BH CV ECHO MEAS - LVIDD: 2.7 CM
BH CV ECHO MEAS - LVIDS: 1.8 CM
BH CV ECHO MEAS - LVLD AP2: 6.4 CM
BH CV ECHO MEAS - LVLD AP4: 6.2 CM
BH CV ECHO MEAS - LVLS AP2: 6 CM
BH CV ECHO MEAS - LVLS AP4: 5.4 CM
BH CV ECHO MEAS - LVOT AREA (M): 2.8 CM^2
BH CV ECHO MEAS - LVOT AREA: 2.8 CM^2
BH CV ECHO MEAS - LVOT DIAM: 1.9 CM
BH CV ECHO MEAS - LVPWD: 0.85 CM
BH CV ECHO MEAS - MED PEAK E' VEL: 7 CM/SEC
BH CV ECHO MEAS - MEDIAL E/E' RATIO: 10.2
BH CV ECHO MEAS - MV A MAX VEL: 80.6 CM/SEC
BH CV ECHO MEAS - MV DEC TIME: 0.16 SEC
BH CV ECHO MEAS - MV E MAX VEL: 71.1 CM/SEC
BH CV ECHO MEAS - MV E/A: 0.88
BH CV ECHO MEAS - MV MAX PG: 3.5 MMHG
BH CV ECHO MEAS - MV MEAN PG: 2 MMHG
BH CV ECHO MEAS - MV V2 MAX: 94.1 CM/SEC
BH CV ECHO MEAS - MV V2 MEAN: 67.7 CM/SEC
BH CV ECHO MEAS - MV V2 VTI: 20.9 CM
BH CV ECHO MEAS - MVA(VTI): 2.6 CM^2
BH CV ECHO MEAS - RAP SYSTOLE: 5 MMHG
BH CV ECHO MEAS - RVSP: 37 MMHG
BH CV ECHO MEAS - SI(CUBED): 9.5 ML/M^2
BH CV ECHO MEAS - SI(LVOT): 37.1 ML/M^2
BH CV ECHO MEAS - SI(MOD-SP2): 24.9 ML/M^2
BH CV ECHO MEAS - SI(MOD-SP4): 23.3 ML/M^2
BH CV ECHO MEAS - SI(TEICH): 11.9 ML/M^2
BH CV ECHO MEAS - SV(CUBED): 13.9 ML
BH CV ECHO MEAS - SV(LVOT): 53.9 ML
BH CV ECHO MEAS - SV(MOD-SP2): 36.2 ML
BH CV ECHO MEAS - SV(MOD-SP4): 33.9 ML
BH CV ECHO MEAS - SV(TEICH): 17.3 ML
BH CV ECHO MEAS - TAPSE (>1.6): 1.8 CM
BH CV ECHO MEAS - TR MAX PG: 32 MMHG
BH CV ECHO MEAS - TR MAX VEL: 281.5 CM/SEC
BH CV ECHO MEASUREMENTS AVERAGE E/E' RATIO: 10.69
BH CV XLRA - TDI S': 9.1 CM/SEC
BH CV XLRA MEAS LEFT DIST CCA EDV: 17.8 CM/SEC
BH CV XLRA MEAS LEFT DIST CCA PSV: 71 CM/SEC
BH CV XLRA MEAS LEFT DIST ICA EDV: 30.4 CM/SEC
BH CV XLRA MEAS LEFT DIST ICA PSV: 81.1 CM/SEC
BH CV XLRA MEAS LEFT ICA/CCA RATIO: 1.1
BH CV XLRA MEAS LEFT MID CCA EDV: 12.4 CM/SEC
BH CV XLRA MEAS LEFT MID CCA PSV: 67.4 CM/SEC
BH CV XLRA MEAS LEFT MID ICA EDV: 22.3 CM/SEC
BH CV XLRA MEAS LEFT MID ICA PSV: 74.1 CM/SEC
BH CV XLRA MEAS LEFT PROX CCA EDV: 19 CM/SEC
BH CV XLRA MEAS LEFT PROX CCA PSV: 76 CM/SEC
BH CV XLRA MEAS LEFT PROX ECA EDV: 12 CM/SEC
BH CV XLRA MEAS LEFT PROX ECA PSV: 99.3 CM/SEC
BH CV XLRA MEAS LEFT PROX ICA EDV: 20.4 CM/SEC
BH CV XLRA MEAS LEFT PROX ICA PSV: 74.6 CM/SEC
BH CV XLRA MEAS LEFT PROX SCLA EDV: 1.2 CM/SEC
BH CV XLRA MEAS LEFT PROX SCLA PSV: 114.4 CM/SEC
BH CV XLRA MEAS LEFT VERTEBRAL A EDV: 6.6 CM/SEC
BH CV XLRA MEAS LEFT VERTEBRAL A PSV: 55.6 CM/SEC
BH CV XLRA MEAS RIGHT DIST CCA EDV: 15.6 CM/SEC
BH CV XLRA MEAS RIGHT DIST CCA PSV: 69.6 CM/SEC
BH CV XLRA MEAS RIGHT DIST ICA EDV: 26 CM/SEC
BH CV XLRA MEAS RIGHT DIST ICA PSV: 87.2 CM/SEC
BH CV XLRA MEAS RIGHT ICA/CCA RATIO: 1.9
BH CV XLRA MEAS RIGHT MID CCA EDV: 12.8 CM/SEC
BH CV XLRA MEAS RIGHT MID CCA PSV: 60.3 CM/SEC
BH CV XLRA MEAS RIGHT MID ICA EDV: 17.5 CM/SEC
BH CV XLRA MEAS RIGHT MID ICA PSV: 60.3 CM/SEC
BH CV XLRA MEAS RIGHT PROX CCA EDV: 8.1 CM/SEC
BH CV XLRA MEAS RIGHT PROX CCA PSV: 46.7 CM/SEC
BH CV XLRA MEAS RIGHT PROX ECA EDV: 15.1 CM/SEC
BH CV XLRA MEAS RIGHT PROX ECA PSV: 104.5 CM/SEC
BH CV XLRA MEAS RIGHT PROX ICA EDV: 13.5 CM/SEC
BH CV XLRA MEAS RIGHT PROX ICA PSV: 79.7 CM/SEC
BH CV XLRA MEAS RIGHT PROX SCLA EDV: 5.7 CM/SEC
BH CV XLRA MEAS RIGHT PROX SCLA PSV: 152.5 CM/SEC
BH CV XLRA MEAS RIGHT VERTEBRAL A EDV: 16.6 CM/SEC
BH CV XLRA MEAS RIGHT VERTEBRAL A PSV: 79.8 CM/SEC
CHOLEST SERPL-MCNC: 151 MG/DL (ref 0–200)
GLUCOSE BLDC GLUCOMTR-MCNC: 77 MG/DL (ref 70–130)
HBA1C MFR BLD: 5.1 % (ref 4.8–5.6)
HDLC SERPL-MCNC: 28 MG/DL (ref 40–60)
LDLC SERPL CALC-MCNC: 94 MG/DL (ref 0–100)
LDLC/HDLC SERPL: 3.21 {RATIO}
LV EF 2D ECHO EST: 60 %
MAXIMAL PREDICTED HEART RATE: 135 BPM
STRESS TARGET HR: 115 BPM
TRIGL SERPL-MCNC: 166 MG/DL (ref 0–150)
VLDLC SERPL-MCNC: 29 MG/DL (ref 5–40)

## 2022-03-25 PROCEDURE — 95816 EEG AWAKE AND DROWSY: CPT

## 2022-03-25 PROCEDURE — 93306 TTE W/DOPPLER COMPLETE: CPT

## 2022-03-25 PROCEDURE — 97165 OT EVAL LOW COMPLEX 30 MIN: CPT

## 2022-03-25 PROCEDURE — 99213 OFFICE O/P EST LOW 20 MIN: CPT | Performed by: PSYCHIATRY & NEUROLOGY

## 2022-03-25 PROCEDURE — G0378 HOSPITAL OBSERVATION PER HR: HCPCS

## 2022-03-25 PROCEDURE — 80061 LIPID PANEL: CPT

## 2022-03-25 PROCEDURE — 82962 GLUCOSE BLOOD TEST: CPT

## 2022-03-25 PROCEDURE — 83036 HEMOGLOBIN GLYCOSYLATED A1C: CPT

## 2022-03-25 PROCEDURE — 25010000002 ENOXAPARIN PER 10 MG: Performed by: INTERNAL MEDICINE

## 2022-03-25 PROCEDURE — 96372 THER/PROPH/DIAG INJ SC/IM: CPT

## 2022-03-25 PROCEDURE — 0 POTASSIUM CHLORIDE PER 2 MEQ: Performed by: INTERNAL MEDICINE

## 2022-03-25 PROCEDURE — 96361 HYDRATE IV INFUSION ADD-ON: CPT

## 2022-03-25 PROCEDURE — 99226 PR SBSQ OBSERVATION CARE/DAY 35 MINUTES: CPT | Performed by: INTERNAL MEDICINE

## 2022-03-25 PROCEDURE — 92523 SPEECH SOUND LANG COMPREHEN: CPT

## 2022-03-25 PROCEDURE — 93306 TTE W/DOPPLER COMPLETE: CPT | Performed by: INTERNAL MEDICINE

## 2022-03-25 RX ORDER — QUETIAPINE FUMARATE 25 MG/1
25 TABLET, FILM COATED ORAL NIGHTLY
Status: DISCONTINUED | OUTPATIENT
Start: 2022-03-25 | End: 2022-03-26 | Stop reason: HOSPADM

## 2022-03-25 RX ORDER — LISINOPRIL 10 MG/1
10 TABLET ORAL DAILY
Status: DISCONTINUED | OUTPATIENT
Start: 2022-03-25 | End: 2022-03-26 | Stop reason: HOSPADM

## 2022-03-25 RX ORDER — POLYETHYLENE GLYCOL 3350 17 G/17G
17 POWDER, FOR SOLUTION ORAL DAILY PRN
Status: DISCONTINUED | OUTPATIENT
Start: 2022-03-25 | End: 2022-03-26 | Stop reason: HOSPADM

## 2022-03-25 RX ORDER — BISACODYL 5 MG/1
5 TABLET, DELAYED RELEASE ORAL DAILY PRN
Status: DISCONTINUED | OUTPATIENT
Start: 2022-03-25 | End: 2022-03-26 | Stop reason: HOSPADM

## 2022-03-25 RX ORDER — BISACODYL 10 MG
10 SUPPOSITORY, RECTAL RECTAL DAILY PRN
Status: DISCONTINUED | OUTPATIENT
Start: 2022-03-25 | End: 2022-03-26 | Stop reason: HOSPADM

## 2022-03-25 RX ORDER — AMOXICILLIN 250 MG
2 CAPSULE ORAL 2 TIMES DAILY PRN
Status: DISCONTINUED | OUTPATIENT
Start: 2022-03-25 | End: 2022-03-26 | Stop reason: HOSPADM

## 2022-03-25 RX ADMIN — ASPIRIN 81 MG: 81 TABLET, COATED ORAL at 09:23

## 2022-03-25 RX ADMIN — Medication 10 ML: at 19:39

## 2022-03-25 RX ADMIN — LISINOPRIL 10 MG: 10 TABLET ORAL at 15:19

## 2022-03-25 RX ADMIN — Medication 10 ML: at 09:24

## 2022-03-25 RX ADMIN — POTASSIUM CHLORIDE AND SODIUM CHLORIDE 75 ML/HR: 450; 150 INJECTION, SOLUTION INTRAVENOUS at 11:48

## 2022-03-25 RX ADMIN — CYANOCOBALAMIN TAB 1000 MCG 1000 MCG: 1000 TAB at 09:23

## 2022-03-25 RX ADMIN — ATORVASTATIN CALCIUM 80 MG: 40 TABLET, FILM COATED ORAL at 19:38

## 2022-03-25 RX ADMIN — QUETIAPINE FUMARATE 25 MG: 25 TABLET ORAL at 19:39

## 2022-03-25 RX ADMIN — Medication 10 ML: at 09:23

## 2022-03-25 RX ADMIN — ENOXAPARIN SODIUM 40 MG: 40 INJECTION SUBCUTANEOUS at 19:39

## 2022-03-26 VITALS
HEIGHT: 62 IN | TEMPERATURE: 97.7 F | WEIGHT: 105 LBS | DIASTOLIC BLOOD PRESSURE: 72 MMHG | RESPIRATION RATE: 18 BRPM | BODY MASS INDEX: 19.32 KG/M2 | SYSTOLIC BLOOD PRESSURE: 150 MMHG | HEART RATE: 104 BPM | OXYGEN SATURATION: 95 %

## 2022-03-26 LAB
ANION GAP SERPL CALCULATED.3IONS-SCNC: 8 MMOL/L (ref 5–15)
BUN SERPL-MCNC: 7 MG/DL (ref 8–23)
BUN/CREAT SERPL: 12.7 (ref 7–25)
CALCIUM SPEC-SCNC: 9.3 MG/DL (ref 8.6–10.5)
CHLORIDE SERPL-SCNC: 107 MMOL/L (ref 98–107)
CO2 SERPL-SCNC: 23 MMOL/L (ref 22–29)
CREAT SERPL-MCNC: 0.55 MG/DL (ref 0.57–1)
DEPRECATED RDW RBC AUTO: 42.6 FL (ref 37–54)
EGFRCR SERPLBLD CKD-EPI 2021: 90 ML/MIN/1.73
ERYTHROCYTE [DISTWIDTH] IN BLOOD BY AUTOMATED COUNT: 12.8 % (ref 12.3–15.4)
GLUCOSE SERPL-MCNC: 60 MG/DL (ref 65–99)
HCT VFR BLD AUTO: 40.7 % (ref 34–46.6)
HGB BLD-MCNC: 13.4 G/DL (ref 12–15.9)
MCH RBC QN AUTO: 29.8 PG (ref 26.6–33)
MCHC RBC AUTO-ENTMCNC: 32.9 G/DL (ref 31.5–35.7)
MCV RBC AUTO: 90.4 FL (ref 79–97)
PLATELET # BLD AUTO: 255 10*3/MM3 (ref 140–450)
PMV BLD AUTO: 9.8 FL (ref 6–12)
POTASSIUM SERPL-SCNC: 3.8 MMOL/L (ref 3.5–5.2)
RBC # BLD AUTO: 4.5 10*6/MM3 (ref 3.77–5.28)
SODIUM SERPL-SCNC: 138 MMOL/L (ref 136–145)
WBC NRBC COR # BLD: 5.6 10*3/MM3 (ref 3.4–10.8)

## 2022-03-26 PROCEDURE — 97161 PT EVAL LOW COMPLEX 20 MIN: CPT

## 2022-03-26 PROCEDURE — G0378 HOSPITAL OBSERVATION PER HR: HCPCS

## 2022-03-26 PROCEDURE — 99217 PR OBSERVATION CARE DISCHARGE MANAGEMENT: CPT | Performed by: NURSE PRACTITIONER

## 2022-03-26 PROCEDURE — 85027 COMPLETE CBC AUTOMATED: CPT | Performed by: INTERNAL MEDICINE

## 2022-03-26 PROCEDURE — 80048 BASIC METABOLIC PNL TOTAL CA: CPT | Performed by: INTERNAL MEDICINE

## 2022-03-26 RX ORDER — ATORVASTATIN CALCIUM 80 MG/1
80 TABLET, FILM COATED ORAL NIGHTLY
Qty: 30 TABLET | Refills: 0 | Status: SHIPPED | OUTPATIENT
Start: 2022-03-26 | End: 2022-11-11 | Stop reason: HOSPADM

## 2022-03-26 RX ORDER — LISINOPRIL 20 MG/1
10 TABLET ORAL DAILY
Qty: 30 TABLET | Refills: 0 | Status: ON HOLD
Start: 2022-03-26 | End: 2022-11-11 | Stop reason: SDUPTHER

## 2022-03-26 RX ADMIN — ASPIRIN 81 MG: 81 TABLET, COATED ORAL at 09:13

## 2022-03-26 RX ADMIN — LISINOPRIL 10 MG: 10 TABLET ORAL at 09:13

## 2022-03-26 RX ADMIN — Medication 10 ML: at 09:13

## 2022-03-26 RX ADMIN — CYANOCOBALAMIN TAB 1000 MCG 1000 MCG: 1000 TAB at 09:13

## 2022-03-29 LAB
QT INTERVAL: 344 MS
QTC INTERVAL: 401 MS

## 2022-11-04 ENCOUNTER — APPOINTMENT (OUTPATIENT)
Dept: CT IMAGING | Facility: HOSPITAL | Age: 87
End: 2022-11-04

## 2022-11-04 ENCOUNTER — HOSPITAL ENCOUNTER (EMERGENCY)
Facility: HOSPITAL | Age: 87
Discharge: HOME | End: 2022-11-04
Attending: EMERGENCY MEDICINE | Admitting: EMERGENCY MEDICINE

## 2022-11-04 ENCOUNTER — APPOINTMENT (OUTPATIENT)
Dept: GENERAL RADIOLOGY | Facility: HOSPITAL | Age: 87
End: 2022-11-04

## 2022-11-04 VITALS
HEART RATE: 89 BPM | BODY MASS INDEX: 19.88 KG/M2 | WEIGHT: 108 LBS | RESPIRATION RATE: 16 BRPM | OXYGEN SATURATION: 99 % | HEIGHT: 62 IN | TEMPERATURE: 98.3 F | DIASTOLIC BLOOD PRESSURE: 85 MMHG | SYSTOLIC BLOOD PRESSURE: 172 MMHG

## 2022-11-04 VITALS
BODY MASS INDEX: 19.88 KG/M2 | HEART RATE: 81 BPM | RESPIRATION RATE: 16 BRPM | WEIGHT: 108 LBS | TEMPERATURE: 98.4 F | DIASTOLIC BLOOD PRESSURE: 94 MMHG | OXYGEN SATURATION: 97 % | HEIGHT: 62 IN | SYSTOLIC BLOOD PRESSURE: 187 MMHG

## 2022-11-04 DIAGNOSIS — M25.561 RIGHT KNEE PAIN, UNSPECIFIED CHRONICITY: ICD-10-CM

## 2022-11-04 DIAGNOSIS — S01.01XA LACERATION OF SCALP, INITIAL ENCOUNTER: ICD-10-CM

## 2022-11-04 DIAGNOSIS — Z91.81 AT HIGH RISK FOR FALLS: Primary | ICD-10-CM

## 2022-11-04 DIAGNOSIS — S06.0X0A CONCUSSION WITHOUT LOSS OF CONSCIOUSNESS, INITIAL ENCOUNTER: Primary | ICD-10-CM

## 2022-11-04 DIAGNOSIS — Z87.828 HISTORY OF HEAD INJURY: ICD-10-CM

## 2022-11-04 LAB
ALBUMIN SERPL-MCNC: 4.3 G/DL (ref 3.5–5.2)
ALBUMIN/GLOB SERPL: 1.4 G/DL
ALP SERPL-CCNC: 49 U/L (ref 39–117)
ALT SERPL W P-5'-P-CCNC: 14 U/L (ref 1–33)
ANION GAP SERPL CALCULATED.3IONS-SCNC: 9 MMOL/L (ref 5–15)
AST SERPL-CCNC: 28 U/L (ref 1–32)
BASOPHILS # BLD AUTO: 0.07 10*3/MM3 (ref 0–0.2)
BASOPHILS NFR BLD AUTO: 1 % (ref 0–1.5)
BILIRUB SERPL-MCNC: 0.6 MG/DL (ref 0–1.2)
BILIRUB UR QL STRIP: NEGATIVE
BUN SERPL-MCNC: 10 MG/DL (ref 8–23)
BUN/CREAT SERPL: 15.4 (ref 7–25)
CALCIUM SPEC-SCNC: 9.2 MG/DL (ref 8.6–10.5)
CHLORIDE SERPL-SCNC: 102 MMOL/L (ref 98–107)
CLARITY UR: CLEAR
CO2 SERPL-SCNC: 28 MMOL/L (ref 22–29)
COLOR UR: YELLOW
CREAT SERPL-MCNC: 0.65 MG/DL (ref 0.57–1)
D-LACTATE SERPL-SCNC: 1.3 MMOL/L (ref 0.5–2)
DEPRECATED RDW RBC AUTO: 42 FL (ref 37–54)
EGFRCR SERPLBLD CKD-EPI 2021: 85.9 ML/MIN/1.73
EOSINOPHIL # BLD AUTO: 0.15 10*3/MM3 (ref 0–0.4)
EOSINOPHIL NFR BLD AUTO: 2.1 % (ref 0.3–6.2)
ERYTHROCYTE [DISTWIDTH] IN BLOOD BY AUTOMATED COUNT: 12.4 % (ref 12.3–15.4)
GLOBULIN UR ELPH-MCNC: 3 GM/DL
GLUCOSE SERPL-MCNC: 113 MG/DL (ref 65–99)
GLUCOSE UR STRIP-MCNC: NEGATIVE MG/DL
HCT VFR BLD AUTO: 45.9 % (ref 34–46.6)
HGB BLD-MCNC: 14.9 G/DL (ref 12–15.9)
HGB UR QL STRIP.AUTO: NEGATIVE
HOLD SPECIMEN: NORMAL
IMM GRANULOCYTES # BLD AUTO: 0.03 10*3/MM3 (ref 0–0.05)
IMM GRANULOCYTES NFR BLD AUTO: 0.4 % (ref 0–0.5)
KETONES UR QL STRIP: NEGATIVE
LEUKOCYTE ESTERASE UR QL STRIP.AUTO: NEGATIVE
LIPASE SERPL-CCNC: 92 U/L (ref 13–60)
LYMPHOCYTES # BLD AUTO: 0.76 10*3/MM3 (ref 0.7–3.1)
LYMPHOCYTES NFR BLD AUTO: 10.5 % (ref 19.6–45.3)
MCH RBC QN AUTO: 29.9 PG (ref 26.6–33)
MCHC RBC AUTO-ENTMCNC: 32.5 G/DL (ref 31.5–35.7)
MCV RBC AUTO: 92 FL (ref 79–97)
MONOCYTES # BLD AUTO: 0.39 10*3/MM3 (ref 0.1–0.9)
MONOCYTES NFR BLD AUTO: 5.4 % (ref 5–12)
NEUTROPHILS NFR BLD AUTO: 5.81 10*3/MM3 (ref 1.7–7)
NEUTROPHILS NFR BLD AUTO: 80.6 % (ref 42.7–76)
NITRITE UR QL STRIP: NEGATIVE
NRBC BLD AUTO-RTO: 0 /100 WBC (ref 0–0.2)
PH UR STRIP.AUTO: 7 [PH] (ref 5–8)
PLATELET # BLD AUTO: 240 10*3/MM3 (ref 140–450)
PMV BLD AUTO: 10.1 FL (ref 6–12)
POTASSIUM SERPL-SCNC: 3.5 MMOL/L (ref 3.5–5.2)
PROT SERPL-MCNC: 7.3 G/DL (ref 6–8.5)
PROT UR QL STRIP: NEGATIVE
RBC # BLD AUTO: 4.99 10*6/MM3 (ref 3.77–5.28)
SODIUM SERPL-SCNC: 139 MMOL/L (ref 136–145)
SP GR UR STRIP: 1.01 (ref 1–1.03)
UROBILINOGEN UR QL STRIP: NORMAL
WBC NRBC COR # BLD: 7.21 10*3/MM3 (ref 3.4–10.8)
WHOLE BLOOD HOLD COAG: NORMAL
WHOLE BLOOD HOLD SPECIMEN: NORMAL

## 2022-11-04 PROCEDURE — 83605 ASSAY OF LACTIC ACID: CPT | Performed by: EMERGENCY MEDICINE

## 2022-11-04 PROCEDURE — 70450 CT HEAD/BRAIN W/O DYE: CPT

## 2022-11-04 PROCEDURE — 90471 IMMUNIZATION ADMIN: CPT | Performed by: EMERGENCY MEDICINE

## 2022-11-04 PROCEDURE — 90715 TDAP VACCINE 7 YRS/> IM: CPT | Performed by: EMERGENCY MEDICINE

## 2022-11-04 PROCEDURE — 80053 COMPREHEN METABOLIC PANEL: CPT | Performed by: EMERGENCY MEDICINE

## 2022-11-04 PROCEDURE — 83690 ASSAY OF LIPASE: CPT | Performed by: EMERGENCY MEDICINE

## 2022-11-04 PROCEDURE — 72125 CT NECK SPINE W/O DYE: CPT

## 2022-11-04 PROCEDURE — 81003 URINALYSIS AUTO W/O SCOPE: CPT | Performed by: EMERGENCY MEDICINE

## 2022-11-04 PROCEDURE — 73560 X-RAY EXAM OF KNEE 1 OR 2: CPT

## 2022-11-04 PROCEDURE — 85025 COMPLETE CBC W/AUTO DIFF WBC: CPT | Performed by: EMERGENCY MEDICINE

## 2022-11-04 PROCEDURE — P9612 CATHETERIZE FOR URINE SPEC: HCPCS

## 2022-11-04 PROCEDURE — 99284 EMERGENCY DEPT VISIT MOD MDM: CPT

## 2022-11-04 PROCEDURE — 99283 EMERGENCY DEPT VISIT LOW MDM: CPT

## 2022-11-04 PROCEDURE — 25010000002 TETANUS-DIPHTH-ACELL PERTUSSIS 5-2.5-18.5 LF-MCG/0.5 SUSPENSION PREFILLED SYRINGE: Performed by: EMERGENCY MEDICINE

## 2022-11-04 RX ORDER — SODIUM CHLORIDE 9 MG/ML
10 INJECTION INTRAVENOUS AS NEEDED
Status: DISCONTINUED | OUTPATIENT
Start: 2022-11-04 | End: 2022-11-04 | Stop reason: HOSPADM

## 2022-11-04 RX ADMIN — TETANUS TOXOID, REDUCED DIPHTHERIA TOXOID AND ACELLULAR PERTUSSIS VACCINE, ADSORBED 0.5 ML: 5; 2.5; 8; 8; 2.5 SUSPENSION INTRAMUSCULAR at 02:06

## 2022-11-04 RX ADMIN — SODIUM CHLORIDE 500 ML: 9 INJECTION, SOLUTION INTRAVENOUS at 08:36

## 2022-11-04 NOTE — DISCHARGE INSTRUCTIONS
Use the Ace wrap for 3 days to facilitate recovery.  Tylenol as needed for discomfort.  TAKE EVERY NECESSARY PRECAUTION TO AVOID FALLS.  Encourage fluids and best hydration and nutrition. PLEASE ADD BEDRAIL TO DAILY TREATMENT PLAN AND PRECAUTIONS.  Additionally, increase NIGHTTIME dose of Lamictal to 50mg.  Thank you.

## 2022-11-04 NOTE — ED PROVIDER NOTES
[place]    EMERGENCY DEPARTMENT ENCOUNTER      Pt Name: Susan Tucker  MRN: [id number]  YOB: 1935  Date of evaluation: 11/4/2022  Provider: Shankar Read MD    CHIEF COMPLAINT       Chief Complaint   Patient presents with   • Fall         HISTORY OF PRESENT ILLNESS  (Location/Symptom, Timing/Onset, Context/Setting, Quality, Duration, Modifying Factors, Severity.)   Susan Tucker is a 87 y.o. female who [place] the [place] with [place]-level fall in which she hit the back of her head.  She has laceration to the back of her head with bleeding controlled.  She is confused at baseline.  She is able to provide a reliable history however.  She has no additional complaints.  She takes no anticoagulant medications.      Nursing notes were reviewed.    REVIEW OF SYSTEMS    (2-9 systems for level 4, 10 or more for level 5)   ROS:  General:  No fevers, no chills, no weakness  Cardiovascular:  No chest pain, no palpitations  Respiratory:  No shortness of breath, no cough, no wheezing  Gastrointestinal:  No pain, no nausea, no vomiting, no diarrhea  Musculoskeletal:  No muscle pain, no joint pain  Skin: Scalp laceration  Neurologic:  No speech problems, no headache, no extremity numbness, no extremity tingling, no extremity weakness  Psychiatric:  No anxiety  Genitourinary:  No dysuria, no hematuria    Except as noted above the remainder of the review of systems was reviewed and negative.       PAST MEDICAL HISTORY     Past Medical History:   Diagnosis Date   • Diabetes mellitus (HCC)    • Elevated cholesterol    • Hypertension    • Stroke (HCC)          SURGICAL HISTORY       Past Surgical History:   Procedure Laterality Date   • ERCP N/A 6/27/2018    Procedure: ENDOSCOPIC RETROGRADE CHOLANGIOPANCREATOGRAPHY;  Surgeon: Liam Manning MD;  Location: [facility] ENDOSCOPY;  Service: Gastroenterology   • EXPLORATORY LAPAROTOMY N/A 2/10/2020    Procedure: LAPAROTOMY EXPLORATORY BOWEL  RESECTION;  Surgeon: Carlos Zurita MD;  Location: [facility];  Service: General;  Laterality: N/A;   • HYSTERECTOMY           CURRENT MEDICATIONS     No current facility-administered medications for this encounter.    Current Outpatient Medications:   •  acetaminophen (TYLENOL) 325 MG tablet, Take 2 tablets by mouth Every 4 (Four) Hours As Needed for Mild Pain., Disp: , Rfl:   •  amLODIPine (NORVASC) 5 MG tablet, Take 1 tablet by mouth Daily., Disp: , Rfl:   •  aspirin 81 MG EC tablet, Take 81 mg by mouth Daily., Disp: , Rfl:   •  fenofibrate (TRICOR) 145 MG tablet, Take 145 mg by mouth Daily., Disp: , Rfl:   •  lisinopril (PRINIVIL,ZESTRIL) 20 MG tablet, Take 1 tablet by mouth Daily., Disp: 30 tablet, Rfl: 0  •  melatonin 5 MG tablet tablet, Take 1 tablet by mouth At Night As Needed (sleep)., Disp: , Rfl:   •  ondansetron (ZOFRAN) 4 MG tablet, Take 1 tablet by mouth Every 6 (Six) Hours As Needed for Nausea or Vomiting., Disp: , Rfl:   •  QUEtiapine (SEROquel) 25 MG tablet, Take 1 tablet by mouth At Night As Needed (restlessness/ sleep)., Disp: , Rfl:   •  vitamin B-12 (CYANOCOBALAMIN) 1000 MCG tablet, Take 1,000 mcg by mouth Daily., Disp: , Rfl:     ALLERGIES     Amoxicillin, Atorvastatin, and Cephalexin    FAMILY HISTORY       Family History   Problem Relation Age of Onset   • Hypertension Mother    • Hypertension Father    • Early death Brother    • Hypertension Maternal Grandmother    • Hypertension Paternal Grandmother    • Learning disabilities Other           SOCIAL HISTORY       Social History     Socioeconomic History   • Marital status:    Tobacco Use   • Smoking status: Never   • Smokeless tobacco: Never   Substance and Sexual Activity   • Alcohol use: No   • Drug use: No         PHYSICAL EXAM    (up to 7 for level 4, 8 or more for level 5)     Vitals:    11/04/22 0123 11/04/22 0200   BP: 153/80 (!) 187/94   BP Location: Right arm    Patient Position: Sitting    Pulse: 80 81   Resp: 18 16  "  Temp: 98.4 °F (36.9 °C)    TempSrc: Oral    SpO2: 94% 97%   Weight: 49 kg (108 lb)    Height: 157.5 cm (62\")        Physical Exam  General: Awake, alert, no acute distress.  HEENT: Pupils are equally round and reactive to light, EOMI, conjunctivae clear, sclerae white, there is no injection no icterus.  Oral mucosa is moist, no exudate. Uvula is midline. No malocclusion or tenderness over the mandible. There is no hemotympanum, dixon sign, or raccoon eyes.  Neck: Neck is supple, full range of motion, trachea midline. No midline tenderness.  Cardiac: Heart regular rate, rhythm, no murmurs, rubs, or gallops. Peripheral pulses are 2+ throughout.  Lungs: Lungs are clear to auscultation, there is no wheezing, rhonchi, or rales. There is no use of accessory muscles.  Chest wall: There is no tenderness to palpation over the chest wall or over ribs. There are no chest wall ecchymoses.  Abdomen: Abdomen is soft, nontender, nondistended. There are no firm or pulsatile masses, no rebound rigidity or guarding. No abdominal wall ecchymoses.  Musculoskeletal: No deformity or focal bone tenderness.  Neuro: Motor intact, sensory intact, level of consciousness is normal.  Dermatology: 3 cm linear occipital scalp laceration  Psych: Mentation is grossly normal, cognition is grossly normal. Affect is appropriate.        DIAGNOSTIC RESULTS     EKG: All EKGs are interpreted by the Emergency Department Physician who either signs or Co-signs this chart in the absence of a cardiologist.    No orders to display       RADIOLOGY:   Non-plain film images such as CT, Ultrasound and MRI are read by the radiologist. Plain radiographic images are visualized and preliminarily interpreted by the emergency physician with the below findings:      [x] Radiologist's Report Reviewed:  CT Cervical Spine Without Contrast   Final Result      No acute fracture or subluxation of the cervical spine.               Electronically signed by:  Liam Moore, " M.D.     11/4/2022 12:15 AM Mountain Time      CT Head Without Contrast   Final Result      1. No acute intracranial abnormality.      2. Mild/moderate volume loss and chronic microvascular ischemic changes. Arteriosclerosis.         Electronically signed by:  Liam Moore M.D.     11/4/2022 12:13 AM Mountain Time            ED BEDSIDE ULTRASOUND:   Performed by ED Physician - none    LABS:    I have reviewed and interpreted all of the currently available lab results from this visit (if applicable):  Results for orders placed or performed during the hospital encounter of 03/24/22   COVID-19, [facility] IN-HOUSE NP/OP SWAB IN UTM/VTM/SALINE TRANSPORT MEDIA 24HR TAT - Swab, Nasopharynx    Specimen: Nasopharynx; Swab   Result Value Ref Range    COVID19 Not Detected Not Detected - Ref. Range   Comprehensive Metabolic Panel    Specimen: Blood   Result Value Ref Range    Glucose 94 65 - 99 mg/dL    BUN 10 8 - 23 mg/dL    Creatinine 0.82 0.57 - 1.00 mg/dL    Sodium 139 136 - 145 mmol/L    Potassium 4.0 3.5 - 5.2 mmol/L    Chloride 105 98 - 107 mmol/L    CO2 24.0 22.0 - 29.0 mmol/L    Calcium 9.4 8.6 - 10.5 mg/dL    Total Protein 6.3 6.0 - 8.5 g/dL    Albumin 3.80 3.50 - 5.20 g/dL    ALT (SGPT) 12 1 - 33 U/L    AST (SGOT) 25 1 - 32 U/L    Alkaline Phosphatase 40 39 - 117 U/L    Total Bilirubin 0.6 0.0 - 1.2 mg/dL    Globulin 2.5 gm/dL    A/G Ratio 1.5 g/dL    BUN/Creatinine Ratio 12.2 7.0 - 25.0    Anion Gap 10.0 5.0 - 15.0 mmol/L    eGFR 70.2 >60.0 mL/min/1.73   Urinalysis With Microscopic If Indicated (No Culture) - Urine, Catheter    Specimen: Urine, Catheter   Result Value Ref Range    Color, UA Yellow Yellow, Straw    Appearance, UA Clear Clear    pH, UA 7.0 5.0 - 8.0    Specific Gravity, UA 1.082 (H) 1.001 - 1.030    Glucose, UA Negative Negative    Ketones, UA Negative Negative    Bilirubin, UA Negative Negative    Blood, UA Negative Negative    Protein, UA Negative Negative    Leuk Esterase, UA Negative  Negative    Nitrite, UA Negative Negative    Urobilinogen, UA 1.0 E.U./dL 0.2 - 1.0 E.U./dL   Troponin    Specimen: Blood   Result Value Ref Range    Troponin T <0.010 0.000 - 0.030 ng/mL   Troponin    Specimen: Blood   Result Value Ref Range    Troponin T 0.012 0.000 - 0.030 ng/mL   Lactic Acid, Plasma    Specimen: Blood   Result Value Ref Range    Lactate 1.1 0.5 - 2.0 mmol/L   CBC Auto Differential    Specimen: Blood   Result Value Ref Range    WBC 4.18 3.40 - 10.80 10*3/mm3    RBC 4.32 3.77 - 5.28 10*6/mm3    Hemoglobin 13.4 12.0 - 15.9 g/dL    Hematocrit 39.9 34.0 - 46.6 %    MCV 92.4 79.0 - 97.0 fL    MCH 31.0 26.6 - 33.0 pg    MCHC 33.6 31.5 - 35.7 g/dL    RDW 12.6 12.3 - 15.4 %    RDW-SD 42.6 37.0 - 54.0 fl    MPV 10.0 6.0 - 12.0 fL    Platelets 239 140 - 450 10*3/mm3    Neutrophil % 65.1 42.7 - 76.0 %    Lymphocyte % 18.9 (L) 19.6 - 45.3 %    Monocyte % 9.3 5.0 - 12.0 %    Eosinophil % 4.5 0.3 - 6.2 %    Basophil % 1.7 (H) 0.0 - 1.5 %    Immature Grans % 0.5 0.0 - 0.5 %    Neutrophils, Absolute 2.72 1.70 - 7.00 10*3/mm3    Lymphocytes, Absolute 0.79 0.70 - 3.10 10*3/mm3    Monocytes, Absolute 0.39 0.10 - 0.90 10*3/mm3    Eosinophils, Absolute 0.19 0.00 - 0.40 10*3/mm3    Basophils, Absolute 0.07 0.00 - 0.20 10*3/mm3    Immature Grans, Absolute 0.02 0.00 - 0.05 10*3/mm3    nRBC 0.0 0.0 - 0.2 /100 WBC   Magnesium    Specimen: Blood   Result Value Ref Range    Magnesium 1.9 1.6 - 2.4 mg/dL   Hemoglobin A1c    Specimen: Blood   Result Value Ref Range    Hemoglobin A1C 5.10 4.80 - 5.60 %   Lipid Panel    Specimen: Blood   Result Value Ref Range    Total Cholesterol 151 0 - 200 mg/dL    Triglycerides 166 (H) 0 - 150 mg/dL    HDL Cholesterol 28 (L) 40 - 60 mg/dL    LDL Cholesterol  94 0 - 100 mg/dL    VLDL Cholesterol 29 5 - 40 mg/dL    LDL/HDL Ratio 3.21    Basic Metabolic Panel    Specimen: Blood   Result Value Ref Range    Glucose 60 (L) 65 - 99 mg/dL    BUN 7 (L) 8 - 23 mg/dL    Creatinine 0.55 (L) 0.57 - 1.00  mg/dL    Sodium 138 136 - 145 mmol/L    Potassium 3.8 3.5 - 5.2 mmol/L    Chloride 107 98 - 107 mmol/L    CO2 23.0 22.0 - 29.0 mmol/L    Calcium 9.3 8.6 - 10.5 mg/dL    BUN/Creatinine Ratio 12.7 7.0 - 25.0    Anion Gap 8.0 5.0 - 15.0 mmol/L    eGFR 90.0 >60.0 mL/min/1.73   CBC (No Diff)    Specimen: Blood   Result Value Ref Range    WBC 5.60 3.40 - 10.80 10*3/mm3    RBC 4.50 3.77 - 5.28 10*6/mm3    Hemoglobin 13.4 12.0 - 15.9 g/dL    Hematocrit 40.7 34.0 - 46.6 %    MCV 90.4 79.0 - 97.0 fL    MCH 29.8 26.6 - 33.0 pg    MCHC 32.9 31.5 - 35.7 g/dL    RDW 12.8 12.3 - 15.4 %    RDW-SD 42.6 37.0 - 54.0 fl    MPV 9.8 6.0 - 12.0 fL    Platelets 255 140 - 450 10*3/mm3   POC Creatinine    Specimen: Blood   Result Value Ref Range    Creatinine 0.80 0.60 - 1.30 mg/dL   POC Glucose Once    Specimen: Blood   Result Value Ref Range    Glucose 55 (L) 70 - 130 mg/dL   POC Glucose Once    Specimen: Blood   Result Value Ref Range    Glucose 74 70 - 130 mg/dL   POC Glucose Once    Specimen: Blood   Result Value Ref Range    Glucose 118 70 - 130 mg/dL   POC Glucose Once    Specimen: Blood   Result Value Ref Range    Glucose 77 70 - 130 mg/dL   ECG 12 Lead   Result Value Ref Range    QT Interval 344 ms    QTC Interval 401 ms   Adult Transthoracic Echo Complete W/ Cont if Necessary Per Protocol   Result Value Ref Range    BSA 1.5 m^2    IVSd 0.85 cm    LVIDd 2.7 cm    LVIDs 1.8 cm    LVPWd 0.85 cm    IVS/LVPW 1.0     FS 33.3 %    EDV(Teich) 27.0 ml    ESV(Teich) 9.7 ml    EF(Teich) 64.0 %    EDV(cubed) 19.7 ml    ESV(cubed) 5.8 ml    EF(cubed) 70.4 %    LV mass(C)d 55.1 grams    LV mass(C)dI 37.9 grams/m^2    SV(Teich) 17.3 ml    SI(Teich) 11.9 ml/m^2    SV(cubed) 13.9 ml    SI(cubed) 9.5 ml/m^2    LA dimension (2D)  3.1 cm    LVOT diam 1.9 cm    LVOT area 2.8 cm^2    LVOT area(traced) 2.8 cm^2    LAd major 5.5 cm    LVLd ap4 6.2 cm    EDV(MOD-sp4) 51.3 ml    LVLs ap4 5.4 cm    ESV(MOD-sp4) 17.4 ml    EF(MOD-sp4) 66.1 %    LVLd ap2 6.4  cm    EDV(MOD-sp2) 70.4 ml    LVLs ap2 6.0 cm    ESV(MOD-sp2) 34.2 ml    EF(MOD-sp2) 51.4 %    EF(MOD-bp) 58.6 %    SV(MOD-sp4) 33.9 ml    SI(MOD-sp4) 23.3 ml/m^2    SV(MOD-sp2) 36.2 ml    SI(MOD-sp2) 24.9 ml/m^2    LV Hurtado Vol (BSA corrected) 35.3 ml/m^2    LV Sys Vol (BSA corrected) 12.0 ml/m^2    TAPSE (>1.6) 1.8 cm    MV E max bernard 71.1 cm/sec    MV A max bernard 80.6 cm/sec    MV E/A 0.88     MV V2 max 94.1 cm/sec    MV max PG 3.5 mmHg    MV V2 mean 67.7 cm/sec    MV mean PG 2.0 mmHg    MV V2 VTI 20.9 cm    MVA(VTI) 2.6 cm^2    MV dec time 0.16 sec    Ao pk bernard 101.0 cm/sec    Ao max PG 4.0 mmHg    Ao max PG (full) 1.3 mmHg    Ao V2 mean 77.0 cm/sec    Ao mean PG 3.0 mmHg    Ao mean PG (full) 2.0 mmHg    Ao V2 VTI 26.3 cm    ELPIDIO(I,A) 2.0 cm^2    ELPIDIO(I,D) 2.0 cm^2    ELPIDIO(V,A) 2.3 cm^2    ELPIDIO(V,D) 2.3 cm^2    LV V1 max PG 2.7 mmHg    LV V1 mean PG 1.0 mmHg    LV V1 max 81.6 cm/sec    LV V1 mean 55.3 cm/sec    LV V1 VTI 19.0 cm    SV(LVOT) 53.9 ml    SI(LVOT) 37.1 ml/m^2    TR max bernard 281.5 cm/sec    TR max PG 32.0 mmHg    RV S' 9.1 cm/sec    Lat E/e'  11.3     Med E/e' 10.2     Lat Peak E' Bernard 6.3 cm/sec    Med Peak E' Bernard 7.0 cm/sec     CV ECHO PAMELA - BZI_BMI 19.2 kilograms/m^2     CV ECHO PAMELA - BSA([facility]) 1.4 m^2     CV ECHO PAMELA - BZI_METRIC_WEIGHT 47.6 kg     CV ECHO PAMELA - BZI_METRIC_HEIGHT 157.5 cm    Avg E/e' ratio 10.69     Target HR (85%) 115 bpm    Max. Pred. HR (100%) 135 bpm    RAP systole 5 mmHg    RVSP(TR) 37 mmHg    Echo EF Estimated 60 %   Duplex Carotid Ultrasound CAR   Result Value Ref Range    Prox CCA PSV 46.7 cm/sec    Prox CCA EDV 8.1 cm/sec    Right Mid CCA PSV 60.3 cm/sec    left Mid CCA PSV 67.4 cm/sec    right Mid CCA EDV 12.8 cm/sec    left Mid CCA EDV 12.4 cm/sec    Dist CCA PSV 69.6 cm/sec    Dist CCA PSV 71.0 cm/sec    Dist CCA EDV 15.6 cm/sec    Dist CCA EDV 17.8 cm/sec    Prox ECA .5 cm/sec    Prox ECA PSV 99.3 cm/sec    Prox ECA EDV 15.1 cm/sec    Prox ECA EDV 12.0  "cm/sec    Prox ICA PSV 79.7 cm/sec    Prox ICA PSV 74.6 cm/sec    Prox ICA EDV 13.5 cm/sec    Prox ICA EDV 20.4 cm/sec    Mid ICA PSV 60.3 cm/sec    Mid ICA PSV 74.1 cm/sec    Mid ICA EDV 17.5 cm/sec    Mid ICA EDV 22.3 cm/sec    Dist ICA PSV 87.2 cm/sec    Dist ICA PSV 81.1 cm/sec    Dist ICA EDV 26.0 cm/sec    Dist ICA EDV 30.4 cm/sec    Vertebral A PSV 79.8 cm/sec    Vertebral A PSV 55.6 cm/sec    Vertebral A EDV 16.6 cm/sec    Vertebral A EDV 6.6 cm/sec    Prox SCLA .5 cm/sec    Prox SCLA .4 cm/sec    Prox SCLA EDV 5.7 cm/sec    Prox SCLA EDV 1.2 cm/sec    ICA/CCA ratio 1.9     Prox CCA PSV 76 cm/sec    Prox CCA EDV 19 cm/sec    ICA/CCA ratio 1.1         All other labs were within normal range or not returned as of this dictation.      EMERGENCY DEPARTMENT COURSE and DIFFERENTIAL DIAGNOSIS/MDM:   Vitals:    Vitals:    11/04/22 0123 11/04/22 0200   BP: 153/80 (!) 187/94   BP Location: Right arm    Patient Position: Sitting    Pulse: 80 81   Resp: 18 16   Temp: 98.4 °F (36.9 °C)    TempSrc: Oral    SpO2: 94% 97%   Weight: 49 kg (108 lb)    Height: 157.5 cm (62\")             Imaging is negative for any evidence of intracranial hemorrhage or skull fracture.  No evidence of C-spine injury.  Patient completed her physical examination, no additional traumatic injury.  Laceration repaired by myself using staples.  Patient be discharged to follow-up with her PCP.    I had a discussion with the patient/family regarding diagnosis, diagnostic results, treatment plan, and medications.  The patient/family indicated understanding of these instructions.  I spent adequate time at the bedside preceding discharge necessary to personally discuss the aftercare instructions, giving patient education, providing explanations of the results of our evaluations/findings, and my decision making to assure that the patient/family understand the plan of care.  Time was allotted to answer questions at that time and throughout the " ED course.  Emphasis was placed on timely follow-up after discharge.  I also discussed the potential for the development of an acute emergent condition requiring further evaluation, admission, or even surgical intervention. I discussed that we found nothing during the visit today indicating the need for further workup, admission, or the presence of an unstable medical condition.  I encouraged the patient to return to the emergency department immediately for ANY concerns, worsening, new complaints, or if symptoms persist and unable to seek follow-up in a timely fashion.  The patient/family expressed understanding and agreement with this plan.  The patient will follow-up with their PCP in 1-2 days for reevaluation.       MEDICATIONS ADMINISTERED IN ED:  Medications   Tetanus-Diphth-Acell Pertussis (BOOSTRIX) injection 0.5 mL (0.5 mL Intramuscular Given 11/4/22 0206)       PROCEDURES:    Laceration Repair  Indication: Occipital scalp laceration  Consent: Verbal from patient  Technique: A 3 cm laceration is present on the patient's occipital scalp with the following features: Linear, superficial, no active bleeding, no foreign body. There is no evidence of significant vascular, neurologic, or soft tissue compromise. Full functional status is maintained. The laceration was copiously irrigated with saline. The wound was carefully inspected and probed to the base and found to have no deep structure involvement or foreign body. The wound was repaired using 2 staples in the following technique(s): Skin stapler.  Complications: None        FINAL IMPRESSION      1. Concussion without loss of consciousness, initial encounter    2. Laceration of scalp, initial encounter          DISPOSITION/PLAN     ED Disposition     ED Disposition   Discharge    Condition   Stable    Comment   --             PATIENT REFERRED TO:  Pj Buchanan DO  [address] [place] [address] [phone]    Schedule an appointment as soon as  possible for a visit in 2 days      [facility] Emergency Department  [address] [address] [phone]    If symptoms worsen      DISCHARGE MEDICATIONS:     Medication List      CONTINUE taking these medications    aspirin 81 MG EC tablet     fenofibrate 145 MG tablet  Commonly known as: TRICOR     vitamin B-12 1000 MCG tablet  Commonly known as: CYANOCOBALAMIN                Comment: Please note this report has been produced using speech recognition software.      Shankar Read MD  Attending Emergency Physician               Shankar Read MD  11/13/22 9906

## 2022-11-04 NOTE — ED PROVIDER NOTES
EMERGENCY DEPARTMENT ENCOUNTER    Pt Name: Susan Tucker  MRN: [id number]  Pt :   1935  Room Number:    Date of encounter:  2022  PCP: Pj Buchanan DO  ED Provider: JOESPH Flores    Historian: Patient and family and EMS      HPI:  Chief Complaint: Fall        Context: Susan Tucker is a 87 y.o. female who presents to the ED via EMS who was called back to the [facility] this morning when the patient was found on the floor on the side of her bed vomiting.  Further inquiry reveals that Ms. Tucker was recently seen here in the ER last night for fall with head injury.  Head imaging was negative for acute findings.  She received laceration care to the scalp.  She was discharged back by ambulance only a few hours ago.  It is unknown if the patient had a new head injury.  She complains of right knee pain with weightbearing.  Patient is pleasantly confused with a well-known history of dementia.  Her daughter, at the bedside, states that her mother is at her baseline cognition currently.    Review of systems is data deficient due to the patient's dementia.  There is no known history of fever or recent illness.  No complaints of chest pain or cough or respiratory distress.  GI: An event of vomiting prior to arrival.  No complaints of abdominal pain and no known diarrhea.  Positive for generalized weakness.  Unknown for syncope.      PAST MEDICAL HISTORY  Past Medical History:   Diagnosis Date   • Diabetes mellitus (HCC)    • Elevated cholesterol    • Hypertension    • Stroke (HCC)          PAST SURGICAL HISTORY  Past Surgical History:   Procedure Laterality Date   • ERCP N/A 2018    Procedure: ENDOSCOPIC RETROGRADE CHOLANGIOPANCREATOGRAPHY;  Surgeon: Liam Manning MD;  Location: [facility] ENDOSCOPY;  Service: Gastroenterology   • EXPLORATORY LAPAROTOMY N/A 2/10/2020    Procedure: LAPAROTOMY EXPLORATORY BOWEL RESECTION;  Surgeon: Carlos Zurita MD;  Location:  [facility] OR;  Service:  General;  Laterality: N/A;   • HYSTERECTOMY           FAMILY HISTORY  Family History   Problem Relation Age of Onset   • Hypertension Mother    • Hypertension Father    • Early death Brother    • Hypertension Maternal Grandmother    • Hypertension Paternal Grandmother    • Learning disabilities Other          SOCIAL HISTORY  Social History     Socioeconomic History   • Marital status:    Tobacco Use   • Smoking status: Never   • Smokeless tobacco: Never   Substance and Sexual Activity   • Alcohol use: No   • Drug use: No         ALLERGIES  Amoxicillin, Atorvastatin, and Cephalexin        REVIEW OF SYSTEMS  Review of Systems     All systems reviewed and negative except for those discussed in HPI.       PHYSICAL EXAM    I have reviewed the triage vital signs and nursing notes.    ED Triage Vitals [11/04/22 0720]   Temp Heart Rate Resp BP SpO2   98.3 °F (36.8 °C) 75 16 127/97 95 %      Temp src Heart Rate Source Patient Position BP Location FiO2 (%)   -- Monitor Lying -- --       Physical Exam  GENERAL:   Appears pleasantly confused elder who appears underweight.  Her vital signs are normal.  HENT: Nares patent.  EYES: No scleral icterus.  CV: Regular rhythm, regular rate.  No tachycardia  RESPIRATORY: Normal effort.  No audible wheezes, rales or rhonchi.  ABDOMEN: Soft, nontender.  Palpation does not elicit a pain response.  MUSCULOSKELETAL: No deformities.  Pelvis is stable.  Passive range of motion to the right knee elicits a pain response.  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  Other extremities are atraumatic and unremarkable.  NEURO: Alert, moves all extremities, follows commands.  She is conversational but does not effectively contribute to her medical story today.  SKIN: Warm, dry, no rash visualized.        LAB RESULTS  Recent Results (from the past 24 hour(s))   Comprehensive Metabolic Panel    Collection Time: 11/04/22  7:30 AM    Specimen: Blood   Result Value Ref Range    Glucose 113 (H) 65  - 99 mg/dL    BUN 10 8 - 23 mg/dL    Creatinine 0.65 0.57 - 1.00 mg/dL    Sodium 139 136 - 145 mmol/L    Potassium 3.5 3.5 - 5.2 mmol/L    Chloride 102 98 - 107 mmol/L    CO2 28.0 22.0 - 29.0 mmol/L    Calcium 9.2 8.6 - 10.5 mg/dL    Total Protein 7.3 6.0 - 8.5 g/dL    Albumin 4.30 3.50 - 5.20 g/dL    ALT (SGPT) 14 1 - 33 U/L    AST (SGOT) 28 1 - 32 U/L    Alkaline Phosphatase 49 39 - 117 U/L    Total Bilirubin 0.6 0.0 - 1.2 mg/dL    Globulin 3.0 gm/dL    A/G Ratio 1.4 g/dL    BUN/Creatinine Ratio 15.4 7.0 - 25.0    Anion Gap 9.0 5.0 - 15.0 mmol/L    eGFR 85.9 >60.0 mL/min/1.73   Lipase    Collection Time: 11/04/22  7:30 AM    Specimen: Blood   Result Value Ref Range    Lipase 92 (H) 13 - 60 U/L   Lactic Acid, Plasma    Collection Time: 11/04/22  7:30 AM    Specimen: Blood   Result Value Ref Range    Lactate 1.3 0.5 - 2.0 mmol/L   Green Top (Gel)    Collection Time: 11/04/22  7:30 AM   Result Value Ref Range    Extra Tube Hold for add-ons.    Lavender Top    Collection Time: 11/04/22  7:30 AM   Result Value Ref Range    Extra Tube hold for add-on    Gold Top - SST    Collection Time: 11/04/22  7:30 AM   Result Value Ref Range    Extra Tube Hold for add-ons.    Gray Top    Collection Time: 11/04/22  7:30 AM   Result Value Ref Range    Extra Tube Hold for add-ons.    Light Blue Top    Collection Time: 11/04/22  7:30 AM   Result Value Ref Range    Extra Tube Hold for add-ons.    CBC Auto Differential    Collection Time: 11/04/22  7:30 AM    Specimen: Blood   Result Value Ref Range    WBC 7.21 3.40 - 10.80 10*3/mm3    RBC 4.99 3.77 - 5.28 10*6/mm3    Hemoglobin 14.9 12.0 - 15.9 g/dL    Hematocrit 45.9 34.0 - 46.6 %    MCV 92.0 79.0 - 97.0 fL    MCH 29.9 26.6 - 33.0 pg    MCHC 32.5 31.5 - 35.7 g/dL    RDW 12.4 12.3 - 15.4 %    RDW-SD 42.0 37.0 - 54.0 fl    MPV 10.1 6.0 - 12.0 fL    Platelets 240 140 - 450 10*3/mm3    Neutrophil % 80.6 (H) 42.7 - 76.0 %    Lymphocyte % 10.5 (L) 19.6 - 45.3 %    Monocyte % 5.4 5.0 - 12.0 %     Eosinophil % 2.1 0.3 - 6.2 %    Basophil % 1.0 0.0 - 1.5 %    Immature Grans % 0.4 0.0 - 0.5 %    Neutrophils, Absolute 5.81 1.70 - 7.00 10*3/mm3    Lymphocytes, Absolute 0.76 0.70 - 3.10 10*3/mm3    Monocytes, Absolute 0.39 0.10 - 0.90 10*3/mm3    Eosinophils, Absolute 0.15 0.00 - 0.40 10*3/mm3    Basophils, Absolute 0.07 0.00 - 0.20 10*3/mm3    Immature Grans, Absolute 0.03 0.00 - 0.05 10*3/mm3    nRBC 0.0 0.0 - 0.2 /100 WBC   Urinalysis With Microscopic If Indicated (No Culture) - Urine, Catheter    Collection Time: 11/04/22  7:52 AM    Specimen: Urine, Catheter   Result Value Ref Range    Color, UA Yellow Yellow, Straw    Appearance, UA Clear Clear    pH, UA 7.0 5.0 - 8.0    Specific Gravity, UA 1.012 1.001 - 1.030    Glucose, UA Negative Negative    Ketones, UA Negative Negative    Bilirubin, UA Negative Negative    Blood, UA Negative Negative    Protein, UA Negative Negative    Leuk Esterase, UA Negative Negative    Nitrite, UA Negative Negative    Urobilinogen, UA 1.0 E.U./dL 0.2 - 1.0 E.U./dL       If labs were ordered, I independently reviewed the results.        RADIOLOGY  XR Knee 1 or 2 View Right    Result Date: 11/4/2022  Examination: XR KNEE 1 OR 2 VW RIGHT-  Date of Exam: 11/4/2022 8:45 AM  Indication: fall; right knee pain.  Comparison: None available.  Technique: Multiple radiographic views of the knee were obtained.  Findings: There is diffuse osteopenia.  There is mild narrowing of medial lateral joint compartments.  Mild osteophyte formation is noted of the medial femoral condyle.  There is no acute fracture or dislocation.  Mild degenerative spurring is noted of the patella.  No joint effusion or hemarthrosis.  Basilar calcic lesions are seen within soft tissues.          1.  Osteopenia and mild degenerative changes of the knee.  No acute bony abnormality or joint effusion.  This report was finalized on 11/4/2022 9:10 AM by Osbaldo Blank MD.      CT Head Without Contrast    Result Date:  11/4/2022  Examination: CT HEAD WO CONTRAST-  Date of Exam: 11/4/2022 10:47 AM  Indication: 2nd fall in 12 hours, this time, with vomiting.  Comparison: 11/4/2022.  Technique: Axial noncontrast CT imaging of the head was performed. Automated exposure control and iterative reconstruction methods were used.  Findings: There is a chronic lacunar infarct in left thalamus. There is patchy hypoattenuation in both basal ganglia. There is patchy hypoattenuation in the periventricular white matter. No new hypoattenuating lesions. Stable chronic lacunar type infarct in the superior right cerebellum. No acute intracranial hemorrhage. No mass effect or midline shift. No abnormal extra-axial collections. There are mild intracranial vascular calcifications. Orbits appear within normal limits. The calvarium is intact. The paranasal sinuses and mastoid air cells appear well aerated. There are staples in the parietal occipital scalp. No obvious soft tissue contusion or hematoma.       1. No acute intracranial abnormality. 2. Stable laceration in the midline parieto-occipital scalp. No hematoma or underlying fracture. 3. Chronic small vessel ischemic change and chronic lacunar infarcts in the left thalamus and right cerebellum.  This report was finalized on 11/4/2022 10:55 AM by Cezar Velasquez MD.      CT Head Without Contrast    Result Date: 11/4/2022  EXAMINATION: CT HEAD WITHOUT CONTRAST DATE: 11/4/2022 1:48 AM INDICATION: Fall, pain COMPARISON: MRI brain 3/24/2022 TECHNIQUE: Noncontrast imaging obtained from the vertex to the skull base.  CT dose lowering techniques were used, to include: automated exposure control, adjustment for patient size, and or use of iterative reconstruction.? FINDINGS: Soft Tissues: No significant soft tissue abnormality. Skull: No underlying skull fracture or radiopaque foreign body. Sinuses: Paranasal sinuses are clear. Mastoids: Mastoid air cells are clear. Globes and Orbits: Globes and orbits are  intact. Brain: No acute hemorrhage.  No midline shift, masses, or mass effect.  No evidence of acute infarct by noncontrast CT. Ventricles and Cisterns: Ventricular size and configuration is within normal limits. Basal cisterns are patent. No abnormal extra-axial fluid collection. Senescent Changes: Mild to moderate volume loss and chronic microvascular ischemic changes. Arteriosclerosis     1. No acute intracranial abnormality. 2. Mild/moderate volume loss and chronic microvascular ischemic changes. Arteriosclerosis. Electronically signed by:  Liam Moore M.D.  11/4/2022 12:13 AM Mountain Time    CT Cervical Spine Without Contrast    Result Date: 11/4/2022  EXAMINATION: CT CERVICAL SPINE WITHOUT DATE: 11/4/2022 1:48 AM INDICATION: Fall, pain COMPARISON: CT angiograms head and neck 3/24/2022 TECHNIQUE: Thin section axial noncontrast images were obtained through the cervical spine.  Sagittal and coronal reformatted images were created.  Images were reviewed in bone and soft tissue windows.  CT dose lowering techniques were used, to include: automated exposure control, adjustment for patient size, and or use of iterative reconstruction. FINDINGS: Osseous: Cervical lordosis is maintained. Vertebral body height and alignment is preserved. No acute fracture or subluxation. No prevertebral soft tissue swelling. The lateral masses of C1 align on C2. Degenerative Changes Mild degenerative disc disease and facet arthropathy. No high-grade canal stenosis. Soft Tissues of the Neck: No focal soft tissue abnormality within the visualized neck. Visualized lung apices are clear. Visualized airways are patent.     No acute fracture or subluxation of the cervical spine. Electronically signed by:  Liam Moore M.D.  11/4/2022 12:15 AM Mountain Time      PROCEDURES    Procedures    No orders to display       MEDICATIONS GIVEN IN ER    Medications   sodium chloride 0.9 % bolus 500 mL (0 mL Intravenous Stopped 11/4/22 0922)          ED Course as of 11/04/22 1754   Fri Nov 04, 2022   1210 Patient has had normal vital signs throughout her ED course.  She has been pleasant and without complaint.  She has had her baseline cognition according to her daughters.  Work-up is reassuring.  The daughter and I have discussed her risk for falls and measures will be taken to help take increased precautions.  She states that the providers at [facility] can also do some physical therapy onsite.  I think this is reasonable.  We discussed parameters for concern that would warrant return to the emergency department.  Ms. Tucker's daughter understands concurs this outpatient plan [MS]      ED Course User Index  [MS] Brittany Balderrama, JOESPH           AS OF 17:54 EDT VITALS:    BP - 172/85  HR - 89  TEMP - 98.3 °F (36.8 °C)  O2 SATS - 99%                  DIAGNOSIS  Final diagnoses:   At high risk for falls   Right knee pain, unspecified chronicity   History of head injury         DISPOSITION    DISCHARGE    Patient discharged in stable condition.    Reviewed implications of results, diagnosis, meds, responsibility to follow up, warning signs and symptoms of possible worsening, potential complications and reasons to return to ER.    Patient/Family voiced understanding of above instructions.    Discussed plan for discharge, as there is no emergent indication for admission.  Pt/family is agreeable and understands need for follow up and possible repeat testing.  Pt/family is aware that discharge does not mean that nothing is wrong but that it indicates no emergency is currently present that requires admission and they must continue care with follow-up as given below or with a physician of their choice.     FOLLOW-UP  Pj Buchanan, DO  [address] [place] [address] [phone]    Schedule an appointment as soon as possible for a visit in 2 days  If symptoms worsen         Medication List      No changes were made to your prescriptions during  this visit.                  Brittany Balderrama, APRN  11/04/22 7855

## 2022-11-07 ENCOUNTER — APPOINTMENT (OUTPATIENT)
Dept: CT IMAGING | Facility: HOSPITAL | Age: 87
End: 2022-11-07

## 2022-11-07 ENCOUNTER — HOSPITAL ENCOUNTER (INPATIENT)
Facility: HOSPITAL | Age: 87
LOS: 3 days | Discharge: HOME | End: 2022-11-11
Attending: EMERGENCY MEDICINE | Admitting: INTERNAL MEDICINE

## 2022-11-07 ENCOUNTER — APPOINTMENT (OUTPATIENT)
Dept: GENERAL RADIOLOGY | Facility: HOSPITAL | Age: 87
End: 2022-11-07

## 2022-11-07 DIAGNOSIS — G93.40 ACUTE ENCEPHALOPATHY: ICD-10-CM

## 2022-11-07 DIAGNOSIS — T50.905A ADVERSE EFFECT OF DRUG, INITIAL ENCOUNTER: ICD-10-CM

## 2022-11-07 DIAGNOSIS — R29.6 FREQUENT FALLS: Primary | ICD-10-CM

## 2022-11-07 LAB
ALBUMIN SERPL-MCNC: 3.6 G/DL (ref 3.5–5.2)
ALBUMIN/GLOB SERPL: 1.5 G/DL
ALP SERPL-CCNC: 48 U/L (ref 39–117)
ALT SERPL W P-5'-P-CCNC: 16 U/L (ref 1–33)
ANION GAP SERPL CALCULATED.3IONS-SCNC: 8 MMOL/L (ref 5–15)
AST SERPL-CCNC: 34 U/L (ref 1–32)
BASOPHILS # BLD AUTO: 0.06 10*3/MM3 (ref 0–0.2)
BASOPHILS NFR BLD AUTO: 1 % (ref 0–1.5)
BILIRUB SERPL-MCNC: 1 MG/DL (ref 0–1.2)
BUN SERPL-MCNC: 13 MG/DL (ref 8–23)
BUN/CREAT SERPL: 24.1 (ref 7–25)
CALCIUM SPEC-SCNC: 8.9 MG/DL (ref 8.6–10.5)
CHLORIDE SERPL-SCNC: 102 MMOL/L (ref 98–107)
CO2 SERPL-SCNC: 27 MMOL/L (ref 22–29)
CREAT SERPL-MCNC: 0.54 MG/DL (ref 0.57–1)
D-LACTATE SERPL-SCNC: 1.1 MMOL/L (ref 0.5–2)
DEPRECATED RDW RBC AUTO: 41.2 FL (ref 37–54)
EGFRCR SERPLBLD CKD-EPI 2021: 89.8 ML/MIN/1.73
EOSINOPHIL # BLD AUTO: 0.24 10*3/MM3 (ref 0–0.4)
EOSINOPHIL NFR BLD AUTO: 4.2 % (ref 0.3–6.2)
ERYTHROCYTE [DISTWIDTH] IN BLOOD BY AUTOMATED COUNT: 12.5 % (ref 12.3–15.4)
FLUAV RNA RESP QL NAA+PROBE: NOT DETECTED
FLUBV RNA RESP QL NAA+PROBE: NOT DETECTED
GLOBULIN UR ELPH-MCNC: 2.4 GM/DL
GLUCOSE SERPL-MCNC: 119 MG/DL (ref 65–99)
HCT VFR BLD AUTO: 38.8 % (ref 34–46.6)
HGB BLD-MCNC: 13 G/DL (ref 12–15.9)
IMM GRANULOCYTES # BLD AUTO: 0.03 10*3/MM3 (ref 0–0.05)
IMM GRANULOCYTES NFR BLD AUTO: 0.5 % (ref 0–0.5)
LYMPHOCYTES # BLD AUTO: 0.79 10*3/MM3 (ref 0.7–3.1)
LYMPHOCYTES NFR BLD AUTO: 13.8 % (ref 19.6–45.3)
MCH RBC QN AUTO: 30.4 PG (ref 26.6–33)
MCHC RBC AUTO-ENTMCNC: 33.5 G/DL (ref 31.5–35.7)
MCV RBC AUTO: 90.7 FL (ref 79–97)
MONOCYTES # BLD AUTO: 0.51 10*3/MM3 (ref 0.1–0.9)
MONOCYTES NFR BLD AUTO: 8.9 % (ref 5–12)
NEUTROPHILS NFR BLD AUTO: 4.11 10*3/MM3 (ref 1.7–7)
NEUTROPHILS NFR BLD AUTO: 71.6 % (ref 42.7–76)
NRBC BLD AUTO-RTO: 0 /100 WBC (ref 0–0.2)
PLATELET # BLD AUTO: 205 10*3/MM3 (ref 140–450)
PMV BLD AUTO: 10.1 FL (ref 6–12)
POTASSIUM SERPL-SCNC: 3.1 MMOL/L (ref 3.5–5.2)
PROT SERPL-MCNC: 6 G/DL (ref 6–8.5)
RBC # BLD AUTO: 4.28 10*6/MM3 (ref 3.77–5.28)
SARS-COV-2 RNA RESP QL NAA+PROBE: NOT DETECTED
SODIUM SERPL-SCNC: 137 MMOL/L (ref 136–145)
TROPONIN T SERPL-MCNC: <0.01 NG/ML (ref 0–0.03)
WBC NRBC COR # BLD: 5.74 10*3/MM3 (ref 3.4–10.8)

## 2022-11-07 PROCEDURE — 71045 X-RAY EXAM CHEST 1 VIEW: CPT

## 2022-11-07 PROCEDURE — 87636 SARSCOV2 & INF A&B AMP PRB: CPT | Performed by: EMERGENCY MEDICINE

## 2022-11-07 PROCEDURE — 93005 ELECTROCARDIOGRAM TRACING: CPT | Performed by: EMERGENCY MEDICINE

## 2022-11-07 PROCEDURE — 83735 ASSAY OF MAGNESIUM: CPT | Performed by: FAMILY MEDICINE

## 2022-11-07 PROCEDURE — 99285 EMERGENCY DEPT VISIT HI MDM: CPT

## 2022-11-07 PROCEDURE — 84484 ASSAY OF TROPONIN QUANT: CPT | Performed by: EMERGENCY MEDICINE

## 2022-11-07 PROCEDURE — P9612 CATHETERIZE FOR URINE SPEC: HCPCS

## 2022-11-07 PROCEDURE — 87040 BLOOD CULTURE FOR BACTERIA: CPT | Performed by: EMERGENCY MEDICINE

## 2022-11-07 PROCEDURE — 85025 COMPLETE CBC W/AUTO DIFF WBC: CPT | Performed by: EMERGENCY MEDICINE

## 2022-11-07 PROCEDURE — 36415 COLL VENOUS BLD VENIPUNCTURE: CPT

## 2022-11-07 PROCEDURE — 70450 CT HEAD/BRAIN W/O DYE: CPT

## 2022-11-07 PROCEDURE — 83605 ASSAY OF LACTIC ACID: CPT | Performed by: EMERGENCY MEDICINE

## 2022-11-07 PROCEDURE — 80053 COMPREHEN METABOLIC PANEL: CPT | Performed by: EMERGENCY MEDICINE

## 2022-11-07 RX ORDER — SODIUM CHLORIDE 0.9 % (FLUSH) 0.9 %
10 SYRINGE (ML) INJECTION AS NEEDED
Status: DISCONTINUED | OUTPATIENT
Start: 2022-11-07 | End: 2022-11-11 | Stop reason: HOSPADM

## 2022-11-08 ENCOUNTER — APPOINTMENT (OUTPATIENT)
Dept: MRI IMAGING | Facility: HOSPITAL | Age: 87
End: 2022-11-08

## 2022-11-08 PROBLEM — R29.6 FREQUENT FALLS: Status: ACTIVE | Noted: 2022-11-08

## 2022-11-08 PROBLEM — E87.6 HYPOKALEMIA: Status: ACTIVE | Noted: 2022-11-08

## 2022-11-08 LAB
ANION GAP SERPL CALCULATED.3IONS-SCNC: 10 MMOL/L (ref 5–15)
BUN SERPL-MCNC: 9 MG/DL (ref 8–23)
BUN/CREAT SERPL: 18.8 (ref 7–25)
CALCIUM SPEC-SCNC: 9.3 MG/DL (ref 8.6–10.5)
CHLORIDE SERPL-SCNC: 103 MMOL/L (ref 98–107)
CO2 SERPL-SCNC: 28 MMOL/L (ref 22–29)
CREAT SERPL-MCNC: 0.48 MG/DL (ref 0.57–1)
EGFRCR SERPLBLD CKD-EPI 2021: 92.4 ML/MIN/1.73
GLUCOSE SERPL-MCNC: 87 MG/DL (ref 65–99)
MAGNESIUM SERPL-MCNC: 1.8 MG/DL (ref 1.6–2.4)
POTASSIUM SERPL-SCNC: 3.4 MMOL/L (ref 3.5–5.2)
POTASSIUM SERPL-SCNC: 5 MMOL/L (ref 3.5–5.2)
SODIUM SERPL-SCNC: 141 MMOL/L (ref 136–145)
TSH SERPL DL<=0.05 MIU/L-ACNC: 0.91 UIU/ML (ref 0.27–4.2)

## 2022-11-08 PROCEDURE — 84132 ASSAY OF SERUM POTASSIUM: CPT | Performed by: INTERNAL MEDICINE

## 2022-11-08 PROCEDURE — 80048 BASIC METABOLIC PNL TOTAL CA: CPT | Performed by: PHYSICIAN ASSISTANT

## 2022-11-08 PROCEDURE — 0 MAGNESIUM SULFATE 4 GM/100ML SOLUTION: Performed by: FAMILY MEDICINE

## 2022-11-08 PROCEDURE — 97530 THERAPEUTIC ACTIVITIES: CPT

## 2022-11-08 PROCEDURE — 70551 MRI BRAIN STEM W/O DYE: CPT

## 2022-11-08 PROCEDURE — 97535 SELF CARE MNGMENT TRAINING: CPT

## 2022-11-08 PROCEDURE — 92610 EVALUATE SWALLOWING FUNCTION: CPT

## 2022-11-08 PROCEDURE — 97162 PT EVAL MOD COMPLEX 30 MIN: CPT

## 2022-11-08 PROCEDURE — 84443 ASSAY THYROID STIM HORMONE: CPT | Performed by: PHYSICIAN ASSISTANT

## 2022-11-08 PROCEDURE — 97166 OT EVAL MOD COMPLEX 45 MIN: CPT

## 2022-11-08 PROCEDURE — 99223 1ST HOSP IP/OBS HIGH 75: CPT | Performed by: FAMILY MEDICINE

## 2022-11-08 RX ORDER — MAGNESIUM SULFATE HEPTAHYDRATE 40 MG/ML
4 INJECTION, SOLUTION INTRAVENOUS AS NEEDED
Status: DISCONTINUED | OUTPATIENT
Start: 2022-11-08 | End: 2022-11-11 | Stop reason: HOSPADM

## 2022-11-08 RX ORDER — MAGNESIUM SULFATE HEPTAHYDRATE 40 MG/ML
2 INJECTION, SOLUTION INTRAVENOUS AS NEEDED
Status: DISCONTINUED | OUTPATIENT
Start: 2022-11-08 | End: 2022-11-11 | Stop reason: HOSPADM

## 2022-11-08 RX ORDER — POTASSIUM CHLORIDE 750 MG/1
40 CAPSULE, EXTENDED RELEASE ORAL AS NEEDED
Status: DISCONTINUED | OUTPATIENT
Start: 2022-11-08 | End: 2022-11-08 | Stop reason: SDUPTHER

## 2022-11-08 RX ORDER — SODIUM CHLORIDE, SODIUM LACTATE, POTASSIUM CHLORIDE, CALCIUM CHLORIDE 600; 310; 30; 20 MG/100ML; MG/100ML; MG/100ML; MG/100ML
75 INJECTION, SOLUTION INTRAVENOUS CONTINUOUS
Status: ACTIVE | OUTPATIENT
Start: 2022-11-08 | End: 2022-11-08

## 2022-11-08 RX ORDER — POTASSIUM CHLORIDE 7.45 MG/ML
10 INJECTION INTRAVENOUS
Status: DISCONTINUED | OUTPATIENT
Start: 2022-11-08 | End: 2022-11-08 | Stop reason: SDUPTHER

## 2022-11-08 RX ORDER — SODIUM CHLORIDE 0.9 % (FLUSH) 0.9 %
10 SYRINGE (ML) INJECTION EVERY 12 HOURS SCHEDULED
Status: DISCONTINUED | OUTPATIENT
Start: 2022-11-08 | End: 2022-11-11 | Stop reason: HOSPADM

## 2022-11-08 RX ORDER — POTASSIUM CHLORIDE 1.5 G/1.77G
40 POWDER, FOR SOLUTION ORAL AS NEEDED
Status: DISCONTINUED | OUTPATIENT
Start: 2022-11-08 | End: 2022-11-11 | Stop reason: HOSPADM

## 2022-11-08 RX ORDER — ASPIRIN 81 MG/1
81 TABLET ORAL DAILY
Status: DISCONTINUED | OUTPATIENT
Start: 2022-11-08 | End: 2022-11-11 | Stop reason: HOSPADM

## 2022-11-08 RX ORDER — POTASSIUM CHLORIDE 1.5 G/1.77G
40 POWDER, FOR SOLUTION ORAL AS NEEDED
Status: DISCONTINUED | OUTPATIENT
Start: 2022-11-08 | End: 2022-11-08 | Stop reason: SDUPTHER

## 2022-11-08 RX ORDER — ONDANSETRON 2 MG/ML
4 INJECTION INTRAMUSCULAR; INTRAVENOUS EVERY 6 HOURS PRN
Status: DISCONTINUED | OUTPATIENT
Start: 2022-11-08 | End: 2022-11-11 | Stop reason: HOSPADM

## 2022-11-08 RX ORDER — CHOLECALCIFEROL (VITAMIN D3) 125 MCG
5 CAPSULE ORAL NIGHTLY PRN
Status: DISCONTINUED | OUTPATIENT
Start: 2022-11-08 | End: 2022-11-11 | Stop reason: HOSPADM

## 2022-11-08 RX ORDER — ATORVASTATIN CALCIUM 40 MG/1
80 TABLET, FILM COATED ORAL NIGHTLY
Status: DISCONTINUED | OUTPATIENT
Start: 2022-11-08 | End: 2022-11-09

## 2022-11-08 RX ORDER — POTASSIUM CHLORIDE 750 MG/1
40 CAPSULE, EXTENDED RELEASE ORAL AS NEEDED
Status: DISCONTINUED | OUTPATIENT
Start: 2022-11-08 | End: 2022-11-11 | Stop reason: HOSPADM

## 2022-11-08 RX ORDER — POTASSIUM CHLORIDE 7.45 MG/ML
10 INJECTION INTRAVENOUS
Status: DISCONTINUED | OUTPATIENT
Start: 2022-11-08 | End: 2022-11-11 | Stop reason: HOSPADM

## 2022-11-08 RX ORDER — SODIUM CHLORIDE 0.9 % (FLUSH) 0.9 %
10 SYRINGE (ML) INJECTION AS NEEDED
Status: DISCONTINUED | OUTPATIENT
Start: 2022-11-08 | End: 2022-11-11 | Stop reason: HOSPADM

## 2022-11-08 RX ORDER — ACETAMINOPHEN 325 MG/1
650 TABLET ORAL EVERY 4 HOURS PRN
Status: DISCONTINUED | OUTPATIENT
Start: 2022-11-08 | End: 2022-11-11 | Stop reason: HOSPADM

## 2022-11-08 RX ORDER — ONDANSETRON 4 MG/1
4 TABLET, FILM COATED ORAL EVERY 6 HOURS PRN
Status: DISCONTINUED | OUTPATIENT
Start: 2022-11-08 | End: 2022-11-11 | Stop reason: HOSPADM

## 2022-11-08 RX ADMIN — Medication 10 ML: at 08:13

## 2022-11-08 RX ADMIN — SODIUM CHLORIDE, POTASSIUM CHLORIDE, SODIUM LACTATE AND CALCIUM CHLORIDE 75 ML/HR: 600; 310; 30; 20 INJECTION, SOLUTION INTRAVENOUS at 02:30

## 2022-11-08 RX ADMIN — POTASSIUM CHLORIDE 40 MEQ: 750 CAPSULE, EXTENDED RELEASE ORAL at 13:00

## 2022-11-08 RX ADMIN — POTASSIUM CHLORIDE 40 MEQ: 750 CAPSULE, EXTENDED RELEASE ORAL at 08:13

## 2022-11-08 RX ADMIN — POTASSIUM CHLORIDE 40 MEQ: 750 CAPSULE, EXTENDED RELEASE ORAL at 02:43

## 2022-11-08 RX ADMIN — Medication 10 ML: at 02:33

## 2022-11-08 RX ADMIN — ASPIRIN 81 MG: 81 TABLET, COATED ORAL at 08:13

## 2022-11-08 RX ADMIN — ATORVASTATIN CALCIUM 80 MG: 40 TABLET, FILM COATED ORAL at 02:44

## 2022-11-08 RX ADMIN — ATORVASTATIN CALCIUM 80 MG: 40 TABLET, FILM COATED ORAL at 20:23

## 2022-11-08 RX ADMIN — Medication 10 ML: at 20:23

## 2022-11-08 RX ADMIN — MAGNESIUM SULFATE HEPTAHYDRATE 4 G: 40 INJECTION, SOLUTION INTRAVENOUS at 02:44

## 2022-11-08 NOTE — H&P
[facility] Medicine Services  HISTORY AND PHYSICAL    Patient Name: Susan Tucker  : 1935  MRN: [id number]  Primary Care Physician: Pj Buchanan DO  Date of admission: 2022    Subjective   Subjective     Chief Complaint:  Frequent falls    HPI:  Susan Tucker is an 87 y.o. female with a past medical history significant for dementia, hypertension, HLD, and DM2. She presents today with daughter from [facility]. Apparently patient has just moved into [facility] as of last Wednesday. HPI limited secondary to patient position. She is nonverbal and obtunded. Records reviewed indicate that patient has been having issues with frequent falls for the past 4-5 days. She is typically ambulatory per wheelchair and has been falling out of it multiple times a day. She is not anticoagulated. No LOC. But there was concern for head trauma. Last Friday, patient was taken to ED at 0100 wherein she underwent neurologic evaluation and underwent CT of head. No acute abnormalities. Upon returning home patient has some intractable nausea and vomiting. She returned to ED where she underwent repeat CT head, which was again negative. Daughter adds that one fall resulted in acute vital change wherein she became hypotensive to with systolic 84, HR 50. She has subsequent LOC but came too after ammonia capsule. Of note, daughter adds that patient's Seroquel dose has been increased in the past week. Daughter if asking if this could be contributory to symptoms.  Currently there are no complaints of fever, cough, congestion, SOB, or chest pain. No abdominal pain or N/v/D. No complaints of headache. No reports of focal weakness/parathesias or seizures. Will admit to inpatient for further evaluation and treatment.      Review of Systems   Unable to perform ROS: Dementia        All other systems reviewed and are negative.     Personal History     Past Medical History:   Diagnosis Date   • Diabetes mellitus  (HCC)    • Elevated cholesterol    • Hypertension    • Stroke (HCC)              Past Surgical History:   Procedure Laterality Date   • ERCP N/A 6/27/2018    Procedure: ENDOSCOPIC RETROGRADE CHOLANGIOPANCREATOGRAPHY;  Surgeon: Liam Manning MD;  Location: [facility] ENDOSCOPY;  Service: Gastroenterology   • EXPLORATORY LAPAROTOMY N/A 2/10/2020    Procedure: LAPAROTOMY EXPLORATORY BOWEL RESECTION;  Surgeon: Carlos Zurita MD;  Location: [facility] OR;  Service: General;  Laterality: N/A;   • HYSTERECTOMY         Family History:  family history includes Early death in her brother; Hypertension in her father, maternal grandmother, mother, and paternal grandmother; Learning disabilities in an other family member. Otherwise pertinent FHx was reviewed and unremarkable.     Social History:  reports that she has never smoked. She has never used smokeless tobacco. She reports that she does not drink alcohol and does not use drugs.  Social History     Social History Narrative   • Not on file       Medications:  QUEtiapine, aspirin, atorvastatin, fenofibrate, lisinopril, and vitamin B-12    Allergies   Allergen Reactions   • Amoxicillin Hives   • Atorvastatin Nausea Only   • Cephalexin Rash       Objective   Objective     Vital Signs:   Temp:  [97.7 °F (36.5 °C)] 97.7 °F (36.5 °C)  Heart Rate:  [72-82] 75  Resp:  [16] 16  BP: (102-129)/() 129/68    Physical Exam   Constitutional: obtunded, lethargic  Eyes: PERRLA, sclerae anicteric, no conjunctival injection  HENT: NCAT, mucous membranes moist  Neck: Supple, no thyromegaly, no lymphadenopathy, trachea midline  Respiratory: Clear to auscultation bilaterally, nonlabored respirations   Cardiovascular: RRR, no murmurs, rubs, or gallops, palpable pedal pulses bilaterally  Gastrointestinal: Positive bowel sounds, soft, nontender, nondistended  Musculoskeletal: No bilateral ankle edema, no clubbing or cyanosis to extremities  Psychiatric: flat affect,  cooperative  Neurologic: Oriented x 3, strength symmetric in all extremities, Cranial Nerves grossly intact to confrontation, speech clear  Skin: No rashes      Result Review:  I have personally reviewed the results from the time of this admission to 11/8/2022 02:47 EST and agree with these findings:  [x]  Laboratory list / accordion  []  Microbiology  []  Radiology  []  EKG/Telemetry   []  Cardiology/Vascular   []  Pathology  [x]  Old records  []  Other:  Most notable findings include: vitals stable. Glucose 119. Potassium 3.1. chemistry and hematology otherwise favorable.    LAB RESULTS:      Lab 11/07/22 2307 11/04/22 0730   WBC 5.74 7.21   HEMOGLOBIN 13.0 14.9   HEMATOCRIT 38.8 45.9   PLATELETS 205 240   NEUTROS ABS 4.11 5.81   IMMATURE GRANS (ABS) 0.03 0.03   LYMPHS ABS 0.79 0.76   MONOS ABS 0.51 0.39   EOS ABS 0.24 0.15   MCV 90.7 92.0   LACTATE 1.1 1.3         Lab 11/07/22 2307 11/04/22  0730   SODIUM 137 139   POTASSIUM 3.1* 3.5   CHLORIDE 102 102   CO2 27.0 28.0   ANION GAP 8.0 9.0   BUN 13 10   CREATININE 0.54* 0.65   EGFR 89.8 85.9   GLUCOSE 119* 113*   CALCIUM 8.9 9.2   MAGNESIUM 1.8  --          Lab 11/07/22 2307 11/04/22 0730   TOTAL PROTEIN 6.0 7.3   ALBUMIN 3.60 4.30   GLOBULIN 2.4 3.0   ALT (SGPT) 16 14   AST (SGOT) 34* 28   BILIRUBIN 1.0 0.6   ALK PHOS 48 49   LIPASE  --  92*         Lab 11/07/22 2307   TROPONIN T <0.010                 Brief Urine Lab Results  (Last result in the past 365 days)      Color   Clarity   Blood   Leuk Est   Nitrite   Protein   CREAT   Urine HCG        11/04/22 0752 Yellow   Clear   Negative   Negative   Negative   Negative               Microbiology Results (last 10 days)     Procedure Component Value - Date/Time    COVID PRE-OP / PRE-PROCEDURE SCREENING ORDER (NO ISOLATION) - Swab, Nasopharynx [[id number]]  (Normal) Collected: 11/07/22 2307    Lab Status: Final result Specimen: Swab from Nasopharynx Updated: 11/07/22 8539    Narrative:      The following orders  were created for panel order COVID PRE-OP / PRE-PROCEDURE SCREENING ORDER (NO ISOLATION) - Swab, Nasopharynx.  Procedure                               Abnormality         Status                     ---------                               -----------         ------                     COVID-19 and FLU A/B PCR...[[id number]]  Normal              Final result                 Please view results for these tests on the individual orders.    COVID-19 and FLU A/B PCR - Swab, Nasopharynx [[id number]]  (Normal) Collected: 11/07/22 2307    Lab Status: Final result Specimen: Swab from Nasopharynx Updated: 11/07/22 3553     COVID19 Not Detected     Influenza A PCR Not Detected     Influenza B PCR Not Detected    Narrative:      Fact sheet for providers: https://www.fda.gov/media/446286/download    Fact sheet for patients: https://www.fda.gov/media/334044/download    Test performed by PCR.          CT Head Without Contrast    Result Date: 11/7/2022  EXAMINATION:  CT HEAD WO CONTRAST DATE: 11/7/2022 11:11 PM  INDICATION: Syncope and confusion  COMPARISON: Head CT November 4, 2022  TECHNIQUE:  Routine axial images through the head without contrast. Low-dose CT acquisition technique included one or more of the following options: 1. Automated exposure control, 2. Adjustment of mA and/or KV according to patient's size and/or 3. Use of iterative reconstruction. FINDINGS:  Intracranial Contents: Moderate generalized volume loss. Confluent hypodensities in the supratentorial white matter, most commonly related to chronic small vessel ischemic changes. Chronic lacunar infarcts in the left thalamus. Additional chronic infarct in the right cerebellum.   Gray-white differentiation is intact.   No acute intracranial hemorrhage. No mass effect, midline shift, hydrocephalus, herniation, or extra axial fluid collection.  Bones and Extracranial Soft Tissues: The calvarium is intact. Posterior midline scalp laceration, unchanged. Visualized  paranasal sinuses and mastoid air cells are clear.  Visualized intraorbital contents are unremarkable. Distal internal carotid artery atherosclerotic calcifications.     Impression: 1. No acute intracranial abnormality. No changes compared to the head CT on November 4, 2022. 2. Volume loss and moderate chronic small vessel ischemic changes. Chronic left thalamic and right cerebellar lacunar infarcts. Electronically signed by:  Sridhar Beyer M.D.  11/7/2022 9:35 PM Mountain Time    XR Chest 1 View    Result Date: 11/8/2022  EXAMINATION: XR CHEST 1 VW  DATE: 11/7/2022 11:15 PM INDICATIONS: Syncope. COMPARISON: None FINDINGS: Lungs: Normal. Pleura: Normal. Heart: Borderline heart size. Aorta: Atherosclerotic and left-sided. Mediastinum and Kalyani: Normal. Osseous: Stable. Devices: None. Other: A subcentimeter calcification projects over the right diaphragm.     Impression: 1. Stable borderline heart size. 2. No acute thoracic process. Electronically signed by:  Genaro Fuentes M.D.  11/7/2022 10:04 PM Mountain Time      Results for orders placed during the hospital encounter of 03/24/22    Adult Transthoracic Echo Complete W/ Cont if Necessary Per Protocol    Interpretation Summary  · Estimated left ventricular EF = 60%  · No hemodynamically significant valvular heart disease      Assessment & Plan   Assessment & Plan       Frequent falls    Hypertension    History of Stroke    Type 2 diabetes mellitus without complication (HCC)    Hyperlipidemia    Hypokalemia    Frequent Falls  Failure to Thrive  History of of remote CVA  - CT head without acute abnormalities but demonstrates volume loss and moderate chronic small vessel ischemic changes. Chronic left thalamic and right cerebellar lacunar infarcts.  - continue ASA and high intensity statin  - obtain MRI brain  - hold antihypertensives  - hold seroquel  - obtain orthostatic blood pressures  - UA  - gentle hydration overnight  - PT/OT  - neuro checks  - consider neurology  input  - am labs    Hypokalemia  - replace as needed per protocol. No EKG changes  - check magnesium    HTN  - stable. Holding lisinopril    DM2  - stable. Currently diet controlled  - check Hb a1c      DVT prophylaxis: mechanical     CODE STATUS:  Full code  Level Of Support Discussed With: Patient  Code Status (Patient has no pulse and is not breathing): CPR (Attempt to Resuscitate)  Medical Interventions (Patient has pulse or is breathing): Full Support      This note has been completed as part of a split-shared workflow.     Electronically signed by Phan Ramos PA-C, 11/08/22, 2:47 AM EST.        Attending   Admission Attestation       I have performed an independent face-to-face diagnostic evaluation including performing an independent physical examination as documented here.  The documented plan of care above was reviewed and developed with the advanced practice clinician (APC).      Brief Summary Statement:   Susan Tucker is a 87 y.o. female patient with past medical history of dementia, hx of CVA, HTN, HLD and T2DM.  Patient is a resident at [facility].  Patient recently moved to [facility] last Wednesday.  Patient was brought to [facility] due to being obtunded.  Patient daughter had reported to the ED staff the patient has had multiple falls in the past 4 to 5 days.  Fallen out of her wheelchair multiple times.  Upon arrival to the ED at 1 AM she underwent neurological evaluation and had a CT of the head that noted no acute abnormalities.  Daughter had also reported to the ED staff the patient had been hypotensive and bradycardic with a heart rate of 50 and systolic blood pressure of 84.  Patient Seroquel dose was recently been increased.  We will admit patient hospital service and hold Seroquel for now.  Do neurochecks.  At time of attending evaluation patient will was responsive to verbal stimuli.  And able to answer yes and no.  She was oriented to person and place.  We will  continue to hold Seroquel. Due to Hx of CVA we will obtain stat MRI of the brain. And consult gen neurology vs stroke neurology based on results.     Remainder of detailed HPI is as noted by APC and has been reviewed and/or edited by me for completeness.    Attending Physical Exam:  Temp:  [97.7 °F (36.5 °C)] 97.7 °F (36.5 °C)  Heart Rate:  [72-87] 87  Resp:  [16] 16  BP: (102-129)/() 129/68    Constitutional: Awake, alert  Eyes: PERRLA, sclerae anicteric, no conjunctival injection  HENT: NCAT, mucous membranes moist  Neck: Supple, no thyromegaly, no lymphadenopathy, trachea midline  Respiratory: Clear to auscultation bilaterally, nonlabored respirations   Cardiovascular: RRR, no murmurs, rubs, or gallops, palpable pedal pulses bilaterally  Gastrointestinal: Positive bowel sounds, soft, nontender, nondistended  Musculoskeletal: No bilateral ankle edema, no clubbing or cyanosis to extremities  Psychiatric: Appropriate affect, cooperative  Neurologic: Oriented x 2,  Speech is limited to 1 word answers, not able to follow commands.   Skin: No rashes      Brief Assessment/Plan :  See detailed assessment and plan developed with APC which I have reviewed and/or edited for completeness.        Yin Leal,   11/08/22

## 2022-11-08 NOTE — THERAPY EVALUATION
Acute Care - Speech Language Pathology   Swallow Initial Evaluation [facility]   Clinical Swallow Evaluation       Patient Name: Susan Tucker  : 1935  MRN: [id number]  Today's Date: 2022               Admit Date: 2022    Visit Dx:     ICD-10-CM ICD-9-CM   1. Frequent falls  R29.6 V15.88   2. Acute encephalopathy  G93.40 348.30   3. Adverse effect of drug, initial encounter  T50.905A E947.9     Patient Active Problem List   Diagnosis   • Sepsis (HCC)   • Elevated liver enzymes   • Dehydration   • Hypertension   • History of Stroke   • UTI (urinary tract infection)   • Calculus of bile duct with acute cholecystitis and obstruction   • Enterocolitis   • Gallstone ileus of small intestine (HCC)   • Syncope and collapse   • Frequent falls   • Type 2 diabetes mellitus without complication (HCC)   • Hyperlipidemia   • Hypokalemia     Past Medical History:   Diagnosis Date   • Diabetes mellitus (HCC)    • Elevated cholesterol    • Hypertension    • Stroke (HCC)      Past Surgical History:   Procedure Laterality Date   • ERCP N/A 2018    Procedure: ENDOSCOPIC RETROGRADE CHOLANGIOPANCREATOGRAPHY;  Surgeon: Liam Manning MD;  Location: [facility] ENDOSCOPY;  Service: Gastroenterology   • EXPLORATORY LAPAROTOMY N/A 2/10/2020    Procedure: LAPAROTOMY EXPLORATORY BOWEL RESECTION;  Surgeon: Carlos Zurita MD;  Location: [facility] OR;  Service: General;  Laterality: N/A;   • HYSTERECTOMY         SLP Recommendation and Plan  SLP Swallowing Diagnosis: swallow WFL/no suspected pharyngeal impairment (22)  SLP Diet Recommendation: regular textures, thin liquids (22)  Recommended Precautions and Strategies: upright posture during/after eating, small bites of food and sips of liquid (22)  SLP Rec. for Method of Medication Administration: meds whole, as tolerated (22)     Monitor for Signs of Aspiration: yes, notify SLP if any concerns (22)     Swallow  Criteria for Skilled Therapeutic Interventions Met: no problems identified which require skilled intervention (11/08/22 1500)  Anticipated Discharge Disposition (SLP): unknown (11/08/22 1500)  Rehab Potential/Prognosis, Swallowing: adequate, monitor progress closely (11/08/22 1500)  Therapy Frequency (Swallow): evaluation only (11/08/22 1500)                                           Plan of Care Reviewed With: patient  Progress:  (eval)      SWALLOW EVALUATION (last 72 hours)     SLP Adult Swallow Evaluation     Row Name 11/08/22 1500                   Rehab Evaluation    Document Type evaluation  -AV        Subjective Information no complaints  -AV        Patient Observations alert;cooperative  confused  -AV        Patient/Family/Caregiver Comments/Observations no family present  -AV        Patient Effort good  -AV           General Information    Patient Profile Reviewed yes  -AV        Pertinent History Of Current Problem CVA, Dementia, HTN, SOB  -AV        Current Method of Nutrition NPO  -AV        Precautions/Limitations, Vision WFL with corrective lenses  -AV        Precautions/Limitations, Hearing WFL;for purposes of eval  -AV        Prior Level of Function-Communication cognitive-linguistic impairment  baseline dementia  -AV        Prior Level of Function-Swallowing no diet consistency restrictions  -AV        Plans/Goals Discussed with patient  -AV        Barriers to Rehab medically complex;cognitive status  -AV        Patient's Goals for Discharge patient did not state  -AV           Pain    Additional Documentation Pain Scale: FACES Pre/Post-Treatment (Group)  -AV           Pain Scale: FACES Pre/Post-Treatment    Pain: FACES Scale, Pretreatment 0-->no hurt  -AV        Posttreatment Pain Rating 0-->no hurt  -AV           Oral Motor Structure and Function    Dentition Assessment missing teeth  -AV        Secretion Management WNL/WFL  -AV        Mucosal Quality moist, healthy  -AV        Volitional Swallow  WFL  -AV        Volitional Cough WFL  -AV           Oral Musculature and Cranial Nerve Assessment    Oral Motor General Assessment WFL  -AV           General Eating/Swallowing Observations    Eating/Swallowing Skills fed by SLP;self-fed  -AV        Positioning During Eating upright 90 degree  -AV        Utensils Used spoon;cup;straw  -AV        Consistencies Trialed regular textures;pureed;thin liquids  -AV           Clinical Swallow Eval    Oral Prep Phase WFL  -AV        Oral Transit WFL  -AV        Oral Residue WFL  -AV        Pharyngeal Phase no overt signs/symptoms of pharyngeal impairment  -AV        Esophageal Phase unremarkable  -AV           SLP Evaluation Clinical Impression    SLP Swallowing Diagnosis swallow WFL/no suspected pharyngeal impairment  -AV        Functional Impact no impact on function  -AV        Rehab Potential/Prognosis, Swallowing adequate, monitor progress closely  -AV        Swallow Criteria for Skilled Therapeutic Interventions Met no problems identified which require skilled intervention  -AV           Recommendations    Therapy Frequency (Swallow) evaluation only  -AV        SLP Diet Recommendation regular textures;thin liquids  -AV        Recommended Precautions and Strategies upright posture during/after eating;small bites of food and sips of liquid  -AV        Oral Care Recommendations Oral Care BID/PRN  -AV        SLP Rec. for Method of Medication Administration meds whole;as tolerated  -AV        Monitor for Signs of Aspiration yes;notify SLP if any concerns  -AV        Anticipated Discharge Disposition (SLP) unknown  -AV              User Key  (r) = Recorded By, (t) = Taken By, (c) = Cosigned By    Initials Name Effective Dates    AV Jenny Stroud MS CCC-SLP 06/16/21 -                 EDUCATION  The patient has been educated in the following areas:   Dysphagia (Swallowing Impairment) Oral Care/Hydration.        Patient was not wearing a face mask and did not exhibit  coughing during this therapy encounter.  Procedure performed was not aerosolizing, involved close contact (within 6 feet for at least 15 minutes or longer), and did not involve contact with infectious secretions or specimens.  Therapist used appropriate personal protective equipment including gloves, standard procedure mask and eye protection.  Appropriate PPE was worn during the entire therapy session.  Hand hygiene was completed before and after therapy session.          Time Calculation:    Time Calculation- SLP     Row Name 11/08/22 1518             Time Calculation- SLP    SLP Start Time 1500  -AV      SLP Received On 11/08/22  -AV         Untimed Charges    SLP Eval/Re-eval  ST Eval Oral Pharyng Swallow - 18109  -AV      [id number] Eval Oral Pharyng Swallow Minutes 30  -AV         Total Minutes    Untimed Charges Total Minutes 30  -AV       Total Minutes 30  -AV            User Key  (r) = Recorded By, (t) = Taken By, (c) = Cosigned By    Initials Name Provider Type    AV Jenny Stroud, MS CCC-SLP Speech and Language Pathologist                Therapy Charges for Today     Code Description Service Date Service Provider Modifiers Qty    [id number]  ST EVAL ORAL PHARYNG SWALLOW 2 11/8/2022 Jenny Stroud MS CCC-SLP GN 1               Jenny Bhatia MS CCC-SLP  11/8/2022

## 2022-11-08 NOTE — SIGNIFICANT NOTE
MRI brain without contrast reviewed.  No evidence of stroke.  If patient continues to have decreased LOC please consult general neurology.    Sagrario Anthony MSN, APRN, AGACNP-BC  Stroke Neurology

## 2022-11-08 NOTE — THERAPY EVALUATION
Patient Name: Susan Tucker  : 1935    MRN: [id number]                              Today's Date: 2022       Admit Date: 2022    Visit Dx:     ICD-10-CM ICD-9-CM   1. Frequent falls  R29.6 V15.88   2. Acute encephalopathy  G93.40 348.30   3. Adverse effect of drug, initial encounter  T50.905A E947.9     Patient Active Problem List   Diagnosis   • Sepsis (HCC)   • Elevated liver enzymes   • Dehydration   • Hypertension   • History of Stroke   • UTI (urinary tract infection)   • Calculus of bile duct with acute cholecystitis and obstruction   • Enterocolitis   • Gallstone ileus of small intestine (HCC)   • Syncope and collapse   • Frequent falls   • Type 2 diabetes mellitus without complication (HCC)   • Hyperlipidemia   • Hypokalemia     Past Medical History:   Diagnosis Date   • Diabetes mellitus (HCC)    • Elevated cholesterol    • Hypertension    • Stroke (HCC)      Past Surgical History:   Procedure Laterality Date   • ERCP N/A 2018    Procedure: ENDOSCOPIC RETROGRADE CHOLANGIOPANCREATOGRAPHY;  Surgeon: Liam Manning MD;  Location:  [facility];  Service: Gastroenterology   • EXPLORATORY LAPAROTOMY N/A 2/10/2020    Procedure: LAPAROTOMY EXPLORATORY BOWEL RESECTION;  Surgeon: Carlos Zurita MD;  Location:  [facility];  Service: General;  Laterality: N/A;   • HYSTERECTOMY        General Information     Row Name 22          Physical Therapy Time and Intention    Document Type evaluation  -ML     Mode of Treatment physical therapy  -ML     Row Name 22          General Information    Patient Profile Reviewed yes  -ML     Prior Level of Function independent:;all household mobility;gait;transfer;ADL's  Patient reports using walker at baseline, patient poor historian  -ML     Existing Precautions/Restrictions fall  -ML     Barriers to Rehab cognitive status  -ML     Row Name 22          Living Environment    People in Home other (see comments)  patient  reports living with her spouse, per previous notes patient lives with children, no family in room to confirm home setup  -     Row Name 11/08/22 0904          Stairs Within Home, Primary    Stairs, Within Home, Primary Patient reports living in a split level home, from previous notes patient lives in a single level home  -     Row Name 11/08/22 0904          Cognition    Orientation Status (Cognition) oriented to;person;verbal cues/prompts needed for orientation;place;situation;time  -     Row Name 11/08/22 0904          Safety Issues, Functional Mobility    Safety Issues Affecting Function (Mobility) awareness of need for assistance;insight into deficits/self-awareness;judgment;positioning of assistive device;safety precaution awareness;safety precautions follow-through/compliance;sequencing abilities  -     Impairments Affecting Function (Mobility) balance;cognition;postural/trunk control;strength;endurance/activity tolerance  -     Cognitive Impairments, Mobility Safety/Performance awareness, need for assistance;insight into deficits/self-awareness;judgment;problem-solving/reasoning;safety precaution awareness;safety precaution follow-through;sequencing abilities  -           User Key  (r) = Recorded By, (t) = Taken By, (c) = Cosigned By    Initials Name Provider Type     Litzy Puga Physical Therapist               Mobility     Row Name 11/08/22 0908          Bed Mobility    Bed Mobility supine-sit  -ML     Supine-Sit Arthur (Bed Mobility) verbal cues;minimum assist (75% patient effort);1 person assist  -ML     Assistive Device (Bed Mobility) head of bed elevated  -     Row Name 11/08/22 0908          Bed-Chair Transfer    Bed-Chair Arthur (Transfers) verbal cues;nonverbal cues (demo/gesture);maximum assist (25% patient effort);1 person assist  -ML     Assistive Device (Bed-Chair Transfers) other (see comments)  UE support/gait belt  -ML     Comment, (Bed-Chair Transfer) Patient  completed modified squat pivot transfer from EOB to chair  -ML     Row Name 11/08/22 0908          Sit-Stand Transfer    Sit-Stand Keith (Transfers) verbal cues;nonverbal cues (demo/gesture);maximum assist (25% patient effort);1 person assist  -ML     Assistive Device (Sit-Stand Transfers) walker, front-wheeled;other (see comments)  UE support  -ML     Comment, (Sit-Stand Transfer) Initially patient attempted sit to stand transfer with RW, however, patient with pusing posteriorly.  -ML     Row Name 11/08/22 0908          Gait/Stairs (Locomotion)    Keith Level (Gait) unable to assess  -ML           User Key  (r) = Recorded By, (t) = Taken By, (c) = Cosigned By    Initials Name Provider Type    ML Litzy Puga Physical Therapist               Obj/Interventions     Row Name 11/08/22 0911          Range of Motion Comprehensive    General Range of Motion bilateral lower extremity ROM WNL  -ML     Row Name 11/08/22 0911          Strength Comprehensive (MMT)    General Manual Muscle Testing (MMT) Assessment lower extremity strength deficits identified  -ML     Comment, General Manual Muscle Testing (MMT) Assessment BLE at least 3/5 observed with mobility, unable to complete formal MMT due to cognitive status  -ML     Row Name 11/08/22 0911          Balance    Balance Assessment sitting static balance;sitting dynamic balance;sit to stand dynamic balance;standing static balance  -ML     Static Sitting Balance contact guard;verbal cues;non-verbal cues (demo/gesture);1-person assist  -ML     Dynamic Sitting Balance verbal cues;non-verbal cues (demo/gesture);minimal assist;1-person assist  -ML     Position, Sitting Balance unsupported;sitting edge of bed  -ML     Sit to Stand Dynamic Balance verbal cues;non-verbal cues (demo/gesture);maximum assist;1-person assist  -ML     Static Standing Balance verbal cues;non-verbal cues (demo/gesture);maximum assist;1-person assist  -ML     Position/Device Used, Standing  Balance supported;other (see comments)  gait belt/UE support  -ML     Balance Interventions sitting;standing;sit to stand;supported;static;occupation based/functional task  -ML           User Key  (r) = Recorded By, (t) = Taken By, (c) = Cosigned By    Initials Name Provider Type    Litzy Colvin Physical Therapist               Goals/Plan     Row Name 11/08/22 0917          Bed Mobility Goal 1 (PT)    Activity/Assistive Device (Bed Mobility Goal 1, PT) bed mobility activities, all  -ML     [place] Level/Cues Needed (Bed Mobility Goal 1, PT) standby assist  -ML     Time Frame (Bed Mobility Goal 1, PT) 10 days  -ML     Row Name 11/08/22 0917          Transfer Goal 1 (PT)    Activity/Assistive Device (Transfer Goal 1, PT) sit-to-stand/stand-to-sit;bed-to-chair/chair-to-bed;walker, rolling  -ML     [place] Level/Cues Needed (Transfer Goal 1, PT) verbal cues required;contact guard required  -ML     Time Frame (Transfer Goal 1, PT) 10 days  -ML     Row Name 11/08/22 0917          Gait Training Goal 1 (PT)    Activity/Assistive Device (Gait Training Goal 1, PT) gait (walking locomotion);assistive device use;decrease fall risk;improve balance and speed;walker, rolling  -ML     [place] Level (Gait Training Goal 1, PT) contact guard required  -ML     Distance (Gait Training Goal 1, PT) 50  -ML     Time Frame (Gait Training Goal 1, PT) 10 days  -ML     Row Name 11/08/22 0917          Therapy Assessment/Plan (PT)    Planned Therapy Interventions (PT) balance training;bed mobility training;gait training;home exercise program;neuromuscular re-education;patient/family education;postural re-education;ROM (range of motion);strengthening;transfer training;wheelchair management/propulsion training  -ML           User Key  (r) = Recorded By, (t) = Taken By, (c) = Cosigned By    Initials Name Provider Type    Litzy Colvin Physical Therapist               Clinical Impression     Row Name 11/08/22 0913          Pain     Pretreatment Pain Rating 0/10 - no pain  -ML     Posttreatment Pain Rating 0/10 - no pain  -ML     Row Name 11/08/22 0913          Plan of Care Review    Plan of Care Reviewed With patient  -ML     Outcome Evaluation Physical therapy evaluation complete. The patient orientated to self, cues for time and place. Patient able to follow commands in order to participate in PT evaluation. Patient requird Ling for supine to sit transfer. Patient attempted sit to stand transfer with RW, however, patient pushing posteriorly. Patient required verbal and tactile cues to complete modified squat pivot transfer from EOB to chair with maxA. Patient presents with decreased strength, balance and activity tolerance affecting functional mobility. Patient would continue to benefit from skilled PT to address deficits. At hospital discharge recommend SNF.  -ML     Row Name 11/08/22 0913          Therapy Assessment/Plan (PT)    Rehab Potential (PT) fair, will monitor progress closely  -     Criteria for Skilled Interventions Met (PT) yes;meets criteria;skilled treatment is necessary  -ML     Therapy Frequency (PT) daily  -ML     Row Name 11/08/22 0913          Vital Signs    Pre Patient Position Supine  -ML     Intra Patient Position Standing  -ML     Post Patient Position Sitting  -ML     Row Name 11/08/22 0913          Positioning and Restraints    Pre-Treatment Position in bed  -ML     Post Treatment Position chair  -ML     In Chair notified nsg;reclined;call light within reach;encouraged to call for assist;exit alarm on;waffle cushion;legs elevated  -ML           User Key  (r) = Recorded By, (t) = Taken By, (c) = Cosigned By    Initials Name Provider Type    Litzy Colvin Physical Therapist               Outcome Measures     Row Name 11/08/22 0920          How much help from another person do you currently need...    Turning from your back to your side while in flat bed without using bedrails? 3  -ML     Moving from lying on  back to sitting on the side of a flat bed without bedrails? 3  -ML     Moving to and from a bed to a chair (including a wheelchair)? 2  -ML     Standing up from a chair using your arms (e.g., wheelchair, bedside chair)? 2  -ML     Climbing 3-5 steps with a railing? 1  -ML     To walk in hospital room? 1  -ML     AM-PAC 6 Clicks Score (PT) 12  -ML     Highest level of mobility 4 --> Transferred to chair/commode  -ML     Row Name 11/08/22 0920          Functional Assessment    Outcome Measure Options AM-PAC 6 Clicks Basic Mobility (PT)  -ML           User Key  (r) = Recorded By, (t) = Taken By, (c) = Cosigned By    Initials Name Provider Type    Litzy Colvin Physical Therapist                             Physical Therapy Education     Title: PT OT SLP Therapies (In Progress)     Topic: Physical Therapy (In Progress)     Point: Mobility training (In Progress)     Learning Progress Summary           Patient Acceptance, E, NR by  at 11/8/2022 0921                   Point: Home exercise program (Not Started)     Learner Progress:  Not documented in this visit.          Point: Body mechanics (In Progress)     Learning Progress Summary           Patient Acceptance, E, NR by ML at 11/8/2022 0921                   Point: Precautions (In Progress)     Learning Progress Summary           Patient Acceptance, E, NR by ML at 11/8/2022 0921                               User Key     Initials Effective Dates Name Provider Type Critical access hospital 04/22/21 -  Litzy Puga Physical Therapist PT              PT Recommendation and Plan  Planned Therapy Interventions (PT): balance training, bed mobility training, gait training, home exercise program, neuromuscular re-education, patient/family education, postural re-education, ROM (range of motion), strengthening, transfer training, wheelchair management/propulsion training  Plan of Care Reviewed With: patient  Outcome Evaluation: Physical therapy evaluation complete. The patient  orientated to self, cues for time and place. Patient able to follow commands in order to participate in PT evaluation. Patient requird Ling for supine to sit transfer. Patient attempted sit to stand transfer with RW, however, patient pushing posteriorly. Patient required verbal and tactile cues to complete modified squat pivot transfer from EOB to chair with maxA. Patient presents with decreased strength, balance and activity tolerance affecting functional mobility. Patient would continue to benefit from skilled PT to address deficits. At hospital discharge recommend SNF.     Time Calculation:    PT Charges     Row Name 11/08/22 0922             Time Calculation    Start Time 0820  -ML      PT Received On 11/08/22  -ML      PT Goal Re-Cert Due Date 11/18/22  -ML         Timed Charges    81903 - PT Therapeutic Activity Minutes 15  -ML         Untimed Charges    PT Eval/Re-eval Minutes 46  -ML         Total Minutes    Timed Charges Total Minutes 15  -ML      Untimed Charges Total Minutes 46  -ML       Total Minutes 61  -ML            User Key  (r) = Recorded By, (t) = Taken By, (c) = Cosigned By    Initials Name Provider Type    Litzy Colvin Physical Therapist              Therapy Charges for Today     Code Description Service Date Service Provider Modifiers Qty    19378754487 HC PT THERAPEUTIC ACT EA 15 MIN 11/8/2022 Litzy Puga GP 1    50693375853 HC PT EVAL MOD COMPLEXITY 4 11/8/2022 Litzy Puga GP 1          PT G-Codes  Outcome Measure Options: AM-PAC 6 Clicks Basic Mobility (PT)  AM-PAC 6 Clicks Score (PT): 12    Litzy Puga  11/8/2022

## 2022-11-08 NOTE — ED PROVIDER NOTES
Subjective   History of Present Illness  87-year-old female who presents for evaluation of syncope and recent frequent falls.  The patient due to underlying dementia [facility]  The patient began to have frequent falls last Friday, 4 days ago.  The patient was evaluated once early Friday morning at 1 AM and had a CT scan of the head without contrast that showed no acute abnormalities.  The patient ultimately sent back to her nursing facility at roughly 3 AM  and within a couple hours began to have nausea and vomiting and therefore was sent back to the ER.  Repeat CT scan of the head without contrast did not show any acute abnormalities.  The patient reportedly the day fell and was identified to have a blood pressure of 84 systolic with a heart rate of 50 and was very difficult to respond.  They used an ammonia capsule and eventually the patient was able to awake and interact.  The patient currently has received her nightly dose of Seroquel.  Per report from the daughter she just received her dose of Seroquel before she passed out this evening and they did not feel like this was a contributor because they did not feel like it had been on board 11.  There is no report of recent fever.  The patient is somnolent but will open her eyes follow commands and does not exhibit any focal weakness to the face, arms, or legs.  No new acute urinary symptoms have been identified include no dysuria, frequency, urgency.  On the patient's second visit to the ER 3 days ago she had a urine sample that was negative for infection.  Besides the Seroquel being increased from 25 mg to 50 mg the daughter denies any other acute medication changes.        Review of Systems   Constitutional: Positive for activity change and fatigue. Negative for chills and fever.   HENT: Negative for congestion, ear pain, postnasal drip, sinus pressure and sore throat.    Eyes: Negative for pain, redness and visual disturbance.    Respiratory: Negative for cough, chest tightness and shortness of breath.    Cardiovascular: Negative for chest pain, palpitations and leg swelling.   Gastrointestinal: Negative for abdominal pain, anal bleeding, blood in stool, diarrhea, nausea and vomiting.   Endocrine: Negative for polydipsia and polyuria.   Genitourinary: Negative for difficulty urinating, dysuria, frequency and urgency.   Musculoskeletal: Negative for arthralgias, back pain and neck pain.   Skin: Negative for pallor and rash.   Allergic/Immunologic: Negative for environmental allergies and immunocompromised state.   Neurological: Negative for dizziness, weakness and headaches.   Hematological: Negative for adenopathy.   Psychiatric/Behavioral: Positive for confusion and decreased concentration. Negative for self-injury and suicidal ideas. The patient is not nervous/anxious.    All other systems reviewed and are negative.      Past Medical History:   Diagnosis Date   • Diabetes mellitus (HCC)    • Elevated cholesterol    • Hypertension    • Stroke (HCC)        Allergies   Allergen Reactions   • Amoxicillin Hives   • Atorvastatin Nausea Only   • Cephalexin Rash       Past Surgical History:   Procedure Laterality Date   • ERCP N/A 6/27/2018    Procedure: ENDOSCOPIC RETROGRADE CHOLANGIOPANCREATOGRAPHY;  Surgeon: Liam Manning MD;  Location: [facility];  Service: Gastroenterology   • EXPLORATORY LAPAROTOMY N/A 2/10/2020    Procedure: LAPAROTOMY EXPLORATORY BOWEL RESECTION;  Surgeon: Carlos Zurita MD;  Location: [facility] OR;  Service: General;  Laterality: N/A;   • HYSTERECTOMY         Family History   Problem Relation Age of Onset   • Hypertension Mother    • Hypertension Father    • Early death Brother    • Hypertension Maternal Grandmother    • Hypertension Paternal Grandmother    • Learning disabilities Other        Social History     Socioeconomic History   • Marital status:    Tobacco Use   • Smoking status: Never   •  Smokeless tobacco: Never   Substance and Sexual Activity   • Alcohol use: No   • Drug use: No           Objective   Physical Exam  Vitals and nursing note reviewed.   Constitutional:       General: She is not in acute distress.     Appearance: Normal appearance. She is well-developed. She is ill-appearing. She is not toxic-appearing or diaphoretic.   HENT:      Head: Normocephalic and atraumatic.      Right Ear: External ear normal.      Left Ear: External ear normal.      Nose: Nose normal.   Eyes:      General: Lids are normal.      Pupils: Pupils are equal, round, and reactive to light.   Neck:      Trachea: No tracheal deviation.   Cardiovascular:      Rate and Rhythm: Normal rate and regular rhythm.      Pulses: No decreased pulses.      Heart sounds: Normal heart sounds. No murmur heard.    No friction rub. No gallop.   Pulmonary:      Effort: Pulmonary effort is normal. No respiratory distress.      Breath sounds: Normal breath sounds. No decreased breath sounds, wheezing, rhonchi or rales.   Abdominal:      General: Bowel sounds are normal.      Palpations: Abdomen is soft.      Tenderness: There is no abdominal tenderness. There is no guarding or rebound.   Musculoskeletal:         General: No deformity. Normal range of motion.      Cervical back: Normal range of motion and neck supple.   Lymphadenopathy:      Cervical: No cervical adenopathy.   Skin:     General: Skin is warm and dry.      Findings: No rash.   Neurological:      Mental Status: She is oriented to person, place, and time. She is lethargic.      GCS: GCS eye subscore is 4. GCS verbal subscore is 4. GCS motor subscore is 6.      Cranial Nerves: No cranial nerve deficit.      Sensory: No sensory deficit.   Psychiatric:         Speech: Speech normal.         Behavior: Behavior normal.         Thought Content: Thought content normal.         Judgment: Judgment normal.         Procedures           ED Course                                            MDM  Number of Diagnoses or Management Options  Acute encephalopathy: new and requires workup  Adverse effect of drug, initial encounter: new and requires workup  Frequent falls: new and requires workup  Diagnosis management comments: The patient is acutely altered, most likely felt to be secondary to Seroquel.  She does not exhibit any focal neurological deficits.  No significant or acute abnormalities identified on vital signs, lab evaluation, or imaging evaluation.    Discussed the patient with the hospitalist.  Hospital service will consult on the patient to determine status admission.       Amount and/or Complexity of Data Reviewed  Clinical lab tests: ordered and reviewed  Tests in the radiology section of CPT®: ordered and reviewed  Decide to obtain previous medical records or to obtain history from someone other than the patient: yes  Obtain history from someone other than the patient: yes  Review and summarize past medical records: yes  Discuss the patient with other providers: yes  Independent visualization of images, tracings, or specimens: yes        Final diagnoses:   Frequent falls   Acute encephalopathy   Adverse effect of drug, initial encounter       ED Disposition  ED Disposition     ED Disposition   Decision to Admit    Condition   --    Comment   Level of Care: Telemetry [5]   Diagnosis: Frequent falls [252406]   Admitting Physician: STAN REYNOSO [[id number]]   Attending Physician: STAN REYNOSO [[id number]]   Bed Request Comments: tele               No follow-up provider specified.       Medication List      No changes were made to your prescriptions during this visit.          Kirt Rayo MD  11/08/22 1400

## 2022-11-08 NOTE — PLAN OF CARE
Goal Outcome Evaluation:  Plan of Care Reviewed With: patient        Progress:  (eval)       SLP evaluation completed. Will sign-off dysphagia. Please see note for further details and recommendations.

## 2022-11-08 NOTE — PROGRESS NOTES
[facility]  ADMISSION FOLLOW-UP NOTE          Patient admitted after midnight, H&P by my partner performed earlier on today's date reviewed.  Interim findings, labs, and charting also reviewed.        The [facility] Hospital Problem List has been managed and updated to include any new diagnoses:  Active Hospital Problems    Diagnosis  POA   • **Frequent falls [R29.6]  Not Applicable   • Hypokalemia [E87.6]  Yes   • Hypertension [I10]  Yes   • History of Stroke [I63.9]  Yes   • Type 2 diabetes mellitus without complication (HCC) [E11.9]  Yes   • Hyperlipidemia [E78.5]  Yes      Resolved Hospital Problems   No resolved problems to display.         ADDITIONAL PLAN:  - detailed assessment and plan from admission reviewed  Susan Tucker is a 87 y.o. female patient with past medical history of dementia, hx of CVA, HTN, HLD and T2DM.  Patient is a resident at [facility].  Patient recently moved to [facility] last Wednesday.  Patient was brought to [facility] due to being obtunded.    Frequent Falls  Failure to Thrive  History of of remote CVA  - CT head without acute abnormalities but demonstrates volume loss and moderate chronic small vessel ischemic changes. Chronic left thalamic and right cerebellar lacunar infarcts.  - continue ASA and high intensity statin  - obtain MRI brain  - hold antihypertensives  - hold seroquel  - obtain orthostatic blood pressures  - UA pending  - gentle hydration overnight  - PT/OT  - neuro checks  - am labs     Hypokalemia  - replace as needed per protocol. No EKG changes  - check magnesium     HTN  - stable. Holding lisinopril     DM2  - stable. Currently diet controlled  - check Hb a1c    Sharda Mason DO  11/08/22

## 2022-11-08 NOTE — THERAPY EVALUATION
Patient Name: Susan Tucker  : 1935    MRN: [id number]                              Today's Date: 2022       Admit Date: 2022    Visit Dx:     ICD-10-CM ICD-9-CM   1. Frequent falls  R29.6 V15.88   2. Acute encephalopathy  G93.40 348.30   3. Adverse effect of drug, initial encounter  T50.905A E947.9     Patient Active Problem List   Diagnosis   • Sepsis (HCC)   • Elevated liver enzymes   • Dehydration   • Hypertension   • History of Stroke   • UTI (urinary tract infection)   • Calculus of bile duct with acute cholecystitis and obstruction   • Enterocolitis   • Gallstone ileus of small intestine (HCC)   • Syncope and collapse   • Frequent falls   • Type 2 diabetes mellitus without complication (HCC)   • Hyperlipidemia   • Hypokalemia     Past Medical History:   Diagnosis Date   • Diabetes mellitus (HCC)    • Elevated cholesterol    • Hypertension    • Stroke (HCC)      Past Surgical History:   Procedure Laterality Date   • ERCP N/A 2018    Procedure: ENDOSCOPIC RETROGRADE CHOLANGIOPANCREATOGRAPHY;  Surgeon: Liam Manning MD;  Location: [facility] ENDOSCOPY;  Service: Gastroenterology   • EXPLORATORY LAPAROTOMY N/A 2/10/2020    Procedure: LAPAROTOMY EXPLORATORY BOWEL RESECTION;  Surgeon: Carlos Zurita MD;  Location: [facility] OR;  Service: General;  Laterality: N/A;   • HYSTERECTOMY        General Information     Row Name 22 1114          OT Time and Intention    Document Type evaluation  -     Mode of Treatment occupational therapy  -     Row Name 22 1114          General Information    Patient Profile Reviewed yes  -MC     Prior Level of Function mod assist:;max assist:;ADL's;bed mobility;transfer;w/c or scooter  Pt poor historian, TBA further. Per CR, pt recently moved to [facility] and has had multiple recent falls, ED note states pt mainly uses w/c at baseline, TBA further.  -MC     Existing Precautions/Restrictions fall;other (see comments)  baseline dementia,  incontinent  -     Barriers to Rehab medically complex;cognitive status  -     Row Name 11/08/22 1114          Living Environment    People in Home facility resident  Pt recently moved to [facility] per CR, pt poor historian, ALE further.  -     Row Name 11/08/22 1114          Cognition    Orientation Status (Cognition) oriented to;person;disoriented to;place;situation;time;verbal cues/prompts needed for orientation  -     Row Name 11/08/22 1114          Safety Issues, Functional Mobility    Safety Issues Affecting Function (Mobility) awareness of need for assistance;insight into deficits/self-awareness;judgment;problem-solving;safety precaution awareness;safety precautions follow-through/compliance;sequencing abilities  -     Impairments Affecting Function (Mobility) balance;cognition;postural/trunk control;strength;endurance/activity tolerance  -     Cognitive Impairments, Mobility Safety/Performance attention;awareness, need for assistance;insight into deficits/self-awareness;safety precaution awareness;safety precaution follow-through;sequencing abilities;judgment;problem-solving/reasoning  -           User Key  (r) = Recorded By, (t) = Taken By, (c) = Cosigned By    Initials Name Provider Type     Emi Doss OT Occupational Therapist                 Mobility/ADL's     Row Name 11/08/22 1119          Bed Mobility    Bed Mobility scooting/bridging;sit-supine  -     Scooting/Bridging [place] (Bed Mobility) maximum assist (25% patient effort);2 person assist;verbal cues  -     Sit-Supine [place] (Bed Mobility) maximum assist (25% patient effort);1 person assist;verbal cues  -     Bed Mobility, Safety Issues cognitive deficits limit understanding;decreased use of arms for pushing/pulling;decreased use of legs for bridging/pushing;impaired trunk control for bed mobility  -     Assistive Device (Bed Mobility) head of bed elevated;bed rails;draw sheet  -     Row Name  11/08/22 [facility]9          Transfers    Transfers sit-stand transfer;stand-sit transfer;bed-chair transfer  -     Comment, (Transfers) Pt received sitting UIC, mod Ax2 for STS & SPT from chair-to-bed w/ BUE support & B foot block, significant posterior lean noted.  -     Row Name 11/08/22 1119          Bed-Chair Transfer    Bed-Chair [place] (Transfers) moderate assist (50% patient effort);2 person assist;verbal cues  chair-to-bed transfer  -     Assistive Device (Bed-Chair Transfers) other (see comments)  -     Row Name 11/08/22 1119          Sit-Stand Transfer    Sit-Stand [place] (Transfers) moderate assist (50% patient effort);2 person assist;verbal cues  -     Assistive Device (Sit-Stand Transfers) other (see comments)  -[facility] Name 11/08/22 1119          Stand-Sit Transfer    Stand-Sit [place] (Transfers) moderate assist (50% patient effort);2 person assist;verbal cues  -     Assistive Device (Stand-Sit Transfers) other (see comments)  -[facility] Name 11/08/22 [facility]9          Functional Mobility    Functional Mobility- Ind. Level unable to perform  -[facility] Name 11/08/22 1119          Activities of Daily Living    BADL Assessment/Intervention upper body dressing;toileting;grooming  -[facility] Name 11/08/22 [facility]9          Upper Body Dressing Assessment/Training    [place] Level (Upper Body Dressing) doff;don;other (see comments)  gown  -     Position (Upper Body Dressing) sitting up in bed  -     Comment, (Upper Body Dressing) Pt's gown soiled with urine upon entry d/t purwick malfunction. Pt max A for doffing gown and donning clean hospital gown.  -     Row Name 11/08/22 1119          Toileting Assessment/Training    [place] Level (Toileting) adjust/manage clothing;change pad/brief;perform perineal hygiene;dependent (less than 25% patient effort);verbal cues  -     Position (Toileting) sitting up in bed;supported standing  -[facility] Name 11/08/22 1119           Grooming Assessment/Training    [place] Level (Grooming) wash face, hands;set up;hair care, combing/brushing;moderate assist (50% patient effort);verbal cues  -     Position (Grooming) supported sitting  -           User Key  (r) = Recorded By, (t) = Taken By, (c) = Cosigned By    Initials Name Provider Type     Emi Doss OT Occupational Therapist               Obj/Interventions     Row Name 11/08/22 1122          Sensory Assessment (Somatosensory)    Sensory Assessment (Somatosensory) UE sensation intact  -     Row Name 11/08/22 1122          Vision Assessment/Intervention    Visual Impairment/Limitations WFL  -     Row Name 11/08/22 1122          Range of Motion Comprehensive    General Range of Motion bilateral upper extremity ROM WFL  -     Row Name 11/08/22 1122          Strength Comprehensive (MMT)    General Manual Muscle Testing (MMT) Assessment upper extremity strength deficits identified  -     Comment, General Manual Muscle Testing (MMT) Assessment Formal MMT limited d/t pt's cognitive status, BUE grossly 4-/5  -     Row Name 11/08/22 1122          Balance    Balance Assessment sitting static balance;sit to stand dynamic balance;standing static balance;standing dynamic balance  -     Static Sitting Balance contact guard  -     Position, Sitting Balance unsupported;sitting in chair;sitting edge of bed  -     Sit to Stand Dynamic Balance moderate assist;2-person assist;verbal cues  -     Static Standing Balance moderate assist;2-person assist;verbal cues  -     Dynamic Standing Balance moderate assist;2-person assist;verbal cues  -     Position/Device Used, Standing Balance supported  -     Balance Interventions sitting;sit to stand;occupation based/functional task  -           User Key  (r) = Recorded By, (t) = Taken By, (c) = Cosigned By    Initials Name Provider Type     Emi Doss OT Occupational Therapist               Goals/Plan     Row Name  11/08/22 1128          Bed Mobility Goal 1 (OT)    Activity/Assistive Device (Bed Mobility Goal 1, OT) sit to supine;supine to sit  -     [place] Level/Cues Needed (Bed Mobility Goal 1, OT) verbal cues required;minimum assist (75% or more patient effort)  -     Time Frame (Bed Mobility Goal 1, OT) long term goal (LTG);10 days  -     Progress/Outcomes (Bed Mobility Goal 1, OT) goal ongoing  -     Row Name 11/08/22 1128          Transfer Goal 1 (OT)    Activity/Assistive Device (Transfer Goal 1, OT) sit-to-stand/stand-to-sit;bed-to-chair/chair-to-bed;commode, bedside without drop arms;walker, rolling  -     [place] Level/Cues Needed (Transfer Goal 1, OT) minimum assist (75% or more patient effort);verbal cues required  -     Time Frame (Transfer Goal 1, OT) long term goal (LTG);10 days  -     Progress/Outcome (Transfer Goal 1, OT) goal ongoing  -     Row Name 11/08/22 1128          Toileting Goal 1 (OT)    Activity/Device (Toileting Goal 1, OT) adjust/manage clothing;perform perineal hygiene;commode, bedside without drop arms  -     [place] Level/Cues Needed (Toileting Goal 1, OT) moderate assist (50-74% patient effort);verbal cues required  -     Time Frame (Toileting Goal 1, OT) long term goal (LTG);10 days  -     Progress/Outcome (Toileting Goal 1, OT) goal ongoing  -     Row Name 11/08/22 1128          Therapy Assessment/Plan (OT)    Planned Therapy Interventions (OT) activity tolerance training;adaptive equipment training;BADL retraining;functional balance retraining;IADL retraining;occupation/activity based interventions;ROM/therapeutic exercise;strengthening exercise;transfer/mobility retraining;patient/caregiver education/training;cognitive/visual perception retraining  -           User Key  (r) = Recorded By, (t) = Taken By, (c) = Cosigned By    Initials Name Provider Type    Emi Soto OT Occupational Therapist               Clinical Impression     Row  Name 11/08/22 1123          Pain Assessment    Pretreatment Pain Rating 0/10 - no pain  -     Posttreatment Pain Rating 0/10 - no pain  -     Row Name 11/08/22 1123          Plan of Care Review    Plan of Care Reviewed With patient  -     Outcome Evaluation OT eval complete. Pt presents w/ increased confusion, decreased safety awareness, generalized weakness, postural control and balance deficits limiting her ADL independence. Pt SUA for washing face, mod A for brushing hair, max A for UBD, dep for toilet hygiene, mod Ax2 for STS & SPT from bed-to-chair w/ BUE support & B foot block w/ posterior lean noted. Pt would benefit from continued skilled IPOT services to address current functional defiicts. Rec SNF at d/c.  -     Row Name 11/08/22 1123          Therapy Assessment/Plan (OT)    Rehab Potential (OT) good, to achieve stated therapy goals  -     Criteria for Skilled Therapeutic Interventions Met (OT) yes;skilled treatment is necessary  -     Therapy Frequency (OT) daily  -     Row Name 11/08/22 1123          Therapy Plan Review/Discharge Plan (OT)    Anticipated Discharge Disposition (OT) skilled nursing facility  -     Row Name 11/08/22 1123          Vital Signs    Pre Systolic BP Rehab --  VSS - RN cleared OT  -     O2 Delivery Pre Treatment room air  -     O2 Delivery Intra Treatment room air  -     O2 Delivery Post Treatment room air  -     Pre Patient Position Sitting  -     Intra Patient Position Standing  -     Post Patient Position Supine  -     Row Name 11/08/22 1123          Positioning and Restraints    Pre-Treatment Position sitting in chair/recliner  -     Post Treatment Position bed  -     In Bed supine;call light within reach;encouraged to call for assist;exit alarm on;side rails up x3  -           User Key  (r) = Recorded By, (t) = Taken By, (c) = Cosigned By    Initials Name Provider Type    Eim Soto, OT Occupational Therapist                Outcome Measures     Row Name 11/08/22 1128          How much help from another is currently needed...    Putting on and taking off regular lower body clothing? 2  -MC     Bathing (including washing, rinsing, and drying) 2  -MC     Toileting (which includes using toilet bed pan or urinal) 2  -MC     Putting on and taking off regular upper body clothing 2  -MC     Taking care of personal grooming (such as brushing teeth) 3  -MC     Eating meals 3  -     AM-PAC 6 Clicks Score (OT) 14  -     Row Name 11/08/22 0920          How much help from another person do you currently need...    Turning from your back to your side while in flat bed without using bedrails? 3  -ML     Moving from lying on back to sitting on the side of a flat bed without bedrails? 3  -ML     Moving to and from a bed to a chair (including a wheelchair)? 2  -ML     Standing up from a chair using your arms (e.g., wheelchair, bedside chair)? 2  -ML     Climbing 3-5 steps with a railing? 1  -ML     To walk in hospital room? 1  -ML     AM-PAC 6 Clicks Score (PT) 12  -ML     Highest level of mobility 4 --> Transferred to chair/commode  -ML     Row Name 11/08/22 1128 11/08/22 0920       Functional Assessment    Outcome Measure Options AM-PAC 6 Clicks Daily Activity (OT)  - AM-PAC 6 Clicks Basic Mobility (PT)  -ML          User Key  (r) = Recorded By, (t) = Taken By, (c) = Cosigned By    Initials Name Provider Type    Emi Soto OT Occupational Therapist     Litzy Puga Physical Therapist                Occupational Therapy Education     Title: PT OT SLP Therapies (In Progress)     Topic: Occupational Therapy (In Progress)     Point: ADL training (In Progress)     Description:   Instruct learner(s) on proper safety adaptation and remediation techniques during self care or transfers.   Instruct in proper use of assistive devices.              Learning Progress Summary           Patient Acceptance, E, NR by  at 11/8/2022 1125                    Point: Home exercise program (Not Started)     Description:   Instruct learner(s) on appropriate technique for monitoring, assisting and/or progressing therapeutic exercises/activities.              Learner Progress:  Not documented in this visit.          Point: Precautions (In Progress)     Description:   Instruct learner(s) on prescribed precautions during self-care and functional transfers.              Learning Progress Summary           Patient Acceptance, E, NR by  at 11/8/2022 1129                   Point: Body mechanics (In Progress)     Description:   Instruct learner(s) on proper positioning and spine alignment during self-care, functional mobility activities and/or exercises.              Learning Progress Summary           Patient Acceptance, E, NR by  at 11/8/2022 1129                               User Key     Initials Effective Dates Name Provider Type Discipline     10/14/22 -  Emi Doss, SAMARA Occupational Therapist OT              OT Recommendation and Plan  Planned Therapy Interventions (OT): activity tolerance training, adaptive equipment training, BADL retraining, functional balance retraining, IADL retraining, occupation/activity based interventions, ROM/therapeutic exercise, strengthening exercise, transfer/mobility retraining, patient/caregiver education/training, cognitive/visual perception retraining  Therapy Frequency (OT): daily  Plan of Care Review  Plan of Care Reviewed With: patient  Outcome Evaluation: OT eval complete. Pt presents w/ increased confusion, decreased safety awareness, generalized weakness, postural control and balance deficits limiting her ADL independence. Pt SUA for washing face, mod A for brushing hair, max A for UBD, dep for toilet hygiene, mod Ax2 for STS & SPT from bed-to-chair w/ BUE support & B foot block w/ posterior lean noted. Pt would benefit from continued skilled IPOT services to address current functional defiicts. Rec SNF at d/c.      Time Calculation:    Time Calculation- OT     Row Name 11/08/22 1129             Time Calculation- OT    OT Start Time 1036  -      OT Received On 11/08/22  -      OT Goal Re-Cert Due Date 11/18/22  -         Timed Charges    78534 - OT Therapeutic Activity Minutes 10  -MC      29941 - OT Self Care/Mgmt Minutes 14  -MC         Untimed Charges    OT Eval/Re-eval Minutes 33  -MC         Total Minutes    Timed Charges Total Minutes 24  -MC      Untimed Charges Total Minutes 33  -MC       Total Minutes 57  -MC            User Key  (r) = Recorded By, (t) = Taken By, (c) = Cosigned By    Initials Name Provider Type    MC Emi Doss, OT Occupational Therapist              Therapy Charges for Today     Code Description Service Date Service Provider Modifiers Qty    41373458796 HC OT THERAPEUTIC ACT EA 15 MIN 11/8/2022 Emi Doss OT GO 1    34930687143 HC OT SELF CARE/MGMT/TRAIN EA 15 MIN 11/8/2022 Emi Doss OT GO 1    [id number]  OT EVAL MOD COMPLEXITY 3 11/8/2022 Emi Doss OT GO 1               Emi Doss OT  11/8/2022

## 2022-11-08 NOTE — PLAN OF CARE
Problem: Fall Injury Risk  Goal: Absence of Fall and Fall-Related Injury  Outcome: Ongoing, Progressing  Intervention: Identify and Manage Contributors  Recent Flowsheet Documentation  Taken 11/8/2022 0823 by Varsha Harvey RN  Medication Review/Management: medications reviewed  Intervention: Promote Injury-Free Environment  Recent Flowsheet Documentation  Taken 11/8/2022 1016 by Varsha Harvey RN  Safety Promotion/Fall Prevention: safety round/check completed  Taken 11/8/2022 0823 by Varsha Harvey RN  Safety Promotion/Fall Prevention:   assistive device/personal items within reach   clutter free environment maintained   fall prevention program maintained   nonskid shoes/slippers when out of bed   safety round/check completed     Problem: Skin Injury Risk Increased  Goal: Skin Health and Integrity  Outcome: Ongoing, Progressing  Intervention: Optimize Skin Protection  Recent Flowsheet Documentation  Taken 11/8/2022 0823 by Varsha Harvey RN  Pressure Reduction Techniques: frequent weight shift encouraged  Head of Bed (HOB) Positioning: HOB at 30-45 degrees  Pressure Reduction Devices: pressure-redistributing mattress utilized  Skin Protection: adhesive use limited     Problem: Malnutrition  Goal: Improved Nutritional Intake  Outcome: Ongoing, Progressing     Problem: Electrolyte Imbalance  Goal: Electrolyte Balance  Outcome: Ongoing, Progressing     Problem: Hypertension Comorbidity  Goal: Blood Pressure in Desired Range  Outcome: Ongoing, Progressing  Intervention: Maintain Blood Pressure Management  Recent Flowsheet Documentation  Taken 11/8/2022 0823 by Varsha Harvey RN  Medication Review/Management: medications reviewed   Goal Outcome Evaluation:   Pt rested throughout shift. No complaints of pain. Pt disoriented to time and situation. Pt having MRI and speech eval today. Admission and MRI questions completed per pt daughter. Pt K+ low at 3.1, see MAR, replacing per protocol. Recheck this  afternoon. Pt remains SR and on room air. Will continue to observe.

## 2022-11-08 NOTE — PLAN OF CARE
Goal Outcome Evaluation:  Plan of Care Reviewed With: patient           Outcome Evaluation: Physical therapy evaluation complete. The patient orientated to self, cues for time and place. Patient able to follow commands in order to participate in PT evaluation. Patient required Ling for supine to sit transfer. Patient attempted sit to stand transfer with RW, however, patient pushing posteriorly. Patient required verbal and tactile cues to complete modified squat pivot transfer from EOB to chair with maxA. Patient presents with decreased strength, balance and activity tolerance affecting functional mobility. Patient would continue to benefit from skilled PT to address deficits. At hospital discharge recommend SNF.

## 2022-11-08 NOTE — CASE MANAGEMENT/SOCIAL WORK
Discharge Planning Assessment  [facility]     Patient Name: Susan Tucker  MRN: [id number]  Today's Date: 11/8/2022    Admit Date: 11/7/2022    Plan: discharge plan   Discharge Needs Assessment     Row Name 11/08/22 1534       Living Environment    People in Home facility resident    Name(s) of People in Home Pt is a resident at [facility] on [address]    Current Living Arrangements assisted living facility    Duration at Residence 1 week    Primary Care Provided by self;child(david);other (see comments)  Staff at facility    Provides Primary Care For no one, unable/limited ability to care for self    Family Caregiver if Needed child(david), adult    Family Caregiver Names Jada, Liam, Tj, and Dinah(adult children)    Quality of Family Relationships helpful;involved;supportive    Able to Return to Prior Arrangements yes    Living Arrangement Comments I spoke with Dinah(daughter) on cell as pt has dementia.. Pt is a resident at [facility] and has been there a week.       Resource/Environmental Concerns    Resource/Environmental Concerns none       Transition Planning    Patient/Family Anticipates Transition to other (see comments)  [facility]    Patient/Family Anticipated Services at Transition     Transportation Anticipated health plan transportation       Discharge Needs Assessment    Readmission Within the Last 30 Days no previous admission in last 30 days    Current Outpatient/Agency/Support Group other (see comments)  [facility])    Equipment Currently Used at Home cane, quad tip;walker, rolling;wheelchair    Equipment Needed After Discharge cane, quad;wheelchair, manual;walker, rolling    Outpatient/Agency/Support Group Needs other (see comments)  [facility])    Discharge Coordination/Progress Per daughter, Dinah, pt has Medicare and [employer] . Pt is a resident at [facility] on [address] and the goal is to return  there at discharge. Daughter reports pt is able to feed and partial dress self. Pt here with frequent falls. CM will cont to follow for discharge needs. Daughter sates pt will need transportation.               Discharge Plan     Row Name 11/08/22 7425       Plan    Plan discharge plan    Plan Comments Per daughter, the goal is back to [facility] at discharge. I did confirm with [facility] (spoke with Melida) that pt is a resident there. Per Melida that should be able to accept pt back at discharge. Daughter states will need ambulance to transport. CM will cont to follow.    Final Discharge Disposition Code 01 - home or self-care              Continued Care and Services - Admitted Since 11/7/2022    Coordination has not been started for this encounter.       Expected Discharge Date and Time     Expected Discharge Date Expected Discharge Time    Nov 11, 2022          Demographic Summary     Row Name 11/08/22 1532       General Information    General Information Comments PCP is [facility] KAPOOR       Contact Information    Permission Granted to Share Info With     Contact Information Obtained for     Contact Information Comments Jada Ross(daughter) [phone] or Dinah Mcmillan(daugher) [phone]               Functional Status    No documentation.                Psychosocial    No documentation.                Abuse/Neglect    No documentation.                Legal    No documentation.                Substance Abuse    No documentation.                Patient Forms    No documentation.                   Jaimie Montemayor RN

## 2022-11-09 LAB
BACTERIA UR QL AUTO: ABNORMAL /HPF
BILIRUB UR QL STRIP: NEGATIVE
CLARITY UR: ABNORMAL
COLOR UR: YELLOW
GLUCOSE UR STRIP-MCNC: NEGATIVE MG/DL
HGB UR QL STRIP.AUTO: ABNORMAL
HYALINE CASTS UR QL AUTO: ABNORMAL /LPF
KETONES UR QL STRIP: NEGATIVE
LEUKOCYTE ESTERASE UR QL STRIP.AUTO: ABNORMAL
NITRITE UR QL STRIP: NEGATIVE
PH UR STRIP.AUTO: 6.5 [PH] (ref 5–8)
PROT UR QL STRIP: NEGATIVE
QT INTERVAL: 404 MS
QTC INTERVAL: 463 MS
RBC # UR STRIP: ABNORMAL /HPF
REF LAB TEST METHOD: ABNORMAL
SP GR UR STRIP: 1.01 (ref 1–1.03)
SQUAMOUS #/AREA URNS HPF: ABNORMAL /HPF
UROBILINOGEN UR QL STRIP: ABNORMAL
WBC # UR STRIP: ABNORMAL /HPF

## 2022-11-09 PROCEDURE — 87077 CULTURE AEROBIC IDENTIFY: CPT | Performed by: EMERGENCY MEDICINE

## 2022-11-09 PROCEDURE — 97116 GAIT TRAINING THERAPY: CPT

## 2022-11-09 PROCEDURE — 99233 SBSQ HOSP IP/OBS HIGH 50: CPT | Performed by: NURSE PRACTITIONER

## 2022-11-09 PROCEDURE — 81001 URINALYSIS AUTO W/SCOPE: CPT | Performed by: EMERGENCY MEDICINE

## 2022-11-09 PROCEDURE — 87086 URINE CULTURE/COLONY COUNT: CPT | Performed by: EMERGENCY MEDICINE

## 2022-11-09 PROCEDURE — 87186 SC STD MICRODIL/AGAR DIL: CPT | Performed by: EMERGENCY MEDICINE

## 2022-11-09 PROCEDURE — 25010000002 LEVOFLOXACIN PER 250 MG: Performed by: NURSE PRACTITIONER

## 2022-11-09 RX ORDER — QUETIAPINE FUMARATE 25 MG/1
12.5 TABLET, FILM COATED ORAL NIGHTLY PRN
Status: DISCONTINUED | OUTPATIENT
Start: 2022-11-09 | End: 2022-11-11 | Stop reason: HOSPADM

## 2022-11-09 RX ORDER — LEVOFLOXACIN 5 MG/ML
500 INJECTION, SOLUTION INTRAVENOUS EVERY 24 HOURS
Status: DISCONTINUED | OUTPATIENT
Start: 2022-11-09 | End: 2022-11-11

## 2022-11-09 RX ORDER — LABETALOL HYDROCHLORIDE 5 MG/ML
10 INJECTION, SOLUTION INTRAVENOUS EVERY 6 HOURS PRN
Status: DISCONTINUED | OUTPATIENT
Start: 2022-11-09 | End: 2022-11-11 | Stop reason: HOSPADM

## 2022-11-09 RX ORDER — LISINOPRIL 20 MG/1
20 TABLET ORAL
Status: DISCONTINUED | OUTPATIENT
Start: 2022-11-09 | End: 2022-11-11 | Stop reason: HOSPADM

## 2022-11-09 RX ORDER — AMLODIPINE BESYLATE 5 MG/1
5 TABLET ORAL
Status: DISCONTINUED | OUTPATIENT
Start: 2022-11-09 | End: 2022-11-11 | Stop reason: HOSPADM

## 2022-11-09 RX ADMIN — LISINOPRIL 20 MG: 20 TABLET ORAL at 08:48

## 2022-11-09 RX ADMIN — Medication 5 MG: at 20:35

## 2022-11-09 RX ADMIN — Medication 10 ML: at 08:30

## 2022-11-09 RX ADMIN — AMLODIPINE BESYLATE 5 MG: 5 TABLET ORAL at 12:53

## 2022-11-09 RX ADMIN — LEVOFLOXACIN 500 MG: 500 INJECTION, SOLUTION INTRAVENOUS at 15:16

## 2022-11-09 RX ADMIN — Medication 10 ML: at 20:35

## 2022-11-09 RX ADMIN — ASPIRIN 81 MG: 81 TABLET, COATED ORAL at 08:30

## 2022-11-09 NOTE — PLAN OF CARE
Goal Outcome Evaluation:  Plan of Care Reviewed With: patient        Progress: improving  Outcome Evaluation: Pt pleasantly confused, cooperative. Pt dem increased independence with mobility this date with bed mob and t/f's, able to progress to ambulating to door and back with RW and Ling x2 with cues for safety and posture. HR 121bpm, O2 sat stable on RA. Continue POC.

## 2022-11-09 NOTE — THERAPY TREATMENT NOTE
Patient Name: Susan Tucker  : 1935    MRN: [id number]                              Today's Date: 2022       Admit Date: 2022    Visit Dx:     ICD-10-CM ICD-9-CM   1. Frequent falls  R29.6 V15.88   2. Acute encephalopathy  G93.40 348.30   3. Adverse effect of drug, initial encounter  T50.905A E947.9     Patient Active Problem List   Diagnosis   • Sepsis (HCC)   • Elevated liver enzymes   • Dehydration   • Hypertension   • History of Stroke   • Acute UTI (urinary tract infection)   • Calculus of bile duct with acute cholecystitis and obstruction   • Enterocolitis   • Gallstone ileus of small intestine (HCC)   • Syncope and collapse   • Frequent falls   • Type 2 diabetes mellitus without complication (HCC)   • Hyperlipidemia   • Hypokalemia     Past Medical History:   Diagnosis Date   • Diabetes mellitus (HCC)    • Elevated cholesterol    • Hypertension    • Stroke (HCC)      Past Surgical History:   Procedure Laterality Date   • ERCP N/A 2018    Procedure: ENDOSCOPIC RETROGRADE CHOLANGIOPANCREATOGRAPHY;  Surgeon: Liam Manning MD;  Location: [facility] ENDOSCOPY;  Service: Gastroenterology   • EXPLORATORY LAPAROTOMY N/A 2/10/2020    Procedure: LAPAROTOMY EXPLORATORY BOWEL RESECTION;  Surgeon: Carlos Zurita MD;  Location: [facility] OR;  Service: General;  Laterality: N/A;   • HYSTERECTOMY        General Information     Row Name 22 145          Physical Therapy Time and Intention    Document Type therapy note (daily note)  -     Mode of Treatment physical therapy  -     Row Name 22          General Information    Existing Precautions/Restrictions fall;other (see comments)  baseline dementia, incontinent  -     Row Name 22          Cognition    Orientation Status (Cognition) oriented to;person;disoriented to;place;situation;time;verbal cues/prompts needed for orientation  -     Row Name 22          Safety Issues, Functional Mobility    Safety  Issues Affecting Function (Mobility) insight into deficits/self-awareness;safety precaution awareness;problem-solving;sequencing abilities  -SJ     Impairments Affecting Function (Mobility) balance;cognition;postural/trunk control;strength;endurance/activity tolerance  -SJ           User Key  (r) = Recorded By, (t) = Taken By, (c) = Cosigned By    Initials Name Provider Type    SJ Nadine Bermudez, PT Physical Therapist               Mobility     Row Name 11/09/22 1553          Bed Mobility    Supine-Sit Dodge (Bed Mobility) verbal cues;minimum assist (75% patient effort);1 person assist  -SJ     Assistive Device (Bed Mobility) head of bed elevated;bed rails;draw sheet  -SJ     Comment, (Bed Mobility) cues for sequencing  -SJ     Row Name 11/09/22 1553          Transfers    Comment, (Transfers) SPT from EOB > chair, then sit <> stand from recliner.  -SJ     Row Name 11/09/22 1553          Bed-Chair Transfer    Bed-Chair Dodge (Transfers) minimum assist (75% patient effort);2 person assist;verbal cues  -SJ     Assistive Device (Bed-Chair Transfers) walker, front-wheeled  -SJ     Row Name 11/09/22 1553          Sit-Stand Transfer    Sit-Stand Dodge (Transfers) minimum assist (75% patient effort);2 person assist;verbal cues  -SJ     Assistive Device (Sit-Stand Transfers) walker, front-wheeled  -SJ     Row Name 11/09/22 1553          Gait/Stairs (Locomotion)    Dodge Level (Gait) minimum assist (75% patient effort);2 person assist  -     Assistive Device (Gait) walker, front-wheeled  -SJ     Distance in Feet (Gait) 20  -SJ     Deviations/Abnormal Patterns (Gait) bilateral deviations;base of support, narrow;lisa decreased;gait speed decreased  -SJ     Bilateral Gait Deviations forward flexed posture;heel strike decreased  -SJ     Comment, (Gait/Stairs) Pt amb to door and back with RW and Ling x2 with cues for safety and posture. HR 121bpm, O2 sat stable on RA.  -SJ           User Key  (r)  = Recorded By, (t) = Taken By, (c) = Cosigned By    Initials Name Provider Type    Nadine Addison, ANI Physical Therapist               Obj/Interventions     Row Name 11/09/22 1555          Balance    Balance Assessment sitting static balance;standing static balance  -SJ     Static Sitting Balance standby assist  -SJ     Position, Sitting Balance unsupported;sitting edge of bed  -SJ     Static Standing Balance minimal assist;1-person assist  -SJ     Position/Device Used, Standing Balance walker, rolling  -SJ           User Key  (r) = Recorded By, (t) = Taken By, (c) = Cosigned By    Initials Name Provider Type    SJ Nadine Bermudez PT Physical Therapist               Goals/Plan    No documentation.                Clinical Impression     Row Name 11/09/22 1556          Pain    Pretreatment Pain Rating 0/10 - no pain  -SJ     Posttreatment Pain Rating 0/10 - no pain  -SJ     Row Name 11/09/22 1556          Plan of Care Review    Plan of Care Reviewed With patient  -SJ     Progress improving  -SJ     Outcome Evaluation Pt pleasantly confused, cooperative. Pt dem increased independence with mobility this date with bed mob and t/f's, able to progress to ambulating to door and back with RW and Ling x2 with cues for safety and posture. HR 121bpm, O2 sat stable on RA. Continue POC.  -SJ     Row Name 11/09/22 1556          Vital Signs    Intratreatment Heart Rate (beats/min) 121  -SJ     Posttreatment Heart Rate (beats/min) 97  -SJ     Row Name 11/09/22 1556          Positioning and Restraints    Pre-Treatment Position in bed  -SJ     Post Treatment Position chair  -SJ     In Chair notified nsg;reclined;call light within reach;encouraged to call for assist;exit alarm on;waffle cushion;on mechanical lift sling;heels elevated  -SJ           User Key  (r) = Recorded By, (t) = Taken By, (c) = Cosigned By    Initials Name Provider Type    Nadine Addison, ANI Physical Therapist               Outcome Measures     Row Name  11/09/22 1558          How much help from another person do you currently need...    Turning from your back to your side while in flat bed without using bedrails? 3  -SJ     Moving from lying on back to sitting on the side of a flat bed without bedrails? 3  -SJ     Moving to and from a bed to a chair (including a wheelchair)? 2  -SJ     Standing up from a chair using your arms (e.g., wheelchair, bedside chair)? 2  -SJ     To walk in hospital room? 2  -SJ     Row Name 11/09/22 1558          Functional Assessment    Outcome Measure Options AM-PAC 6 Clicks Basic Mobility (PT)  -           User Key  (r) = Recorded By, (t) = Taken By, (c) = Cosigned By    Initials Name Provider Type    Nadine Addison PT Physical Therapist                             Physical Therapy Education     Title: PT OT SLP Therapies (In Progress)     Topic: Physical Therapy (In Progress)     Point: Mobility training (In Progress)     Learning Progress Summary           Patient Acceptance, E, NR by  at 11/9/2022 1558    Acceptance, E, NR by ML at 11/8/2022 0921                   Point: Home exercise program (Not Started)     Learner Progress:  Not documented in this visit.          Point: Body mechanics (In Progress)     Learning Progress Summary           Patient Acceptance, E, NR by  at 11/9/2022 1558    Acceptance, E, NR by ML at 11/8/2022 0921                   Point: Precautions (In Progress)     Learning Progress Summary           Patient Acceptance, E, NR by  at 11/9/2022 1558    Acceptance, E, NR by ML at 11/8/2022 0921                               User Key     Initials Effective Dates Name Provider Type Discipline     06/16/21 -  Nadine Bermudez PT Physical Therapist PT    ML 04/22/21 -  Litzy Puga Physical Therapist PT              PT Recommendation and Plan     Plan of Care Reviewed With: patient  Progress: improving  Outcome Evaluation: Pt pleasantly confused, cooperative. Pt dem increased independence with mobility  this date with bed mob and t/f's, able to progress to ambulating to door and back with RW and Ling x2 with cues for safety and posture. HR 121bpm, O2 sat stable on RA. Continue POC.     Time Calculation:    PT Charges     Row Name 11/09/22 1558             Time Calculation    Start Time 1457  -SJ      PT Received On 11/09/22  -      PT Goal Re-Cert Due Date 11/18/22  -         Time Calculation- PT    Total Timed Code Minutes- PT 25 minute(s)  -SJ         Timed Charges    74167 - Gait Training Minutes  20  -SJ      18509 - PT Therapeutic Activity Minutes 5  -SJ         Total Minutes    Timed Charges Total Minutes 25  -SJ       Total Minutes 25  -SJ            User Key  (r) = Recorded By, (t) = Taken By, (c) = Cosigned By    Initials Name Provider Type     Nadine Bermudez, PT Physical Therapist              Therapy Charges for Today     Code Description Service Date Service Provider Modifiers Qty    49123079306 HC GAIT TRAINING EA 15 MIN 11/9/2022 Nadine Bermudez, PT GP 2    58382239574  PT THER SUPP EA 15 MIN 11/9/2022 Nadine Bermudez PT GP 2          PT G-Codes  Outcome Measure Options: AM-PAC 6 Clicks Basic Mobility (PT)  AM-PAC 6 Clicks Score (PT): 12  AM-PAC 6 Clicks Score (OT): 14    Nadine Bermudez PT  11/9/2022

## 2022-11-09 NOTE — PLAN OF CARE
Problem: Fall Injury Risk  Goal: Absence of Fall and Fall-Related Injury  Outcome: Ongoing, Progressing  Intervention: Identify and Manage Contributors  Recent Flowsheet Documentation  Taken 11/9/2022 0825 by Varsha Harvey RN  Medication Review/Management: medications reviewed  Intervention: Promote Injury-Free Environment  Recent Flowsheet Documentation  Taken 11/9/2022 1205 by Varsha Harvey RN  Safety Promotion/Fall Prevention: safety round/check completed  Taken 11/9/2022 1030 by Varsha Harvey RN  Safety Promotion/Fall Prevention: safety round/check completed  Taken 11/9/2022 1015 by Varsha Harvey RN  Safety Promotion/Fall Prevention: safety round/check completed  Taken 11/9/2022 0825 by Varsha Harvey RN  Safety Promotion/Fall Prevention:   assistive device/personal items within reach   clutter free environment maintained   fall prevention program maintained   nonskid shoes/slippers when out of bed   safety round/check completed     Problem: Skin Injury Risk Increased  Goal: Skin Health and Integrity  Outcome: Ongoing, Progressing  Intervention: Optimize Skin Protection  Recent Flowsheet Documentation  Taken 11/9/2022 0825 by Varsha Harvey RN  Pressure Reduction Techniques: frequent weight shift encouraged  Pressure Reduction Devices: pressure-redistributing mattress utilized  Skin Protection: adhesive use limited     Problem: Malnutrition  Goal: Improved Nutritional Intake  Outcome: Ongoing, Progressing     Problem: Electrolyte Imbalance  Goal: Electrolyte Balance  Outcome: Ongoing, Progressing     Problem: Hypertension Comorbidity  Goal: Blood Pressure in Desired Range  Outcome: Ongoing, Progressing  Intervention: Maintain Blood Pressure Management  Recent Flowsheet Documentation  Taken 11/9/2022 0825 by Varsha Harvey RN  Medication Review/Management: medications reviewed     Problem: Adult Inpatient Plan of Care  Goal: Plan of Care Review  Outcome: Ongoing,  Progressing  Flowsheets (Taken 11/9/2022 1316)  Progress: no change  Goal: Patient-Specific Goal (Individualized)  Outcome: Ongoing, Progressing  Goal: Absence of Hospital-Acquired Illness or Injury  Outcome: Ongoing, Progressing  Intervention: Identify and Manage Fall Risk  Recent Flowsheet Documentation  Taken 11/9/2022 1205 by Varsha Harvey RN  Safety Promotion/Fall Prevention: safety round/check completed  Taken 11/9/2022 1030 by Varsha Harvey RN  Safety Promotion/Fall Prevention: safety round/check completed  Taken 11/9/2022 1015 by Varsha Harvey RN  Safety Promotion/Fall Prevention: safety round/check completed  Taken 11/9/2022 0825 by Varsha Harvey RN  Safety Promotion/Fall Prevention:   assistive device/personal items within reach   clutter free environment maintained   fall prevention program maintained   nonskid shoes/slippers when out of bed   safety round/check completed  Intervention: Prevent Skin Injury  Recent Flowsheet Documentation  Taken 11/9/2022 0825 by Varsha Harvey RN  Skin Protection: adhesive use limited  Intervention: Prevent and Manage VTE (Venous Thromboembolism) Risk  Recent Flowsheet Documentation  Taken 11/9/2022 0825 by Varsha Harvey RN  Activity Management: activity adjusted per tolerance  VTE Prevention/Management: sequential compression devices off  Intervention: Prevent Infection  Recent Flowsheet Documentation  Taken 11/9/2022 1205 by Varsha Harvey RN  Infection Prevention: hand hygiene promoted  Taken 11/9/2022 1030 by Varsha Harvey RN  Infection Prevention: hand hygiene promoted  Taken 11/9/2022 1015 by Varsha Harvey RN  Infection Prevention: hand hygiene promoted  Taken 11/9/2022 0825 by Varsha Harvey RN  Infection Prevention: hand hygiene promoted  Goal: Optimal Comfort and Wellbeing  Outcome: Ongoing, Progressing  Intervention: Provide Person-Centered Care  Recent Flowsheet Documentation  Taken 11/9/2022 0825 by Wes  Varsha HASKINS RN  Trust Relationship/Rapport: care explained  Goal: Readiness for Transition of Care  Outcome: Ongoing, Progressing   Goal Outcome Evaluation:      Pt rested throughout shift. Sent down UA, +3 for bacteria. Pt remains on room air and SR/ST. Pt BP high, restarted home lisinopril and new amlodipine. Will continue to observe.      Progress: no change

## 2022-11-09 NOTE — CASE MANAGEMENT/SOCIAL WORK
Continued Stay Note  [facility]     Patient Name: Susan Tucker  MRN: [id number]  Today's Date: 11/9/2022    Admit Date: 11/7/2022    Plan: discharge plan   Discharge Plan     Row Name 11/09/22 1543       Plan    Plan discharge plan    Plan Comments Plan is back to [facility] at discharge. [place] [facility]. CM will cont to follow    Final Discharge Disposition Code 01 - home or self-care               Discharge Codes    No documentation.               Expected Discharge Date and Time     Expected Discharge Date Expected Discharge Time    Nov 14, 2022             Jaimie Montemayor RN

## 2022-11-09 NOTE — PROGRESS NOTES
[facility]  PROGRESS NOTE    Patient Name: Susan Tucker  : 1935  MRN: [id number]    Date of Admission: 2022  Primary Care Physician: Pj Buchanan DO    Subjective   Subjective     CC:  Mental status change    HPI:  Patient awake and conversant in bed. RN at bedside on exam getting ready to straight cath for urine. Patient states back of held is tender while lying on it. No other complaints    ROS:  Gen- No fevers, chills  CV- No chest pain, palpitations  Resp- No cough, dyspnea  GI- No N/V/D, abd pain        Objective   Objective     Vital Signs:   Temp:  [97.8 °F (36.6 °C)-98.8 °F (37.1 °C)] 98.8 °F (37.1 °C)  Heart Rate:  [] 95  Resp:  [16-18] 16  BP: (156-185)/() 174/101     Physical Exam:  Constitutional: No acute distress, awake, alert, frail  HENT: NCAT, mucous membranes moist- staple in scalp  Respiratory: Clear to auscultation bilaterally, respiratory effort normal   Cardiovascular: RRR, no murmurs, rubs, or gallops  Gastrointestinal: Positive bowel sounds, soft, nontender, nondistended  Musculoskeletal: No bilateral ankle edema  Psychiatric: Appropriate affect, cooperative  Neurologic: Oriented x 3, strength symmetric in all extremities, Cranial Nerves grossly intact to confrontation, speech clear  Skin: No rashes      Results Reviewed:  LAB RESULTS:      Lab 22  23022  0730   WBC 5.74 7.21   HEMOGLOBIN 13.0 14.9   HEMATOCRIT 38.8 45.9   PLATELETS 205 240   NEUTROS ABS 4.11 5.81   IMMATURE GRANS (ABS) 0.03 0.03   LYMPHS ABS 0.79 0.76   MONOS ABS 0.51 0.39   EOS ABS 0.24 0.15   MCV 90.7 92.0   LACTATE 1.1 1.3         Lab 22  1739 22  0815 22  0439 22  2307 22  0730   SODIUM  --  141  --  137 139   POTASSIUM 5.0 3.4*  --  3.1* 3.5   CHLORIDE  --  103  --  102 102   CO2  --  28.0  --  27.0 28.0   ANION GAP  --  10.0  --  8.0 9.0   BUN  --  9  --  13 10   CREATININE  --  0.48*  --  0.54* 0.65   EGFR   --  92.4  --  89.8 85.9   GLUCOSE  --  87  --  119* 113*   CALCIUM  --  9.3  --  8.9 9.2   MAGNESIUM  --   --   --  1.8  --    TSH  --   --  0.908  --   --          Lab 11/07/22 2307 11/04/22  0730   TOTAL PROTEIN 6.0 7.3   ALBUMIN 3.60 4.30   GLOBULIN 2.4 3.0   ALT (SGPT) 16 14   AST (SGOT) 34* 28   BILIRUBIN 1.0 0.6   ALK PHOS 48 49   LIPASE  --  92*         Lab 11/07/22 2307   TROPONIN T <0.010                 Brief Urine Lab Results  (Last result in the past 365 days)      Color   Clarity   Blood   Leuk Est   Nitrite   Protein   CREAT   Urine HCG        11/09/22 1200 Yellow   Cloudy   Moderate (2+)   Moderate (2+)   Negative   Negative                 Microbiology Results Abnormal     Procedure Component Value - Date/Time    Blood Culture - Blood, Arm, Right [[id number]]  (Normal) Collected: 11/07/22 2335    Lab Status: Preliminary result Specimen: Blood from Arm, Right Updated: 11/09/22 0101     Blood Culture No growth at 24 hours    Blood Culture - Blood, Arm, Left [[id number]]  (Normal) Collected: 11/07/22 2311    Lab Status: Preliminary result Specimen: Blood from Arm, Left Updated: 11/09/22 0101     Blood Culture No growth at 24 hours    COVID PRE-OP / PRE-PROCEDURE SCREENING ORDER (NO ISOLATION) - Swab, Nasopharynx [[id number]]  (Normal) Collected: 11/07/22 2307    Lab Status: Final result Specimen: Swab from Nasopharynx Updated: 11/07/22 6739    Narrative:      The following orders were created for panel order COVID PRE-OP / PRE-PROCEDURE SCREENING ORDER (NO ISOLATION) - Swab, Nasopharynx.  Procedure                               Abnormality         Status                     ---------                               -----------         ------                     COVID-19 and FLU A/B PCR...[[id number]]  Normal              Final result                 Please view results for these tests on the individual orders.    COVID-19 and FLU A/B PCR - Swab, Nasopharynx [[id number]]  (Normal) Collected: 11/07/22 2307     Lab Status: Final result Specimen: Swab from Nasopharynx Updated: 11/07/22 2349     COVID19 Not Detected     Influenza A PCR Not Detected     Influenza B PCR Not Detected    Narrative:      Fact sheet for providers: https://www.fda.gov/media/409128/download    Fact sheet for patients: https://www.fda.gov/media/550727/download    Test performed by PCR.          CT Head Without Contrast    Result Date: 11/7/2022  EXAMINATION:  CT HEAD WO CONTRAST DATE: 11/7/2022 11:11 PM  INDICATION: Syncope and confusion  COMPARISON: Head CT November 4, 2022  TECHNIQUE:  Routine axial images through the head without contrast. Low-dose CT acquisition technique included one or more of the following options: 1. Automated exposure control, 2. Adjustment of mA and/or KV according to patient's size and/or 3. Use of iterative reconstruction. FINDINGS:  Intracranial Contents: Moderate generalized volume loss. Confluent hypodensities in the supratentorial white matter, most commonly related to chronic small vessel ischemic changes. Chronic lacunar infarcts in the left thalamus. Additional chronic infarct in the right cerebellum.   Gray-white differentiation is intact.   No acute intracranial hemorrhage. No mass effect, midline shift, hydrocephalus, herniation, or extra axial fluid collection.  Bones and Extracranial Soft Tissues: The calvarium is intact. Posterior midline scalp laceration, unchanged. Visualized paranasal sinuses and mastoid air cells are clear.  Visualized intraorbital contents are unremarkable. Distal internal carotid artery atherosclerotic calcifications.     Impression: 1. No acute intracranial abnormality. No changes compared to the head CT on November 4, 2022. 2. Volume loss and moderate chronic small vessel ischemic changes. Chronic left thalamic and right cerebellar lacunar infarcts. Electronically signed by:  Sridhar Beyer M.D.  11/7/2022 9:35 PM Mountain Time    MRI Brain Without Contrast    Result Date:  11/8/2022  DATE OF EXAM: 11/8/2022 1:21 PM  PROCEDURE: MRI BRAIN WO CONTRAST-  INDICATIONS: AMS, frequent falls; R29.6-Repeated falls; G93.40-Encephalopathy, unspecified; T50.905A-Adverse effect of unspecified drugs, medicaments and biological substances, initial encounter  COMPARISON: 3/24/2022  TECHNIQUE: Multiplanar multisequence images of the brain were performed without contrast according to routine brain MRI protocol.  FINDINGS: No acute infarct is present on diffusion weighted sequences. Midline structures are normal and the craniocervical junction is satisfactory in appearance. Advanced age-related changes are present, with generalized volume loss in addition to mild typical T2 hyperintense periventricular white matter changes of chronic small vessel ischemia. There is otherwise no evidence of intracranial hemorrhage, mass or mass effect. The ventricles demonstrate some ex vacuo prominence relating to surrounding volume loss. The orbits are normal and the paranasal sinuses are grossly clear. Intracranial arterial flow voids are maintained.      Impression: Stable age-related changes as above. There is no evidence of acute ischemia, hemorrhage, mass or mass effect.  This report was finalized on 11/8/2022 2:20 PM by Hermes Mitchell.      XR Chest 1 View    Result Date: 11/8/2022  EXAMINATION: XR CHEST 1 VW  DATE: 11/7/2022 11:15 PM INDICATIONS: Syncope. COMPARISON: None FINDINGS: Lungs: Normal. Pleura: Normal. Heart: Borderline heart size. Aorta: Atherosclerotic and left-sided. Mediastinum and Kalyani: Normal. Osseous: Stable. Devices: None. Other: A subcentimeter calcification projects over the right diaphragm.     Impression: 1. Stable borderline heart size. 2. No acute thoracic process. Electronically signed by:  Genaro Fuentes M.D.  11/7/2022 10:04 PM Mountain Time      Results for orders placed during the hospital encounter of 03/24/22    Adult Transthoracic Echo Complete W/ Cont if Necessary Per  Protocol    Interpretation Summary  · Estimated left ventricular EF = 60%  · No hemodynamically significant valvular heart disease      I have reviewed the medications:  Scheduled Meds:amLODIPine, 5 mg, Oral, Q24H  aspirin, 81 mg, Oral, Daily  atorvastatin, 80 mg, Oral, Nightly  levoFLOXacin, 500 mg, Intravenous, Q24H  lisinopril, 20 mg, Oral, Q24H  sodium chloride, 10 mL, Intravenous, Q12H      Continuous Infusions:   PRN Meds:.•  acetaminophen  •  labetalol  •  magnesium sulfate **OR** magnesium sulfate **OR** magnesium sulfate  •  melatonin  •  ondansetron **OR** ondansetron  •  potassium chloride **OR** potassium chloride **OR** potassium chloride  •  Insert peripheral IV **AND** sodium chloride  •  sodium chloride    Assessment & Plan   Assessment & Plan     Active Hospital Problems    Diagnosis  POA   • **Frequent falls [R29.6]  Not Applicable   • Hypokalemia [E87.6]  Yes   • Hypertension [I10]  Yes   • History of Stroke [I63.9]  Yes   • Acute UTI (urinary tract infection) [N39.0]  Unknown   • Type 2 diabetes mellitus without complication (HCC) [E11.9]  Yes   • Hyperlipidemia [E78.5]  Yes      Resolved Hospital Problems   No resolved problems to display.        Brief Hospital Course to date:  Susan Tucker is a 87 y.o. female with past medical history of dementia, hx of CVA, HTN, HLD and T2DM.  Patient is a resident at [facility].  Patient recently moved to [facility] last Wednesday.  Patient was brought to [facility] due to being obtunded.    This patient's problems and plans were partially entered by my partner and updated as appropriate by me 11/09/22.    Metabolic encephalopathy  Acute UTI  -- urine obtained to via straight cath due to incontinence. Culture sent  -- Due to allergies, levaquin started x 3 days. Will await culture  -- patient more alert and appears she may be close to her baseline  -- CT head revealed stable/ old CVAs  -- MRI brain negative   -- stroke neuro saw for possible  CVA and have signed off  -- seroquel held on admit. Can resume prn     Frequent Falls  Failure to Thrive  History of of remote CVA  - continue ASA and high intensity statin  - obtain orthostatic blood pressures  - UA pending  - gentle hydration given overnight  - PT/OT  - tsh wnl     Hypokalemia  - replace as needed per protocol. No EKG changes  - check magnesium     HTN  - elevated. Resume home lisinopril.  - norvasc 5mg started due to continued elevation, monitor     DM2  - stable. Currently diet controlled    Head laceration 2/2 fall 11/4  -- sustained 11/4 and staples placed in posterior scalp in ED  -- will need removed 11/14      Expected Discharge Location and Transportation: [place]  Expected Discharge Date: 11/11    DVT prophylaxis:  Mechanical DVT prophylaxis orders are present.     AM-PAC 6 Clicks Score (PT): 12 (11/08/22 0920)    CODE STATUS:   Code Status and Medical Interventions:   Ordered at: 11/08/22 1120     Medical Intervention Limits:    NO intubation (DNI)     Level Of Support Discussed With:    Patient     Code Status (Patient has no pulse and is not breathing):    No CPR (Do Not Attempt to Resuscitate)     Medical Interventions (Patient has pulse or is breathing):    Limited Support       Rosario Ng, APRN  11/09/22

## 2022-11-10 PROCEDURE — 97535 SELF CARE MNGMENT TRAINING: CPT

## 2022-11-10 PROCEDURE — 97530 THERAPEUTIC ACTIVITIES: CPT

## 2022-11-10 PROCEDURE — 99232 SBSQ HOSP IP/OBS MODERATE 35: CPT | Performed by: NURSE PRACTITIONER

## 2022-11-10 PROCEDURE — 25010000002 LEVOFLOXACIN PER 250 MG: Performed by: NURSE PRACTITIONER

## 2022-11-10 RX ADMIN — LEVOFLOXACIN 500 MG: 500 INJECTION, SOLUTION INTRAVENOUS at 14:44

## 2022-11-10 RX ADMIN — ASPIRIN 81 MG: 81 TABLET, COATED ORAL at 10:44

## 2022-11-10 RX ADMIN — AMLODIPINE BESYLATE 5 MG: 5 TABLET ORAL at 10:44

## 2022-11-10 RX ADMIN — LISINOPRIL 20 MG: 20 TABLET ORAL at 10:45

## 2022-11-10 NOTE — PLAN OF CARE
Goal Outcome Evaluation:  Plan of Care Reviewed With: patient        Progress: improving  Outcome Evaluation: Pt continues to be limited by generalized weakness, confusion, and impaired balance. Pt tolerated OOB to chair using the RW with Mod A due to posterior lean and narrow stance requiring cues for attention to task. Oral care performed in sitting with Min A. Continue with POC.

## 2022-11-10 NOTE — THERAPY TREATMENT NOTE
Patient Name: Susan Tucker  : 1935    MRN: [id number]                              Today's Date: 11/10/2022       Admit Date: 2022    Visit Dx:     ICD-10-CM ICD-9-CM   1. Frequent falls  R29.6 V15.88   2. Acute encephalopathy  G93.40 348.30   3. Adverse effect of drug, initial encounter  T50.905A E947.9     Patient Active Problem List   Diagnosis   • Sepsis (HCC)   • Elevated liver enzymes   • Dehydration   • Hypertension   • History of Stroke   • Acute UTI (urinary tract infection)   • Calculus of bile duct with acute cholecystitis and obstruction   • Enterocolitis   • Gallstone ileus of small intestine (HCC)   • Syncope and collapse   • Frequent falls   • Type 2 diabetes mellitus without complication (HCC)   • Hyperlipidemia   • Hypokalemia     Past Medical History:   Diagnosis Date   • Diabetes mellitus (HCC)    • Elevated cholesterol    • Hypertension    • Stroke (HCC)      Past Surgical History:   Procedure Laterality Date   • ERCP N/A 2018    Procedure: ENDOSCOPIC RETROGRADE CHOLANGIOPANCREATOGRAPHY;  Surgeon: Liam Manning MD;  Location: [facility] ENDOSCOPY;  Service: Gastroenterology   • EXPLORATORY LAPAROTOMY N/A 2/10/2020    Procedure: LAPAROTOMY EXPLORATORY BOWEL RESECTION;  Surgeon: Carlos Zurita MD;  Location: [facility] OR;  Service: General;  Laterality: N/A;   • HYSTERECTOMY        General Information     Row Name 11/10/22 1148          Physical Therapy Time and Intention    Document Type therapy note (daily note)  -ML     Mode of Treatment physical therapy  -     Row Name 11/10/22 1148          General Information    Patient Profile Reviewed yes  -ML     Existing Precautions/Restrictions fall;other (see comments)  dementia, incontinent  -ML     Barriers to Rehab medically complex;previous functional deficit;cognitive status  -ML     Row Name 11/10/22 1148          Cognition    Orientation Status (Cognition) oriented to;person;time;verbal cues/prompts needed for  orientation;place  -     Row Name 11/10/22 1148          Safety Issues, Functional Mobility    Safety Issues Affecting Function (Mobility) awareness of need for assistance;insight into deficits/self-awareness;safety precaution awareness;safety precautions follow-through/compliance;judgment;sequencing abilities;problem-solving  -     Impairments Affecting Function (Mobility) balance;cognition;postural/trunk control;strength;endurance/activity tolerance  -     Cognitive Impairments, Mobility Safety/Performance awareness, need for assistance;insight into deficits/self-awareness;judgment;problem-solving/reasoning;safety precaution awareness;safety precaution follow-through;sequencing abilities  -ML           User Key  (r) = Recorded By, (t) = Taken By, (c) = Cosigned By    Initials Name Provider Type    ML Litzy Puga Physical Therapist               Mobility     Row Name 11/10/22 1149          Bed Mobility    Comment, (Bed Mobility) Patient found up in chair  -     Row Name 11/10/22 1149          Sit-Stand Transfer    Sit-Stand Blount (Transfers) verbal cues;1 person assist;minimum assist (75% patient effort)  -ML     Assistive Device (Sit-Stand Transfers) walker, front-wheeled  -ML     Comment, (Sit-Stand Transfer) Patient required verbal cues for correct hand placement and forward weight shift during sit to stand transfer.  -     Row Name 11/10/22 1149          Gait/Stairs (Locomotion)    Blount Level (Gait) verbal cues;contact guard;1 person assist  -ML     Assistive Device (Gait) walker, front-wheeled  -ML     Distance in Feet (Gait) 25  -ML     Deviations/Abnormal Patterns (Gait) bilateral deviations;base of support, narrow;lisa decreased;gait speed decreased  -ML     Bilateral Gait Deviations forward flexed posture;heel strike decreased  -ML           User Key  (r) = Recorded By, (t) = Taken By, (c) = Cosigned By    Initials Name Provider Type    Litzy Colvin Physical Therapist                Obj/Interventions     [facility] Name 11/10/22 1150          Motor Skills    Therapeutic Exercise hip;knee;ankle  -ML     [facility] Name 11/10/22 1150          Hip (Therapeutic Exercise)    Hip (Therapeutic Exercise) strengthening exercise  -     Hip Strengthening (Therapeutic Exercise) marching while seated;20 repititions  -ML     [facility] Name 11/10/22 1150          Knee (Therapeutic Exercise)    Knee (Therapeutic Exercise) strengthening exercise  -ML     Knee Strengthening (Therapeutic Exercise) bilateral;LAQ (long arc quad);10 repetitions;2 sets  -ML     Row Name 11/10/22 1150          Ankle (Therapeutic Exercise)    Ankle (Therapeutic Exercise) AROM (active range of motion)  -ML     Ankle AROM (Therapeutic Exercise) bilateral;dorsiflexion;plantarflexion;10 repetitions  -ML     Row Name 11/10/22 1150          Balance    Balance Assessment sitting static balance;sit to stand dynamic balance;standing static balance;standing dynamic balance  -ML     Static Sitting Balance supervision  -ML     Position, Sitting Balance unsupported;sitting in chair  -ML     Sit to Stand Dynamic Balance verbal cues;minimal assist;1-person assist  -ML     Static Standing Balance verbal cues;contact guard  -ML     Dynamic Standing Balance verbal cues;contact guard  -ML     Position/Device Used, Standing Balance supported;walker, rolling  -ML     Balance Interventions sitting;standing;sit to stand;supported;static;dynamic;occupation based/functional task  -ML           User Key  (r) = Recorded By, (t) = Taken By, (c) = Cosigned By    Initials Name Provider Type    ML Litzy Puga Physical Therapist               Goals/Plan    No documentation.                Clinical Impression     Row Name 11/10/22 1151          Pain    Pretreatment Pain Rating 0/10 - no pain  -ML     Posttreatment Pain Rating 0/10 - no pain  -ML     Row Name 11/10/22 1151          Plan of Care Review    Plan of Care Reviewed With patient  -ML     Progress improving  -ML      Outcome Evaluation Physical therapy treatment complete. The patient tolerated PT treatment well. Patient required Ling for sit to stand transfer. Patient ambulated 25 feet in room with RW and CGA. Patient required additional time to complete ambulation distance. Patient complete seated LE therex. Continue current PT POC.  -ML     Row Name 11/10/22 1151          Vital Signs    Pre Patient Position Sitting  -ML     Intra Patient Position Standing  -ML     Post Patient Position Sitting  -ML     Row Name 11/10/22 1151          Positioning and Restraints    Pre-Treatment Position sitting in chair/recliner  -ML     Post Treatment Position chair  -ML     In Chair notified nsg;reclined;call light within reach;encouraged to call for assist;exit alarm on;legs elevated;waffle cushion;on mechanical lift sling  -ML           User Key  (r) = Recorded By, (t) = Taken By, (c) = Cosigned By    Initials Name Provider Type    Litzy Colvin Physical Therapist               Outcome Measures     Row Name 11/10/22 1153          How much help from another person do you currently need...    Turning from your back to your side while in flat bed without using bedrails? 3  -ML     Moving from lying on back to sitting on the side of a flat bed without bedrails? 3  -ML     Moving to and from a bed to a chair (including a wheelchair)? 3  -ML     Standing up from a chair using your arms (e.g., wheelchair, bedside chair)? 3  -ML     Climbing 3-5 steps with a railing? 1  -ML     To walk in hospital room? 3  -ML     AM-PAC 6 Clicks Score (PT) 16  -ML     Highest level of mobility 5 --> Static standing  -ML     Row Name 11/10/22 1153 11/10/22 1005       Functional Assessment    Outcome Measure Options AM-PAC 6 Clicks Basic Mobility (PT)  -ML AM-PAC 6 Clicks Daily Activity (OT)  -AN          User Key  (r) = Recorded By, (t) = Taken By, (c) = Cosigned By    Initials Name Provider Type    Litzy Colvin Physical Therapist    Duyen Hameed, OT  Occupational Therapist                             Physical Therapy Education     Title: PT OT SLP Therapies (In Progress)     Topic: Physical Therapy (Done)     Point: Mobility training (Done)     Learning Progress Summary           Patient Acceptance, E, VU,NR by  at 11/10/2022 1153    Acceptance, E, NR by  at 11/9/2022 1558    Acceptance, E, NR by ML at 11/8/2022 0921                   Point: Home exercise program (Done)     Learning Progress Summary           Patient Acceptance, E, VU,NR by  at 11/10/2022 1153                   Point: Body mechanics (Done)     Learning Progress Summary           Patient Acceptance, E, VU,NR by ML at 11/10/2022 1153    Acceptance, E, NR by  at 11/9/2022 1558    Acceptance, E, NR by  at 11/8/2022 0921                   Point: Precautions (Done)     Learning Progress Summary           Patient Acceptance, E, VU,NR by  at 11/10/2022 1153    Acceptance, E, NR by  at 11/9/2022 1558    Acceptance, E, NR by  at 11/8/2022 0921                               User Key     Initials Effective Dates Name Provider Type Discipline     06/16/21 -  Nadine Bermudez, PT Physical Therapist PT     04/22/21 -  Litzy Puga Physical Therapist PT              PT Recommendation and Plan  Planned Therapy Interventions (PT): balance training, bed mobility training, gait training, home exercise program, neuromuscular re-education, patient/family education, postural re-education, ROM (range of motion), strengthening, transfer training, wheelchair management/propulsion training  Plan of Care Reviewed With: patient  Progress: improving  Outcome Evaluation: Physical therapy treatment complete. The patient tolerated PT treatment well. Patient required Ling for sit to stand transfer. Patient ambulated 25 feet in room with RW and CGA. Patient required additional time to complete ambulation distance. Patient complete seated LE therex. Continue current PT POC.     Time Calculation:    PT Charges      Row Name 11/10/22 1153             Time Calculation    Start Time 1101  -ML      PT Received On 11/10/22  -ML         Timed Charges    70272 - PT Therapeutic Activity Minutes 24  -ML         Total Minutes    Timed Charges Total Minutes 24  -ML       Total Minutes 24  -ML            User Key  (r) = Recorded By, (t) = Taken By, (c) = Cosigned By    Initials Name Provider Type    Litzy Colvin Physical Therapist              Therapy Charges for Today     Code Description Service Date Service Provider Modifiers Qty    86006102023 HC PT THERAPEUTIC ACT EA 15 MIN 11/10/2022 Litzy Puga GP 2          PT G-Codes  Outcome Measure Options: AM-PAC 6 Clicks Basic Mobility (PT)  AM-PAC 6 Clicks Score (PT): 16  AM-PAC 6 Clicks Score (OT): 15    Litzy Puga  11/10/2022

## 2022-11-10 NOTE — PROGRESS NOTES
[facility]  PROGRESS NOTE    Patient Name: Susan Tucker  : 1935  MRN: [id number]    Date of Admission: 2022  Primary Care Physician: Pj Buchanan DO    Subjective   Subjective     CC:  Mental status change    HPI:  Patient up in chair eating breakfast this morning. States she feels much better. No overnight complaints    ROS:  Gen- No fevers, chills  CV- No chest pain, palpitations  Resp- No cough, dyspnea  GI- No N/V/D, abd pain        Objective   Objective     Vital Signs:   Temp:  [98.3 °F (36.8 °C)-98.8 °F (37.1 °C)] 98.3 °F (36.8 °C)  Heart Rate:  [] 79  Resp:  [16-18] 18  BP: (127-174)/() 137/76     Physical Exam:  Constitutional: No acute distress, awake, alert, frail  HENT: NCAT, mucous membranes moist- staple in scalp  Respiratory: Clear to auscultation bilaterally, respiratory effort normal   Cardiovascular: RRR, no murmurs, rubs, or gallops  Gastrointestinal: Positive bowel sounds, soft, nontender, nondistended  Musculoskeletal: No bilateral ankle edema  Psychiatric: Appropriate affect, cooperative  Neurologic: Oriented x 2,MOSER, speech clear  Skin: No rashes      Results Reviewed:  LAB RESULTS:      Lab 22  0730   WBC 5.74 7.21   HEMOGLOBIN 13.0 14.9   HEMATOCRIT 38.8 45.9   PLATELETS 205 240   NEUTROS ABS 4.11 5.81   IMMATURE GRANS (ABS) 0.03 0.03   LYMPHS ABS 0.79 0.76   MONOS ABS 0.51 0.39   EOS ABS 0.24 0.15   MCV 90.7 92.0   LACTATE 1.1 1.3         Lab 22  1739 22  0815 22  0439 22  2307 22  0730   SODIUM  --  141  --  137 139   POTASSIUM 5.0 3.4*  --  3.1* 3.5   CHLORIDE  --  103  --  102 102   CO2  --  28.0  --  27.0 28.0   ANION GAP  --  10.0  --  8.0 9.0   BUN  --  9  --  13 10   CREATININE  --  0.48*  --  0.54* 0.65   EGFR  --  92.4  --  89.8 85.9   GLUCOSE  --  87  --  119* 113*   CALCIUM  --  9.3  --  8.9 9.2   MAGNESIUM  --   --   --  1.8  --    TSH  --   --  0.908  --   --           Lab 11/07/22 2307 11/04/22  0730   TOTAL PROTEIN 6.0 7.3   ALBUMIN 3.60 4.30   GLOBULIN 2.4 3.0   ALT (SGPT) 16 14   AST (SGOT) 34* 28   BILIRUBIN 1.0 0.6   ALK PHOS 48 49   LIPASE  --  92*         Lab 11/07/22 2307   TROPONIN T <0.010                 Brief Urine Lab Results  (Last result in the past 365 days)      Color   Clarity   Blood   Leuk Est   Nitrite   Protein   CREAT   Urine HCG        11/09/22 1200 Yellow   Cloudy   Moderate (2+)   Moderate (2+)   Negative   Negative                 Microbiology Results Abnormal     Procedure Component Value - Date/Time    Blood Culture - Blood, Arm, Right [[id number]]  (Normal) Collected: 11/07/22 2335    Lab Status: Preliminary result Specimen: Blood from Arm, Right Updated: 11/10/22 0100     Blood Culture No growth at 2 days    Blood Culture - Blood, Arm, Left [[id number]]  (Normal) Collected: 11/07/22 2311    Lab Status: Preliminary result Specimen: Blood from Arm, Left Updated: 11/10/22 0100     Blood Culture No growth at 2 days    COVID PRE-OP / PRE-PROCEDURE SCREENING ORDER (NO ISOLATION) - Swab, Nasopharynx [[id number]]  (Normal) Collected: 11/07/22 2307    Lab Status: Final result Specimen: Swab from Nasopharynx Updated: 11/07/22 2349    Narrative:      The following orders were created for panel order COVID PRE-OP / PRE-PROCEDURE SCREENING ORDER (NO ISOLATION) - Swab, Nasopharynx.  Procedure                               Abnormality         Status                     ---------                               -----------         ------                     COVID-19 and FLU A/B PCR...[[id number]]  Normal              Final result                 Please view results for these tests on the individual orders.    COVID-19 and FLU A/B PCR - Swab, Nasopharynx [[id number]]  (Normal) Collected: 11/07/22 2307    Lab Status: Final result Specimen: Swab from Nasopharynx Updated: 11/07/22 2349     COVID19 Not Detected     Influenza A PCR Not Detected     Influenza B PCR  Not Detected    Narrative:      Fact sheet for providers: https://www.fda.gov/media/349896/download    Fact sheet for patients: https://www.fda.gov/media/454359/download    Test performed by PCR.          MRI Brain Without Contrast    Result Date: 11/8/2022  DATE OF EXAM: 11/8/2022 1:21 PM  PROCEDURE: MRI BRAIN WO CONTRAST-  INDICATIONS: AMS, frequent falls; R29.6-Repeated falls; G93.40-Encephalopathy, unspecified; T50.905A-Adverse effect of unspecified drugs, medicaments and biological substances, initial encounter  COMPARISON: 3/24/2022  TECHNIQUE: Multiplanar multisequence images of the brain were performed without contrast according to routine brain MRI protocol.  FINDINGS: No acute infarct is present on diffusion weighted sequences. Midline structures are normal and the craniocervical junction is satisfactory in appearance. Advanced age-related changes are present, with generalized volume loss in addition to mild typical T2 hyperintense periventricular white matter changes of chronic small vessel ischemia. There is otherwise no evidence of intracranial hemorrhage, mass or mass effect. The ventricles demonstrate some ex vacuo prominence relating to surrounding volume loss. The orbits are normal and the paranasal sinuses are grossly clear. Intracranial arterial flow voids are maintained.      Impression: Stable age-related changes as above. There is no evidence of acute ischemia, hemorrhage, mass or mass effect.  This report was finalized on 11/8/2022 2:20 PM by Hermes Mitchell.        Results for orders placed during the hospital encounter of 03/24/22    Adult Transthoracic Echo Complete W/ Cont if Necessary Per Protocol    Interpretation Summary  · Estimated left ventricular EF = 60%  · No hemodynamically significant valvular heart disease      I have reviewed the medications:  Scheduled Meds:amLODIPine, 5 mg, Oral, Q24H  aspirin, 81 mg, Oral, Daily  levoFLOXacin, 500 mg, Intravenous, Q24H  lisinopril, 20 mg,  Oral, Q24H  sodium chloride, 10 mL, Intravenous, Q12H      Continuous Infusions:   PRN Meds:.•  acetaminophen  •  labetalol  •  magnesium sulfate **OR** magnesium sulfate **OR** magnesium sulfate  •  melatonin  •  ondansetron **OR** ondansetron  •  potassium chloride **OR** potassium chloride **OR** potassium chloride  •  QUEtiapine  •  Insert peripheral IV **AND** sodium chloride  •  sodium chloride    Assessment & Plan   Assessment & Plan     Active Hospital Problems    Diagnosis  POA   • **Frequent falls [R29.6]  Not Applicable   • Hypokalemia [E87.6]  Yes   • Hypertension [I10]  Yes   • History of Stroke [I63.9]  Yes   • Acute UTI (urinary tract infection) [N39.0]  Unknown   • Type 2 diabetes mellitus without complication (HCC) [E11.9]  Yes   • Hyperlipidemia [E78.5]  Yes      Resolved Hospital Problems   No resolved problems to display.        Brief Hospital Course to date:  Susan Tucker is a 87 y.o. female with past medical history of dementia, hx of CVA, HTN, HLD and T2DM.  Patient is a resident at [facility].  Patient recently moved to [facility] last Wednesday.  Patient was brought to [facility] due to being obtunded.    This patient's problems and plans were partially entered by my partner and updated as appropriate by me 11/10/22.    Metabolic encephalopathy  Acute UTI  -- urine obtained to via straight cath due to incontinence. Culture + GNB  -- Due to allergies, levaquin started x 3 days. Will await culture  -- patient more alert and appears she may be close to her baseline  -- CT head revealed stable/ old CVAs  -- MRI brain negative   -- stroke neuro saw for possible CVA and have signed off  -- seroquel held on admit. Can resume prn     Frequent Falls  Failure to Thrive  History of of remote CVA  - continue ASA and high intensity statin  - orthostats ok  - gentle hydration given, now doing well with po intake  - PT/OT  - tsh wnl     Hypokalemia  - replace as needed per protocol. No  EKG changes     HTN  - elevated. Resume home lisinopril.  - norvasc 5mg started due to continued elevation, improved and no change today     DM2  - stable. Currently diet controlled    Head laceration 2/2 fall 11/4  -- sustained 11/4 and staples placed in posterior scalp in ED  -- will need removed 11/11 prior to DC    Discussed POC and updated daughterCarl 11/10 @1045 am      Expected Discharge Location and Transportation: [place]  Expected Discharge Date: 11/11    DVT prophylaxis:  Mechanical DVT prophylaxis orders are present.     AM-PAC 6 Clicks Score (PT): 12 (11/08/22 0920)    CODE STATUS:   Code Status and Medical Interventions:   Ordered at: 11/08/22 1120     Medical Intervention Limits:    NO intubation (DNI)     Level Of Support Discussed With:    Patient     Code Status (Patient has no pulse and is not breathing):    No CPR (Do Not Attempt to Resuscitate)     Medical Interventions (Patient has pulse or is breathing):    Limited Support       Rosario Ng, APRN  11/10/22

## 2022-11-10 NOTE — NURSING NOTE
Woc consulted for redness blanchable on bilateral gluteals.  By definition blanchable is not pressure and red blanchable redness is not a wound.  Discussed with RN as charted that they were putting Z guard and Mepilex together on the backside.  This is no not recommended therapy.  Woc explained that they cancel each other out so pick 1 or the other this Woc usually picks silicone foam for protection if patient is continent in the face of incontinence fecal or urine will use Z guard or some barrier paste instead.  Continue with the every 2 offloading.  RN is checking on patient due to advanced age and low Maxx scale if she would benefit from specialty mattress.  Informed to call Woc back at [phone].  At that time Woc will order bed if needed.  Otherwise Woc will sign off.  Pressure injury prevention orders placed.

## 2022-11-10 NOTE — PLAN OF CARE
Goal Outcome Evaluation:  Plan of Care Reviewed With: patient        Progress: improving  Outcome Evaluation: Physical therapy treatment complete. The patient tolerated PT treatment well. Patient required Ling for sit to stand transfer. Patient ambulated 25 feet in room with RW and CGA. Patient required additional time to complete ambulation distance. Patient complete seated LE therex. Continue current PT POC.

## 2022-11-10 NOTE — THERAPY TREATMENT NOTE
Patient Name: Susan Tucker  : 1935    MRN: [id number]                              Today's Date: 11/10/2022       Admit Date: 2022    Visit Dx:     ICD-10-CM ICD-9-CM   1. Frequent falls  R29.6 V15.88   2. Acute encephalopathy  G93.40 348.30   3. Adverse effect of drug, initial encounter  T50.905A E947.9     Patient Active Problem List   Diagnosis   • Sepsis (HCC)   • Elevated liver enzymes   • Dehydration   • Hypertension   • History of Stroke   • Acute UTI (urinary tract infection)   • Calculus of bile duct with acute cholecystitis and obstruction   • Enterocolitis   • Gallstone ileus of small intestine (HCC)   • Syncope and collapse   • Frequent falls   • Type 2 diabetes mellitus without complication (HCC)   • Hyperlipidemia   • Hypokalemia     Past Medical History:   Diagnosis Date   • Diabetes mellitus (HCC)    • Elevated cholesterol    • Hypertension    • Stroke (HCC)      Past Surgical History:   Procedure Laterality Date   • ERCP N/A 2018    Procedure: ENDOSCOPIC RETROGRADE CHOLANGIOPANCREATOGRAPHY;  Surgeon: Liam Manning MD;  Location: [facility] ENDOSCOPY;  Service: Gastroenterology   • EXPLORATORY LAPAROTOMY N/A 2/10/2020    Procedure: LAPAROTOMY EXPLORATORY BOWEL RESECTION;  Surgeon: Carlos Zurita MD;  Location: [facility] OR;  Service: General;  Laterality: N/A;   • HYSTERECTOMY        General Information     Row Name 11/10/22 0954          OT Time and Intention    Document Type therapy note (daily note)  -AN     Mode of Treatment occupational therapy  -AN     Row Name 11/10/22 0954          General Information    Patient Profile Reviewed yes  -AN     Existing Precautions/Restrictions fall;other (see comments)  dementia, incontinent  -AN     Barriers to Rehab medically complex;cognitive status;previous functional deficit  -AN     Row Name 11/10/22 0954          Cognition    Orientation Status (Cognition) oriented to;person;disoriented to;place;situation;time;verbal cues/prompts  needed for orientation  -AN     Row Name 11/10/22 0954          Safety Issues, Functional Mobility    Safety Issues Affecting Function (Mobility) safety precautions follow-through/compliance;safety precaution awareness;problem-solving;insight into deficits/self-awareness;judgment;awareness of need for assistance;sequencing abilities;positioning of assistive device  -AN     Impairments Affecting Function (Mobility) balance;cognition;postural/trunk control;strength;endurance/activity tolerance  -AN     Cognitive Impairments, Mobility Safety/Performance awareness, need for assistance;insight into deficits/self-awareness;judgment;problem-solving/reasoning;sequencing abilities;safety precaution follow-through;safety precaution awareness  -AN           User Key  (r) = Recorded By, (t) = Taken By, (c) = Cosigned By    Initials Name Provider Type    Duyen Hameed OT Occupational Therapist                 Mobility/ADL's     Row Name 11/10/22 0958          Bed Mobility    Bed Mobility rolling left;rolling right;supine-sit  -AN     Rolling Left [place] (Bed Mobility) supervision;verbal cues  -AN     Rolling Right [place] (Bed Mobility) supervision;verbal cues  -AN     Supine-Sit [place] (Bed Mobility) verbal cues;1 person assist;moderate assist (50% patient effort)  -AN     Bed Mobility, Safety Issues cognitive deficits limit understanding;decreased use of arms for pushing/pulling;decreased use of legs for bridging/pushing;impaired trunk control for bed mobility  -AN     Assistive Device (Bed Mobility) head of bed elevated;bed rails;draw sheet  -AN     Comment, (Bed Mobility) rolling L and R performed for chinmay care  -AN     Row Name 11/10/22 0958          Transfers    Transfers sit-stand transfer;stand-sit transfer;bed-chair transfer  -AN     Comment, (Transfers) cues for hand placement and transfer technique using RW; posterior lean with narrow stance upon standing EOB requring verbal and tactile cues  to correct  -AN     Row Name 11/10/22 0958          Bed-Chair Transfer    Bed-Chair [place] (Transfers) 1 person assist;verbal cues;moderate assist (50% patient effort)  -AN     Assistive Device (Bed-Chair Transfers) walker, front-wheeled  -AN     Row Name 11/10/22 0958          Sit-Stand Transfer    Sit-Stand [place] (Transfers) verbal cues;moderate assist (50% patient effort);1 person assist  -AN     Assistive Device (Sit-Stand Transfers) walker, front-wheeled  -AN     Row Name 11/10/22 0958          Stand-Sit Transfer    Stand-Sit [place] (Transfers) verbal cues;moderate assist (50% patient effort);1 person assist  -AN     Assistive Device (Stand-Sit Transfers) walker, front-wheeled  -AN     Row Name 11/10/22 0958          Functional Mobility    Functional Mobility- Ind. Level not tested  -AN     Row Name 11/10/22 0958          Activities of Daily Living    BADL Assessment/Intervention lower body dressing;feeding;grooming;toileting  -AN     Row Name 11/10/22 0958          Toileting Assessment/Training    [place] Level (Toileting) adjust/manage clothing;change pad/brief;perform perineal hygiene;dependent (less than 25% patient effort);verbal cues  -AN     Position (Toileting) supine  -AN     Comment, (Toileting) soiled upon arrival requiring rolling in supine for chinmay care  -AN     Row Name 11/10/22 0958          Grooming Assessment/Training    [place] Level (Grooming) hair care, combing/brushing;wash face, hands;set up;oral care regimen;minimum assist (75% patient effort);verbal cues  -AN     Position (Grooming) supported sitting  -AN     Row Name 11/10/22 0958          Lower Body Dressing Assessment/Training    [place] Level (Lower Body Dressing) don;socks;contact guard assist  -AN     Position (Lower Body Dressing) edge of bed sitting  -AN     Row Name 11/10/22 0958          Self-Feeding Assessment/Training    [place] Level (Feeding) prepare tray/open items;liquids to  mouth;set up  -AN     Position (Self-Feeding) supported sitting  -AN           User Key  (r) = Recorded By, (t) = Taken By, (c) = Cosigned By    Initials Name Provider Type    Duyen Hameed OT Occupational Therapist               Obj/Interventions     Row Name 11/10/22 1002          Balance    Static Sitting Balance contact guard  -AN     Dynamic Sitting Balance contact guard  -AN     Position, Sitting Balance sitting edge of bed  -AN     Sit to Stand Dynamic Balance verbal cues;moderate assist;non-verbal cues (demo/gesture);1-person assist  -AN     Static Standing Balance minimal assist;1-person assist;verbal cues  -AN     Dynamic Standing Balance verbal cues;moderate assist;1-person assist  -AN     Position/Device Used, Standing Balance walker, rolling  -AN     Balance Interventions standing;sit to stand;supported;static;dynamic;minimal challenge;moderate challenge;occupation based/functional task;narrowed base of support  -AN     Comment, Balance cues to widen base of support for improved balance and performance with standing  -AN           User Key  (r) = Recorded By, (t) = Taken By, (c) = Cosigned By    Initials Name Provider Type    Duyen Hameed OT Occupational Therapist               Goals/Plan    No documentation.                Clinical Impression     Row Name 11/10/22 1003          Pain Assessment    Pretreatment Pain Rating 0/10 - no pain  -AN     Posttreatment Pain Rating 0/10 - no pain  -AN     Pre/Posttreatment Pain Comment asymptomatic  -AN     Pain Intervention(s) Repositioned;Ambulation/increased activity  -AN     Row Name 11/10/22 1003          Plan of Care Review    Plan of Care Reviewed With patient  -AN     Progress improving  -AN     Outcome Evaluation Pt continues to be limited by generalized weakness, confusion, and impaired balance. Pt tolerated OOB to chair using the RW with Mod A due to posterior lean and narrow stance requiring cues for attention to task. Oral care  performed in sitting with Min A. Continue with POC.  -AN     Row Name 11/10/22 1003          Therapy Plan Review/Discharge Plan (OT)    Anticipated Discharge Disposition (OT) skilled nursing facility  -AN     Row Name 11/10/22 1003          Vital Signs    Pre Systolic BP Rehab --  VSS  -AN     O2 Delivery Pre Treatment room air  -AN     O2 Delivery Intra Treatment room air  -AN     O2 Delivery Post Treatment room air  -AN     Pre Patient Position Supine  -AN     Intra Patient Position Standing  -AN     Post Patient Position Sitting  -AN     Row Name 11/10/22 1003          Positioning and Restraints    Pre-Treatment Position in bed  -AN     Post Treatment Position chair  -AN     In Chair notified nsg;reclined;call light within reach;encouraged to call for assist;exit alarm on;waffle cushion;on mechanical lift sling;legs elevated;heels elevated  -AN           User Key  (r) = Recorded By, (t) = Taken By, (c) = Cosigned By    Initials Name Provider Type    Duyen Hameed OT Occupational Therapist               Outcome Measures     Row Name 11/10/22 1005          How much help from another is currently needed...    Putting on and taking off regular lower body clothing? 3  -AN     Bathing (including washing, rinsing, and drying) 2  -AN     Toileting (which includes using toilet bed pan or urinal) 1  -AN     Putting on and taking off regular upper body clothing 3  -AN     Taking care of personal grooming (such as brushing teeth) 3  -AN     Eating meals 3  -AN     AM-PAC 6 Clicks Score (OT) 15  -AN     [facility] Name 11/10/22 1005          Functional Assessment    Outcome Measure Options AM-PAC 6 Clicks Daily Activity (OT)  -AN           User Key  (r) = Recorded By, (t) = Taken By, (c) = Cosigned By    Initials Name Provider Type    Duyen Hameed OT Occupational Therapist                Occupational Therapy Education     Title: PT OT SLP Therapies (In Progress)     Topic: Occupational Therapy (In Progress)      Point: ADL training (Done)     Description:   Instruct learner(s) on proper safety adaptation and remediation techniques during self care or transfers.   Instruct in proper use of assistive devices.              Learning Progress Summary           Patient Acceptance, E, VU,NR by  at 11/10/2022 1005    Acceptance, E, NR by  at 11/8/2022 1129                   Point: Home exercise program (Not Started)     Description:   Instruct learner(s) on appropriate technique for monitoring, assisting and/or progressing therapeutic exercises/activities.              Learner Progress:  Not documented in this visit.          Point: Precautions (Done)     Description:   Instruct learner(s) on prescribed precautions during self-care and functional transfers.              Learning Progress Summary           Patient Acceptance, E, VU,NR by  at 11/10/2022 1005    Acceptance, E, NR by  at 11/8/2022 1129                   Point: Body mechanics (Done)     Description:   Instruct learner(s) on proper positioning and spine alignment during self-care, functional mobility activities and/or exercises.              Learning Progress Summary           Patient Acceptance, E, VU,NR by  at 11/10/2022 1005    Acceptance, E, NR by  at 11/8/2022 1129                               User Key     Initials Effective Dates Name Provider Type Discipline     10/14/22 -  Emi Doss OT Occupational Therapist OT     09/21/21 -  Duyen Gongora OT Occupational Therapist OT              OT Recommendation and Plan     Plan of Care Review  Plan of Care Reviewed With: patient  Progress: improving  Outcome Evaluation: Pt continues to be limited by generalized weakness, confusion, and impaired balance. Pt tolerated OOB to chair using the RW with Mod A due to posterior lean and narrow stance requiring cues for attention to task. Oral care performed in sitting with Min A. Continue with POC.     Time Calculation:    Time Calculation- OT     Row  Name 11/10/22 1006             Time Calculation- OT    OT Start Time 0935  -AN      OT Received On 11/10/22  -AN         Timed Charges    92485 - OT Therapeutic Activity Minutes 10  -AN      22903 - OT Self Care/Mgmt Minutes 15  -AN         Total Minutes    Timed Charges Total Minutes 25  -AN       Total Minutes 25  -AN            User Key  (r) = Recorded By, (t) = Taken By, (c) = Cosigned By    Initials Name Provider Type    AN Duyen Gongora OT Occupational Therapist              Therapy Charges for Today     Code Description Service Date Service Provider Modifiers Qty    56372587337 HC OT THERAPEUTIC ACT EA 15 MIN 11/10/2022 Duyen Gongora OT GO 1    88417747305 HC OT SELF CARE/MGMT/TRAIN EA 15 MIN 11/10/2022 Duyen Gongora OT GO 1               Duyen Gongora OT  11/10/2022

## 2022-11-10 NOTE — DISCHARGE PLACEMENT REQUEST
"Susan Palomino (87 y.o. Female)     To [place]  From [facility] at [facility]() [phone]    Date of Birth   1935    Social Security Number       Address   [address] [place] [id number]    Home Phone   [phone]    MRN   [id number]       Shinto   None    Marital Status                               Admission Date   11/7/22    Admission Type   Emergency    Admitting Provider   Marbella Chang MD    Attending Provider   Marbella Chang MD    Department, Room/Bed   [facility] 6A, N615/1       Discharge Date       Discharge Disposition       Discharge Destination                               Attending Provider: Marbella Chang MD    Allergies: Amoxicillin, Atorvastatin, Cephalexin    Isolation: None   Infection: None   Code Status: No CPR    Ht: 157.5 cm (62\")   Wt: 49 kg (108 lb)    Admission Cmt: None   Principal Problem: Frequent falls [R29.6]                 Active Insurance as of 11/7/2022     Primary Coverage     Payor Plan Insurance Group Employer/Plan Group    MEDICARE MEDICARE A & B      Payor Plan Address Payor Plan Phone Number Payor Plan Fax Number Effective Dates    [address] [phone]  4/1/2001 - None Entered    [facility] [id number]       Subscriber Name Subscriber Birth Date Member ID       SUSAN PALOMINO 1935 [id number]           Secondary Coverage     Payor Plan Insurance Group Employer/Plan Group    AARP MC SUP AARP HEALTH CARE OPTIONS      Payor Plan Address Payor Plan Phone Number Payor Plan Fax Number Effective Dates    [facility] [phone]  1/1/2018 - None Entered    [address]       [facility] [address]       Subscriber Name Subscriber Birth Date Member ID       SUSAN PALOMINO 1935 [id number]                 Emergency Contacts      (Rel.) Home Phone Work Phone Mobile Phone    CHRISTIE (ASHLEY)JOSE (Daughter) [phone] -- [phone]    SEBASTIAN MASON (Daughter) [phone] -- [phone]    ESTELITA PALOMINO EDCOURTNEY (Son) " [phone] -- [phone]    NATE PALOMINO (Son) [phone] -- [phone]               History & Physical      Yin Leal DO at 22 0126              [facility] Medicine Services  HISTORY AND PHYSICAL    Patient Name: Susan Palomino  : 1935  MRN: [id number]  Primary Care Physician: Pj Buchanan DO  Date of admission: 2022    Subjective    Subjective     Chief Complaint:  Frequent falls    HPI:  Susan Palomino is an 87 y.o. female with a past medical history significant for dementia, hypertension, HLD, and DM2. She presents today with daughter from [facility]. Apparently [facility] as of last Wednesday. HPI limited secondary to patient position. She is nonverbal and obtunded. Records reviewed indicate that patient has been having issues with frequent falls for the past 4-5 days. She is typically ambulatory per wheelchair and has been falling out of it multiple times a day. She is not anticoagulated. No LOC. But there was concern for head trauma. Last Friday, patient was taken to ED at 0100 wherein she underwent neurologic evaluation and underwent CT of head. No acute abnormalities. Upon returning home patient has some intractable nausea and vomiting. She returned to ED where she underwent repeat CT head, which was again negative. Daughter adds that one fall resulted in acute vital change wherein she became hypotensive to with systolic 84, HR 50. She has subsequent LOC but came too after ammonia capsule. Of note, daughter adds that patient's Seroquel dose has been increased in the past week. Daughter if asking if this could be contributory to symptoms.  Currently there are no complaints of fever, cough, congestion, SOB, or chest pain. No abdominal pain or N/v/D. No complaints of headache. No reports of focal weakness/parathesias or seizures. Will admit to inpatient for further evaluation and treatment.      Review of Systems   Unable to  perform ROS: Dementia        All other systems reviewed and are negative.     Personal History     Past Medical History:   Diagnosis Date   • Diabetes mellitus (HCC)    • Elevated cholesterol    • Hypertension    • Stroke (HCC)              Past Surgical History:   Procedure Laterality Date   • ERCP N/A 6/27/2018    Procedure: ENDOSCOPIC RETROGRADE CHOLANGIOPANCREATOGRAPHY;  Surgeon: Liam Manning MD;  Location:  [facility];  Service: Gastroenterology   • EXPLORATORY LAPAROTOMY N/A 2/10/2020    Procedure: LAPAROTOMY EXPLORATORY BOWEL RESECTION;  Surgeon: Carlos Zurita MD;  [facility];  Service: General;  Laterality: N/A;   • HYSTERECTOMY         Family History:  family history includes Early death in her brother; Hypertension in her father, maternal grandmother, mother, and paternal grandmother; Learning disabilities in an other family member. Otherwise pertinent FHx was reviewed and unremarkable.     Social History:  reports that she has never smoked. She has never used smokeless tobacco. She reports that she does not drink alcohol and does not use drugs.  Social History     Social History Narrative   • Not on file       Medications:  QUEtiapine, aspirin, atorvastatin, fenofibrate, lisinopril, and vitamin B-12    Allergies   Allergen Reactions   • Amoxicillin Hives   • Atorvastatin Nausea Only   • Cephalexin Rash       Objective    Objective     Vital Signs:   Temp:  [97.7 °F (36.5 °C)] 97.7 °F (36.5 °C)  Heart Rate:  [72-82] 75  Resp:  [16] 16  BP: (102-129)/() 129/68    Physical Exam   Constitutional: obtunded, lethargic  Eyes: PERRLA, sclerae anicteric, no conjunctival injection  HENT: NCAT, mucous membranes moist  Neck: Supple, no thyromegaly, no lymphadenopathy, trachea midline  Respiratory: Clear to auscultation bilaterally, nonlabored respirations   Cardiovascular: RRR, no murmurs, rubs, or gallops, palpable pedal pulses bilaterally  Gastrointestinal: Positive bowel sounds,  soft, nontender, nondistended  Musculoskeletal: No bilateral ankle edema, no clubbing or cyanosis to extremities  Psychiatric: flat affect, cooperative  Neurologic: Oriented x 3, strength symmetric in all extremities, Cranial Nerves grossly intact to confrontation, speech clear  Skin: No rashes      Result Review:  I have personally reviewed the results from the time of this admission to 11/8/2022 02:47 EST and agree with these findings:  [x]  Laboratory list / accordion  []  Microbiology  []  Radiology  []  EKG/Telemetry   []  Cardiology/Vascular   []  Pathology  [x]  Old records  []  Other:  Most notable findings include: vitals stable. Glucose 119. Potassium 3.1. chemistry and hematology otherwise favorable.    LAB RESULTS:      Lab 11/07/22 2307 11/04/22  0730   WBC 5.74 7.21   HEMOGLOBIN 13.0 14.9   HEMATOCRIT 38.8 45.9   PLATELETS 205 240   NEUTROS ABS 4.11 5.81   IMMATURE GRANS (ABS) 0.03 0.03   LYMPHS ABS 0.79 0.76   MONOS ABS 0.51 0.39   EOS ABS 0.24 0.15   MCV 90.7 92.0   LACTATE 1.1 1.3         Lab 11/07/22 2307 11/04/22  0730   SODIUM 137 139   POTASSIUM 3.1* 3.5   CHLORIDE 102 102   CO2 27.0 28.0   ANION GAP 8.0 9.0   BUN 13 10   CREATININE 0.54* 0.65   EGFR 89.8 85.9   GLUCOSE 119* 113*   CALCIUM 8.9 9.2   MAGNESIUM 1.8  --          Lab 11/07/22 2307 11/04/22  0730   TOTAL PROTEIN 6.0 7.3   ALBUMIN 3.60 4.30   GLOBULIN 2.4 3.0   ALT (SGPT) 16 14   AST (SGOT) 34* 28   BILIRUBIN 1.0 0.6   ALK PHOS 48 49   LIPASE  --  92*         Lab 11/07/22 2307   TROPONIN T <0.010                 Brief Urine Lab Results  (Last result in the past 365 days)      Color   Clarity   Blood   Leuk Est   Nitrite   Protein   CREAT   Urine HCG        11/04/22 0752 Yellow   Clear   Negative   Negative   Negative   Negative               Microbiology Results (last 10 days)     Procedure Component Value - Date/Time    COVID PRE-OP / PRE-PROCEDURE SCREENING ORDER (NO ISOLATION) - Swab, Nasopharynx [[id number]]  (Normal)  Collected: 11/07/22 2307    Lab Status: Final result Specimen: Swab from Nasopharynx Updated: 11/07/22 2349    Narrative:      The following orders were created for panel order COVID PRE-OP / PRE-PROCEDURE SCREENING ORDER (NO ISOLATION) - Swab, Nasopharynx.  Procedure                               Abnormality         Status                     ---------                               -----------         ------                     COVID-19 and FLU A/B PCR...[[id number]]  Normal              Final result                 Please view results for these tests on the individual orders.    COVID-19 and FLU A/B PCR - Swab, Nasopharynx [[id number]]  (Normal) Collected: 11/07/22 2307    Lab Status: Final result Specimen: Swab from Nasopharynx Updated: 11/07/22 2349     COVID19 Not Detected     Influenza A PCR Not Detected     Influenza B PCR Not Detected    Narrative:      Fact sheet for providers: https://www.fda.gov/media/477948/download    Fact sheet for patients: https://www.fda.gov/media/841818/download    Test performed by PCR.          CT Head Without Contrast    Result Date: 11/7/2022  EXAMINATION:  CT HEAD WO CONTRAST DATE: 11/7/2022 11:11 PM  INDICATION: Syncope and confusion  COMPARISON: Head CT November 4, 2022  TECHNIQUE:  Routine axial images through the head without contrast. Low-dose CT acquisition technique included one or more of the following options: 1. Automated exposure control, 2. Adjustment of mA and/or KV according to patient's size and/or 3. Use of iterative reconstruction. FINDINGS:  Intracranial Contents: Moderate generalized volume loss. Confluent hypodensities in the supratentorial white matter, most commonly related to chronic small vessel ischemic changes. Chronic lacunar infarcts in the left thalamus. Additional chronic infarct in the right cerebellum.   Gray-white differentiation is intact.   No acute intracranial hemorrhage. No mass effect, midline shift, hydrocephalus, herniation, or extra  axial fluid collection.  Bones and Extracranial Soft Tissues: The calvarium is intact. Posterior midline scalp laceration, unchanged. Visualized paranasal sinuses and mastoid air cells are clear.  Visualized intraorbital contents are unremarkable. Distal internal carotid artery atherosclerotic calcifications.     Impression: 1. No acute intracranial abnormality. No changes compared to the head CT on November 4, 2022. 2. Volume loss and moderate chronic small vessel ischemic changes. Chronic left thalamic and right cerebellar lacunar infarcts. Electronically signed by:  Sridhar Beyer M.D.  11/7/2022 9:35 PM Mountain Time    XR Chest 1 View    Result Date: 11/8/2022  EXAMINATION: XR CHEST 1 VW  DATE: 11/7/2022 11:15 PM INDICATIONS: Syncope. COMPARISON: None FINDINGS: Lungs: Normal. Pleura: Normal. Heart: Borderline heart size. Aorta: Atherosclerotic and left-sided. Mediastinum and Kalyani: Normal. Osseous: Stable. Devices: None. Other: A subcentimeter calcification projects over the right diaphragm.     Impression: 1. Stable borderline heart size. 2. No acute thoracic process. Electronically signed by:  Genaro Fuentes M.D.  11/7/2022 10:04 PM Mountain Time      Results for orders placed during the hospital encounter of 03/24/22    Adult Transthoracic Echo Complete W/ Cont if Necessary Per Protocol    Interpretation Summary  · Estimated left ventricular EF = 60%  · No hemodynamically significant valvular heart disease      Assessment & Plan   Assessment & Plan       Frequent falls    Hypertension    History of Stroke    Type 2 diabetes mellitus without complication (HCC)    Hyperlipidemia    Hypokalemia    Frequent Falls  Failure to Thrive  History of of remote CVA  - CT head without acute abnormalities but demonstrates volume loss and moderate chronic small vessel ischemic changes. Chronic left thalamic and right cerebellar lacunar infarcts.  - continue ASA and high intensity statin  - obtain MRI brain  - hold  antihypertensives  - hold seroquel  - obtain orthostatic blood pressures  - UA  - gentle hydration overnight  - PT/OT  - neuro checks  - consider neurology input  - am labs    Hypokalemia  - replace as needed per protocol. No EKG changes  - check magnesium    HTN  - stable. Holding lisinopril    DM2  - stable. Currently diet controlled  - check Hb a1c      DVT prophylaxis: mechanical     CODE STATUS:  Full code  Level Of Support Discussed With: Patient  Code Status (Patient has no pulse and is not breathing): CPR (Attempt to Resuscitate)  Medical Interventions (Patient has pulse or is breathing): Full Support      This note has been completed as part of a split-shared workflow.     Electronically signed by Phan Ramos PA-C, 11/08/22, 2:47 AM EST.        Attending   Admission Attestation       I have performed an independent face-to-face diagnostic evaluation including performing an independent physical examination as documented here.  The documented plan of care above was reviewed and developed with the advanced practice clinician (APC).      Brief Summary Statement:   Susan Tucker is a 87 y.o. female patient with past medical history of dementia, hx of CVA, HTN, HLD and T2DM.  Patient is a resident at [facility].  Patient recently moved to [facility] last Wednesday.  Patient was brought to [facility] due to being obtunded.  Patient daughter had reported to the ED staff the patient has had multiple falls in the past 4 to 5 days.  Fallen out of her wheelchair multiple times.  Upon arrival to the ED at 1 AM she underwent neurological evaluation and had a CT of the head that noted no acute abnormalities.  Daughter had also reported to the ED staff the patient had been hypotensive and bradycardic with a heart rate of 50 and systolic blood pressure of 84.  Patient Seroquel dose was recently been increased.  We will admit patient hospital service and hold Seroquel for now.  Do neurochecks.  At time  of attending evaluation patient will was responsive to verbal stimuli.  And able to answer yes and no.  She was oriented to person and place.  We will continue to hold Seroquel. Due to Hx of CVA we will obtain stat MRI of the brain. And consult gen neurology vs stroke neurology based on results.     Remainder of detailed HPI is as noted by APC and has been reviewed and/or edited by me for completeness.    Attending Physical Exam:  Temp:  [97.7 °F (36.5 °C)] 97.7 °F (36.5 °C)  Heart Rate:  [72-87] 87  Resp:  [16] 16  BP: (102-129)/() 129/68    Constitutional: Awake, alert  Eyes: PERRLA, sclerae anicteric, no conjunctival injection  HENT: NCAT, mucous membranes moist  Neck: Supple, no thyromegaly, no lymphadenopathy, trachea midline  Respiratory: Clear to auscultation bilaterally, nonlabored respirations   Cardiovascular: RRR, no murmurs, rubs, or gallops, palpable pedal pulses bilaterally  Gastrointestinal: Positive bowel sounds, soft, nontender, nondistended  Musculoskeletal: No bilateral ankle edema, no clubbing or cyanosis to extremities  Psychiatric: Appropriate affect, cooperative  Neurologic: Oriented x 2,  Speech is limited to 1 word answers, not able to follow commands.   Skin: No rashes      Brief Assessment/Plan :  See detailed assessment and plan developed with APC which I have reviewed and/or edited for completeness.        Yin Leal DO  11/08/22                          Electronically signed by Yin Leal DO at 11/08/22 0615         Current Facility-Administered Medications   Medication Dose Route Frequency Provider Last Rate Last Admin   • acetaminophen (TYLENOL) tablet 650 mg  650 mg Oral Q4H PRN Phan Ramos PA-C       • amLODIPine (NORVASC) tablet 5 mg  5 mg Oral Q24H Rosario Ng APRN   5 mg at 11/10/22 1044   • aspirin EC tablet 81 mg  81 mg Oral Daily Phan Ramos PA-C   81 mg at 11/10/22 1044   • labetalol (NORMODYNE,TRANDATE) injection 10 mg  10 mg Intravenous  Q6H PRN Rosario Ng R, APRN       • levoFLOXacin (LEVAQUIN) 500 mg/100 mL D5W (premix) (LEVAQUIN) 500 mg  500 mg Intravenous Q24H Rosario Ng, APRN   500 mg at 11/09/22 1516   • lisinopril (PRINIVIL,ZESTRIL) tablet 20 mg  20 mg Oral Q24H Sharda Mason, DO   20 mg at 11/10/22 1045   • Magnesium Sulfate 2 gram Bolus, followed by 8 gram infusion (total Mg dose 10 grams)- Mg less than or equal to 1mg/dL  2 g Intravenous PRN Mel, Yin J, DO        Or   • Magnesium Sulfate 2 gram / 50mL Infusion (GIVE X 3 BAGS TO EQUAL 6GM TOTAL DOSE) - Mg 1.1 - 1.5 mg/dl  2 g Intravenous PRN Mel Yin J, DO        Or   • Magnesium Sulfate 4 gram infusion- Mg 1.6-1.9 mg/dL  4 g Intravenous PRN MelYin J, DO 25 mL/hr at 11/08/22 0814 Currently Infusing at 11/08/22 0814   • melatonin tablet 5 mg  5 mg Oral Nightly PRN Phan Ramos PA-C   5 mg at 11/09/22 2035   • ondansetron (ZOFRAN) tablet 4 mg  4 mg Oral Q6H PRN Phan Ramos PAGraceC        Or   • ondansetron (ZOFRAN) injection 4 mg  4 mg Intravenous Q6H PRN Phan Ramos PA-C       • potassium chloride (MICRO-K) CR capsule 40 mEq  40 mEq Oral PRN Phan Ramos PA-C   40 mEq at 11/08/22 1300    Or   • potassium chloride (KLOR-CON) packet 40 mEq  40 mEq Oral PRN Phan Ramos PA-BREANNE        Or   • potassium chloride 10 mEq in 100 mL IVPB  10 mEq Intravenous Q1H PRN Phan Ramos PA-C       • QUEtiapine (SEROquel) tablet 12.5 mg  12.5 mg Oral Nightly PRN Rosario Ng R, APRN       • sodium chloride 0.9 % flush 10 mL  10 mL Intravenous PRN Kirt Rayo MD       • sodium chloride 0.9 % flush 10 mL  10 mL Intravenous Q12H Phan Ramos PA-C   10 mL at 11/09/22 2035   • sodium chloride 0.9 % flush 10 mL  10 mL Intravenous PRN Phan Ramos PA-C            Physician Progress Notes (most recent note)      Rosario Ng APRN at 11/10/22 1051              [facility] Medicine Services  PROGRESS  NOTE    Patient Name: Susan Tucker  : 1935  MRN: [id number]    Date of Admission: 2022  Primary Care Physician: Pj Buchanan DO    Subjective   Subjective     CC:  Mental status change    HPI:  Patient up in chair eating breakfast this morning. States she feels much better. No overnight complaints    ROS:  Gen- No fevers, chills  CV- No chest pain, palpitations  Resp- No cough, dyspnea  GI- No N/V/D, abd pain        Objective   Objective     Vital Signs:   Temp:  [98.3 °F (36.8 °C)-98.8 °F (37.1 °C)] 98.3 °F (36.8 °C)  Heart Rate:  [] 79  Resp:  [16-18] 18  BP: (127-174)/() 137/76     Physical Exam:  Constitutional: No acute distress, awake, alert, frail  HENT: NCAT, mucous membranes moist- staple in scalp  Respiratory: Clear to auscultation bilaterally, respiratory effort normal   Cardiovascular: RRR, no murmurs, rubs, or gallops  Gastrointestinal: Positive bowel sounds, soft, nontender, nondistended  Musculoskeletal: No bilateral ankle edema  Psychiatric: Appropriate affect, cooperative  Neurologic: Oriented x 2,MOSER, speech clear  Skin: No rashes      Results Reviewed:  LAB RESULTS:      Lab 22  0730   WBC 5.74 7.21   HEMOGLOBIN 13.0 14.9   HEMATOCRIT 38.8 45.9   PLATELETS 205 240   NEUTROS ABS 4.11 5.81   IMMATURE GRANS (ABS) 0.03 0.03   LYMPHS ABS 0.79 0.76   MONOS ABS 0.51 0.39   EOS ABS 0.24 0.15   MCV 90.7 92.0   LACTATE 1.1 1.3         Lab 22  1739 22  0815 22  0439 22  2307 22  0730   SODIUM  --  141  --  137 139   POTASSIUM 5.0 3.4*  --  3.1* 3.5   CHLORIDE  --  103  --  102 102   CO2  --  28.0  --  27.0 28.0   ANION GAP  --  10.0  --  8.0 9.0   BUN  --  9  --  13 10   CREATININE  --  0.48*  --  0.54* 0.65   EGFR  --  92.4  --  89.8 85.9   GLUCOSE  --  87  --  119* 113*   CALCIUM  --  9.3  --  8.9 9.2   MAGNESIUM  --   --   --  1.8  --    TSH  --   --  0.908  --   --          Lab 22  2307 22  0730   TOTAL PROTEIN 6.0  7.3   ALBUMIN 3.60 4.30   GLOBULIN 2.4 3.0   ALT (SGPT) 16 14   AST (SGOT) 34* 28   BILIRUBIN 1.0 0.6   ALK PHOS 48 49   LIPASE  --  92*         Lab 11/07/22 2307   TROPONIN T <0.010                 Brief Urine Lab Results  (Last result in the past 365 days)      Color   Clarity   Blood   Leuk Est   Nitrite   Protein   CREAT   Urine HCG        11/09/22 1200 Yellow   Cloudy   Moderate (2+)   Moderate (2+)   Negative   Negative                 Microbiology Results Abnormal     Procedure Component Value - Date/Time    Blood Culture - Blood, Arm, Right [[id number]]  (Normal) Collected: 11/07/22 2335    Lab Status: Preliminary result Specimen: Blood from Arm, Right Updated: 11/10/22 0100     Blood Culture No growth at 2 days    Blood Culture - Blood, Arm, Left [[id number]]  (Normal) Collected: 11/07/22 2311    Lab Status: Preliminary result Specimen: Blood from Arm, Left Updated: 11/10/22 0100     Blood Culture No growth at 2 days    COVID PRE-OP / PRE-PROCEDURE SCREENING ORDER (NO ISOLATION) - Swab, Nasopharynx [[id number]]  (Normal) Collected: 11/07/22 2307    Lab Status: Final result Specimen: Swab from Nasopharynx Updated: 11/07/22 2349    Narrative:      The following orders were created for panel order COVID PRE-OP / PRE-PROCEDURE SCREENING ORDER (NO ISOLATION) - Swab, Nasopharynx.  Procedure                               Abnormality         Status                     ---------                               -----------         ------                     COVID-19 and FLU A/B PCR...[[id number]]  Normal              Final result                 Please view results for these tests on the individual orders.    COVID-19 and FLU A/B PCR - Swab, Nasopharynx [[id number]]  (Normal) Collected: 11/07/22 2307    Lab Status: Final result Specimen: Swab from Nasopharynx Updated: 11/07/22 2349     COVID19 Not Detected     Influenza A PCR Not Detected     Influenza B PCR Not Detected    Narrative:      Fact sheet for providers:  https://www.fda.gov/media/179991/download    Fact sheet for patients: https://www.fda.gov/media/656821/download    Test performed by PCR.          MRI Brain Without Contrast    Result Date: 11/8/2022  DATE OF EXAM: 11/8/2022 1:21 PM  PROCEDURE: MRI BRAIN WO CONTRAST-  INDICATIONS: AMS, frequent falls; R29.6-Repeated falls; G93.40-Encephalopathy, unspecified; T50.905A-Adverse effect of unspecified drugs, medicaments and biological substances, initial encounter  COMPARISON: 3/24/2022  TECHNIQUE: Multiplanar multisequence images of the brain were performed without contrast according to routine brain MRI protocol.  FINDINGS: No acute infarct is present on diffusion weighted sequences. Midline structures are normal and the craniocervical junction is satisfactory in appearance. Advanced age-related changes are present, with generalized volume loss in addition to mild typical T2 hyperintense periventricular white matter changes of chronic small vessel ischemia. There is otherwise no evidence of intracranial hemorrhage, mass or mass effect. The ventricles demonstrate some ex vacuo prominence relating to surrounding volume loss. The orbits are normal and the paranasal sinuses are grossly clear. Intracranial arterial flow voids are maintained.      Impression: Stable age-related changes as above. There is no evidence of acute ischemia, hemorrhage, mass or mass effect.  This report was finalized on 11/8/2022 2:20 PM by Hermes Mitchell.        Results for orders placed during the hospital encounter of 03/24/22    Adult Transthoracic Echo Complete W/ Cont if Necessary Per Protocol    Interpretation Summary  · Estimated left ventricular EF = 60%  · No hemodynamically significant valvular heart disease      I have reviewed the medications:  Scheduled Meds:amLODIPine, 5 mg, Oral, Q24H  aspirin, 81 mg, Oral, Daily  levoFLOXacin, 500 mg, Intravenous, Q24H  lisinopril, 20 mg, Oral, Q24H  sodium chloride, 10 mL, Intravenous,  Q12H      Continuous Infusions:   PRN Meds:.•  acetaminophen  •  labetalol  •  magnesium sulfate **OR** magnesium sulfate **OR** magnesium sulfate  •  melatonin  •  ondansetron **OR** ondansetron  •  potassium chloride **OR** potassium chloride **OR** potassium chloride  •  QUEtiapine  •  Insert peripheral IV **AND** sodium chloride  •  sodium chloride    Assessment & Plan   Assessment & Plan     Active Hospital Problems    Diagnosis  POA   • **Frequent falls [R29.6]  Not Applicable   • Hypokalemia [E87.6]  Yes   • Hypertension [I10]  Yes   • History of Stroke [I63.9]  Yes   • Acute UTI (urinary tract infection) [N39.0]  Unknown   • Type 2 diabetes mellitus without complication (HCC) [E11.9]  Yes   • Hyperlipidemia [E78.5]  Yes      Resolved Hospital Problems   No resolved problems to display.        Brief Hospital Course to date:  Susan Tucker is a 87 y.o. female with past medical history of dementia, hx of CVA, HTN, HLD and T2DM.  Patient is a resident at [facility].  Patient recently moved to [facility] last Wednesday.  Patient was brought to [facility] due to being obtunded.    This patient's problems and plans were partially entered by my partner and updated as appropriate by me 11/10/22.    Metabolic encephalopathy  Acute UTI  -- urine obtained to via straight cath due to incontinence. Culture + GNB  -- Due to allergies, levaquin started x 3 days. Will await culture  -- patient more alert and appears she may be close to her baseline  -- CT head revealed stable/ old CVAs  -- MRI brain negative   -- stroke neuro saw for possible CVA and have signed off  -- seroquel held on admit. Can resume prn     Frequent Falls  Failure to Thrive  History of of remote CVA  - continue ASA and high intensity statin  - orthostats ok  - gentle hydration given, now doing well with po intake  - PT/OT  - tsh wnl     Hypokalemia  - replace as needed per protocol. No EKG changes     HTN  - elevated. Resume home  lisinopril.  - norvasc 5mg started due to continued elevation, improved and no change today     DM2  - stable. Currently diet controlled    Head laceration 2/ fall   -- sustained  and staples placed in posterior scalp in ED  -- will need removed  prior to DC    Discussed POC and updated daughter, Carl RAMIREZ 11/10 @1045 am      Expected Discharge Location and Transportation: [place]  Expected Discharge Date:     DVT prophylaxis:  Mechanical DVT prophylaxis orders are present.     AM-PAC 6 Clicks Score (PT): 12 (22 0920)    CODE STATUS:   Code Status and Medical Interventions:   Ordered at: 22 1120     Medical Intervention Limits:    NO intubation (DNI)     Level Of Support Discussed With:    Patient     Code Status (Patient has no pulse and is not breathing):    No CPR (Do Not Attempt to Resuscitate)     Medical Interventions (Patient has pulse or is breathing):    Limited Support       JOESPH Yadav  11/10/22                Electronically signed by Rosario Ng APRN at 11/10/22 1101          Physical Therapy Notes (most recent note)      Nadine Bermudez, PT at 22 3437  Version 1 of 1         Patient Name: Susan Tucker  : 1935    MRN: [id number]                              Today's Date: 2022       Admit Date: 2022    Visit Dx:     ICD-10-CM ICD-9-CM   1. Frequent falls  R29.6 V15.88   2. Acute encephalopathy  G93.40 348.30   3. Adverse effect of drug, initial encounter  T50.905A E947.9     Patient Active Problem List   Diagnosis   • Sepsis (HCC)   • Elevated liver enzymes   • Dehydration   • Hypertension   • History of Stroke   • Acute UTI (urinary tract infection)   • Calculus of bile duct with acute cholecystitis and obstruction   • Enterocolitis   • Gallstone ileus of small intestine (HCC)   • Syncope and collapse   • Frequent falls   • Type 2 diabetes mellitus without complication (HCC)   • Hyperlipidemia   • Hypokalemia     Past  Medical History:   Diagnosis Date   • Diabetes mellitus (HCC)    • Elevated cholesterol    • Hypertension    • Stroke (HCC)      Past Surgical History:   Procedure Laterality Date   • ERCP N/A 6/27/2018    Procedure: ENDOSCOPIC RETROGRADE CHOLANGIOPANCREATOGRAPHY;  Surgeon: Liam Manning MD;  Location: [facility] ENDOSCOPY;  Service: Gastroenterology   • EXPLORATORY LAPAROTOMY N/A 2/10/2020    Procedure: LAPAROTOMY EXPLORATORY BOWEL RESECTION;  Surgeon: Carlos Zurita MD;  Location: [facility] OR;  Service: General;  Laterality: N/A;   • HYSTERECTOMY        General Information     Row Name 11/09/22 1457          Physical Therapy Time and Intention    Document Type therapy note (daily note)  -     Mode of Treatment physical therapy  -     Row Name 11/09/22 1457          General Information    Existing Precautions/Restrictions fall;other (see comments)  baseline dementia, incontinent  -     Row Name 11/09/22 1457          Cognition    Orientation Status (Cognition) oriented to;person;disoriented to;place;situation;time;verbal cues/prompts needed for orientation  -     Row Name 11/09/22 1457          Safety Issues, Functional Mobility    Safety Issues Affecting Function (Mobility) insight into deficits/self-awareness;safety precaution awareness;problem-solving;sequencing abilities  -     Impairments Affecting Function (Mobility) balance;cognition;postural/trunk control;strength;endurance/activity tolerance  -           User Key  (r) = Recorded By, (t) = Taken By, (c) = Cosigned By    Initials Name Provider Type    SJ Nadine Bermudez PT Physical Therapist               Mobility     Row Name 11/09/22 0029          Bed Mobility    Supine-Sit [place] (Bed Mobility) verbal cues;minimum assist (75% patient effort);1 person assist  -     Assistive Device (Bed Mobility) head of bed elevated;bed rails;draw sheet  -     Comment, (Bed Mobility) cues for sequencing  -     Row Name 11/09/22 4461           Transfers    Comment, (Transfers) SPT from EOB > chair, then sit <> stand from recliner.  -SJ     Row Name 11/09/22 1553          Bed-Chair Transfer    Bed-Chair [place] (Transfers) minimum assist (75% patient effort);2 person assist;verbal cues  -SJ     Assistive Device (Bed-Chair Transfers) walker, front-wheeled  -SJ     Row Name 11/09/22 1553          Sit-Stand Transfer    Sit-Stand [place] (Transfers) minimum assist (75% patient effort);2 person assist;verbal cues  -SJ     Assistive Device (Sit-Stand Transfers) walker, front-wheeled  -SJ     Row Name 11/09/22 1553          Gait/Stairs (Locomotion)    [place] Level (Gait) minimum assist (75% patient effort);2 person assist  -SJ     Assistive Device (Gait) walker, front-wheeled  -SJ     Distance in Feet (Gait) 20  -SJ     Deviations/Abnormal Patterns (Gait) bilateral deviations;base of support, narrow;lisa decreased;gait speed decreased  -SJ     Bilateral Gait Deviations forward flexed posture;heel strike decreased  -SJ     Comment, (Gait/Stairs) Pt amb to door and back with RW and Ling x2 with cues for safety and posture. HR 121bpm, O2 sat stable on RA.  -SJ           User Key  (r) = Recorded By, (t) = Taken By, (c) = Cosigned By    Initials Name Provider Type    Nadine Addison PT Physical Therapist               Obj/Interventions     Row Name 11/09/22 1557          Balance    Balance Assessment sitting static balance;standing static balance  -SJ     Static Sitting Balance standby assist  -SJ     Position, Sitting Balance unsupported;sitting edge of bed  -SJ     Static Standing Balance minimal assist;1-person assist  -SJ     Position/Device Used, Standing Balance walker, rolling  -SJ           User Key  (r) = Recorded By, (t) = Taken By, (c) = Cosigned By    Initials Name Provider Type    Nadine Addison PT Physical Therapist               Goals/Plan    No documentation.                Clinical Impression     Row Name 11/09/22 1552           Pain    Pretreatment Pain Rating 0/10 - no pain  -SJ     Posttreatment Pain Rating 0/10 - no pain  -SJ     Row Name 11/09/22 1556          Plan of Care Review    Plan of Care Reviewed With patient  -SJ     Progress improving  -     Outcome Evaluation Pt pleasantly confused, cooperative. Pt dem increased independence with mobility this date with bed mob and t/f's, able to progress to ambulating to door and back with RW and Ling x2 with cues for safety and posture. HR 121bpm, O2 sat stable on RA. Continue POC.  -SJ     Row Name 11/09/22 1556          Vital Signs    Intratreatment Heart Rate (beats/min) 121  -SJ     Posttreatment Heart Rate (beats/min) 97  -     Row Name 11/09/22 1556          Positioning and Restraints    Pre-Treatment Position in bed  -SJ     Post Treatment Position chair  -SJ     In Chair notified nsg;reclined;call light within reach;encouraged to call for assist;exit alarm on;waffle cushion;on mechanical lift sling;heels elevated  -           User Key  (r) = Recorded By, (t) = Taken By, (c) = Cosigned By    Initials Name Provider Type    Nadine Addison PT Physical Therapist               Outcome Measures     Row Name 11/09/22 1558          How much help from another person do you currently need...    Turning from your back to your side while in flat bed without using bedrails? 3  -SJ     Moving from lying on back to sitting on the side of a flat bed without bedrails? 3  -SJ     Moving to and from a bed to a chair (including a wheelchair)? 2  -SJ     Standing up from a chair using your arms (e.g., wheelchair, bedside chair)? 2  -SJ     To walk in hospital room? 2  -SJ     Row Name 11/09/22 1558          Functional Assessment    Outcome Measure Options AM-PAC 6 Clicks Basic Mobility (PT)  -           User Key  (r) = Recorded By, (t) = Taken By, (c) = Cosigned By    Initials Name Provider Type    Nadine Addison PT Physical Therapist                             Physical  Therapy Education     Title: PT OT SLP Therapies (In Progress)     Topic: Physical Therapy (In Progress)     Point: Mobility training (In Progress)     Learning Progress Summary           Patient Acceptance, E, NR by  at 11/9/2022 1558    Acceptance, E, NR by ML at 11/8/2022 0921                   Point: Home exercise program (Not Started)     Learner Progress:  Not documented in this visit.          Point: Body mechanics (In Progress)     Learning Progress Summary           Patient Acceptance, E, NR by  at 11/9/2022 1558    Acceptance, E, NR by ML at 11/8/2022 0921                   Point: Precautions (In Progress)     Learning Progress Summary           Patient Acceptance, E, NR by  at 11/9/2022 1558    Acceptance, E, NR by ML at 11/8/2022 0921                               User Key     Initials Effective Dates Name Provider Type Discipline     06/16/21 -  Nadine Bermudez, PT Physical Therapist PT     04/22/21 -  Litzy Puga Physical Therapist PT              PT Recommendation and Plan     Plan of Care Reviewed With: patient  Progress: improving  Outcome Evaluation: Pt pleasantly confused, cooperative. Pt dem increased independence with mobility this date with bed mob and t/f's, able to progress to ambulating to door and back with RW and Ling x2 with cues for safety and posture. HR 121bpm, O2 sat stable on RA. Continue POC.     Time Calculation:    PT Charges     Row Name 11/09/22 1558             Time Calculation    Start Time 1457  -      PT Received On 11/09/22  -      PT Goal Re-Cert Due Date 11/18/22  -         Time Calculation- PT    Total Timed Code Minutes- PT 25 minute(s)  -SJ         Timed Charges    25316 - Gait Training Minutes  20  -SJ      54506 - PT Therapeutic Activity Minutes 5  -SJ         Total Minutes    Timed Charges Total Minutes 25  -SJ       Total Minutes 25  -SJ            User Key  (r) = Recorded By, (t) = Taken By, (c) = Cosigned By    Initials Name Provider Type      Nadine Bermudez, PT Physical Therapist              Therapy Charges for Today     Code Description Service Date Service Provider Modifiers Qty    27984575997 HC GAIT TRAINING EA 15 MIN 2022 Nadine Bermudez, PT GP 2    37227979569 HC PT THER SUPP EA 15 MIN 2022 Nadine Bermudez PT GP 2          PT G-Codes  Outcome Measure Options: AM-PAC 6 Clicks Basic Mobility (PT)  AM-PAC 6 Clicks Score (PT): 12  AM-PAC 6 Clicks Score (OT): 14    Nadine Bermudez PT  2022      Electronically signed by Nadine Bermudez PT at 22 1600          Occupational Therapy Notes (most recent note)      Duyen Gongora, OT at 11/10/22 0935          Patient Name: Susan Tucker  : 1935    MRN: [id number]                              Today's Date: 11/10/2022       Admit Date: 2022    Visit Dx:     ICD-10-CM ICD-9-CM   1. Frequent falls  R29.6 V15.88   2. Acute encephalopathy  G93.40 348.30   3. Adverse effect of drug, initial encounter  T50.905A E947.9     Patient Active Problem List   Diagnosis   • Sepsis (HCC)   • Elevated liver enzymes   • Dehydration   • Hypertension   • History of Stroke   • Acute UTI (urinary tract infection)   • Calculus of bile duct with acute cholecystitis and obstruction   • Enterocolitis   • Gallstone ileus of small intestine (HCC)   • Syncope and collapse   • Frequent falls   • Type 2 diabetes mellitus without complication (HCC)   • Hyperlipidemia   • Hypokalemia     Past Medical History:   Diagnosis Date   • Diabetes mellitus (HCC)    • Elevated cholesterol    • Hypertension    • Stroke (HCC)      Past Surgical History:   Procedure Laterality Date   • ERCP N/A 2018    Procedure: ENDOSCOPIC RETROGRADE CHOLANGIOPANCREATOGRAPHY;  Surgeon: Liam Manning MD;  Location: [facility] ENDOSCOPY;  Service: Gastroenterology   • EXPLORATORY LAPAROTOMY N/A 2/10/2020    Procedure: LAPAROTOMY EXPLORATORY BOWEL RESECTION;  Surgeon: Carlos Zurita MD;  Location: [facility] OR;  Service:  General;  Laterality: N/A;   • HYSTERECTOMY        General Information     Row Name 11/10/22 0954          OT Time and Intention    Document Type therapy note (daily note)  -AN     Mode of Treatment occupational therapy  -AN     Row Name 11/10/22 0954          General Information    Patient Profile Reviewed yes  -AN     Existing Precautions/Restrictions fall;other (see comments)  dementia, incontinent  -AN     Barriers to Rehab medically complex;cognitive status;previous functional deficit  -AN     Row Name 11/10/22 0954          Cognition    Orientation Status (Cognition) oriented to;person;disoriented to;place;situation;time;verbal cues/prompts needed for orientation  -AN     Row Name 11/10/22 0954          Safety Issues, Functional Mobility    Safety Issues Affecting Function (Mobility) safety precautions follow-through/compliance;safety precaution awareness;problem-solving;insight into deficits/self-awareness;judgment;awareness of need for assistance;sequencing abilities;positioning of assistive device  -AN     Impairments Affecting Function (Mobility) balance;cognition;postural/trunk control;strength;endurance/activity tolerance  -AN     Cognitive Impairments, Mobility Safety/Performance awareness, need for assistance;insight into deficits/self-awareness;judgment;problem-solving/reasoning;sequencing abilities;safety precaution follow-through;safety precaution awareness  -AN           User Key  (r) = Recorded By, (t) = Taken By, (c) = Cosigned By    Initials Name Provider Type    Duyen Hameed OT Occupational Therapist                 Mobility/ADL's     Row Name 11/10/22 0903          Bed Mobility    Bed Mobility rolling left;rolling right;supine-sit  -AN     Rolling Left [place] (Bed Mobility) supervision;verbal cues  -AN     Rolling Right [place] (Bed Mobility) supervision;verbal cues  -AN     Supine-Sit [place] (Bed Mobility) verbal cues;1 person assist;moderate assist (50% patient  effort)  -AN     Bed Mobility, Safety Issues cognitive deficits limit understanding;decreased use of arms for pushing/pulling;decreased use of legs for bridging/pushing;impaired trunk control for bed mobility  -AN     Assistive Device (Bed Mobility) head of bed elevated;bed rails;draw sheet  -AN     Comment, (Bed Mobility) rolling L and R performed for chinmay care  -AN     Row Name 11/10/22 0958          Transfers    Transfers sit-stand transfer;stand-sit transfer;bed-chair transfer  -AN     Comment, (Transfers) cues for hand placement and transfer technique using RW; posterior lean with narrow stance upon standing EOB requring verbal and tactile cues to correct  -AN     Row Name 11/10/22 0958          Bed-Chair Transfer    Bed-Chair [place] (Transfers) 1 person assist;verbal cues;moderate assist (50% patient effort)  -AN     Assistive Device (Bed-Chair Transfers) walker, front-wheeled  -AN     Row Name 11/10/22 0958          Sit-Stand Transfer    Sit-Stand [place] (Transfers) verbal cues;moderate assist (50% patient effort);1 person assist  -AN     Assistive Device (Sit-Stand Transfers) walker, front-wheeled  -AN     Row Name 11/10/22 0958          Stand-Sit Transfer    Stand-Sit [place] (Transfers) verbal cues;moderate assist (50% patient effort);1 person assist  -AN     Assistive Device (Stand-Sit Transfers) walker, front-wheeled  -AN     Row Name 11/10/22 0958          Functional Mobility    Functional Mobility- Ind. Level not tested  -AN     Row Name 11/10/22 0958          Activities of Daily Living    BADL Assessment/Intervention lower body dressing;feeding;grooming;toileting  -AN     Row Name 11/10/22 0958          Toileting Assessment/Training    [place] Level (Toileting) adjust/manage clothing;change pad/brief;perform perineal hygiene;dependent (less than 25% patient effort);verbal cues  -AN     Position (Toileting) supine  -AN     Comment, (Toileting) soiled upon arrival requiring rolling  in supine for chinmay care  -AN     Row Name 11/10/22 0958          Grooming Assessment/Training    [place] Level (Grooming) hair care, combing/brushing;wash face, hands;set up;oral care regimen;minimum assist (75% patient effort);verbal cues  -AN     Position (Grooming) supported sitting  -AN     Row Name 11/10/22 0958          Lower Body Dressing Assessment/Training    [place] Level (Lower Body Dressing) don;socks;contact guard assist  -AN     Position (Lower Body Dressing) edge of bed sitting  -AN     Row Name 11/10/22 0958          Self-Feeding Assessment/Training    [place] Level (Feeding) prepare tray/open items;liquids to mouth;set up  -AN     Position (Self-Feeding) supported sitting  -AN           User Key  (r) = Recorded By, (t) = Taken By, (c) = Cosigned By    Initials Name Provider Type    Duyen Hameed OT Occupational Therapist               Obj/Interventions     Row Name 11/10/22 1002          Balance    Static Sitting Balance contact guard  -AN     Dynamic Sitting Balance contact guard  -AN     Position, Sitting Balance sitting edge of bed  -AN     Sit to Stand [place] Balance verbal cues;moderate assist;non-verbal cues (demo/gesture);1-person assist  -AN     Static Standing Balance minimal assist;1-person assist;verbal cues  -AN     Dynamic Standing Balance verbal cues;moderate assist;1-person assist  -AN     Position/Device Used, Standing Balance walker, rolling  -AN     Balance Interventions standing;sit to stand;supported;static;dynamic;minimal challenge;moderate challenge;occupation based/functional task;narrowed base of support  -AN     Comment, Balance cues to widen base of support for improved balance and performance with standing  -AN           User Key  (r) = Recorded By, (t) = Taken By, (c) = Cosigned By    Initials Name Provider Type    Duyen Hameed OT Occupational Therapist               Goals/Plan    No documentation.                Clinical Impression      Row Name 11/10/22 1003          Pain Assessment    Pretreatment Pain Rating 0/10 - no pain  -AN     Posttreatment Pain Rating 0/10 - no pain  -AN     Pre/Posttreatment Pain Comment asymptomatic  -AN     Pain Intervention(s) Repositioned;Ambulation/increased activity  -AN     Row Name 11/10/22 1003          Plan of Care Review    Plan of Care Reviewed With patient  -AN     Progress improving  -AN     Outcome Evaluation Pt continues to be limited by generalized weakness, confusion, and impaired balance. Pt tolerated OOB to chair using the RW with Mod A due to posterior lean and narrow stance requiring cues for attention to task. Oral care performed in sitting with Min A. Continue with POC.  -AN     Row Name 11/10/22 1003          Therapy Plan Review/Discharge Plan (OT)    Anticipated Discharge Disposition (OT) skilled nursing facility  -AN     [facility] Name 11/10/22 1003          Vital Signs    Pre Systolic BP Rehab --  VSS  -AN     O2 Delivery Pre Treatment room air  -AN     O2 Delivery Intra Treatment room air  -AN     O2 Delivery Post Treatment room air  -AN     Pre Patient Position Supine  -AN     Intra Patient Position Standing  -AN     Post Patient Position Sitting  -AN     Row Name 11/10/22 1003          Positioning and Restraints    Pre-Treatment Position in bed  -AN     Post Treatment Position chair  -AN     In Chair notified nsg;reclined;call light within reach;encouraged to call for assist;exit alarm on;waffle cushion;on mechanical lift sling;legs elevated;heels elevated  -AN           User Key  (r) = Recorded By, (t) = Taken By, (c) = Cosigned By    Initials Name Provider Type    AN Duyen Gognora OT Occupational Therapist               Outcome Measures     Row Name 11/10/22 1005          How much help from another is currently needed...    Putting on and taking off regular lower body clothing? 3  -AN     Bathing (including washing, rinsing, and drying) 2  -AN     Toileting (which includes using toilet bed  pan or urinal) 1  -AN     Putting on and taking off regular upper body clothing 3  -AN     Taking care of personal grooming (such as brushing teeth) 3  -AN     Eating meals 3  -AN     AM-PAC 6 Clicks Score (OT) 15  -AN     Row Name 11/10/22 1005          Functional Assessment    Outcome Measure Options AM-PAC 6 Clicks Daily Activity (OT)  -AN           User Key  (r) = Recorded By, (t) = Taken By, (c) = Cosigned By    Initials Name Provider Type    Duyen Hameed OT Occupational Therapist                Occupational Therapy Education     Title: PT OT SLP Therapies (In Progress)     Topic: Occupational Therapy (In Progress)     Point: ADL training (Done)     Description:   Instruct learner(s) on proper safety adaptation and remediation techniques during self care or transfers.   Instruct in proper use of assistive devices.              Learning Progress Summary           Patient Acceptance, E, VU,NR by RONN at 11/10/2022 1005    Acceptance, E, NR by  at 11/8/2022 1129                   Point: Home exercise program (Not Started)     Description:   Instruct learner(s) on appropriate technique for monitoring, assisting and/or progressing therapeutic exercises/activities.              Learner Progress:  Not documented in this visit.          Point: Precautions (Done)     Description:   Instruct learner(s) on prescribed precautions during self-care and functional transfers.              Learning Progress Summary           Patient Acceptance, E, VU,NR by RONN at 11/10/2022 1005    Acceptance, E, NR by  at 11/8/2022 1129                   Point: Body mechanics (Done)     Description:   Instruct learner(s) on proper positioning and spine alignment during self-care, functional mobility activities and/or exercises.              Learning Progress Summary           Patient Acceptance, E, VU,NR by RONN at 11/10/2022 1005    Acceptance, E, NR by  at 11/8/2022 1129                               User Key     Initials Effective  Dates Name Provider Type Discipline     10/14/22 -  Emi Doss OT Occupational Therapist OT     09/21/21 -  Duyen Gongora OT Occupational Therapist OT              OT Recommendation and Plan     Plan of Care Review  Plan of Care Reviewed With: patient  Progress: improving  Outcome Evaluation: Pt continues to be limited by generalized weakness, confusion, and impaired balance. Pt tolerated OOB to chair using the RW with Mod A due to posterior lean and narrow stance requiring cues for attention to task. Oral care performed in sitting with Min A. Continue with POC.     Time Calculation:    Time Calculation- OT     Row Name 11/10/22 1006             Time Calculation- OT    OT Start Time 0935  -AN      OT Received On 11/10/22  -AN         Timed Charges    49314 - OT Therapeutic Activity Minutes 10  -AN      21967 - OT Self Care/Mgmt Minutes 15  -AN         Total Minutes    Timed Charges Total Minutes 25  -AN       Total Minutes 25  -AN            User Key  (r) = Recorded By, (t) = Taken By, (c) = Cosigned By    Initials Name Provider Type    AN Duyen Gongora OT Occupational Therapist              Therapy Charges for Today     Code Description Service Date Service Provider Modifiers Qty    46207650036 HC OT THERAPEUTIC ACT EA 15 MIN 11/10/2022 Duyen Gongora OT GO 1    41482743410 HC OT SELF CARE/MGMT/TRAIN EA 15 MIN 11/10/2022 Duyen Gongora OT GO 1               Duyen Gongora OT  11/10/2022    Electronically signed by Duyen Gongora OT at 11/10/22 1007

## 2022-11-11 VITALS
DIASTOLIC BLOOD PRESSURE: 54 MMHG | WEIGHT: 108 LBS | HEART RATE: 100 BPM | SYSTOLIC BLOOD PRESSURE: 143 MMHG | BODY MASS INDEX: 19.88 KG/M2 | RESPIRATION RATE: 18 BRPM | OXYGEN SATURATION: 97 % | TEMPERATURE: 97.9 F | HEIGHT: 62 IN

## 2022-11-11 PROBLEM — E87.6 HYPOKALEMIA: Status: RESOLVED | Noted: 2022-11-08 | Resolved: 2022-11-11

## 2022-11-11 LAB — BACTERIA SPEC AEROBE CULT: ABNORMAL

## 2022-11-11 PROCEDURE — 25010000002 LEVOFLOXACIN PER 250 MG: Performed by: NURSE PRACTITIONER

## 2022-11-11 PROCEDURE — 99239 HOSP IP/OBS DSCHRG MGMT >30: CPT | Performed by: NURSE PRACTITIONER

## 2022-11-11 RX ORDER — CHOLECALCIFEROL (VITAMIN D3) 125 MCG
5 CAPSULE ORAL NIGHTLY PRN
Start: 2022-11-11

## 2022-11-11 RX ORDER — LEVOFLOXACIN 5 MG/ML
500 INJECTION, SOLUTION INTRAVENOUS EVERY 24 HOURS
Status: COMPLETED | OUTPATIENT
Start: 2022-11-11 | End: 2022-11-11

## 2022-11-11 RX ORDER — QUETIAPINE FUMARATE 25 MG/1
25 TABLET, FILM COATED ORAL NIGHTLY PRN
Start: 2022-11-11

## 2022-11-11 RX ORDER — AMLODIPINE BESYLATE 5 MG/1
5 TABLET ORAL
Start: 2022-11-11

## 2022-11-11 RX ORDER — ONDANSETRON 4 MG/1
4 TABLET, FILM COATED ORAL EVERY 6 HOURS PRN
Start: 2022-11-11

## 2022-11-11 RX ORDER — ACETAMINOPHEN 325 MG/1
650 TABLET ORAL EVERY 4 HOURS PRN
Start: 2022-11-11

## 2022-11-11 RX ORDER — LISINOPRIL 20 MG/1
20 TABLET ORAL DAILY
Qty: 30 TABLET | Refills: 0
Start: 2022-11-11

## 2022-11-11 RX ADMIN — LISINOPRIL 20 MG: 20 TABLET ORAL at 09:17

## 2022-11-11 RX ADMIN — LEVOFLOXACIN 500 MG: 500 INJECTION, SOLUTION INTRAVENOUS at 11:45

## 2022-11-11 RX ADMIN — AMLODIPINE BESYLATE 5 MG: 5 TABLET ORAL at 09:17

## 2022-11-11 RX ADMIN — ASPIRIN 81 MG: 81 TABLET, COATED ORAL at 09:17

## 2022-11-11 RX ADMIN — Medication 10 ML: at 09:17

## 2022-11-11 NOTE — DISCHARGE PLACEMENT REQUEST
"Susan Palomino (87 y.o. Female)     To [facility]  From [facility]() [phone]    Date of Birth   1935    Social Security Number       Address   [address] [place] [id number]    Home Phone   [phone]    MRN   [id number]       Synagogue   None    Marital Status                               Admission Date   11/7/22    Admission Type   Emergency    Admitting Provider   Marbella Chang MD    Attending Provider   Marbella Chang MD    Department, Room/Bed   [facility] 6A, N615/1       Discharge Date       Discharge Disposition   Skilled Nursing Facility (DC - External)    Discharge Destination                               Attending Provider: Marbella Chang MD    Allergies: Amoxicillin, Atorvastatin, Cephalexin    Isolation: None   Infection: None   Code Status: No CPR    Ht: 157.5 cm (62\")   Wt: 49 kg (108 lb)    Admission Cmt: None   Principal Problem: Frequent falls [R29.6]                 Active Insurance as of 11/7/2022     Primary Coverage     Payor Plan Insurance Group Employer/Plan Group    MEDICARE MEDICARE A & B      Payor Plan Address Payor Plan Phone Number Payor Plan Fax Number Effective Dates    [address] [phone]  4/1/2001 - None Entered    [facility] [id number]       Subscriber Name Subscriber Birth Date Member ID       SUSAN PALOMINO 1935 [id number]           Secondary Coverage     Payor Plan Insurance Group Employer/Plan Group    [employer] SUP AAR HEALTH CARE OPTIONS      Payor Plan Address Payor Plan Phone Number Payor Plan Fax Number Effective Dates    [facility] [phone]  1/1/2018 - None Entered    [address]       [facility] [id number]       Subscriber Name Subscriber Birth Date Member ID       SUSAN PALOMINO 1935 [id number]                 Emergency Contacts      (Rel.) Home Phone Work Phone Mobile Phone    CHRISTIE (POA)JOSE (Daughter) [phone] -- [phone]    ISABELLASEBASTIAN (Daughter) [phone] -- [phone] "    ANAHYESTELITA ORONA (Son) [phone] -- [phone]    NATE TUCKER (Son) [phone] -- [phone]               Discharge Summary      Rosario Ng APRN at 22 0927              [facility] Medicine Services  DISCHARGE SUMMARY    Patient Name: Susan Tucker  : 1935  MRN: [id number]    Date of Admission: 2022  9:29 PM  Date of Discharge:  2022  Primary Care Physician: Pj Buchanan DO    Consults     No orders found from 10/9/2022 to 2022.          Hospital Course     Presenting Problem:   Frequent falls [R29.6]    Active Hospital Problems    Diagnosis  POA   • **Frequent falls [R29.6]  Not Applicable   • Hypertension [I10]  Yes   • History of Stroke [I63.9]  Yes   • Acute UTI (urinary tract infection) [N39.0]  Unknown   • Type 2 diabetes mellitus without complication (HCC) [E11.9]  Yes   • Hyperlipidemia [E78.5]  Yes      Resolved Hospital Problems    Diagnosis Date Resolved POA   • Hypokalemia [E87.6] 2022 Yes          Hospital Course:  Susan Tucker is a 87 y.o. female   Final result Specimen: Swab from Nasopharynx Updated: 22      Narrative:       The following orders were created for panel order COVID PRE-OP / PRE-PROCEDURE SCREENING ORDER (NO ISOLATION) - Swab, Nasopharynx.  Procedure                               Abnormality         Status                     ---------                               -----------         ------                     COVID-19 and FLU A/B PCR...[[id number]]  Normal              Final result                  Please view results for these tests on the individual orders.     COVID-19 and FLU A/B PCR - Swab, Nasopharynx [[id number]]  (Normal) Collected: 22     Lab Status: Final result Specimen: Swab from Nasopharynx Updated: 22       COVID19 Not Detected       Influenza A PCR Not Detected       Influenza B PCR Not Detected     Narrative:       Fact sheet for providers:  https://www.fda.gov/media/038013/download     Fact sheet for patients: https://www.fda.gov/media/124396/download     Test performed by PCR.               Radiology results from the last 24 hours:   MRI Brain Without Contrast     Result Date: 11/8/2022  DATE OF EXAM: 11/8/2022 1:21 PM  PROCEDURE: MRI BRAIN WO CONTRAST-  INDICATIONS: AMS, frequent falls; R29.6-Repeated falls; G93.40-Encephalopathy, unspecified; T50.905A-Adverse effect of unspecified drugs, medicaments and biological substances, initial encounter  COMPARISON: 3/24/2022  TECHNIQUE: Multiplanar multisequence images of the brain were performed without contrast according to routine brain MRI protocol.  FINDINGS: No acute infarct is present on diffusion weighted sequences. Midline structures are normal and the craniocervical junction is satisfactory in appearance. Advanced age-related changes are present, with generalized volume loss in addition to mild typical T2 hyperintense periventricular white matter changes of chronic small vessel ischemia. There is otherwise no evidence of intracranial hemorrhage, mass or mass effect. The ventricles demonstrate some ex vacuo prominence relating to surrounding volume loss. The orbits are normal and the paranasal sinuses are grossly clear. Intracranial arterial flow voids are maintained.       Impression: Stable age-related changes as above. There is no evidence of acute ischemia, hemorrhage, mass or mass effect.  This report was finalized on 11/8/2022 2:20 PM by Hermes Mitchell.           Results for orders placed during the hospital encounter of 03/24/22     Adult Transthoracic Echo Complete W/ Cont if Necessary Per Protocol     Interpretation Summary  · Estimated left ventricular EF = 60%  · No hemodynamically significant valvular heart disease        I have reviewed the medications:  Scheduled Meds:amLODIPine, 5 mg, Oral, Q24H  aspirin, 81 mg, Oral, Daily  levoFLOXacin, 500 mg, Intravenous, Q24H  lisinopril, 20  mg, Oral, Q24H  sodium chloride, 10 mL, Intravenous, Q12H        Continuous Infusions:   PRN Meds:.•  acetaminophen  •  labetalol  •  magnesium sulfate **OR** magnesium sulfate **OR** magnesium sulfate  •  melatonin  •  ondansetron **OR** ondansetron  •  potassium chloride **OR** potassium chloride **OR** potassium chloride  •  QUEtiapine  •  Insert peripheral IV **AND** sodium chloride  •  sodium chloride           Assessment & Plan     Assessment & Plan            Active Hospital Problems     Diagnosis   POA   • **Frequent falls [R29.6]   Not Applicable   • Hypokalemia [E87.6]   Yes   • Hypertension [I10]   Yes   • History of Stroke [I63.9]   Yes   • Acute UTI (urinary tract infection) [N39.0]   Unknown   • Type 2 diabetes mellitus without complication (HCC) [E11.9]   Yes   • Hyperlipidemia [E78.5]   Yes       Resolved Hospital Problems   No resolved problems to display.         Brief Hospital Course to date:  Susan Tucker is a 87 y.o. female with past medical history of dementia, hx of CVA, HTN, HLD and T2DM.  Patient is a resident at [facility].  Patient recently moved to [facility] last Wednesday.  Patient was brought to [facility] due to being obtunded. Found to have UTI:       Metabolic encephalopathy- resolved  Acute UTI  -- urine obtained to via straight cath due to incontinence. Culture + Ecoli with pan susecpibility   -- will complete last dose of levaquin today (3 days) prior to DC. No further antibiotics at DC  -- patient now at baseline  -- CT head revealed stable/ old CVAs  -- MRI brain negative   -- stroke neuro saw for possible CVA and have signed off  -- seroquel held on admit. Can resume prn     Frequent Falls  Failure to Thrive  History of of remote CVA  - continue ASA and high intensity statin  - orthostats ok  - gentle hydration given, now doing well with po intake  - PT/OT evaluated- ok back to [facility]  - tsh wnl     Hypokalemia  - replaced      HTN  - elevated. Resumed  home lisinopril and increased to 20mg daily  - norvasc 5mg started due to continued elevation, improved and continue at DC     DM2  - stable. Currently diet controlled     Head laceration 2/2 fall 11/4  -- sustained 11/4 and staples placed in posterior scalp in ED  -- remove staples 11/11 prior to DC       Discharge Follow Up Recommendations for outpatient labs/diagnostics:  PCP follow up one week    Day of Discharge     HPI:   Patient sitting up in bed. No complaints. Asking when she gets to leave.    Review of Systems  Gen- No fevers, chills  CV- No chest pain, palpitations  Resp- No cough, dyspnea  GI- No N/V/D, abd pain        Vital Signs:   Temp:  [98 °F (36.7 °C)-98.8 °F (37.1 °C)] 98.5 °F (36.9 °C)  Heart Rate:  [81-97] 81  Resp:  [16-17] 16  BP: (138-159)/(64-95) 154/79      Physical Exam:  Constitutional: No acute distress, awake, alert  HENT: NCAT, mucous membranes moist  Respiratory: Clear to auscultation bilaterally, respiratory effort normal   Cardiovascular: RRR, no murmurs, rubs, or gallops  Gastrointestinal: Positive bowel sounds, soft, nontender, nondistended  Musculoskeletal: No bilateral ankle edema  Psychiatric: Appropriate affect, cooperative  Neurologic: Oriented x 2- baseline dementia, strength symmetric in all extremities, Cranial Nerves grossly intact to confrontation, speech clear  Skin: No rashes      Pertinent  and/or Most Recent Results     LAB RESULTS:      Lab 11/07/22  2307   WBC 5.74   HEMOGLOBIN 13.0   HEMATOCRIT 38.8   PLATELETS 205   NEUTROS ABS 4.11   IMMATURE GRANS (ABS) 0.03   LYMPHS ABS 0.79   MONOS ABS 0.51   EOS ABS 0.24   MCV 90.7   LACTATE 1.1         Lab 11/08/22  1739 11/08/22  0815 11/08/22  0439 11/07/22  2307   SODIUM  --  141  --  137   POTASSIUM 5.0 3.4*  --  3.1*   CHLORIDE  --  103  --  102   CO2  --  28.0  --  27.0   ANION GAP  --  10.0  --  8.0   BUN  --  9  --  13   CREATININE  --  0.48*  --  0.54*   EGFR  --  92.4  --  89.8   GLUCOSE  --  87  --  119*    CALCIUM  --  9.3  --  8.9   MAGNESIUM  --   --   --  1.8   TSH  --   --  0.908  --          Lab 11/07/22 2307   TOTAL PROTEIN 6.0   ALBUMIN 3.60   GLOBULIN 2.4   ALT (SGPT) 16   AST (SGOT) 34*   BILIRUBIN 1.0   ALK PHOS 48         Lab 11/07/22 2307   TROPONIN T <0.010                 Brief Urine Lab Results  (Last result in the past 365 days)      Color   Clarity   Blood   Leuk Est   Nitrite   Protein   CREAT   Urine HCG        11/09/22 1200 Yellow   Cloudy   Moderate (2+)   Moderate (2+)   Negative   Negative               Microbiology Results (last 10 days)     Procedure Component Value - Date/Time    Urine Culture - Urine, Urine, Catheter [[id number]]  (Abnormal)  (Susceptibility) Collected: 11/09/22 1200    Lab Status: Final result Specimen: Urine, Catheter Updated: 11/11/22 0823     Urine Culture >100,000 CFU/mL Escherichia coli    Narrative:      Colonization of the urinary tract without infection is common. Treatment is discouraged unless the patient is symptomatic, pregnant, or undergoing an invasive urologic procedure.    Susceptibility      Escherichia coli      CRYSTAL      Ampicillin Susceptible      Ampicillin + Sulbactam Susceptible      Cefazolin Susceptible      Cefepime Susceptible      Ceftazidime Susceptible      Ceftriaxone Susceptible      Gentamicin Susceptible      Levofloxacin Susceptible      Nitrofurantoin Susceptible      Piperacillin + Tazobactam Susceptible      Trimethoprim + Sulfamethoxazole Susceptible                           Blood Culture - Blood, Arm, Right [[id number]]  (Normal) Collected: 11/07/22 2335    Lab Status: Preliminary result Specimen: Blood from Arm, Right Updated: 11/11/22 0100     Blood Culture No growth at 3 days    Blood Culture - Blood, Arm, Left [[id number]]  (Normal) Collected: 11/07/22 2311    Lab Status: Preliminary result Specimen: Blood from Arm, Left Updated: 11/11/22 0100     Blood Culture No growth at 3 days    COVID PRE-OP / PRE-PROCEDURE SCREENING ORDER  (NO ISOLATION) - Swab, Nasopharynx [[id number]]  (Normal) Collected: 11/07/22 2307    Lab Status: Final result Specimen: Swab from Nasopharynx Updated: 11/07/22 2349    Narrative:      The following orders were created for panel order COVID PRE-OP / PRE-PROCEDURE SCREENING ORDER (NO ISOLATION) - Swab, Nasopharynx.  Procedure                               Abnormality         Status                     ---------                               -----------         ------                     COVID-19 and FLU A/B PCR...[[id number]]  Normal              Final result                 Please view results for these tests on the individual orders.    COVID-19 and FLU A/B PCR - Swab, Nasopharynx [[id number]]  (Normal) Collected: 11/07/22 2307    Lab Status: Final result Specimen: Swab from Nasopharynx Updated: 11/07/22 2349     COVID19 Not Detected     Influenza A PCR Not Detected     Influenza B PCR Not Detected    Narrative:      Fact sheet for providers: https://www.fda.gov/media/065511/download    Fact sheet for patients: https://www.fda.gov/media/286273/download    Test performed by PCR.          XR Knee 1 or 2 View Right    Result Date: 11/4/2022  Examination: XR KNEE 1 OR 2 VW RIGHT-  Date of Exam: 11/4/2022 8:45 AM  Indication: fall; right knee pain.  Comparison: None available.  Technique: Multiple radiographic views of the knee were obtained.  Findings: There is diffuse osteopenia.  There is mild narrowing of medial lateral joint compartments.  Mild osteophyte formation is noted of the medial femoral condyle.  There is no acute fracture or dislocation.  Mild degenerative spurring is noted of the patella.  No joint effusion or hemarthrosis.  Basilar calcic lesions are seen within soft tissues.          1.  Osteopenia and mild degenerative changes of the knee.  No acute bony abnormality or joint effusion.  This report was finalized on 11/4/2022 9:10 AM by Osbaldo Blank MD.      CT Head Without Contrast    Result Date:  11/7/2022  EXAMINATION:  CT HEAD WO CONTRAST DATE: 11/7/2022 11:11 PM  INDICATION: Syncope and confusion  COMPARISON: Head CT November 4, 2022  TECHNIQUE:  Routine axial images through the head without contrast. Low-dose CT acquisition technique included one or more of the following options: 1. Automated exposure control, 2. Adjustment of mA and/or KV according to patient's size and/or 3. Use of iterative reconstruction. FINDINGS:  Intracranial Contents: Moderate generalized volume loss. Confluent hypodensities in the supratentorial white matter, most commonly related to chronic small vessel ischemic changes. Chronic lacunar infarcts in the left thalamus. Additional chronic infarct in the right cerebellum.   Gray-white differentiation is intact.   No acute intracranial hemorrhage. No mass effect, midline shift, hydrocephalus, herniation, or extra axial fluid collection.  Bones and Extracranial Soft Tissues: The calvarium is intact. Posterior midline scalp laceration, unchanged. Visualized paranasal sinuses and mastoid air cells are clear.  Visualized intraorbital contents are unremarkable. Distal internal carotid artery atherosclerotic calcifications.     1. No acute intracranial abnormality. No changes compared to the head CT on November 4, 2022. 2. Volume loss and moderate chronic small vessel ischemic changes. Chronic left thalamic and right cerebellar lacunar infarcts. Electronically signed by:  Sridhar Beyer M.D.  11/7/2022 9:35 PM Mountain Time    CT Head Without Contrast    Result Date: 11/4/2022  Examination: CT HEAD WO CONTRAST-  Date of Exam: 11/4/2022 10:47 AM  Indication: 2nd fall in 12 hours, this time, with vomiting.  Comparison: 11/4/2022.  Technique: Axial noncontrast CT imaging of the head was performed. Automated exposure control and iterative reconstruction methods were used.  Findings: There is a chronic lacunar infarct in left thalamus. There is patchy hypoattenuation in both basal ganglia.  There is patchy hypoattenuation in the periventricular white matter. No new hypoattenuating lesions. Stable chronic lacunar type infarct in the superior right cerebellum. No acute intracranial hemorrhage. No mass effect or midline shift. No abnormal extra-axial collections. There are mild intracranial vascular calcifications. Orbits appear within normal limits. The calvarium is intact. The paranasal sinuses and mastoid air cells appear well aerated. There are staples in the parietal occipital scalp. No obvious soft tissue contusion or hematoma.       1. No acute intracranial abnormality. 2. Stable laceration in the midline parieto-occipital scalp. No hematoma or underlying fracture. 3. Chronic small vessel ischemic change and chronic lacunar infarcts in the left thalamus and right cerebellum.  This report was finalized on 11/4/2022 10:55 AM by Cezar Velasquez MD.      CT Head Without Contrast    Result Date: 11/4/2022  EXAMINATION: CT HEAD WITHOUT CONTRAST DATE: 11/4/2022 1:48 AM INDICATION: Fall, pain COMPARISON: MRI brain 3/24/2022 TECHNIQUE: Noncontrast imaging obtained from the vertex to the skull base.  CT dose lowering techniques were used, to include: automated exposure control, adjustment for patient size, and or use of iterative reconstruction.? FINDINGS: Soft Tissues: No significant soft tissue abnormality. Skull: No underlying skull fracture or radiopaque foreign body. Sinuses: Paranasal sinuses are clear. Mastoids: Mastoid air cells are clear. Globes and Orbits: Globes and orbits are intact. Brain: No acute hemorrhage.  No midline shift, masses, or mass effect.  No evidence of acute infarct by noncontrast CT. Ventricles and Cisterns: Ventricular size and configuration is within normal limits. Basal cisterns are patent. No abnormal extra-axial fluid collection. Senescent Changes: Mild to moderate volume loss and chronic microvascular ischemic changes. Arteriosclerosis     1. No acute intracranial  abnormality. 2. Mild/moderate volume loss and chronic microvascular ischemic changes. Arteriosclerosis. Electronically signed by:  Liam Moore M.D.  11/4/2022 12:13 AM Mountain Time    CT Cervical Spine Without Contrast    Result Date: 11/4/2022  EXAMINATION: CT CERVICAL SPINE WITHOUT DATE: 11/4/2022 1:48 AM INDICATION: Fall, pain COMPARISON: CT angiograms head and neck 3/24/2022 TECHNIQUE: Thin section axial noncontrast images were obtained through the cervical spine.  Sagittal and coronal reformatted images were created.  Images were reviewed in bone and soft tissue windows.  CT dose lowering techniques were used, to include: automated exposure control, adjustment for patient size, and or use of iterative reconstruction. FINDINGS: Osseous: Cervical lordosis is maintained. Vertebral body height and alignment is preserved. No acute fracture or subluxation. No prevertebral soft tissue swelling. The lateral masses of C1 align on C2. Degenerative Changes Mild degenerative disc disease and facet arthropathy. No high-grade canal stenosis. Soft Tissues of the Neck: No focal soft tissue abnormality within the visualized neck. Visualized lung apices are clear. Visualized airways are patent.     No acute fracture or subluxation of the cervical spine. Electronically signed by:  Liam Moore M.D.  11/4/2022 12:15 AM Mountain Time    MRI Brain Without Contrast    Result Date: 11/8/2022  DATE OF EXAM: 11/8/2022 1:21 PM  PROCEDURE: MRI BRAIN WO CONTRAST-  INDICATIONS: AMS, frequent falls; R29.6-Repeated falls; G93.40-Encephalopathy, unspecified; T50.905A-Adverse effect of unspecified drugs, medicaments and biological substances, initial encounter  COMPARISON: 3/24/2022  TECHNIQUE: Multiplanar multisequence images of the brain were performed without contrast according to routine brain MRI protocol.  FINDINGS: No acute infarct is present on diffusion weighted sequences. Midline structures are normal and the craniocervical  junction is satisfactory in appearance. Advanced age-related changes are present, with generalized volume loss in addition to mild typical T2 hyperintense periventricular white matter changes of chronic small vessel ischemia. There is otherwise no evidence of intracranial hemorrhage, mass or mass effect. The ventricles demonstrate some ex vacuo prominence relating to surrounding volume loss. The orbits are normal and the paranasal sinuses are grossly clear. Intracranial arterial flow voids are maintained.      Stable age-related changes as above. There is no evidence of acute ischemia, hemorrhage, mass or mass effect.  This report was finalized on 11/8/2022 2:20 PM by Hermes Mitchell.      XR Chest 1 View    Result Date: 11/8/2022  EXAMINATION: XR CHEST 1 VW  DATE: 11/7/2022 11:15 PM INDICATIONS: Syncope. COMPARISON: None FINDINGS: Lungs: Normal. Pleura: Normal. Heart: Borderline heart size. Aorta: Atherosclerotic and left-sided. Mediastinum and Kalyani: Normal. Osseous: Stable. Devices: None. Other: A subcentimeter calcification projects over the right diaphragm.     1. Stable borderline heart size. 2. No acute thoracic process. Electronically signed by:  Genaro Fuentes M.D.  11/7/2022 10:04 PM Mountain Time      Results for orders placed during the hospital encounter of 03/24/22    Duplex Carotid Ultrasound CAR    Interpretation Summary  · Proximal right internal carotid artery plaque without significant stenosis.  · Right internal carotid artery stenosis of 0-49%.  · Proximal left internal carotid artery plaque without significant stenosis.  · Left internal carotid artery stenosis of 0-49%.  · Nominal antegrade bilateral vertebral artery flow demonstrated.      Results for orders placed during the hospital encounter of 03/24/22    Duplex Carotid Ultrasound CAR    Interpretation Summary  · Proximal right internal carotid artery plaque without significant stenosis.  · Right internal carotid artery stenosis of 0-49%.  ·  Proximal left internal carotid artery plaque without significant stenosis.  · Left internal carotid artery stenosis of 0-49%.  · Nominal antegrade bilateral vertebral artery flow demonstrated.      Results for orders placed during the hospital encounter of 03/24/22    Adult Transthoracic Echo Complete W/ Cont if Necessary Per Protocol    Interpretation Summary  · Estimated left ventricular EF = 60%  · No hemodynamically significant valvular heart disease      Plan for Follow-up of Pending Labs/Results:   Pending Labs     Order Current Status    Blood Culture - Blood, Arm, Left Preliminary result    Blood Culture - Blood, Arm, Right Preliminary result        Discharge Details        Discharge Medications      New Medications      Instructions Start Date   acetaminophen 325 MG tablet  Commonly known as: TYLENOL   650 mg, Oral, Every 4 Hours PRN      amLODIPine 5 MG tablet  Commonly known as: NORVASC   5 mg, Oral, Every 24 Hours Scheduled      melatonin 5 MG tablet tablet   5 mg, Oral, Nightly PRN      ondansetron 4 MG tablet  Commonly known as: ZOFRAN   4 mg, Oral, Every 6 Hours PRN         Changes to Medications      Instructions Start Date   lisinopril 20 MG tablet  Commonly known as: PRINIVIL,ZESTRIL  What changed: how much to take   20 mg, Oral, Daily      QUEtiapine 25 MG tablet  Commonly known as: SEROquel  What changed:   · when to take this  · reasons to take this   25 mg, Oral, Nightly PRN         Continue These Medications      Instructions Start Date   aspirin 81 MG EC tablet   81 mg, Oral, Daily      fenofibrate 145 MG tablet  Commonly known as: TRICOR   145 mg, Oral, Daily      vitamin B-12 1000 MCG tablet  Commonly known as: CYANOCOBALAMIN   1,000 mcg, Oral, Daily         Stop These Medications    atorvastatin 80 MG tablet  Commonly known as: LIPITOR            Allergies   Allergen Reactions   • Amoxicillin Hives     Has tolerated Zosyn   • Atorvastatin Nausea Only   • Cephalexin Rash     Has tolerated  Jimmie         Discharge Disposition:  Skilled Nursing Facility (DC - External)    Diet:  Hospital:  Diet Order   Procedures   • Diet Regular       Activity:  Activity Instructions     Activity as Tolerated            Restrictions or Other Recommendations:         CODE STATUS:    Code Status and Medical Interventions:   Ordered at: 11/08/22 1120     Medical Intervention Limits:    NO intubation (DNI)     Level Of Support Discussed With:    Patient     Code Status (Patient has no pulse and is not breathing):    No CPR (Do Not Attempt to Resuscitate)     Medical Interventions (Patient has pulse or is breathing):    Limited Support       Future Appointments   Date Time Provider Department Center   11/11/2022  1:00 PM EMS 1  [facility]       Additional Instructions for the Follow-ups that You Need to Schedule     Discharge Follow-up with PCP   As directed       Currently Documented PCP:    Pj Buchanan DO    PCP Phone Number:    [phone]     Follow Up Details: 1 week                     JOESPH Yadav  11/11/22      Time Spent on Discharge:  I spent  38 minutes on this discharge activity which included: face-to-face encounter with the patient, reviewing the data in the system, coordination of the care with the nursing staff as well as consultants, documentation, and entering orders.        Electronically signed by JOESPH Yadav, 11/11/22, 9:27 AM EST.      Electronically signed by Rosario Ng APRN at 11/11/22 0932

## 2022-11-11 NOTE — PROGRESS NOTES
Enter Query Response Below      Query Response:     Acute encephalopathy 2/2 UTI supported by improvement w treatment of UTI clinically  Electronically signed by Marbella Chang MD, 22, 4:39 PM EST.           If applicable, please update the problem list.           Patient: Susan Tucker        : 1935  Account: [id number]           Admit Date:         How to Respond to this query:       a. Click New Note     b. Answer query within the yellow box.                c. Update the Problem List, if applicable.      If you have any questions about this query contact me at: [phone]    :     History and physical state failure to thrive, dementia, at time of attending evaluation patient was responsive to verbal stimuli, and able to answer yes and now, oriented to person and place, will continue to hold seroquel and check MRI.   MRI brain impression stable age related changes.   Medicine progress note states awake, and conversant in bed, oriented x3, metabolic encephalopathy, UTI, levaquin started x 3 days, patient more alert and appears close to her baseline.     After study was metabolic encephalopathy clinically supported during this admission?    Yes, please include additional clinical indicators__________________________.  No, dementia only  Other_________________________.  Unable to determine.     By submitting this query, we are merely seeking further clarification of documentation to accurately reflect all conditions that you are monitoring, evaluating, treating or that extend the hospitalization or utilize additional resources of care. Please utilize your independent clinical judgment when addressing the question(s) above.     This query and your response, once completed, will be entered into the legal medical record.    Sincerely,  Andreina WAGNER RN BSN   Clinical Documentation Integrity Program   [employer]

## 2022-11-11 NOTE — PLAN OF CARE
Goal Outcome Evaluation:  VSS, O2 95% on RA. Denies pain/discomfort. Call light within reach, bed alarm set and audible. No significant events to note. Will continue to monitor.

## 2022-11-11 NOTE — NURSING NOTE
2 staples removed from back of head. Cleaned and covered with Clean dry dressing. Pt tolerated well, no complaints.

## 2022-11-11 NOTE — CASE MANAGEMENT/SOCIAL WORK
Case Management Discharge Note      Final Note: Plan is back to [facility]([address] [place]) today and the facility can accept her today.  Discharge summary faxed to [facility] at [phone].  Pt's nurse can call report to [phone] and send a of discharge summary with pt. Any controlled meds will be escribed to the facility's pharmacy.  Transportation arranged with [facility] to tranasport pt today  at 1300 and PCS form on chart.  I spoke with daughter, Dinah, and she is agreeable to discharge plan.         Selected Continued Care - Admitted Since 11/7/2022     Destination    No services have been selected for the patient.              Durable Medical Equipment    No services have been selected for the patient.              Dialysis/Infusion    No services have been selected for the patient.              Home Medical Care    No services have been selected for the patient.              Therapy    No services have been selected for the patient.              Community Resources    No services have been selected for the patient.              Community & DME    No services have been selected for the patient.                  Transportation Services  Ambulance: [facility]    Final Discharge Disposition Code: 01 - home or self-care

## 2022-11-11 NOTE — DISCHARGE SUMMARY
[facility] Medicine Services  DISCHARGE SUMMARY    Patient Name: Susan Tucker  : 1935  MRN: [id number]    Date of Admission: 2022  9:29 PM  Date of Discharge:  2022  Primary Care Physician: Pj Buchanan DO    Consults     No orders found from 10/9/2022 to 2022.          Hospital Course     Presenting Problem:   Frequent falls [R29.6]    Active Hospital Problems    Diagnosis  POA   • **Frequent falls [R29.6]  Not Applicable   • Hypertension [I10]  Yes   • History of Stroke [I63.9]  Yes   • Acute UTI (urinary tract infection) [N39.0]  Unknown   • Type 2 diabetes mellitus without complication (HCC) [E11.9]  Yes   • Hyperlipidemia [E78.5]  Yes      Resolved Hospital Problems    Diagnosis Date Resolved POA   • Hypokalemia [E87.6] 2022 Yes          Hospital Course:  Susan Tucker is a 87 y.o. female   Final result Specimen: Swab from Nasopharynx Updated: 22      Narrative:       The following orders were created for panel order COVID PRE-OP / PRE-PROCEDURE SCREENING ORDER (NO ISOLATION) - Swab, Nasopharynx.  Procedure                               Abnormality         Status                     ---------                               -----------         ------                     COVID-19 and FLU A/B PCR...[[id number]]  Normal              Final result                  Please view results for these tests on the individual orders.     COVID-19 and FLU A/B PCR - Swab, Nasopharynx [[id number]]  (Normal) Collected: 22     Lab Status: Final result Specimen: Swab from Nasopharynx Updated: 22       COVID19 Not Detected       Influenza A PCR Not Detected       Influenza B PCR Not Detected     Narrative:       Fact sheet for providers: https://www.fda.gov/media/272758/download     Fact sheet for patients: https://www.fda.gov/media/735799/download     Test performed by PCR.               Radiology results from the last 24 hours:   MRI Brain Without  Contrast     Result Date: 11/8/2022  DATE OF EXAM: 11/8/2022 1:21 PM  PROCEDURE: MRI BRAIN WO CONTRAST-  INDICATIONS: AMS, frequent falls; R29.6-Repeated falls; G93.40-Encephalopathy, unspecified; T50.905A-Adverse effect of unspecified drugs, medicaments and biological substances, initial encounter  COMPARISON: 3/24/2022  TECHNIQUE: Multiplanar multisequence images of the brain were performed without contrast according to routine brain MRI protocol.  FINDINGS: No acute infarct is present on diffusion weighted sequences. Midline structures are normal and the craniocervical junction is satisfactory in appearance. Advanced age-related changes are present, with generalized volume loss in addition to mild typical T2 hyperintense periventricular white matter changes of chronic small vessel ischemia. There is otherwise no evidence of intracranial hemorrhage, mass or mass effect. The ventricles demonstrate some ex vacuo prominence relating to surrounding volume loss. The orbits are normal and the paranasal sinuses are grossly clear. Intracranial arterial flow voids are maintained.       Impression: Stable age-related changes as above. There is no evidence of acute ischemia, hemorrhage, mass or mass effect.  This report was finalized on 11/8/2022 2:20 PM by Hermes Mitchell.           Results for orders placed during the hospital encounter of 03/24/22     Adult Transthoracic Echo Complete W/ Cont if Necessary Per Protocol     Interpretation Summary  · Estimated left ventricular EF = 60%  · No hemodynamically significant valvular heart disease        I have reviewed the medications:  Scheduled Meds:amLODIPine, 5 mg, Oral, Q24H  aspirin, 81 mg, Oral, Daily  levoFLOXacin, 500 mg, Intravenous, Q24H  lisinopril, 20 mg, Oral, Q24H  sodium chloride, 10 mL, Intravenous, Q12H        Continuous Infusions:   PRN Meds:.•  acetaminophen  •  labetalol  •  magnesium sulfate **OR** magnesium sulfate **OR** magnesium sulfate  •  melatonin  •   ondansetron **OR** ondansetron  •  potassium chloride **OR** potassium chloride **OR** potassium chloride  •  QUEtiapine  •  Insert peripheral IV **AND** sodium chloride  •  sodium chloride           Assessment & Plan     Assessment & Plan            Active Hospital Problems     Diagnosis   POA   • **Frequent falls [R29.6]   Not Applicable   • Hypokalemia [E87.6]   Yes   • Hypertension [I10]   Yes   • History of Stroke [I63.9]   Yes   • Acute UTI (urinary tract infection) [N39.0]   Unknown   • Type 2 diabetes mellitus without complication (HCC) [E11.9]   Yes   • Hyperlipidemia [E78.5]   Yes       Resolved Hospital Problems   No resolved problems to display.         Brief Hospital Course to date:  Susan Tucker is a 87 y.o. female with past medical history of dementia, hx of CVA, HTN, HLD and T2DM.  Patient is a resident at [facility].  Patient recently moved to [facility] last Wednesday.  Patient was brought to [facility] due to being obtunded. Found to have UTI:       Metabolic encephalopathy- resolved  Acute UTI  -- urine obtained to via straight cath due to incontinence. Culture + Ecoli with pan susecpibility   -- will complete last dose of levaquin today (3 days) prior to DC. No further antibiotics at DC  -- patient now at baseline  -- CT head revealed stable/ old CVAs  -- MRI brain negative   -- stroke neuro saw for possible CVA and have signed off  -- seroquel held on admit. Can resume prn     Frequent Falls  Failure to Thrive  History of of remote CVA  - continue ASA and high intensity statin  - orthostats ok  - gentle hydration given, now doing well with po intake  - PT/OT evaluated- ok back to morning point  - tsh wnl     Hypokalemia  - replaced      HTN  - elevated. Resumed home lisinopril and increased to 20mg daily  - norvasc 5mg started due to continued elevation, improved and continue at DC     DM2  - stable. Currently diet controlled     Head laceration 2/2 fall 11/4  -- sustained  11/4 and staples placed in posterior scalp in ED  -- remove staples 11/11 prior to DC       Discharge Follow Up Recommendations for outpatient labs/diagnostics:  PCP follow up one week    Day of Discharge     HPI:   Patient sitting up in bed. No complaints. Asking when she gets to leave.    Review of Systems  Gen- No fevers, chills  CV- No chest pain, palpitations  Resp- No cough, dyspnea  GI- No N/V/D, abd pain        Vital Signs:   Temp:  [98 °F (36.7 °C)-98.8 °F (37.1 °C)] 98.5 °F (36.9 °C)  Heart Rate:  [81-97] 81  Resp:  [16-17] 16  BP: (138-159)/(64-95) 154/79      Physical Exam:  Constitutional: No acute distress, awake, alert  HENT: NCAT, mucous membranes moist  Respiratory: Clear to auscultation bilaterally, respiratory effort normal   Cardiovascular: RRR, no murmurs, rubs, or gallops  Gastrointestinal: Positive bowel sounds, soft, nontender, nondistended  Musculoskeletal: No bilateral ankle edema  Psychiatric: Appropriate affect, cooperative  Neurologic: Oriented x 2- baseline dementia, strength symmetric in all extremities, Cranial Nerves grossly intact to confrontation, speech clear  Skin: No rashes      Pertinent  and/or Most Recent Results     LAB RESULTS:      Lab 11/07/22 2307   WBC 5.74   HEMOGLOBIN 13.0   HEMATOCRIT 38.8   PLATELETS 205   NEUTROS ABS 4.11   IMMATURE GRANS (ABS) 0.03   LYMPHS ABS 0.79   MONOS ABS 0.51   EOS ABS 0.24   MCV 90.7   LACTATE 1.1         Lab 11/08/22  1739 11/08/22  0815 11/08/22  0439 11/07/22  2307   SODIUM  --  141  --  137   POTASSIUM 5.0 3.4*  --  3.1*   CHLORIDE  --  103  --  102   CO2  --  28.0  --  27.0   ANION GAP  --  10.0  --  8.0   BUN  --  9  --  13   CREATININE  --  0.48*  --  0.54*   EGFR  --  92.4  --  89.8   GLUCOSE  --  87  --  119*   CALCIUM  --  9.3  --  8.9   MAGNESIUM  --   --   --  1.8   TSH  --   --  0.908  --          Lab 11/07/22  6345   TOTAL PROTEIN 6.0   ALBUMIN 3.60   GLOBULIN 2.4   ALT (SGPT) 16   AST (SGOT) 34*   BILIRUBIN 1.0   ALK PHOS  48         Lab 11/07/22 2307   TROPONIN T <0.010                 Brief Urine Lab Results  (Last result in the past 365 days)      Color   Clarity   Blood   Leuk Est   Nitrite   Protein   CREAT   Urine HCG        11/09/22 1200 Yellow   Cloudy   Moderate (2+)   Moderate (2+)   Negative   Negative               Microbiology Results (last 10 days)     Procedure Component Value - Date/Time    Urine Culture - Urine, Urine, Catheter [[id number]]  (Abnormal)  (Susceptibility) Collected: 11/09/22 1200    Lab Status: Final result Specimen: Urine, Catheter Updated: 11/11/22 0823     Urine Culture >100,000 CFU/mL Escherichia coli    Narrative:      Colonization of the urinary tract without infection is common. Treatment is discouraged unless the patient is symptomatic, pregnant, or undergoing an invasive urologic procedure.    Susceptibility      Escherichia coli      CRYSTAL      Ampicillin Susceptible      Ampicillin + Sulbactam Susceptible      Cefazolin Susceptible      Cefepime Susceptible      Ceftazidime Susceptible      Ceftriaxone Susceptible      Gentamicin Susceptible      Levofloxacin Susceptible      Nitrofurantoin Susceptible      Piperacillin + Tazobactam Susceptible      Trimethoprim + Sulfamethoxazole Susceptible                           Blood Culture - Blood, Arm, Right [[id number]]  (Normal) Collected: 11/07/22 2335    Lab Status: Preliminary result Specimen: Blood from Arm, Right Updated: 11/11/22 0100     Blood Culture No growth at 3 days    Blood Culture - Blood, Arm, Left [[id number]]  (Normal) Collected: 11/07/22 2311    Lab Status: Preliminary result Specimen: Blood from Arm, Left Updated: 11/11/22 0100     Blood Culture No growth at 3 days    COVID PRE-OP / PRE-PROCEDURE SCREENING ORDER (NO ISOLATION) - Swab, Nasopharynx [[id number]]  (Normal) Collected: 11/07/22 2307    Lab Status: Final result Specimen: Swab from Nasopharynx Updated: 11/07/22 2349    Narrative:      The following orders were created for  panel order COVID PRE-OP / PRE-PROCEDURE SCREENING ORDER (NO ISOLATION) - Swab, Nasopharynx.  Procedure                               Abnormality         Status                     ---------                               -----------         ------                     COVID-19 and FLU A/B PCR...[[id number]]  Normal              Final result                 Please view results for these tests on the individual orders.    COVID-19 and FLU A/B PCR - Swab, Nasopharynx [[id number]]  (Normal) Collected: 11/07/22 2307    Lab Status: Final result Specimen: Swab from Nasopharynx Updated: 11/07/22 6265     COVID19 Not Detected     Influenza A PCR Not Detected     Influenza B PCR Not Detected    Narrative:      Fact sheet for providers: https://www.fda.gov/media/433506/download    Fact sheet for patients: https://www.fda.gov/media/140976/download    Test performed by PCR.          XR Knee 1 or 2 View Right    Result Date: 11/4/2022  Examination: XR KNEE 1 OR 2 VW RIGHT-  Date of Exam: 11/4/2022 8:45 AM  Indication: fall; right knee pain.  Comparison: None available.  Technique: Multiple radiographic views of the knee were obtained.  Findings: There is diffuse osteopenia.  There is mild narrowing of medial lateral joint compartments.  Mild osteophyte formation is noted of the medial femoral condyle.  There is no acute fracture or dislocation.  Mild degenerative spurring is noted of the patella.  No joint effusion or hemarthrosis.  Basilar calcic lesions are seen within soft tissues.          1.  Osteopenia and mild degenerative changes of the knee.  No acute bony abnormality or joint effusion.  This report was finalized on 11/4/2022 9:10 AM by Osbaldo Blank MD.      CT Head Without Contrast    Result Date: 11/7/2022  EXAMINATION:  CT HEAD WO CONTRAST DATE: 11/7/2022 11:11 PM  INDICATION: Syncope and confusion  COMPARISON: Head CT November 4, 2022  TECHNIQUE:  Routine axial images through the head without contrast. Low-dose CT  acquisition technique included one or more of the following options: 1. Automated exposure control, 2. Adjustment of mA and/or KV according to patient's size and/or 3. Use of iterative reconstruction. FINDINGS:  Intracranial Contents: Moderate generalized volume loss. Confluent hypodensities in the supratentorial white matter, most commonly related to chronic small vessel ischemic changes. Chronic lacunar infarcts in the left thalamus. Additional chronic infarct in the right cerebellum.   Gray-white differentiation is intact.   No acute intracranial hemorrhage. No mass effect, midline shift, hydrocephalus, herniation, or extra axial fluid collection.  Bones and Extracranial Soft Tissues: The calvarium is intact. Posterior midline scalp laceration, unchanged. Visualized paranasal sinuses and mastoid air cells are clear.  Visualized intraorbital contents are unremarkable. Distal internal carotid artery atherosclerotic calcifications.     1. No acute intracranial abnormality. No changes compared to the head CT on November 4, 2022. 2. Volume loss and moderate chronic small vessel ischemic changes. Chronic left thalamic and right cerebellar lacunar infarcts. Electronically signed by:  Sridhar Beyer M.D.  11/7/2022 9:35 PM Mountain Time    CT Head Without Contrast    Result Date: 11/4/2022  Examination: CT HEAD WO CONTRAST-  Date of Exam: 11/4/2022 10:47 AM  Indication: 2nd fall in 12 hours, this time, with vomiting.  Comparison: 11/4/2022.  Technique: Axial noncontrast CT imaging of the head was performed. Automated exposure control and iterative reconstruction methods were used.  Findings: There is a chronic lacunar infarct in left thalamus. There is patchy hypoattenuation in both basal ganglia. There is patchy hypoattenuation in the periventricular white matter. No new hypoattenuating lesions. Stable chronic lacunar type infarct in the superior right cerebellum. No acute intracranial hemorrhage. No mass effect or  midline shift. No abnormal extra-axial collections. There are mild intracranial vascular calcifications. Orbits appear within normal limits. The calvarium is intact. The paranasal sinuses and mastoid air cells appear well aerated. There are staples in the parietal occipital scalp. No obvious soft tissue contusion or hematoma.       1. No acute intracranial abnormality. 2. Stable laceration in the midline parieto-occipital scalp. No hematoma or underlying fracture. 3. Chronic small vessel ischemic change and chronic lacunar infarcts in the left thalamus and right cerebellum.  This report was finalized on 11/4/2022 10:55 AM by Cezar Velasquez MD.      CT Head Without Contrast    Result Date: 11/4/2022  EXAMINATION: CT HEAD WITHOUT CONTRAST DATE: 11/4/2022 1:48 AM INDICATION: Fall, pain COMPARISON: MRI brain 3/24/2022 TECHNIQUE: Noncontrast imaging obtained from the vertex to the skull base.  CT dose lowering techniques were used, to include: automated exposure control, adjustment for patient size, and or use of iterative reconstruction.? FINDINGS: Soft Tissues: No significant soft tissue abnormality. Skull: No underlying skull fracture or radiopaque foreign body. Sinuses: Paranasal sinuses are clear. Mastoids: Mastoid air cells are clear. Globes and Orbits: Globes and orbits are intact. Brain: No acute hemorrhage.  No midline shift, masses, or mass effect.  No evidence of acute infarct by noncontrast CT. Ventricles and Cisterns: Ventricular size and configuration is within normal limits. Basal cisterns are patent. No abnormal extra-axial fluid collection. Senescent Changes: Mild to moderate volume loss and chronic microvascular ischemic changes. Arteriosclerosis     1. No acute intracranial abnormality. 2. Mild/moderate volume loss and chronic microvascular ischemic changes. Arteriosclerosis. Electronically signed by:  Liam Moore M.D.  11/4/2022 12:13 AM Mountain Time    CT Cervical Spine Without  Contrast    Result Date: 11/4/2022  EXAMINATION: CT CERVICAL SPINE WITHOUT DATE: 11/4/2022 1:48 AM INDICATION: Fall, pain COMPARISON: CT angiograms head and neck 3/24/2022 TECHNIQUE: Thin section axial noncontrast images were obtained through the cervical spine.  Sagittal and coronal reformatted images were created.  Images were reviewed in bone and soft tissue windows.  CT dose lowering techniques were used, to include: automated exposure control, adjustment for patient size, and or use of iterative reconstruction. FINDINGS: Osseous: Cervical lordosis is maintained. Vertebral body height and alignment is preserved. No acute fracture or subluxation. No prevertebral soft tissue swelling. The lateral masses of C1 align on C2. Degenerative Changes Mild degenerative disc disease and facet arthropathy. No high-grade canal stenosis. Soft Tissues of the Neck: No focal soft tissue abnormality within the visualized neck. Visualized lung apices are clear. Visualized airways are patent.     No acute fracture or subluxation of the cervical spine. Electronically signed by:  Liam Moore M.D.  11/4/2022 12:15 AM Mountain Time    MRI Brain Without Contrast    Result Date: 11/8/2022  DATE OF EXAM: 11/8/2022 1:21 PM  PROCEDURE: MRI BRAIN WO CONTRAST-  INDICATIONS: AMS, frequent falls; R29.6-Repeated falls; G93.40-Encephalopathy, unspecified; T50.905A-Adverse effect of unspecified drugs, medicaments and biological substances, initial encounter  COMPARISON: 3/24/2022  TECHNIQUE: Multiplanar multisequence images of the brain were performed without contrast according to routine brain MRI protocol.  FINDINGS: No acute infarct is present on diffusion weighted sequences. Midline structures are normal and the craniocervical junction is satisfactory in appearance. Advanced age-related changes are present, with generalized volume loss in addition to mild typical T2 hyperintense periventricular white matter changes of chronic small vessel  ischemia. There is otherwise no evidence of intracranial hemorrhage, mass or mass effect. The ventricles demonstrate some ex vacuo prominence relating to surrounding volume loss. The orbits are normal and the paranasal sinuses are grossly clear. Intracranial arterial flow voids are maintained.      Stable age-related changes as above. There is no evidence of acute ischemia, hemorrhage, mass or mass effect.  This report was finalized on 11/8/2022 2:20 PM by Hermes Mitchell.      XR Chest 1 View    Result Date: 11/8/2022  EXAMINATION: XR CHEST 1 VW  DATE: 11/7/2022 11:15 PM INDICATIONS: Syncope. COMPARISON: None FINDINGS: Lungs: Normal. Pleura: Normal. Heart: Borderline heart size. Aorta: Atherosclerotic and left-sided. Mediastinum and Kalyani: Normal. Osseous: Stable. Devices: None. Other: A subcentimeter calcification projects over the right diaphragm.     1. Stable borderline heart size. 2. No acute thoracic process. Electronically signed by:  Genaro Fuentes M.D.  11/7/2022 10:04 PM Mountain Time      Results for orders placed during the hospital encounter of 03/24/22    Duplex Carotid Ultrasound CAR    Interpretation Summary  · Proximal right internal carotid artery plaque without significant stenosis.  · Right internal carotid artery stenosis of 0-49%.  · Proximal left internal carotid artery plaque without significant stenosis.  · Left internal carotid artery stenosis of 0-49%.  · Nominal antegrade bilateral vertebral artery flow demonstrated.      Results for orders placed during the hospital encounter of 03/24/22    Duplex Carotid Ultrasound CAR    Interpretation Summary  · Proximal right internal carotid artery plaque without significant stenosis.  · Right internal carotid artery stenosis of 0-49%.  · Proximal left internal carotid artery plaque without significant stenosis.  · Left internal carotid artery stenosis of 0-49%.  · Nominal antegrade bilateral vertebral artery flow demonstrated.      Results for  orders placed during the hospital encounter of 03/24/22    Adult Transthoracic Echo Complete W/ Cont if Necessary Per Protocol    Interpretation Summary  · Estimated left ventricular EF = 60%  · No hemodynamically significant valvular heart disease      Plan for Follow-up of Pending Labs/Results:   Pending Labs     Order Current Status    Blood Culture - Blood, Arm, Left Preliminary result    Blood Culture - Blood, Arm, Right Preliminary result        Discharge Details        Discharge Medications      New Medications      Instructions Start Date   acetaminophen 325 MG tablet  Commonly known as: TYLENOL   650 mg, Oral, Every 4 Hours PRN      amLODIPine 5 MG tablet  Commonly known as: NORVASC   5 mg, Oral, Every 24 Hours Scheduled      melatonin 5 MG tablet tablet   5 mg, Oral, Nightly PRN      ondansetron 4 MG tablet  Commonly known as: ZOFRAN   4 mg, Oral, Every 6 Hours PRN         Changes to Medications      Instructions Start Date   lisinopril 20 MG tablet  Commonly known as: PRINIVIL,ZESTRIL  What changed: how much to take   20 mg, Oral, Daily      QUEtiapine 25 MG tablet  Commonly known as: SEROquel  What changed:   · when to take this  · reasons to take this   25 mg, Oral, Nightly PRN         Continue These Medications      Instructions Start Date   aspirin 81 MG EC tablet   81 mg, Oral, Daily      fenofibrate 145 MG tablet  Commonly known as: TRICOR   145 mg, Oral, Daily      vitamin B-12 1000 MCG tablet  Commonly known as: CYANOCOBALAMIN   1,000 mcg, Oral, Daily         Stop These Medications    atorvastatin 80 MG tablet  Commonly known as: LIPITOR            Allergies   Allergen Reactions   • Amoxicillin Hives     Has tolerated Zosyn   • Atorvastatin Nausea Only   • Cephalexin Rash     Has tolerated Zosyn         Discharge Disposition:  Skilled Nursing Facility (DC - External)    Diet:  Hospital:  Diet Order   Procedures   • Diet Regular       Activity:  Activity Instructions     Activity as Tolerated             Restrictions or Other Recommendations:         CODE STATUS:    Code Status and Medical Interventions:   Ordered at: 11/08/22 1120     Medical Intervention Limits:    NO intubation (DNI)     Level Of Support Discussed With:    Patient     Code Status (Patient has no pulse and is not breathing):    No CPR (Do Not Attempt to Resuscitate)     Medical Interventions (Patient has pulse or is breathing):    Limited Support       Future Appointments   Date Time Provider Department Center   11/11/2022  1:00 PM [facility]       Additional Instructions for the Follow-ups that You Need to Schedule     Discharge Follow-up with PCP   As directed       Currently Documented PCP:    Pj Buchanan DO    PCP Phone Number:    [phone]     Follow Up Details: 1 week                     JOESPH Yadav  11/11/22      Time Spent on Discharge:  I spent  38 minutes on this discharge activity which included: face-to-face encounter with the patient, reviewing the data in the system, coordination of the care with the nursing staff as well as consultants, documentation, and entering orders.        Electronically signed by JOESPH Yadav, 11/11/22, 9:27 AM EST.

## 2022-11-13 LAB
BACTERIA SPEC AEROBE CULT: NORMAL
BACTERIA SPEC AEROBE CULT: NORMAL

## 2022-11-14 ENCOUNTER — HOSPITAL ENCOUNTER (EMERGENCY)
Facility: HOSPITAL | Age: 87
Discharge: HOME | End: 2022-11-14
Attending: EMERGENCY MEDICINE | Admitting: EMERGENCY MEDICINE

## 2022-11-14 VITALS
WEIGHT: 108 LBS | RESPIRATION RATE: 16 BRPM | OXYGEN SATURATION: 100 % | HEIGHT: 62 IN | DIASTOLIC BLOOD PRESSURE: 58 MMHG | TEMPERATURE: 97.9 F | BODY MASS INDEX: 19.88 KG/M2 | SYSTOLIC BLOOD PRESSURE: 121 MMHG | HEART RATE: 77 BPM

## 2022-11-14 DIAGNOSIS — Z86.79 HISTORY OF HYPERTENSION: ICD-10-CM

## 2022-11-14 DIAGNOSIS — E11.9 CONTROLLED TYPE 2 DIABETES MELLITUS WITHOUT COMPLICATION, UNSPECIFIED WHETHER LONG TERM INSULIN USE: ICD-10-CM

## 2022-11-14 DIAGNOSIS — R07.9 CHEST PAIN, UNSPECIFIED TYPE: Primary | ICD-10-CM

## 2022-11-14 LAB
ALBUMIN SERPL-MCNC: 3.5 G/DL (ref 3.5–5.2)
ALBUMIN/GLOB SERPL: 1.1 G/DL
ALP SERPL-CCNC: 72 U/L (ref 39–117)
ALT SERPL W P-5'-P-CCNC: 16 U/L (ref 1–33)
ANION GAP SERPL CALCULATED.3IONS-SCNC: 13 MMOL/L (ref 5–15)
AST SERPL-CCNC: 23 U/L (ref 1–32)
BACTERIA UR QL AUTO: ABNORMAL /HPF
BASOPHILS # BLD AUTO: 0.1 10*3/MM3 (ref 0–0.2)
BASOPHILS NFR BLD AUTO: 1.1 % (ref 0–1.5)
BILIRUB SERPL-MCNC: 0.8 MG/DL (ref 0–1.2)
BILIRUB UR QL STRIP: NEGATIVE
BUN SERPL-MCNC: 17 MG/DL (ref 8–23)
BUN/CREAT SERPL: 22.4 (ref 7–25)
CALCIUM SPEC-SCNC: 9.9 MG/DL (ref 8.6–10.5)
CHLORIDE SERPL-SCNC: 102 MMOL/L (ref 98–107)
CLARITY UR: ABNORMAL
CO2 SERPL-SCNC: 21 MMOL/L (ref 22–29)
COLOR UR: ABNORMAL
CREAT SERPL-MCNC: 0.76 MG/DL (ref 0.57–1)
DEPRECATED RDW RBC AUTO: 42.2 FL (ref 37–54)
EGFRCR SERPLBLD CKD-EPI 2021: 76.4 ML/MIN/1.73
EOSINOPHIL # BLD AUTO: 0.08 10*3/MM3 (ref 0–0.4)
EOSINOPHIL NFR BLD AUTO: 0.9 % (ref 0.3–6.2)
ERYTHROCYTE [DISTWIDTH] IN BLOOD BY AUTOMATED COUNT: 12.6 % (ref 12.3–15.4)
GLOBULIN UR ELPH-MCNC: 3.1 GM/DL
GLUCOSE SERPL-MCNC: 104 MG/DL (ref 65–99)
GLUCOSE UR STRIP-MCNC: NEGATIVE MG/DL
HCT VFR BLD AUTO: 48 % (ref 34–46.6)
HGB BLD-MCNC: 15.6 G/DL (ref 12–15.9)
HGB UR QL STRIP.AUTO: NEGATIVE
HOLD SPECIMEN: NORMAL
HYALINE CASTS UR QL AUTO: ABNORMAL /LPF
IMM GRANULOCYTES # BLD AUTO: 0.06 10*3/MM3 (ref 0–0.05)
IMM GRANULOCYTES NFR BLD AUTO: 0.7 % (ref 0–0.5)
KETONES UR QL STRIP: NEGATIVE
LEUKOCYTE ESTERASE UR QL STRIP.AUTO: ABNORMAL
LYMPHOCYTES # BLD AUTO: 0.96 10*3/MM3 (ref 0.7–3.1)
LYMPHOCYTES NFR BLD AUTO: 10.5 % (ref 19.6–45.3)
MCH RBC QN AUTO: 29.8 PG (ref 26.6–33)
MCHC RBC AUTO-ENTMCNC: 32.5 G/DL (ref 31.5–35.7)
MCV RBC AUTO: 91.6 FL (ref 79–97)
MONOCYTES # BLD AUTO: 0.41 10*3/MM3 (ref 0.1–0.9)
MONOCYTES NFR BLD AUTO: 4.5 % (ref 5–12)
NEUTROPHILS NFR BLD AUTO: 7.55 10*3/MM3 (ref 1.7–7)
NEUTROPHILS NFR BLD AUTO: 82.3 % (ref 42.7–76)
NITRITE UR QL STRIP: NEGATIVE
NRBC BLD AUTO-RTO: 0 /100 WBC (ref 0–0.2)
NT-PROBNP SERPL-MCNC: 221.6 PG/ML (ref 0–1800)
PH UR STRIP.AUTO: 6.5 [PH] (ref 5–8)
PLATELET # BLD AUTO: 324 10*3/MM3 (ref 140–450)
PMV BLD AUTO: 10.4 FL (ref 6–12)
POTASSIUM SERPL-SCNC: 4.2 MMOL/L (ref 3.5–5.2)
PROT SERPL-MCNC: 6.6 G/DL (ref 6–8.5)
PROT UR QL STRIP: NEGATIVE
QT INTERVAL: 376 MS
QTC INTERVAL: 475 MS
RBC # BLD AUTO: 5.24 10*6/MM3 (ref 3.77–5.28)
RBC # UR STRIP: ABNORMAL /HPF
REF LAB TEST METHOD: ABNORMAL
SODIUM SERPL-SCNC: 136 MMOL/L (ref 136–145)
SP GR UR STRIP: 1.02 (ref 1–1.03)
SQUAMOUS #/AREA URNS HPF: ABNORMAL /HPF
TRANS CELLS #/AREA URNS HPF: ABNORMAL /HPF
TROPONIN T SERPL-MCNC: <0.01 NG/ML (ref 0–0.03)
TROPONIN T SERPL-MCNC: <0.01 NG/ML (ref 0–0.03)
UROBILINOGEN UR QL STRIP: ABNORMAL
WBC # UR STRIP: ABNORMAL /HPF
WBC NRBC COR # BLD: 9.16 10*3/MM3 (ref 3.4–10.8)
WHOLE BLOOD HOLD COAG: NORMAL
WHOLE BLOOD HOLD SPECIMEN: NORMAL

## 2022-11-14 PROCEDURE — 85025 COMPLETE CBC W/AUTO DIFF WBC: CPT | Performed by: EMERGENCY MEDICINE

## 2022-11-14 PROCEDURE — 36415 COLL VENOUS BLD VENIPUNCTURE: CPT

## 2022-11-14 PROCEDURE — 93005 ELECTROCARDIOGRAM TRACING: CPT

## 2022-11-14 PROCEDURE — 81001 URINALYSIS AUTO W/SCOPE: CPT | Performed by: EMERGENCY MEDICINE

## 2022-11-14 PROCEDURE — P9612 CATHETERIZE FOR URINE SPEC: HCPCS

## 2022-11-14 PROCEDURE — 84484 ASSAY OF TROPONIN QUANT: CPT | Performed by: EMERGENCY MEDICINE

## 2022-11-14 PROCEDURE — 83880 ASSAY OF NATRIURETIC PEPTIDE: CPT | Performed by: EMERGENCY MEDICINE

## 2022-11-14 PROCEDURE — 80053 COMPREHEN METABOLIC PANEL: CPT | Performed by: EMERGENCY MEDICINE

## 2022-11-14 PROCEDURE — 99284 EMERGENCY DEPT VISIT MOD MDM: CPT

## 2022-11-14 PROCEDURE — 87086 URINE CULTURE/COLONY COUNT: CPT | Performed by: EMERGENCY MEDICINE

## 2022-11-14 RX ORDER — SODIUM CHLORIDE 0.9 % (FLUSH) 0.9 %
10 SYRINGE (ML) INJECTION AS NEEDED
Status: DISCONTINUED | OUTPATIENT
Start: 2022-11-14 | End: 2022-11-14 | Stop reason: HOSPADM

## 2022-11-14 RX ADMIN — SODIUM CHLORIDE 500 ML: 9 INJECTION, SOLUTION INTRAVENOUS at 15:28

## 2022-11-14 NOTE — ED PROVIDER NOTES
"Subjective   History of Present Illness  The patient presents to the emergency department with unknown complaints.  Patient brought in via EMS from her long-term care facility.  Patient with history of prior stroke and dementia.  Patient also with hypertension, hyperlipidemia, and diabetes.  EMS reports they were called secondary to chest pain.  However when they arrived, the patient denies any chest pain.  Evaluating the patient complains about is that yesterday was \"really bad day\".  She states that yesterday was bad because \"people were poked in front of her\".  Patient currently denies any discomfort.  She has no complaints.  Patient reports today she feels well.    History provided by:  EMS personnel and patient  History limited by:  Dementia      Review of Systems   Unable to perform ROS: Dementia   Constitutional: Negative for fever.   Respiratory: Negative for shortness of breath.    Cardiovascular: Negative for chest pain.   Gastrointestinal: Negative for diarrhea and vomiting.   Psychiatric/Behavioral: Positive for confusion.       Past Medical History:   Diagnosis Date   • Diabetes mellitus (HCC)    • Elevated cholesterol    • Hypertension    • Stroke (HCC)        Allergies   Allergen Reactions   • Amoxicillin Hives     Has tolerated Zosyn   • Atorvastatin Nausea Only   • Cephalexin Rash     Has tolerated Zosyn       Past Surgical History:   Procedure Laterality Date   • ERCP N/A 6/27/2018    Procedure: ENDOSCOPIC RETROGRADE CHOLANGIOPANCREATOGRAPHY;  Surgeon: Liam Manning MD;  Location: [facility] ENDOSCOPY;  Service: Gastroenterology   • EXPLORATORY LAPAROTOMY N/A 2/10/2020    Procedure: LAPAROTOMY EXPLORATORY BOWEL RESECTION;  Surgeon: Carlos Zurita MD;  Location:  [facility] OR;  Service: General;  Laterality: N/A;   • HYSTERECTOMY         Family History   Problem Relation Age of Onset   • Hypertension Mother    • Hypertension Father    • Early death Brother    • Hypertension Maternal Grandmother  "   • Hypertension Paternal Grandmother    • Learning disabilities Other        Social History     Socioeconomic History   • Marital status:    Tobacco Use   • Smoking status: Never   • Smokeless tobacco: Never   Substance and Sexual Activity   • Alcohol use: No   • Drug use: No           Objective   Physical Exam  Vitals and nursing note reviewed.   Constitutional:       General: She is not in acute distress.     Appearance: Normal appearance. She is not ill-appearing or toxic-appearing.   HENT:      Head: Normocephalic and atraumatic.   Eyes:      Pupils: Pupils are equal, round, and reactive to light.   Cardiovascular:      Rate and Rhythm: Normal rate and regular rhythm.      Pulses: Normal pulses.   Pulmonary:      Effort: Pulmonary effort is normal. No respiratory distress.      Breath sounds: Normal breath sounds.   Abdominal:      Palpations: Abdomen is soft.      Tenderness: There is no abdominal tenderness. There is no guarding.   Skin:     General: Skin is warm and dry.   Neurological:      General: No focal deficit present.      Mental Status: She is alert. Mental status is at baseline.   Psychiatric:         Mood and Affect: Mood normal.         Behavior: Behavior normal.         Procedures           ED Course  ED Course as of 11/14/22 1547   Mon Nov 14, 2022   1044 Chart review demonstrates the patient has had multiple ER evaluations as well as 1 admission this month.  He is most related to encephalopathy, fall, and minor head injuries.  See those reports for further details [RS]   1318 Troponin T: <0.010 [RS]   1318 proBNP: 221.6 [RS]   1514 Troponin T: <0.010 [RS]   1515 No emergent findings in our evaluation here in the ER.  Referred any different primary concerns from chest discomfort, to pain all over, to transient hypotension and bradycardia.  Patient is experienced none of those here in the emergency department.  We will plan to discharge her back to her long-term care facility.  Follow-up  with primary care as soon as possible with return as needed to the ER for concerns. [RS]      ED Course User Index  [RS] Rubén Posada MD                      HEART Score: 4                      MDM  Number of Diagnoses or Management Options  Chest pain, unspecified type  Controlled type 2 diabetes mellitus without complication, unspecified whether long term insulin use (HCC)  History of hypertension  Diagnosis management comments: Recent Results (from the past 24 hour(s))  -ECG 12 Lead Chest Pain:   Collection Time: 11/14/22 10:35 AM       Result                      Value             Ref Range           QT Interval                 376               ms                  QTC Interval                475               ms             -Green Top (Gel):   Collection Time: 11/14/22 10:57 AM       Result                      Value             Ref Range           Extra Tube                                                    Hold for add-ons.  -Lavender Top:   Collection Time: 11/14/22 10:57 AM       Result                      Value             Ref Range           Extra Tube                                                    hold for add-on  -Gold Top - SST:   Collection Time: 11/14/22 10:57 AM       Result                      Value             Ref Range           Extra Tube                                                    Hold for add-ons.  -Light Blue Top:   Collection Time: 11/14/22 10:57 AM       Result                      Value             Ref Range           Extra Tube                                                    Hold for add-ons.  -CBC Auto Differential:   Collection Time: 11/14/22 10:57 AM  Specimen: Blood       Result                      Value             Ref Range           WBC                         9.16              3.40 - 10.80*       RBC                         5.24              3.77 - 5.28 *       Hemoglobin                  15.6              12.0 - 15.9 *       Hematocrit                   48.0 (H)          34.0 - 46.6 %       MCV                         91.6              79.0 - 97.0 *       MCH                         29.8              26.6 - 33.0 *       MCHC                        32.5              31.5 - 35.7 *       RDW                         12.6              12.3 - 15.4 %       RDW-SD                      42.2              37.0 - 54.0 *       MPV                         10.4              6.0 - 12.0 fL       Platelets                   324               140 - 450 10*       Neutrophil %                82.3 (H)          42.7 - 76.0 %       Lymphocyte %                10.5 (L)          19.6 - 45.3 %       Monocyte %                  4.5 (L)           5.0 - 12.0 %        Eosinophil %                0.9               0.3 - 6.2 %         Basophil %                  1.1               0.0 - 1.5 %         Immature Grans %            0.7 (H)           0.0 - 0.5 %         Neutrophils, Absolute       7.55 (H)          1.70 - 7.00 *       Lymphocytes, Absolute       0.96              0.70 - 3.10 *       Monocytes, Absolute         0.41              0.10 - 0.90 *       Eosinophils, Absolute       0.08              0.00 - 0.40 *       Basophils, Absolute         0.10              0.00 - 0.20 *       Immature Grans, Absolu*     0.06 (H)          0.00 - 0.05 *       nRBC                        0.0               0.0 - 0.2 /1*  -Urinalysis With Culture If Indicated - Urine, Catheter:   Collection Time: 11/14/22 11:10 AM  Specimen: Urine, Catheter       Result                      Value             Ref Range           Color, UA                                     Yellow, Straw   Dark Yellow (A)       Appearance, UA              Cloudy (A)        Clear               pH, UA                      6.5               5.0 - 8.0           Specific Pipestem, UA        1.017             1.001 - 1.030       Glucose, UA                 Negative          Negative            Ketones, UA                 Negative          Negative             Bilirubin, UA               Negative          Negative            Blood, UA                   Negative          Negative            Protein, UA                 Negative          Negative            Leuk Esterase, UA                             Negative        Moderate (2+) (A)       Nitrite, UA                 Negative          Negative            Urobilinogen, UA                              0.2 - 1.0 E.*   2.0 E.U./dL (A)  -Urinalysis, Microscopic Only - Urine, Catheter:   Collection Time: 11/14/22 11:10 AM  Specimen: Urine, Catheter       Result                      Value             Ref Range           RBC, UA                     0-2               None Seen, 0*       WBC, UA                     21-30 (A)         None Seen, 0*       Bacteria, UA                None Seen         None Seen, T*       Squamous Epithelial Ce*     3-6 (A)           None Seen, 0*       Transitional Epithelia*     3-6 (A)           0 - 2 /HPF          Hyaline Casts, UA           21-30             0 - 6 /LPF          Methodology                                                   Manual Light Microscopy  -Comprehensive Metabolic Panel:   Collection Time: 11/14/22 12:19 PM  Specimen: Arm, Right; Blood       Result                      Value             Ref Range           Glucose                     104 (H)           65 - 99 mg/dL       BUN                         17                8 - 23 mg/dL        Creatinine                  0.76              0.57 - 1.00 *       Sodium                      136               136 - 145 mm*       Potassium                   4.2               3.5 - 5.2 mm*       Chloride                    102               98 - 107 mmo*       CO2                         21.0 (L)          22.0 - 29.0 *       Calcium                     9.9               8.6 - 10.5 m*       Total Protein               6.6               6.0 - 8.5 g/*       Albumin                     3.50              3.50 - 5.20 *       ALT (SGPT)                   16                1 - 33 U/L          AST (SGOT)                  23                1 - 32 U/L          Alkaline Phosphatase        72                39 - 117 U/L        Total Bilirubin             0.8               0.0 - 1.2 mg*       Globulin                    3.1               gm/dL               A/G Ratio                   1.1               g/dL                BUN/Creatinine Ratio        22.4              7.0 - 25.0          Anion Gap                   13.0              5.0 - 15.0 m*       eGFR                        76.4              >60.0 mL/min*  -BNP:   Collection Time: 11/14/22 12:19 PM  Specimen: Arm, Right; Blood       Result                      Value             Ref Range           proBNP                      221.6             0.0 - 1,800.*  -Troponin:   Collection Time: 11/14/22 12:19 PM  Specimen: Arm, Right; Blood       Result                      Value             Ref Range           Troponin T                  <0.010            0.000 - 0.03*  -Troponin:   Collection Time: 11/14/22  2:20 PM  Specimen: Blood       Result                      Value             Ref Range           Troponin T                  <0.010            0.000 - 0.03*  Note: In addition to lab results from this visit, the labs listed above may include labs taken at another facility or during a different encounter within the last 24 hours. Please correlate lab times with ED admission and discharge times for further clarification of the services performed during this visit.    No orders to display  -----------------------------------------------------            11/14/22 11/14/22 11/14/22 11/14/22               1522      1523      1524      1525     -----------------------------------------------------   BP:                                                BP Location:                                           Patient Position:                                           Pulse:       79        80        77                 Resp:                                     16       Temp:                                              TempSrc:                                           SpO2:      98%       99%       100%                Weight:                                            Height:                                           -----------------------------------------------------  Medications  sodium chloride 0.9 % flush 10 mL (has no administration in time range)  sodium chloride 0.9 % bolus 500 mL (0 mL Intravenous Stopped 11/14/22 1537)  ECG/EMG Results (last 24 hours)     Procedure Component Value Units Date/Time    ECG 12 Lead Chest Pain ([id number]) Collected: 11/14/22 1035     Updated: 11/14/22 1141     QT Interval 376 ms      QTC Interval 475 ms     Narrative:      Test Reason : CP  Blood Pressure :   */*   mmHG  Vent. Rate :  96 BPM     Atrial Rate :  96 BPM     P-R Int : 114 ms          QRS Dur :  80 ms      QT Int : 376 ms       P-R-T Axes :  75  19 130 degrees     QTc Int : 475 ms    ** Poor data quality, interpretation may be adversely affected  Sinus rhythm with premature atrial complexes  ST & T wave abnormality, consider lateral ischemia  Abnormal ECG  When compared with ECG of 07-NOV-2022 22:41,  No significant change was found  Confirmed by VALENCIA BELTRAN MD ([id number]) on 11/14/2022 11:40:55 AM    Referred By: ANTHONY           Confirmed By: VALENCIA BELTRAN MD      ECG 12 Lead Chest Pain   Final Result    Test Reason : CP    Blood Pressure :   */*   mmHG    Vent. Rate :  96 BPM     Atrial Rate :  96 BPM       P-R Int : 114 ms          QRS Dur :  80 ms        QT Int : 376 ms       P-R-T Axes :  75  19 130 degrees       QTc Int : 475 ms        ** Poor data quality, interpretation may be adversely affected    Sinus rhythm with premature atrial complexes    ST & T wave abnormality, consider lateral ischemia    Abnormal ECG    When compared with ECG of  07-NOV-2022 22:41,    No significant change was found    Confirmed by RUBÉN POSADA MD ([id number]) on 11/14/2022 11:40:55 AM        Referred By: EDMD           Confirmed By: RUBÉN POSADA MD            Amount and/or Complexity of Data Reviewed  Clinical lab tests: reviewed  Decide to obtain previous medical records or to obtain history from someone other than the patient: yes        Final diagnoses:   Chest pain, unspecified type   History of hypertension   Controlled type 2 diabetes mellitus without complication, unspecified whether long term insulin use (HCC)       ED Disposition  ED Disposition     ED Disposition   Discharge    Condition   Stable    Comment   --             Pj Buchanan, DO  [address]   [place] [address]  [phone]      As soon as possible.    [facility] Emergency Department  [address] [address] [phone]    As needed, If symptoms worsen or ANY concerns.         Medication List      No changes were made to your prescriptions during this visit.          Rubén Posada MD  11/14/22 7691

## 2022-11-15 LAB — BACTERIA SPEC AEROBE CULT: NO GROWTH

## 2023-05-19 NOTE — PLAN OF CARE
Goal Outcome Evaluation:  Plan of Care Reviewed With: patient           Outcome Evaluation: OT teodoroal complete. Pt presents w/ increased confusion, decreased safety awareness, generalized weakness, postural control and balance deficits limiting her ADL independence. Pt SUA for washing face, mod A for brushing hair, max A for UBD, dep for toilet hygiene, mod Ax2 for STS & SPT from bed-to-chair w/ BUE support & B foot block w/ posterior lean noted. Pt would benefit from continued skilled IPOT services to address current functional defiicts. Rec SNF at d/c.   Dr. Perez

## 2024-04-01 ENCOUNTER — HOSPITAL ENCOUNTER (EMERGENCY)
Facility: HOSPITAL | Age: 89
Discharge: HOME | End: 2024-04-01
Attending: EMERGENCY MEDICINE
Payer: MEDICARE

## 2024-04-01 ENCOUNTER — APPOINTMENT (OUTPATIENT)
Dept: GENERAL RADIOLOGY | Facility: HOSPITAL | Age: 89
End: 2024-04-01
Payer: MEDICARE

## 2024-04-01 VITALS
BODY MASS INDEX: 19.88 KG/M2 | OXYGEN SATURATION: 98 % | DIASTOLIC BLOOD PRESSURE: 70 MMHG | RESPIRATION RATE: 14 BRPM | TEMPERATURE: 97.3 F | HEIGHT: 62 IN | WEIGHT: 108 LBS | SYSTOLIC BLOOD PRESSURE: 146 MMHG | HEART RATE: 93 BPM

## 2024-04-01 DIAGNOSIS — M25.569 ARTHRALGIA OF KNEE, UNSPECIFIED LATERALITY: Primary | ICD-10-CM

## 2024-04-01 PROCEDURE — 99283 EMERGENCY DEPT VISIT LOW MDM: CPT

## 2024-04-01 PROCEDURE — 73560 X-RAY EXAM OF KNEE 1 OR 2: CPT

## 2024-04-01 PROCEDURE — 73523 X-RAY EXAM HIPS BI 5/> VIEWS: CPT

## 2024-04-01 RX ORDER — FUROSEMIDE 20 MG/1
TABLET ORAL
COMMUNITY
Start: 2024-03-05

## 2024-04-01 NOTE — Clinical Note
[facility] EMERGENCY DEPARTMENT  [address]  [facility] [address]  Phone: [phone]    Susan Tucker was seen and treated in our emergency department on 4/1/2024.  She may return to work on 04/03/2024.         Thank you for choosing [facility].    Kirt Rayo MD

## 2024-04-01 NOTE — CASE MANAGEMENT/SOCIAL WORK
Continued Stay Note  [facility]     Patient Name: Susan Tucker  MRN: [id number]  Today's Date: 4/1/2024    Admit Date: 4/1/2024    Plan: [facility]   Discharge Plan       Row Name 04/01/24 1127       Plan    Plan [facility] [address]    Patient/Family in Agreement with Plan yes    Plan Comments Patient in need of wheelchair transportation back to [facility]. [facility] will be able to provide WC transportation services for patient today at approx 1145.  ED RN aware of ETA.    Final Discharge Disposition Code 01 - home or self-care                   Discharge Codes    No documentation.                       Monica Donis, RN

## 2024-04-01 NOTE — ED NOTES
Nursing report attempted to [facility] x2. No answer. Voicemail left for return call.

## 2024-04-01 NOTE — ED PROVIDER NOTES
Subjective   History of Present Illness  88-year-old female who presents for evaluation of lower extremity pain.  [facility].  Reportedly she began to complain of pain in her knees last night with ambulation.  She states the pain was severe last night but she denies any current pain.  She denies hip pain.  No upper extremity pain.  No chest or abdominal pain.  No recent fever or infectious symptoms.  She is awake and alert and oriented to person, not fully oriented to place and time.  She is very pleasant.  This is consistent with her baseline dementia.  She has chronic lower extremity edema.  No signs of acute increase in swelling in fact the skin appears to be dry, and contracted, but seems to be a product of recent diuresis.      Review of Systems   Constitutional:  Negative for chills, fatigue and fever.   HENT:  Negative for congestion, ear pain, postnasal drip, sinus pressure and sore throat.    Eyes:  Negative for pain, redness and visual disturbance.   Respiratory:  Negative for cough, chest tightness and shortness of breath.    Cardiovascular:  Negative for chest pain, palpitations and leg swelling.   Gastrointestinal:  Negative for abdominal pain, anal bleeding, blood in stool, diarrhea, nausea and vomiting.   Endocrine: Negative for polydipsia and polyuria.   Genitourinary:  Negative for difficulty urinating, dysuria, frequency and urgency.   Musculoskeletal:  Positive for arthralgias. Negative for back pain and neck pain.   Skin:  Negative for pallor and rash.   Allergic/Immunologic: Negative for environmental allergies and immunocompromised state.   Neurological:  Negative for dizziness, weakness and headaches.   Hematological:  Negative for adenopathy.   Psychiatric/Behavioral:  Negative for confusion, self-injury and suicidal ideas. The patient is not nervous/anxious.    All other systems reviewed and are negative.      Past Medical History:   Diagnosis Date     Diabetes mellitus     Elevated cholesterol     Hypertension     Stroke        Allergies   Allergen Reactions    Amoxicillin Hives     Has tolerated Zosyn    Atorvastatin Nausea Only    Cephalexin Rash     Has tolerated Zosyn       Past Surgical History:   Procedure Laterality Date    ERCP N/A 6/27/2018    Procedure: ENDOSCOPIC RETROGRADE CHOLANGIOPANCREATOGRAPHY;  Surgeon: Liam Manning MD;  Location: [facility] ENDOSCOPY;  Service: Gastroenterology    EXPLORATORY LAPAROTOMY N/A 2/10/2020    Procedure: LAPAROTOMY EXPLORATORY BOWEL RESECTION;  Surgeon: Carlos Zurita MD;  Location: [facility] OR;  Service: General;  Laterality: N/A;    HYSTERECTOMY         Family History   Problem Relation Age of Onset    Hypertension Mother     Hypertension Father     Early death Brother     Hypertension Maternal Grandmother     Hypertension Paternal Grandmother     Learning disabilities Other        Social History     Socioeconomic History    Marital status:    Tobacco Use    Smoking status: Never    Smokeless tobacco: Never   Substance and Sexual Activity    Alcohol use: No    Drug use: No           Objective   Physical Exam  Vitals and nursing note reviewed.   Constitutional:       General: She is not in acute distress.     Appearance: Normal appearance. She is well-developed. She is not toxic-appearing or diaphoretic.   HENT:      Head: Normocephalic and atraumatic.      Right Ear: External ear normal.      Left Ear: External ear normal.      Nose: Nose normal.   Eyes:      General: Lids are normal.      Pupils: Pupils are equal, round, and reactive to light.   Neck:      Trachea: No tracheal deviation.   Cardiovascular:      Rate and Rhythm: Normal rate and regular rhythm.      Pulses: No decreased pulses.      Heart sounds: Normal heart sounds. No murmur heard.     No friction rub. No gallop.      Comments: Chronic bilateral lower extremity edema.  More prominent in the right lower extremity.  Mild venous stasis  dermatitis..  No signs of acute cellulitis.  Pulmonary:      Effort: Pulmonary effort is normal. No respiratory distress.      Breath sounds: Normal breath sounds. No decreased breath sounds, wheezing, rhonchi or rales.   Abdominal:      General: Bowel sounds are normal.      Palpations: Abdomen is soft.      Tenderness: There is no abdominal tenderness. There is no guarding or rebound.   Musculoskeletal:         General: No deformity. Normal range of motion.      Cervical back: Normal range of motion and neck supple.      Right lower le+ Edema present.      Left lower leg: 3+ Edema present.      Comments: No pain to palpation over any of the extremities.  I am able to passively range the bilateral hip and the bilateral knees without expression of pain.   Lymphadenopathy:      Cervical: No cervical adenopathy.   Skin:     General: Skin is warm and dry.      Findings: No rash.   Neurological:      Mental Status: She is alert and oriented to person, place, and time.      Cranial Nerves: No cranial nerve deficit.      Sensory: No sensory deficit.   Psychiatric:         Speech: Speech normal.         Behavior: Behavior normal.         Thought Content: Thought content normal.         Judgment: Judgment normal.         Procedures           ED Course                                             Medical Decision Making  Differential diagnosis includes knee fracture, knee dislocation, chronic arthritis, septic arthritis, other unspecified etiology.    On exam the patient does not have any pain to palpation or any signs of deformity, swelling, or erythema.    She ranges the joints normally without any restriction.    X-ray of the bilateral hips and bilateral knees show arthritis changes but no acute abnormalities.    The patient was discharged home appearing well.     Problems Addressed:  Arthralgia of knee, unspecified laterality: complicated acute illness or injury with systemic symptoms    Amount and/or Complexity of  Data Reviewed  External Data Reviewed: radiology.  Radiology: ordered and independent interpretation performed. Decision-making details documented in ED Course.        Final diagnoses:   Arthralgia of knee, unspecified laterality       ED Disposition  ED Disposition       ED Disposition   Discharge    Condition   Stable    Comment   --               Pj Buchanan, DO  [address] [place] [address] [phone]    In 1 week           Medication List      No changes were made to your prescriptions during this visit.            Kirt Rayo MD  04/06/24 2711

## 2024-06-01 ENCOUNTER — HOSPITAL ENCOUNTER (INPATIENT)
Facility: HOSPITAL | Age: 89
LOS: 5 days | Discharge: NURSING, REHAB OR LONG-TERM CARE | End: 2024-06-08
Attending: EMERGENCY MEDICINE | Admitting: INTERNAL MEDICINE
Payer: MEDICARE

## 2024-06-01 ENCOUNTER — APPOINTMENT (OUTPATIENT)
Dept: CT IMAGING | Facility: HOSPITAL | Age: 89
End: 2024-06-01
Payer: MEDICARE

## 2024-06-01 ENCOUNTER — APPOINTMENT (OUTPATIENT)
Dept: GENERAL RADIOLOGY | Facility: HOSPITAL | Age: 89
End: 2024-06-01
Payer: MEDICARE

## 2024-06-01 DIAGNOSIS — R29.6 UNWITNESSED FALL: ICD-10-CM

## 2024-06-01 DIAGNOSIS — A41.9 SEPSIS WITHOUT ACUTE ORGAN DYSFUNCTION, DUE TO UNSPECIFIED ORGANISM: ICD-10-CM

## 2024-06-01 DIAGNOSIS — R53.1 WEAKNESS GENERALIZED: ICD-10-CM

## 2024-06-01 DIAGNOSIS — R53.1 GENERALIZED WEAKNESS: Primary | ICD-10-CM

## 2024-06-01 DIAGNOSIS — R41.82 ALTERED MENTAL STATUS, UNSPECIFIED ALTERED MENTAL STATUS TYPE: ICD-10-CM

## 2024-06-01 DIAGNOSIS — N39.0 ACUTE UTI (URINARY TRACT INFECTION): ICD-10-CM

## 2024-06-01 DIAGNOSIS — R29.6 FREQUENT FALLS: ICD-10-CM

## 2024-06-01 DIAGNOSIS — I95.9 HYPOTENSIVE EPISODE: ICD-10-CM

## 2024-06-01 DIAGNOSIS — J18.9 COMMUNITY ACQUIRED PNEUMONIA OF LEFT LOWER LOBE OF LUNG: ICD-10-CM

## 2024-06-01 DIAGNOSIS — F03.90 DEMENTIA WITHOUT BEHAVIORAL DISTURBANCE: ICD-10-CM

## 2024-06-01 DIAGNOSIS — E87.6 HYPOKALEMIA: ICD-10-CM

## 2024-06-01 DIAGNOSIS — E86.0 DEHYDRATION: ICD-10-CM

## 2024-06-01 DIAGNOSIS — R13.12 OROPHARYNGEAL DYSPHAGIA: ICD-10-CM

## 2024-06-01 DIAGNOSIS — R55 SYNCOPE AND COLLAPSE: ICD-10-CM

## 2024-06-01 PROBLEM — Z86.39 HISTORY OF DIABETES MELLITUS, TYPE II: Status: ACTIVE | Noted: 2024-06-01

## 2024-06-01 PROBLEM — E16.2 HYPOGLYCEMIA: Status: ACTIVE | Noted: 2024-06-01

## 2024-06-01 LAB
ALBUMIN SERPL-MCNC: 3.5 G/DL (ref 3.5–5.2)
ALBUMIN SERPL-MCNC: 3.6 G/DL (ref 3.5–5.2)
ALBUMIN/GLOB SERPL: 1.1 G/DL
ALBUMIN/GLOB SERPL: 1.3 G/DL
ALP SERPL-CCNC: 42 U/L (ref 39–117)
ALP SERPL-CCNC: 45 U/L (ref 39–117)
ALT SERPL W P-5'-P-CCNC: 23 U/L (ref 1–33)
ALT SERPL W P-5'-P-CCNC: 24 U/L (ref 1–33)
ANION GAP SERPL CALCULATED.3IONS-SCNC: 12 MMOL/L (ref 5–15)
ANION GAP SERPL CALCULATED.3IONS-SCNC: 20 MMOL/L (ref 5–15)
AST SERPL-CCNC: 49 U/L (ref 1–32)
AST SERPL-CCNC: 49 U/L (ref 1–32)
BACTERIA UR QL AUTO: ABNORMAL /HPF
BASOPHILS # BLD AUTO: 0.05 10*3/MM3 (ref 0–0.2)
BASOPHILS NFR BLD AUTO: 1.1 % (ref 0–1.5)
BILIRUB SERPL-MCNC: 0.9 MG/DL (ref 0–1.2)
BILIRUB SERPL-MCNC: 1 MG/DL (ref 0–1.2)
BILIRUB UR QL STRIP: NEGATIVE
BUN SERPL-MCNC: 17 MG/DL (ref 8–23)
BUN SERPL-MCNC: 18 MG/DL (ref 8–23)
BUN/CREAT SERPL: 17 (ref 7–25)
BUN/CREAT SERPL: 17.5 (ref 7–25)
CA-I SERPL ISE-MCNC: 1.23 MMOL/L (ref 1.12–1.32)
CALCIUM SPEC-SCNC: 8.7 MG/DL (ref 8.6–10.5)
CALCIUM SPEC-SCNC: 9.2 MG/DL (ref 8.6–10.5)
CHLORIDE SERPL-SCNC: 92 MMOL/L (ref 98–107)
CHLORIDE SERPL-SCNC: 99 MMOL/L (ref 98–107)
CLARITY UR: CLEAR
CO2 SERPL-SCNC: 23 MMOL/L (ref 22–29)
CO2 SERPL-SCNC: 27 MMOL/L (ref 22–29)
COLOR UR: YELLOW
CREAT SERPL-MCNC: 1 MG/DL (ref 0.57–1)
CREAT SERPL-MCNC: 1.03 MG/DL (ref 0.57–1)
D-LACTATE SERPL-SCNC: 0.9 MMOL/L (ref 0.5–2)
DEPRECATED RDW RBC AUTO: 48.3 FL (ref 37–54)
EGFRCR SERPLBLD CKD-EPI 2021: 52.4 ML/MIN/1.73
EGFRCR SERPLBLD CKD-EPI 2021: 54.3 ML/MIN/1.73
EOSINOPHIL # BLD AUTO: 0.03 10*3/MM3 (ref 0–0.4)
EOSINOPHIL NFR BLD AUTO: 0.7 % (ref 0.3–6.2)
ERYTHROCYTE [DISTWIDTH] IN BLOOD BY AUTOMATED COUNT: 14.2 % (ref 12.3–15.4)
GLOBULIN UR ELPH-MCNC: 2.7 GM/DL
GLOBULIN UR ELPH-MCNC: 3.4 GM/DL
GLUCOSE BLDC GLUCOMTR-MCNC: 148 MG/DL (ref 70–130)
GLUCOSE BLDC GLUCOMTR-MCNC: 153 MG/DL (ref 70–130)
GLUCOSE BLDC GLUCOMTR-MCNC: 50 MG/DL (ref 70–130)
GLUCOSE SERPL-MCNC: 134 MG/DL (ref 65–99)
GLUCOSE SERPL-MCNC: 61 MG/DL (ref 65–99)
GLUCOSE UR STRIP-MCNC: NEGATIVE MG/DL
HBA1C MFR BLD: 5.3 % (ref 4.8–5.6)
HCT VFR BLD AUTO: 45.2 % (ref 34–46.6)
HGB BLD-MCNC: 15.1 G/DL (ref 12–15.9)
HGB UR QL STRIP.AUTO: NEGATIVE
HOLD SPECIMEN: NORMAL
HYALINE CASTS UR QL AUTO: ABNORMAL /LPF
IMM GRANULOCYTES # BLD AUTO: 0.01 10*3/MM3 (ref 0–0.05)
IMM GRANULOCYTES NFR BLD AUTO: 0.2 % (ref 0–0.5)
KETONES UR QL STRIP: NEGATIVE
LEUKOCYTE ESTERASE UR QL STRIP.AUTO: ABNORMAL
LYMPHOCYTES # BLD AUTO: 0.88 10*3/MM3 (ref 0.7–3.1)
LYMPHOCYTES NFR BLD AUTO: 19.7 % (ref 19.6–45.3)
MAGNESIUM SERPL-MCNC: 1.7 MG/DL (ref 1.6–2.4)
MCH RBC QN AUTO: 30.9 PG (ref 26.6–33)
MCHC RBC AUTO-ENTMCNC: 33.4 G/DL (ref 31.5–35.7)
MCV RBC AUTO: 92.4 FL (ref 79–97)
MONOCYTES # BLD AUTO: 0.3 10*3/MM3 (ref 0.1–0.9)
MONOCYTES NFR BLD AUTO: 6.7 % (ref 5–12)
NEUTROPHILS NFR BLD AUTO: 3.19 10*3/MM3 (ref 1.7–7)
NEUTROPHILS NFR BLD AUTO: 71.6 % (ref 42.7–76)
NITRITE UR QL STRIP: NEGATIVE
NRBC BLD AUTO-RTO: 0 /100 WBC (ref 0–0.2)
PH UR STRIP.AUTO: 5.5 [PH] (ref 5–8)
PHOSPHATE SERPL-MCNC: 2.9 MG/DL (ref 2.5–4.5)
PLATELET # BLD AUTO: 232 10*3/MM3 (ref 140–450)
PMV BLD AUTO: 10.1 FL (ref 6–12)
POTASSIUM SERPL-SCNC: 2.5 MMOL/L (ref 3.5–5.2)
POTASSIUM SERPL-SCNC: 2.5 MMOL/L (ref 3.5–5.2)
PROCALCITONIN SERPL-MCNC: 0.15 NG/ML (ref 0–0.25)
PROT SERPL-MCNC: 6.2 G/DL (ref 6–8.5)
PROT SERPL-MCNC: 7 G/DL (ref 6–8.5)
PROT UR QL STRIP: NEGATIVE
QT INTERVAL: 336 MS
QTC INTERVAL: 408 MS
RBC # BLD AUTO: 4.89 10*6/MM3 (ref 3.77–5.28)
RBC # UR STRIP: ABNORMAL /HPF
REF LAB TEST METHOD: ABNORMAL
SODIUM SERPL-SCNC: 135 MMOL/L (ref 136–145)
SODIUM SERPL-SCNC: 138 MMOL/L (ref 136–145)
SP GR UR STRIP: 1.01 (ref 1–1.03)
SQUAMOUS #/AREA URNS HPF: ABNORMAL /HPF
TRANS CELLS #/AREA URNS HPF: ABNORMAL /HPF
TSH SERPL DL<=0.05 MIU/L-ACNC: 1.51 UIU/ML (ref 0.27–4.2)
UROBILINOGEN UR QL STRIP: ABNORMAL
WBC # UR STRIP: ABNORMAL /HPF
WBC NRBC COR # BLD AUTO: 4.46 10*3/MM3 (ref 3.4–10.8)
WHOLE BLOOD HOLD COAG: NORMAL
WHOLE BLOOD HOLD SPECIMEN: NORMAL

## 2024-06-01 PROCEDURE — 84100 ASSAY OF PHOSPHORUS: CPT | Performed by: EMERGENCY MEDICINE

## 2024-06-01 PROCEDURE — 25010000002 POTASSIUM CHLORIDE 10 MEQ/100ML SOLUTION

## 2024-06-01 PROCEDURE — 81001 URINALYSIS AUTO W/SCOPE: CPT | Performed by: EMERGENCY MEDICINE

## 2024-06-01 PROCEDURE — 25010000002 POTASSIUM CHLORIDE PER 2 MEQ OF POTASSIUM: Performed by: NURSE PRACTITIONER

## 2024-06-01 PROCEDURE — 25010000002 MAGNESIUM SULFATE 2 GM/50ML SOLUTION: Performed by: NURSE PRACTITIONER

## 2024-06-01 PROCEDURE — 83735 ASSAY OF MAGNESIUM: CPT | Performed by: EMERGENCY MEDICINE

## 2024-06-01 PROCEDURE — 84145 PROCALCITONIN (PCT): CPT | Performed by: EMERGENCY MEDICINE

## 2024-06-01 PROCEDURE — 70450 CT HEAD/BRAIN W/O DYE: CPT

## 2024-06-01 PROCEDURE — G0378 HOSPITAL OBSERVATION PER HR: HCPCS

## 2024-06-01 PROCEDURE — 85025 COMPLETE CBC W/AUTO DIFF WBC: CPT | Performed by: EMERGENCY MEDICINE

## 2024-06-01 PROCEDURE — 25810000003 DEXTROSE 5 % AND SODIUM CHLORIDE 0.9 % 5-0.9 % SOLUTION 1,000 ML FLEX CONT: Performed by: NURSE PRACTITIONER

## 2024-06-01 PROCEDURE — 83605 ASSAY OF LACTIC ACID: CPT | Performed by: EMERGENCY MEDICINE

## 2024-06-01 PROCEDURE — 83036 HEMOGLOBIN GLYCOSYLATED A1C: CPT | Performed by: NURSE PRACTITIONER

## 2024-06-01 PROCEDURE — 80053 COMPREHEN METABOLIC PANEL: CPT | Performed by: EMERGENCY MEDICINE

## 2024-06-01 PROCEDURE — 36415 COLL VENOUS BLD VENIPUNCTURE: CPT

## 2024-06-01 PROCEDURE — 82948 REAGENT STRIP/BLOOD GLUCOSE: CPT

## 2024-06-01 PROCEDURE — 99285 EMERGENCY DEPT VISIT HI MDM: CPT

## 2024-06-01 PROCEDURE — 82330 ASSAY OF CALCIUM: CPT | Performed by: EMERGENCY MEDICINE

## 2024-06-01 PROCEDURE — 80053 COMPREHEN METABOLIC PANEL: CPT | Performed by: NURSE PRACTITIONER

## 2024-06-01 PROCEDURE — 25010000002 HEPARIN (PORCINE) PER 1000 UNITS: Performed by: NURSE PRACTITIONER

## 2024-06-01 PROCEDURE — 84443 ASSAY THYROID STIM HORMONE: CPT | Performed by: NURSE PRACTITIONER

## 2024-06-01 PROCEDURE — 25810000003 SODIUM CHLORIDE 0.9 % SOLUTION: Performed by: EMERGENCY MEDICINE

## 2024-06-01 PROCEDURE — P9612 CATHETERIZE FOR URINE SPEC: HCPCS

## 2024-06-01 PROCEDURE — 93005 ELECTROCARDIOGRAM TRACING: CPT | Performed by: EMERGENCY MEDICINE

## 2024-06-01 PROCEDURE — 99222 1ST HOSP IP/OBS MODERATE 55: CPT | Performed by: NURSE PRACTITIONER

## 2024-06-01 PROCEDURE — 71045 X-RAY EXAM CHEST 1 VIEW: CPT

## 2024-06-01 RX ORDER — LANOLIN ALCOHOL/MO/W.PET/CERES
1000 CREAM (GRAM) TOPICAL DAILY
COMMUNITY

## 2024-06-01 RX ORDER — IBUPROFEN 600 MG/1
1 TABLET ORAL
Status: DISCONTINUED | OUTPATIENT
Start: 2024-06-01 | End: 2024-06-08 | Stop reason: HOSPADM

## 2024-06-01 RX ORDER — ACETAMINOPHEN 160 MG/5ML
650 SOLUTION ORAL EVERY 4 HOURS PRN
Status: DISCONTINUED | OUTPATIENT
Start: 2024-06-01 | End: 2024-06-08 | Stop reason: HOSPADM

## 2024-06-01 RX ORDER — ASPIRIN 81 MG/1
81 TABLET ORAL DAILY
Status: DISCONTINUED | OUTPATIENT
Start: 2024-06-02 | End: 2024-06-05

## 2024-06-01 RX ORDER — HEPARIN SODIUM 5000 [USP'U]/ML
2500 INJECTION, SOLUTION INTRAVENOUS; SUBCUTANEOUS EVERY 12 HOURS SCHEDULED
Status: DISCONTINUED | OUTPATIENT
Start: 2024-06-01 | End: 2024-06-08 | Stop reason: HOSPADM

## 2024-06-01 RX ORDER — ONDANSETRON 4 MG/1
4 TABLET, ORALLY DISINTEGRATING ORAL EVERY 6 HOURS PRN
Status: DISCONTINUED | OUTPATIENT
Start: 2024-06-01 | End: 2024-06-08 | Stop reason: HOSPADM

## 2024-06-01 RX ORDER — SODIUM CHLORIDE 0.9 % (FLUSH) 0.9 %
10 SYRINGE (ML) INJECTION AS NEEDED
Status: DISCONTINUED | OUTPATIENT
Start: 2024-06-01 | End: 2024-06-08 | Stop reason: HOSPADM

## 2024-06-01 RX ORDER — UREA 10 %
5 LOTION (ML) TOPICAL NIGHTLY PRN
COMMUNITY

## 2024-06-01 RX ORDER — POTASSIUM CHLORIDE 7.45 MG/ML
10 INJECTION INTRAVENOUS
Qty: 600 ML | Refills: 0 | Status: DISCONTINUED | OUTPATIENT
Start: 2024-06-01 | End: 2024-06-01

## 2024-06-01 RX ORDER — AMOXICILLIN 250 MG
2 CAPSULE ORAL 2 TIMES DAILY
Status: DISCONTINUED | OUTPATIENT
Start: 2024-06-01 | End: 2024-06-02

## 2024-06-01 RX ORDER — BISACODYL 10 MG
10 SUPPOSITORY, RECTAL RECTAL DAILY PRN
Status: DISCONTINUED | OUTPATIENT
Start: 2024-06-01 | End: 2024-06-02

## 2024-06-01 RX ORDER — ACETAMINOPHEN 325 MG/1
650 TABLET ORAL EVERY 4 HOURS PRN
Status: DISCONTINUED | OUTPATIENT
Start: 2024-06-01 | End: 2024-06-08 | Stop reason: HOSPADM

## 2024-06-01 RX ORDER — POTASSIUM CHLORIDE 1.5 G/1.58G
40 POWDER, FOR SOLUTION ORAL EVERY 4 HOURS
Status: DISPENSED | OUTPATIENT
Start: 2024-06-01 | End: 2024-06-02

## 2024-06-01 RX ORDER — ACETAMINOPHEN 650 MG/1
650 SUPPOSITORY RECTAL EVERY 4 HOURS PRN
Status: DISCONTINUED | OUTPATIENT
Start: 2024-06-01 | End: 2024-06-08 | Stop reason: HOSPADM

## 2024-06-01 RX ORDER — BISACODYL 5 MG/1
5 TABLET, DELAYED RELEASE ORAL DAILY PRN
Status: DISCONTINUED | OUTPATIENT
Start: 2024-06-01 | End: 2024-06-02

## 2024-06-01 RX ORDER — NICOTINE POLACRILEX 4 MG
15 LOZENGE BUCCAL
Status: DISCONTINUED | OUTPATIENT
Start: 2024-06-01 | End: 2024-06-08 | Stop reason: HOSPADM

## 2024-06-01 RX ORDER — DEXTROSE MONOHYDRATE, SODIUM CHLORIDE, AND POTASSIUM CHLORIDE 50; 1.49; 9 G/1000ML; G/1000ML; G/1000ML
75 INJECTION, SOLUTION INTRAVENOUS CONTINUOUS
Status: DISCONTINUED | OUTPATIENT
Start: 2024-06-01 | End: 2024-06-01

## 2024-06-01 RX ORDER — POLYETHYLENE GLYCOL 3350 17 G/17G
17 POWDER, FOR SOLUTION ORAL DAILY PRN
Status: DISCONTINUED | OUTPATIENT
Start: 2024-06-01 | End: 2024-06-02

## 2024-06-01 RX ORDER — HEPARIN SODIUM 5000 [USP'U]/ML
5000 INJECTION, SOLUTION INTRAVENOUS; SUBCUTANEOUS EVERY 12 HOURS SCHEDULED
Status: DISCONTINUED | OUTPATIENT
Start: 2024-06-01 | End: 2024-06-01

## 2024-06-01 RX ORDER — SODIUM CHLORIDE 9 MG/ML
40 INJECTION, SOLUTION INTRAVENOUS AS NEEDED
Status: DISCONTINUED | OUTPATIENT
Start: 2024-06-01 | End: 2024-06-08 | Stop reason: HOSPADM

## 2024-06-01 RX ORDER — MAGNESIUM SULFATE HEPTAHYDRATE 40 MG/ML
2 INJECTION, SOLUTION INTRAVENOUS ONCE
Status: COMPLETED | OUTPATIENT
Start: 2024-06-01 | End: 2024-06-01

## 2024-06-01 RX ORDER — SODIUM CHLORIDE 0.9 % (FLUSH) 0.9 %
10 SYRINGE (ML) INJECTION EVERY 12 HOURS SCHEDULED
Status: DISCONTINUED | OUTPATIENT
Start: 2024-06-01 | End: 2024-06-08 | Stop reason: HOSPADM

## 2024-06-01 RX ORDER — DEXTROSE MONOHYDRATE 25 G/50ML
25 INJECTION, SOLUTION INTRAVENOUS
Status: DISCONTINUED | OUTPATIENT
Start: 2024-06-01 | End: 2024-06-08 | Stop reason: HOSPADM

## 2024-06-01 RX ORDER — ONDANSETRON 2 MG/ML
4 INJECTION INTRAMUSCULAR; INTRAVENOUS EVERY 6 HOURS PRN
Status: DISCONTINUED | OUTPATIENT
Start: 2024-06-01 | End: 2024-06-08 | Stop reason: HOSPADM

## 2024-06-01 RX ORDER — ONDANSETRON 4 MG/1
4 TABLET, FILM COATED ORAL EVERY 6 HOURS PRN
COMMUNITY

## 2024-06-01 RX ADMIN — ACETAMINOPHEN 650 MG: 650 SOLUTION ORAL at 21:43

## 2024-06-01 RX ADMIN — POTASSIUM CHLORIDE 10 MEQ: 7.46 INJECTION, SOLUTION INTRAVENOUS at 19:38

## 2024-06-01 RX ADMIN — SODIUM CHLORIDE 500 ML: 9 INJECTION, SOLUTION INTRAVENOUS at 18:13

## 2024-06-01 RX ADMIN — MAGNESIUM SULFATE HEPTAHYDRATE 2 G: 2 INJECTION, SOLUTION INTRAVENOUS at 20:09

## 2024-06-01 RX ADMIN — HEPARIN SODIUM 2500 UNITS: 5000 INJECTION INTRAVENOUS; SUBCUTANEOUS at 21:44

## 2024-06-01 RX ADMIN — Medication 10 ML: at 22:00

## 2024-06-01 RX ADMIN — POTASSIUM CHLORIDE 40 MEQ: 1.5 POWDER, FOR SOLUTION ORAL at 21:40

## 2024-06-01 RX ADMIN — SENNOSIDES AND DOCUSATE SODIUM 2 TABLET: 8.6; 5 TABLET ORAL at 21:44

## 2024-06-01 RX ADMIN — POTASSIUM CHLORIDE 75 ML/HR: 2 INJECTION, SOLUTION, CONCENTRATE INTRAVENOUS at 21:56

## 2024-06-01 RX ADMIN — DEXTROSE MONOHYDRATE 25 G: 25 INJECTION, SOLUTION INTRAVENOUS at 20:26

## 2024-06-01 NOTE — H&P
[facility] Medicine Services  HISTORY AND PHYSICAL    Patient Name: Susan Tucker  : 1935  MRN: [id number]  Primary Care Physician: Damon Townsend PA  Date of admission: 2024    Subjective   Subjective     Chief Complaint:  Fall / low blood pressure    HPI:  Susan Tucker is a 89 y.o. female with PMHx of HTN, HLD, CVA, DM who presents to the ED for evaluation after a fall at her living facility and low blood pressure per transferring documentation. 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 Per ED provider documentation, EMS reported unwitnessed fall and low BP,  on their arrival. Per facility, increased confusion over the past 2-3 months reported by nursing staff. She is being admitted to hospital medicine service.    Reviewing documentation, looks like she was prescribed doxycycline and prednisone 2024. Also prescribed cipro x 3 days on 2024. She has not been seen in this ER since 2024 which was for LE pain, at that time noted to be on diuretics for BLE edema.    Review of Systems   Unable to perform ROS: Dementia            Personal History     Past Medical History:   Diagnosis Date    Diabetes mellitus     Elevated cholesterol     Hypertension     Stroke              Past Surgical History:   Procedure Laterality Date    ERCP N/A 2018    Procedure: ENDOSCOPIC RETROGRADE CHOLANGIOPANCREATOGRAPHY;  Surgeon: Liam Manning MD;  Location: [facility] ENDOSCOPY;  Service: Gastroenterology    EXPLORATORY LAPAROTOMY N/A 2/10/2020    Procedure: LAPAROTOMY EXPLORATORY BOWEL RESECTION;  Surgeon: Carlos Zurita MD;  Location: [facility] OR;  Service: General;  Laterality: N/A;    HYSTERECTOMY         Family History:  family history includes Early death in her brother; Hypertension in her father, maternal grandmother, mother, and paternal grandmother;  Learning disabilities in an other family member.     Social History:  reports that she has never smoked. She has never used smokeless tobacco. She reports that she does not drink alcohol and does not use drugs.  Social History     Social History Narrative    Not on file       Medications:  acetaminophen, aspirin, fenofibrate, furosemide, and lisinopril    Allergies   Allergen Reactions    Amoxicillin Hives     Has tolerated Zosyn    Atorvastatin Nausea Only    Cephalexin Rash     Has tolerated Zosyn       Objective   Objective     Vital Signs:   Temp:  [98.2 °F (36.8 °C)] 98.2 °F (36.8 °C)  Heart Rate:  [] 85  Resp:  [16] 16  BP: (111-147)/(60-82) 147/77    Physical Exam  Constitutional:       General: She is not in acute distress.     Appearance: She is underweight.   HENT:      Head: Normocephalic and atraumatic.      Nose: Nose normal.      Mouth/Throat:      Mouth: Mucous membranes are dry.      Dentition: Abnormal dentition. Dental caries present.      Pharynx: Oropharynx is clear.   Eyes:      Extraocular Movements: Extraocular movements intact.      Conjunctiva/sclera: Conjunctivae normal.      Pupils: Pupils are equal, round, and reactive to light.   Cardiovascular:      Rate and Rhythm: Normal rate and regular rhythm.      Pulses: Normal pulses.      Heart sounds: Normal heart sounds. No murmur heard.  Pulmonary:      Effort: Pulmonary effort is normal. No respiratory distress.      Breath sounds: Normal breath sounds.   Abdominal:      General: Bowel sounds are normal. There is no distension.      Palpations: Abdomen is soft.      Tenderness: There is no abdominal tenderness. There is no guarding.   Musculoskeletal:         General: No swelling. Normal range of motion.      Cervical back: Normal range of motion and neck supple.   Skin:     General: Skin is warm and dry.      Capillary Refill: Capillary refill takes less than 2 seconds.      Comments: Scattered scabs and skin tears BLE      Neurological:      Mental Status: She is alert. She is disoriented.      Cranial Nerves: No cranial nerve deficit.      Comments: Unknown baseline but has hx of dementia   Psychiatric:         Mood and Affect: Mood normal.         Behavior: Behavior normal.          Result Review:  I have personally reviewed the results from the time of this admission to 6/1/2024 20:14 EDT and agree with these findings:  [x]  Laboratory list / accordion  []  Microbiology  []  Radiology  [x]  EKG/Telemetry   []  Cardiology/Vascular   []  Pathology  []  Old records  []  Other:  Most notable findings include:     LAB RESULTS:      Lab 06/01/24  1728   WBC 4.46   HEMOGLOBIN 15.1   HEMATOCRIT 45.2   PLATELETS 232   NEUTROS ABS 3.19   IMMATURE GRANS (ABS) 0.01   LYMPHS ABS 0.88   MONOS ABS 0.30   EOS ABS 0.03   MCV 92.4   PROCALCITONIN 0.15   LACTATE 0.9         Lab 06/01/24  1728   SODIUM 135*   POTASSIUM 2.5*   CHLORIDE 92*   CO2 23.0   ANION GAP 20.0*   BUN 18   CREATININE 1.03*   EGFR 52.4*   GLUCOSE 61*   CALCIUM 9.2   IONIZED CALCIUM 1.23   MAGNESIUM 1.7   PHOSPHORUS 2.9         Lab 06/01/24  1728   TOTAL PROTEIN 7.0   ALBUMIN 3.6   GLOBULIN 3.4   ALT (SGPT) 23   AST (SGOT) 49*   BILIRUBIN 1.0   ALK PHOS 45                     Brief Urine Lab Results  (Last result in the past 365 days)        Color   Clarity   Blood   Leuk Est   Nitrite   Protein   CREAT   Urine HCG        06/01/24 1758 Yellow   Clear   Negative   Trace   Negative   Negative                 Microbiology Results (last 10 days)       ** No results found for the last 240 hours. **            No radiology results from the last 24 hrs    Results for orders placed during the hospital encounter of 03/24/22    Adult Transthoracic Echo Complete W/ Cont if Necessary Per Protocol    Interpretation Summary  · Estimated left ventricular EF = 60%  · No hemodynamically significant valvular heart disease      Assessment & Plan   Assessment & Plan       Weakness generalized     Hypertension    History of Stroke    Hyperlipidemia    Hypokalemia    Unwitnessed fall    History of diabetes mellitus, type II    Dementia without behavioral disturbance    Hypoglycemia    Weakness  Unwitnessed fall  - check CT head w/o contrast  - PT/OT consult  - fall precautions  - likely dehydrated / hypoglycemia related    Hypokalemia  Hypoglycemia  - k 2.5, replace  - mag 1.7, replace 2 grams IV mag x 1  - glucose 61 on labs, down to 50 w/ repeat fingerstick  - hypoglycemia protocol  - monitor glucose q 1 hour until glu > 100 x 2 consecutive checks then can change to less frequent monitoring  - fluids, D5 w/ 20 kcl @ 75 ml/hr    JULIA, mild  - fluids  - hold lisinopril and lasix    HTN  HLD  - hold lisinopril, episode of hypotension reported from nursing home    Dementia  - unclear baseline    Hx T2DM  - not on medications  - check A1c    Hx CVA  - continue aspirin    DVT prophylaxis:  Heparin SC BID    CODE STATUS:    Medical Intervention Limits: NO intubation (DNI)  Code Status (Patient has no pulse and is not breathing): No CPR (Do Not Attempt to Resuscitate)  Medical Interventions (Patient has pulse or is breathing): Limited Support      Expected Discharge    Expected Discharge Date: 6/2/2024; Expected Discharge Time:     Signature: Electronically signed by JOESPH Browne, 06/01/24, 8:14 PM EDT      Total time spent: 60 minutes  Time spent includes time reviewing chart, face-to-face time, counseling patient/family/caregiver, ordering medications/tests/procedures, communicating with other health care professionals, documenting clinical information in the electronic health record, and coordination of care.

## 2024-06-01 NOTE — ED NOTES
Susan Tucker    Nursing Report ED to Floor:  Mental status: AO3  Ambulatory status: X1  Oxygen Therapy:  RA  Cardiac Rhythm: AFIB   Admitted from: [facility]  Safety Concerns:  FALL RISK  Social Issues: NONE  ED Room #:  30    ED Nurse Phone Extension - [phone] or may call [phone].      HPI:   Chief Complaint   Patient presents with    Hypotension       Past Medical History:  Past Medical History:   Diagnosis Date    Diabetes mellitus     Elevated cholesterol     Hypertension     Stroke         Past Surgical History:  Past Surgical History:   Procedure Laterality Date    ERCP N/A 6/27/2018    Procedure: ENDOSCOPIC RETROGRADE CHOLANGIOPANCREATOGRAPHY;  Surgeon: Liam Manning MD;  Location: [facility] ENDOSCOPY;  Service: Gastroenterology    EXPLORATORY LAPAROTOMY N/A 2/10/2020    Procedure: LAPAROTOMY EXPLORATORY BOWEL RESECTION;  Surgeon: Carlos Zurita MD;  Location: [facility] OR;  Service: General;  Laterality: N/A;    HYSTERECTOMY          Admitting Doctor:   Stefan Rocha MD    Consulting Provider(s):  Consults       No orders found from 5/3/2024 to 6/2/2024.             Admitting Diagnosis:   The primary encounter diagnosis was Generalized weakness. Diagnoses of Hypotensive episode, Unwitnessed fall, Altered mental status, unspecified altered mental status type, and Hypokalemia were also pertinent to this visit.    Most Recent Vitals:   Vitals:    06/01/24 1831 06/01/24 1857 06/01/24 1858 06/01/24 1900   BP:    147/77   BP Location:       Patient Position:       Pulse: 82 100 81 85   Resp:       Temp:       TempSrc:       SpO2: 100% 100% 99% 100%   Weight:       Height:           Active LDAs/IV Access:   Lines, Drains & Airways       Active LDAs       Name Placement date Placement time Site Days    Peripheral IV 04/01/24 0912 Anterior;Left Forearm 04/01/24  0912  Forearm  61    Peripheral IV 06/01/24 Left Antecubital 06/01/24  --  Antecubital  less than 1                    Labs (abnormal labs have a star):  "  Labs Reviewed   COMPREHENSIVE METABOLIC PANEL - Abnormal; Notable for the following components:       Result Value    Glucose 61 (*)     Creatinine 1.03 (*)     Sodium 135 (*)     Potassium 2.5 (*)     Chloride 92 (*)     AST (SGOT) 49 (*)     Anion Gap 20.0 (*)     eGFR 52.4 (*)     All other components within normal limits    Narrative:     GFR Normal >60  Chronic Kidney Disease <60  Kidney Failure <15    The GFR formula is only valid for adults with stable renal function between ages 18 and 70.   URINALYSIS W/ CULTURE IF INDICATED - Abnormal; Notable for the following components:    Leuk Esterase, UA Trace (*)     All other components within normal limits    Narrative:     In absence of clinical symptoms, the presence of pyuria, bacteria, and/or nitrites on the urinalysis result does not correlate with infection.   URINALYSIS, MICROSCOPIC ONLY - Abnormal; Notable for the following components:    Squamous Epithelial Cells, UA 7-12 (*)     Transitional Epithelial Cells, UA 3-6 (*)     All other components within normal limits   LACTIC ACID, PLASMA - Normal   MAGNESIUM - Normal   PROCALCITONIN - Normal    Narrative:     As a Marker for Sepsis (Non-Neonates):    1. <0.5 ng/mL represents a low risk of severe sepsis and/or septic shock.  2. >2 ng/mL represents a high risk of severe sepsis and/or septic shock.    As a Marker for Lower Respiratory Tract Infections that require antibiotic therapy:    PCT on Admission    Antibiotic Therapy       6-12 Hrs later    >0.5                Strongly Recommended  >0.25 - <0.5        Recommended   0.1 - 0.25          Discouraged              Remeasure/reassess PCT  <0.1                Strongly Discouraged     Remeasure/reassess PCT    As 28 day mortality risk marker: \"Change in Procalcitonin Result\" (>80% or <=80%) if Day 0 (or Day 1) and Day 4 values are available. Refer to http://www.[employer]-pct-calculator.com    Change in PCT <=80%  A decrease of PCT levels below or equal to 80% " defines a positive change in PCT test result representing a higher risk for 28-day all-cause mortality of patients diagnosed with severe sepsis for septic shock.    Change in PCT >80%  A decrease of PCT levels of more than 80% defines a negative change in PCT result representing a lower risk for 28-day all-cause mortality of patients diagnosed with severe sepsis or septic shock.      CBC WITH AUTO DIFFERENTIAL - Normal   PHOSPHORUS - Normal   CALCIUM, IONIZED - Normal   RAINBOW DRAW    Narrative:     The following orders were created for panel order [facility] Draw.  Procedure                               Abnormality         Status                     ---------                               -----------         ------                     Green Top (Gel)[[id number]]                                  Final result               Lavender Top[[id number]]                                     Final result               Gold Top - SST[[id number]]                                   Final result               Gray Top[[id number]]                                         Final result               Light Blue Top[[id number]]                                   Final result                 Please view results for these tests on the individual orders.   MAGNESIUM   COMPREHENSIVE METABOLIC PANEL   CBC AND DIFFERENTIAL    Narrative:     The following orders were created for panel order CBC & Differential.  Procedure                               Abnormality         Status                     ---------                               -----------         ------                     CBC Auto Differential[[id number]]        Normal              Final result                 Please view results for these tests on the individual orders.   GREEN TOP   LAVENDER TOP   GOLD TOP - SST   GRAY TOP   LIGHT BLUE TOP       Meds Given in ED:   Medications   sodium chloride 0.9 % flush 10 mL (has no administration in time range)   Potassium Replacement - Follow Nurse /  BPA Driven Protocol (has no administration in time range)   Magnesium Standard Dose Replacement - Follow Nurse / BPA Driven Protocol (has no administration in time range)   Phosphorus Replacement - Follow Nurse / BPA Driven Protocol (has no administration in time range)   Calcium Replacement - Follow Nurse / BPA Driven Protocol (has no administration in time range)   potassium chloride 10 mEq in 100 mL IVPB (has no administration in time range)   sodium chloride 0.9 % bolus 500 mL (500 mL Intravenous New Bag 6/1/24 1813)           Last NIH score:                                                          Dysphagia screening results:  Patient Factors Component (Dysphagia:Stroke or Rule-out)  Best Eye Response: 4-->(E4) spontaneous (06/01/24 1413)  Best Motor Response: 6-->(M6) obeys commands (06/01/24 1413)  Best Verbal Response: 4-->(V4) confused (06/01/24 1413)  Win Coma Scale Score: 14 (06/01/24 1413)     Win Coma Scale:  No data recorded     CIWA:        Restraint Type:            Isolation Status:  No active isolations

## 2024-06-01 NOTE — LETTER
EMS Transport Request  For use at [facility], [place], [place], [place], and [place] only   Patient Name: Susan Tucker : 1935   Weight:45.1 kg (99 lb 6.4 oz) Pick-up Location: [facility] BLS/ALS: BLS/ALS: BLS   Insurance: MEDICARE Auth End Date:    Pre-Cert #: D/C Summary complete:    Destination: [facility] on [address]   Contact Precautions: None   Equipment (O2, Fluids, etc.): None   Arrive By Date/Time: 24 Stretcher/WC: Stretcher   CM Requesting: Saray Maldonado RN Ext: [phone]   Notes/Medical Necessity: dementia, fear of falling, max assist     ______________________________________________________________________    *Only 2 patient bags OR 1 carry-on size bag are permitted.  Wheelchairs and walkers CANNOT transported with the patient. Acknowledge: Yes

## 2024-06-01 NOTE — ED PROVIDER NOTES
Subjective   History of Present Illness  Patient is a pleasant 89-year-old female presents to the emergency department with report of a fall at her skilled nursing facility.  History is limited as patient does not remember any fall or have any complaint at this time.  She states that she was feeling well and they came in to bring her to the hospital.  EMS states that it was an unwitnessed fall and that they were told that she had an episode of a low blood pressure.  The pressure for EMS upon their arrival is 120 systolic.  She has been unremarkable and route with no complaints and stable vital signs.  Patient denies fever, chills, chest pain, shortness of breath, abdominal pain, vomiting, diarrhea, or other acute complaints.    I called and spoke with the staff at the facility.  They did not witness the episode and the nurse that called EMS has left for the day.  They state that they were told that the patient was found when the nurse or nursing assistant took the blood pressure she was found to have a low blood pressure.  The actual pressure or how low it was was not communicated to the current nurse.  Again, for EMS the pressure was approximately 120 systolic and has been normal throughout transport.    The nurse that I spoke with on the phone does note that over the past 2 to 3 months the patient has had increasing confusion.      Review of Systems   All other systems reviewed and are negative.      Past Medical History:   Diagnosis Date    Diabetes mellitus     Elevated cholesterol     Hypertension     Stroke        Allergies   Allergen Reactions    Amoxicillin Hives     Has tolerated Zosyn    Atorvastatin Nausea Only    Cephalexin Rash     Has tolerated Zosyn       Past Surgical History:   Procedure Laterality Date    ERCP N/A 6/27/2018    Procedure: ENDOSCOPIC RETROGRADE CHOLANGIOPANCREATOGRAPHY;  Surgeon: Liam Manning MD;  Location: [facility] ENDOSCOPY;  Service: Gastroenterology    EXPLORATORY LAPAROTOMY N/A  2/10/2020    Procedure: LAPAROTOMY EXPLORATORY BOWEL RESECTION;  Surgeon: Carlos Zurita MD;  Location: [facility] OR;  Service: General;  Laterality: N/A;    HYSTERECTOMY         Family History   Problem Relation Age of Onset    Hypertension Mother     Hypertension Father     Early death Brother     Hypertension Maternal Grandmother     Hypertension Paternal Grandmother     Learning disabilities Other        Social History     Socioeconomic History    Marital status:    Tobacco Use    Smoking status: Never    Smokeless tobacco: Never   Vaping Use    Vaping status: Never Used   Substance and Sexual Activity    Alcohol use: No    Drug use: No           Objective   Physical Exam  Vitals and nursing note reviewed.   Constitutional:       General: She is not in acute distress.  HENT:      Head: Normocephalic and atraumatic.   Eyes:      Conjunctiva/sclera: Conjunctivae normal.      Pupils: Pupils are equal, round, and reactive to light.   Neck:      Thyroid: No thyromegaly.   Cardiovascular:      Rate and Rhythm: Normal rate and regular rhythm.      Heart sounds: Normal heart sounds. No murmur heard.     No friction rub. No gallop.   Pulmonary:      Effort: Pulmonary effort is normal. No respiratory distress.      Breath sounds: Normal breath sounds.   Abdominal:      General: Bowel sounds are normal.      Palpations: Abdomen is soft.      Tenderness: There is no abdominal tenderness.   Musculoskeletal:         General: Normal range of motion.      Cervical back: Normal range of motion and neck supple.   Lymphadenopathy:      Cervical: No cervical adenopathy.   Skin:     General: Skin is warm and dry.   Neurological:      Mental Status: She is alert.      Comments: Oriented to person and place, not time   Psychiatric:         Behavior: Behavior normal.         Procedures           ED Course      Recent Results (from the past 24 hour(s))   ECG 12 Lead Other; Sepsis    Collection Time: 06/01/24  4:51 PM    Result Value Ref Range    QT Interval 336 ms    QTC Interval 408 ms   Comprehensive Metabolic Panel    Collection Time: 06/01/24  5:28 PM    Specimen: Blood   Result Value Ref Range    Glucose 61 (L) 65 - 99 mg/dL    BUN 18 8 - 23 mg/dL    Creatinine 1.03 (H) 0.57 - 1.00 mg/dL    Sodium 135 (L) 136 - 145 mmol/L    Potassium 2.5 (C) 3.5 - 5.2 mmol/L    Chloride 92 (L) 98 - 107 mmol/L    CO2 23.0 22.0 - 29.0 mmol/L    Calcium 9.2 8.6 - 10.5 mg/dL    Total Protein 7.0 6.0 - 8.5 g/dL    Albumin 3.6 3.5 - 5.2 g/dL    ALT (SGPT) 23 1 - 33 U/L    AST (SGOT) 49 (H) 1 - 32 U/L    Alkaline Phosphatase 45 39 - 117 U/L    Total Bilirubin 1.0 0.0 - 1.2 mg/dL    Globulin 3.4 gm/dL    A/G Ratio 1.1 g/dL    BUN/Creatinine Ratio 17.5 7.0 - 25.0    Anion Gap 20.0 (H) 5.0 - 15.0 mmol/L    eGFR 52.4 (L) >60.0 mL/min/1.73   Lactic Acid, Plasma    Collection Time: 06/01/24  5:28 PM    Specimen: Blood   Result Value Ref Range    Lactate 0.9 0.5 - 2.0 mmol/L   Magnesium    Collection Time: 06/01/24  5:28 PM    Specimen: Blood   Result Value Ref Range    Magnesium 1.7 1.6 - 2.4 mg/dL   Procalcitonin    Collection Time: 06/01/24  5:28 PM    Specimen: Blood   Result Value Ref Range    Procalcitonin 0.15 0.00 - 0.25 ng/mL   Green Top (Gel)    Collection Time: 06/01/24  5:28 PM   Result Value Ref Range    Extra Tube Hold for add-ons.    Lavender Top    Collection Time: 06/01/24  5:28 PM   Result Value Ref Range    Extra Tube hold for add-on    Gold Top - SST    Collection Time: 06/01/24  5:28 PM   Result Value Ref Range    Extra Tube Hold for add-ons.    Gray Top    Collection Time: 06/01/24  5:28 PM   Result Value Ref Range    Extra Tube Hold for add-ons.    Light Blue Top    Collection Time: 06/01/24  5:28 PM   Result Value Ref Range    Extra Tube Hold for add-ons.    CBC Auto Differential    Collection Time: 06/01/24  5:28 PM    Specimen: Blood   Result Value Ref Range    WBC 4.46 3.40 - 10.80 10*3/mm3    RBC 4.89 3.77 - 5.28 10*6/mm3     Hemoglobin 15.1 12.0 - 15.9 g/dL    Hematocrit 45.2 34.0 - 46.6 %    MCV 92.4 79.0 - 97.0 fL    MCH 30.9 26.6 - 33.0 pg    MCHC 33.4 31.5 - 35.7 g/dL    RDW 14.2 12.3 - 15.4 %    RDW-SD 48.3 37.0 - 54.0 fl    MPV 10.1 6.0 - 12.0 fL    Platelets 232 140 - 450 10*3/mm3    Neutrophil % 71.6 42.7 - 76.0 %    Lymphocyte % 19.7 19.6 - 45.3 %    Monocyte % 6.7 5.0 - 12.0 %    Eosinophil % 0.7 0.3 - 6.2 %    Basophil % 1.1 0.0 - 1.5 %    Immature Grans % 0.2 0.0 - 0.5 %    Neutrophils, Absolute 3.19 1.70 - 7.00 10*3/mm3    Lymphocytes, Absolute 0.88 0.70 - 3.10 10*3/mm3    Monocytes, Absolute 0.30 0.10 - 0.90 10*3/mm3    Eosinophils, Absolute 0.03 0.00 - 0.40 10*3/mm3    Basophils, Absolute 0.05 0.00 - 0.20 10*3/mm3    Immature Grans, Absolute 0.01 0.00 - 0.05 10*3/mm3    nRBC 0.0 0.0 - 0.2 /100 WBC   Phosphorus    Collection Time: 06/01/24  5:28 PM    Specimen: Blood   Result Value Ref Range    Phosphorus 2.9 2.5 - 4.5 mg/dL   Calcium, Ionized    Collection Time: 06/01/24  5:28 PM    Specimen: Blood   Result Value Ref Range    Ionized Calcium 1.23 1.12 - 1.32 mmol/L   Hemoglobin A1c    Collection Time: 06/01/24  5:28 PM    Specimen: Blood   Result Value Ref Range    Hemoglobin A1C 5.30 4.80 - 5.60 %   TSH Rfx On Abnormal To Free T4    Collection Time: 06/01/24  5:28 PM    Specimen: Blood   Result Value Ref Range    TSH 1.510 0.270 - 4.200 uIU/mL   Urinalysis With Culture If Indicated - Straight Cath    Collection Time: 06/01/24  5:58 PM    Specimen: Straight Cath; Urine   Result Value Ref Range    Color, UA Yellow Yellow, Straw    Appearance, UA Clear Clear    pH, UA 5.5 5.0 - 8.0    Specific Gravity, UA 1.012 1.001 - 1.030    Glucose, UA Negative Negative    Ketones, UA Negative Negative    Bilirubin, UA Negative Negative    Blood, UA Negative Negative    Protein, UA Negative Negative    Leuk Esterase, UA Trace (A) Negative    Nitrite, UA Negative Negative    Urobilinogen, UA 0.2 E.U./dL 0.2 - 1.0 E.U./dL   Urinalysis,  Microscopic Only - Straight Cath    Collection Time: 06/01/24  5:58 PM    Specimen: Straight Cath; Urine   Result Value Ref Range    RBC, UA 0-2 None Seen, 0-2 /HPF    WBC, UA 0-2 None Seen, 0-2 /HPF    Bacteria, UA Trace None Seen, Trace /HPF    Squamous Epithelial Cells, UA 7-12 (A) None Seen, 0-2 /HPF    Transitional Epithelial Cells, UA 3-6 (A) 0 - 2 /HPF    Hyaline Casts, UA 7-12 0 - 6 /LPF    Methodology Manual Light Microscopy    POC Glucose Once    Collection Time: 06/01/24  7:55 PM    Specimen: Blood   Result Value Ref Range    Glucose 50 (L) 70 - 130 mg/dL   POC Glucose Once    Collection Time: 06/01/24  9:27 PM    Specimen: Blood   Result Value Ref Range    Glucose 148 (H) 70 - 130 mg/dL   Comprehensive Metabolic Panel    Collection Time: 06/01/24  9:42 PM    Specimen: Blood   Result Value Ref Range    Glucose 134 (H) 65 - 99 mg/dL    BUN 17 8 - 23 mg/dL    Creatinine 1.00 0.57 - 1.00 mg/dL    Sodium 138 136 - 145 mmol/L    Potassium 2.5 (C) 3.5 - 5.2 mmol/L    Chloride 99 98 - 107 mmol/L    CO2 27.0 22.0 - 29.0 mmol/L    Calcium 8.7 8.6 - 10.5 mg/dL    Total Protein 6.2 6.0 - 8.5 g/dL    Albumin 3.5 3.5 - 5.2 g/dL    ALT (SGPT) 24 1 - 33 U/L    AST (SGOT) 49 (H) 1 - 32 U/L    Alkaline Phosphatase 42 39 - 117 U/L    Total Bilirubin 0.9 0.0 - 1.2 mg/dL    Globulin 2.7 gm/dL    A/G Ratio 1.3 g/dL    BUN/Creatinine Ratio 17.0 7.0 - 25.0    Anion Gap 12.0 5.0 - 15.0 mmol/L    eGFR 54.3 (L) >60.0 mL/min/1.73   POC Glucose Once    Collection Time: 06/01/24 11:15 PM    Specimen: Blood   Result Value Ref Range    Glucose 153 (H) 70 - 130 mg/dL   POC Glucose Once    Collection Time: 06/02/24  2:49 AM    Specimen: Blood   Result Value Ref Range    Glucose 107 70 - 130 mg/dL   Calcium, Ionized    Collection Time: 06/02/24  4:13 AM    Specimen: Blood   Result Value Ref Range    Ionized Calcium 1.20 1.12 - 1.32 mmol/L   Comprehensive Metabolic Panel    Collection Time: 06/02/24  4:13 AM    Specimen: Blood   Result  Value Ref Range    Glucose 109 (H) 65 - 99 mg/dL    BUN 14 8 - 23 mg/dL    Creatinine 0.76 0.57 - 1.00 mg/dL    Sodium 142 136 - 145 mmol/L    Potassium 3.0 (L) 3.5 - 5.2 mmol/L    Chloride 107 98 - 107 mmol/L    CO2 26.0 22.0 - 29.0 mmol/L    Calcium 8.2 (L) 8.6 - 10.5 mg/dL    Total Protein 5.7 (L) 6.0 - 8.5 g/dL    Albumin 3.0 (L) 3.5 - 5.2 g/dL    ALT (SGPT) 18 1 - 33 U/L    AST (SGOT) 35 (H) 1 - 32 U/L    Alkaline Phosphatase 37 (L) 39 - 117 U/L    Total Bilirubin 0.7 0.0 - 1.2 mg/dL    Globulin 2.7 gm/dL    A/G Ratio 1.1 g/dL    BUN/Creatinine Ratio 18.4 7.0 - 25.0    Anion Gap 9.0 5.0 - 15.0 mmol/L    eGFR 75.5 >60.0 mL/min/1.73   CBC Auto Differential    Collection Time: 06/02/24  4:13 AM    Specimen: Blood   Result Value Ref Range    WBC 4.07 3.40 - 10.80 10*3/mm3    RBC 3.88 3.77 - 5.28 10*6/mm3    Hemoglobin 11.8 (L) 12.0 - 15.9 g/dL    Hematocrit 35.2 34.0 - 46.6 %    MCV 90.7 79.0 - 97.0 fL    MCH 30.4 26.6 - 33.0 pg    MCHC 33.5 31.5 - 35.7 g/dL    RDW 14.1 12.3 - 15.4 %    RDW-SD 47.2 37.0 - 54.0 fl    MPV 9.9 6.0 - 12.0 fL    Platelets 208 140 - 450 10*3/mm3    Neutrophil % 66.0 42.7 - 76.0 %    Lymphocyte % 21.6 19.6 - 45.3 %    Monocyte % 11.3 5.0 - 12.0 %    Eosinophil % 0.2 (L) 0.3 - 6.2 %    Basophil % 0.7 0.0 - 1.5 %    Immature Grans % 0.2 0.0 - 0.5 %    Neutrophils, Absolute 2.68 1.70 - 7.00 10*3/mm3    Lymphocytes, Absolute 0.88 0.70 - 3.10 10*3/mm3    Monocytes, Absolute 0.46 0.10 - 0.90 10*3/mm3    Eosinophils, Absolute 0.01 0.00 - 0.40 10*3/mm3    Basophils, Absolute 0.03 0.00 - 0.20 10*3/mm3    Immature Grans, Absolute 0.01 0.00 - 0.05 10*3/mm3    nRBC 0.0 0.0 - 0.2 /100 WBC   Magnesium    Collection Time: 06/02/24  4:13 AM    Specimen: Blood   Result Value Ref Range    Magnesium 2.0 1.6 - 2.4 mg/dL   Phosphorus    Collection Time: 06/02/24  4:13 AM    Specimen: Blood   Result Value Ref Range    Phosphorus 2.3 (L) 2.5 - 4.5 mg/dL   ECG 12 Lead Rhythm Change    Collection Time: 06/02/24   8:24 AM   Result Value Ref Range    QT Interval 324 ms    QTC Interval 398 ms   POC Glucose Once    Collection Time: 06/02/24  8:30 AM    Specimen: Blood   Result Value Ref Range    Glucose 98 70 - 130 mg/dL   Potassium    Collection Time: 06/02/24  9:11 AM    Specimen: Blood   Result Value Ref Range    Potassium 3.5 3.5 - 5.2 mmol/L   POC Glucose Once    Collection Time: 06/02/24 11:21 AM    Specimen: Blood   Result Value Ref Range    Glucose 104 70 - 130 mg/dL     Note: In addition to lab results from this visit, the labs listed above may include labs taken at another facility or during a different encounter within the last 24 hours. Please correlate lab times with ED admission and discharge times for further clarification of the services performed during this visit.    CT Head Without Contrast   Final Result   Impression:   No acute intracranial process.            Electronically Signed: Yady Villegas MD     6/1/2024 8:25 PM EDT     Workstation ID: [id number]      XR Chest 1 View   Final Result   Impression:   No acute cardiopulmonary process.         Electronically Signed: Yady Villegas MD     6/1/2024 8:19 PM EDT     Workstation ID: [id number]        Vitals:    06/01/24 2315 06/02/24 0252 06/02/24 0700 06/02/24 1100   BP: 116/58 120/56 106/68 141/84   BP Location: Left arm Left arm Left arm Left arm   Patient Position: Lying Lying Sitting Sitting   Pulse: 83 74     Resp: 18 18 18 18   Temp: 98.3 °F (36.8 °C) 97.8 °F (36.6 °C) 98.5 °F (36.9 °C) 98.3 °F (36.8 °C)   TempSrc: Oral Oral Oral Oral   SpO2: 100% 100%     Weight:       Height:         Medications   sodium chloride 0.9 % flush 10 mL (has no administration in time range)   sodium chloride 0.9 % flush 10 mL (10 mL Intravenous Given 6/2/24 0848)   sodium chloride 0.9 % flush 10 mL (has no administration in time range)   sodium chloride 0.9 % infusion 40 mL (has no administration in time range)   acetaminophen (TYLENOL) tablet 650 mg ( Oral Not Given:  See Alt  6/1/24 2143)     Or   acetaminophen (TYLENOL) 160 MG/5ML oral solution 650 mg (650 mg Oral Given 6/1/24 2143)     Or   acetaminophen (TYLENOL) suppository 650 mg ( Rectal Not Given:  See Alt 6/1/24 2143)   sennosides-docusate (PERICOLACE) 8.6-50 MG per tablet 2 tablet (2 tablets Oral Not Given 6/2/24 0848)     And   polyethylene glycol (MIRALAX) packet 17 g (has no administration in time range)     And   bisacodyl (DULCOLAX) EC tablet 5 mg (has no administration in time range)     And   bisacodyl (DULCOLAX) suppository 10 mg (has no administration in time range)   ondansetron ODT (ZOFRAN-ODT) disintegrating tablet 4 mg (has no administration in time range)     Or   ondansetron (ZOFRAN) injection 4 mg (has no administration in time range)   aspirin EC tablet 81 mg (81 mg Oral Given 6/2/24 0848)   dextrose (GLUTOSE) oral gel 15 g (has no administration in time range)   dextrose (D50W) (25 g/50 mL) IV injection 25 g (25 g Intravenous Given 6/1/24 2026)   glucagon (GLUCAGEN) injection 1 mg (has no administration in time range)   potassium chloride (KLOR-CON) packet 40 mEq (0 mEq Oral Hold 6/2/24 0031)   heparin (porcine) 5000 UNIT/ML injection 2,500 Units (2,500 Units Subcutaneous Given 6/2/24 0848)   dextrose 5 % and sodium chloride 0.9 % 1,000 mL with potassium chloride 20 mEq infusion (75 mL/hr Intravenous Rate/Dose Verify 6/2/24 0301)   dextrose 5 % and sodium chloride 0.9 % with KCl 20 mEq/L infusion (75 mL/hr Intravenous New Bag 6/2/24 1137)   Potassium Replacement - Follow Nurse / BPA Driven Protocol (has no administration in time range)   Magnesium Standard Dose Replacement - Follow Nurse / BPA Driven Protocol (has no administration in time range)   Phosphorus Replacement - Follow Nurse / BPA Driven Protocol (has no administration in time range)   Calcium Replacement - Follow Nurse / BPA Driven Protocol (has no administration in time range)   potassium chloride (MICRO-K/KLOR-CON) CR capsule (40 mEq Oral Given  6/2/24 0932)   sodium chloride 0.9 % bolus 500 mL (0 mL Intravenous Stopped 6/1/24 1916)   magnesium sulfate 2g/50 mL (PREMIX) infusion (0 g Intravenous Stopped 6/1/24 2120)     ECG/EMG Results (last 24 hours)       Procedure Component Value Units Date/Time    ECG 12 Lead Other; Sepsis [[id number]] Collected: 06/01/24 1651     Updated: 06/01/24 1651     QT Interval 336 ms      QTC Interval 408 ms     Narrative:      Test Reason : Other~  Blood Pressure :   */*   mmHG  Vent. Rate :  89 BPM     Atrial Rate :  89 BPM     P-R Int : 138 ms          QRS Dur :  90 ms      QT Int : 336 ms       P-R-T Axes :  73  20  71 degrees     QTc Int : 408 ms    Sinus rhythm with premature atrial complexes  Otherwise normal ECG  When compared with ECG of 14-NOV-2022 10:35,  Non-specific change in ST segment in Inferior leads  Nonspecific T wave abnormality no longer evident in Inferior leads  T wave inversion no longer evident in Lateral leads  QT has shortened    Referred By: ANTHONY           Confirmed By:     Telemetry Scan [[id number]] Resulted: 06/01/24     Updated: 06/02/24 0306    ECG 12 Lead Rhythm Change [[id number]] Collected: 06/02/24 0824     Updated: 06/02/24 0824     QT Interval 324 ms      QTC Interval 398 ms     Narrative:      Test Reason : Rhythm Change  Blood Pressure :   */*   mmHG  Vent. Rate :  91 BPM     Atrial Rate :  85 BPM     P-R Int :   * ms          QRS Dur :  70 ms      QT Int : 324 ms       P-R-T Axes :   *  29 -13 degrees     QTc Int : 398 ms    Atrial fibrillation  Nonspecific T wave abnormality  Abnormal ECG  When compared with ECG of 01-JUN-2024 16:51, (Unconfirmed)  Atrial fibrillation has replaced Sinus rhythm  QRS duration has decreased  Nonspecific T wave abnormality now evident in Inferior leads    Referred By: SHANI           Confirmed By:           ECG 12 Lead Rhythm Change   Preliminary Result   Test Reason : Rhythm Change   Blood Pressure :   */*   mmHG   Vent. Rate :  91 BPM     Atrial Rate :  85  BPM      P-R Int :   * ms          QRS Dur :  70 ms       QT Int : 324 ms       P-R-T Axes :   *  29 -13 degrees      QTc Int : 398 ms      Atrial fibrillation   Nonspecific T wave abnormality   Abnormal ECG   When compared with ECG of 01-JUN-2024 16:51, (Unconfirmed)   Atrial fibrillation has replaced Sinus rhythm   QRS duration has decreased   Nonspecific T wave abnormality now evident in Inferior leads      Referred By: SHANI           Confirmed By:       ECG 12 Lead Other; Sepsis   Preliminary Result   Test Reason : Other~   Blood Pressure :   */*   mmHG   Vent. Rate :  89 BPM     Atrial Rate :  89 BPM      P-R Int : 138 ms          QRS Dur :  90 ms       QT Int : 336 ms       P-R-T Axes :  73  20  71 degrees      QTc Int : 408 ms      Sinus rhythm with premature atrial complexes   Otherwise normal ECG   When compared with ECG of 14-NOV-2022 10:35,   Non-specific change in ST segment in Inferior leads   Nonspecific T wave abnormality no longer evident in Inferior leads   T wave inversion no longer evident in Lateral leads   QT has shortened      Referred By: SULEMAMD           Confirmed By:       Telemetry Scan   Final Result                                                 Medical Decision Making  Problems Addressed:  Altered mental status, unspecified altered mental status type: complicated acute illness or injury  Generalized weakness: complicated acute illness or injury  Hypokalemia: complicated acute illness or injury  Hypotensive episode: complicated acute illness or injury  Unwitnessed fall: complicated acute illness or injury    Amount and/or Complexity of Data Reviewed  External Data Reviewed: notes.  Labs: ordered. Decision-making details documented in ED Course.  ECG/medicine tests: ordered and independent interpretation performed. Decision-making details documented in ED Course.    Risk  Prescription drug management.  Decision regarding hospitalization.        Final diagnoses:   Generalized weakness    Hypotensive episode   Unwitnessed fall   Altered mental status, unspecified altered mental status type   Hypokalemia       ED Disposition  ED Disposition       ED Disposition   Decision to Admit    Condition   --    Comment   Level of Care: Telemetry [5]   Diagnosis: Weakness generalized [000895]   Admitting Physician: ADISY TAMAYO [[id number]]                    Rakesh Mcintosh DO  06/02/24 2195

## 2024-06-02 LAB
ALBUMIN SERPL-MCNC: 3 G/DL (ref 3.5–5.2)
ALBUMIN/GLOB SERPL: 1.1 G/DL
ALP SERPL-CCNC: 37 U/L (ref 39–117)
ALT SERPL W P-5'-P-CCNC: 18 U/L (ref 1–33)
ANION GAP SERPL CALCULATED.3IONS-SCNC: 9 MMOL/L (ref 5–15)
AST SERPL-CCNC: 35 U/L (ref 1–32)
BASOPHILS # BLD AUTO: 0.03 10*3/MM3 (ref 0–0.2)
BASOPHILS NFR BLD AUTO: 0.7 % (ref 0–1.5)
BILIRUB SERPL-MCNC: 0.7 MG/DL (ref 0–1.2)
BUN SERPL-MCNC: 14 MG/DL (ref 8–23)
BUN/CREAT SERPL: 18.4 (ref 7–25)
CA-I SERPL ISE-MCNC: 1.2 MMOL/L (ref 1.12–1.32)
CALCIUM SPEC-SCNC: 8.2 MG/DL (ref 8.6–10.5)
CHLORIDE SERPL-SCNC: 107 MMOL/L (ref 98–107)
CO2 SERPL-SCNC: 26 MMOL/L (ref 22–29)
CREAT SERPL-MCNC: 0.76 MG/DL (ref 0.57–1)
DEPRECATED RDW RBC AUTO: 47.2 FL (ref 37–54)
EGFRCR SERPLBLD CKD-EPI 2021: 75.5 ML/MIN/1.73
EOSINOPHIL # BLD AUTO: 0.01 10*3/MM3 (ref 0–0.4)
EOSINOPHIL NFR BLD AUTO: 0.2 % (ref 0.3–6.2)
ERYTHROCYTE [DISTWIDTH] IN BLOOD BY AUTOMATED COUNT: 14.1 % (ref 12.3–15.4)
GLOBULIN UR ELPH-MCNC: 2.7 GM/DL
GLUCOSE BLDC GLUCOMTR-MCNC: 104 MG/DL (ref 70–130)
GLUCOSE BLDC GLUCOMTR-MCNC: 107 MG/DL (ref 70–130)
GLUCOSE BLDC GLUCOMTR-MCNC: 69 MG/DL (ref 70–130)
GLUCOSE BLDC GLUCOMTR-MCNC: 81 MG/DL (ref 70–130)
GLUCOSE BLDC GLUCOMTR-MCNC: 89 MG/DL (ref 70–130)
GLUCOSE BLDC GLUCOMTR-MCNC: 98 MG/DL (ref 70–130)
GLUCOSE SERPL-MCNC: 109 MG/DL (ref 65–99)
HCT VFR BLD AUTO: 35.2 % (ref 34–46.6)
HGB BLD-MCNC: 11.8 G/DL (ref 12–15.9)
IMM GRANULOCYTES # BLD AUTO: 0.01 10*3/MM3 (ref 0–0.05)
IMM GRANULOCYTES NFR BLD AUTO: 0.2 % (ref 0–0.5)
LYMPHOCYTES # BLD AUTO: 0.88 10*3/MM3 (ref 0.7–3.1)
LYMPHOCYTES NFR BLD AUTO: 21.6 % (ref 19.6–45.3)
MAGNESIUM SERPL-MCNC: 2 MG/DL (ref 1.6–2.4)
MCH RBC QN AUTO: 30.4 PG (ref 26.6–33)
MCHC RBC AUTO-ENTMCNC: 33.5 G/DL (ref 31.5–35.7)
MCV RBC AUTO: 90.7 FL (ref 79–97)
MONOCYTES # BLD AUTO: 0.46 10*3/MM3 (ref 0.1–0.9)
MONOCYTES NFR BLD AUTO: 11.3 % (ref 5–12)
NEUTROPHILS NFR BLD AUTO: 2.68 10*3/MM3 (ref 1.7–7)
NEUTROPHILS NFR BLD AUTO: 66 % (ref 42.7–76)
NRBC BLD AUTO-RTO: 0 /100 WBC (ref 0–0.2)
PHOSPHATE SERPL-MCNC: 2.3 MG/DL (ref 2.5–4.5)
PLATELET # BLD AUTO: 208 10*3/MM3 (ref 140–450)
PMV BLD AUTO: 9.9 FL (ref 6–12)
POTASSIUM SERPL-SCNC: 3 MMOL/L (ref 3.5–5.2)
POTASSIUM SERPL-SCNC: 3.5 MMOL/L (ref 3.5–5.2)
POTASSIUM SERPL-SCNC: 4.1 MMOL/L (ref 3.5–5.2)
PROT SERPL-MCNC: 5.7 G/DL (ref 6–8.5)
QT INTERVAL: 324 MS
QTC INTERVAL: 398 MS
RBC # BLD AUTO: 3.88 10*6/MM3 (ref 3.77–5.28)
SODIUM SERPL-SCNC: 142 MMOL/L (ref 136–145)
WBC NRBC COR # BLD AUTO: 4.07 10*3/MM3 (ref 3.4–10.8)

## 2024-06-02 PROCEDURE — 99233 SBSQ HOSP IP/OBS HIGH 50: CPT | Performed by: HOSPITALIST

## 2024-06-02 PROCEDURE — G0378 HOSPITAL OBSERVATION PER HR: HCPCS

## 2024-06-02 PROCEDURE — 93010 ELECTROCARDIOGRAM REPORT: CPT | Performed by: INTERNAL MEDICINE

## 2024-06-02 PROCEDURE — 97530 THERAPEUTIC ACTIVITIES: CPT

## 2024-06-02 PROCEDURE — 84132 ASSAY OF SERUM POTASSIUM: CPT | Performed by: INTERNAL MEDICINE

## 2024-06-02 PROCEDURE — 84100 ASSAY OF PHOSPHORUS: CPT | Performed by: NURSE PRACTITIONER

## 2024-06-02 PROCEDURE — 82330 ASSAY OF CALCIUM: CPT | Performed by: NURSE PRACTITIONER

## 2024-06-02 PROCEDURE — 82948 REAGENT STRIP/BLOOD GLUCOSE: CPT

## 2024-06-02 PROCEDURE — 93005 ELECTROCARDIOGRAM TRACING: CPT | Performed by: HOSPITALIST

## 2024-06-02 PROCEDURE — 87493 C DIFF AMPLIFIED PROBE: CPT | Performed by: HOSPITALIST

## 2024-06-02 PROCEDURE — 87507 IADNA-DNA/RNA PROBE TQ 12-25: CPT | Performed by: HOSPITALIST

## 2024-06-02 PROCEDURE — 83735 ASSAY OF MAGNESIUM: CPT | Performed by: NURSE PRACTITIONER

## 2024-06-02 PROCEDURE — 85025 COMPLETE CBC W/AUTO DIFF WBC: CPT | Performed by: NURSE PRACTITIONER

## 2024-06-02 PROCEDURE — 97162 PT EVAL MOD COMPLEX 30 MIN: CPT

## 2024-06-02 PROCEDURE — 80053 COMPREHEN METABOLIC PANEL: CPT | Performed by: NURSE PRACTITIONER

## 2024-06-02 PROCEDURE — 25010000002 HEPARIN (PORCINE) PER 1000 UNITS: Performed by: NURSE PRACTITIONER

## 2024-06-02 RX ORDER — DEXTROSE MONOHYDRATE, SODIUM CHLORIDE, AND POTASSIUM CHLORIDE 50; 1.49; 9 G/1000ML; G/1000ML; G/1000ML
75 INJECTION, SOLUTION INTRAVENOUS CONTINUOUS
Status: DISPENSED | OUTPATIENT
Start: 2024-06-02 | End: 2024-06-02

## 2024-06-02 RX ORDER — POTASSIUM CHLORIDE 750 MG/1
40 CAPSULE, EXTENDED RELEASE ORAL EVERY 4 HOURS
Status: COMPLETED | OUTPATIENT
Start: 2024-06-02 | End: 2024-06-02

## 2024-06-02 RX ADMIN — POTASSIUM CHLORIDE 40 MEQ: 750 CAPSULE, EXTENDED RELEASE ORAL at 09:32

## 2024-06-02 RX ADMIN — Medication 10 ML: at 20:01

## 2024-06-02 RX ADMIN — HEPARIN SODIUM 2500 UNITS: 5000 INJECTION INTRAVENOUS; SUBCUTANEOUS at 08:48

## 2024-06-02 RX ADMIN — DEXTROSE MONOHYDRATE, SODIUM CHLORIDE, AND POTASSIUM CHLORIDE 75 ML/HR: 50; 9; 1.49 INJECTION, SOLUTION INTRAVENOUS at 11:37

## 2024-06-02 RX ADMIN — ASPIRIN 81 MG: 81 TABLET, COATED ORAL at 08:48

## 2024-06-02 RX ADMIN — POTASSIUM CHLORIDE 40 MEQ: 750 CAPSULE, EXTENDED RELEASE ORAL at 17:04

## 2024-06-02 RX ADMIN — Medication 10 ML: at 08:48

## 2024-06-02 RX ADMIN — HEPARIN SODIUM 2500 UNITS: 5000 INJECTION INTRAVENOUS; SUBCUTANEOUS at 20:00

## 2024-06-02 RX ADMIN — POTASSIUM CHLORIDE 40 MEQ: 750 CAPSULE, EXTENDED RELEASE ORAL at 14:02

## 2024-06-02 NOTE — PLAN OF CARE
Goal Outcome Evaluation:  Plan of Care Reviewed With: patient           Outcome Evaluation: Pt presents with balance and mobility deficits. She would benefit from skilled PT services to improve function. Pt is a questionable historian; therapist is unsure of PLOF. She is limited by extreme fear of falling. She needed max A to stand statically when one person was present and min  when there were 2 people present. Recommend 2 people present for transfers and amb.      Anticipated Discharge Disposition (PT): extended care facility, home with home health (return to facility with PT services)

## 2024-06-02 NOTE — PROGRESS NOTES
"    [facility] Medicine Services  PROGRESS NOTE    Patient Name: Susan Tucker  : 1935  MRN: [id number]    Date of Admission: 2024  Primary Care Physician: Damon Townsend PA    Subjective   Subjective     CC:  Fall / low blood pressure    HPI:  Patient resting in bed, pleasantly confused intermittently. States she remembers falling but told me she fell \"at school.\" Patient in atrial fibrillation with rate controlled.      Objective   Objective     Vital Signs:   Temp:  [97.8 °F (36.6 °C)-100.3 °F (37.9 °C)] 98.3 °F (36.8 °C)  Heart Rate:  [] 74  Resp:  [16-18] 18  BP: (106-147)/(50-88) 141/84  Flow (L/min):  [2-3] 2     Physical Exam:  Constitutional: No acute distress, awake, alert  HENT: NCAT, mucous membranes moist  Respiratory: Clear to auscultation bilaterally, respiratory effort normal on room air  Cardiovascular: irregularly irregular rhythm, rate controlled, no murmurs, rubs, or gallops  Gastrointestinal: Positive bowel sounds, soft, nontender, nondistended  Musculoskeletal: No bilateral ankle edema  Psychiatric: Appropriate affect, cooperative  Neurologic: confused, MOSER, speech clear  Skin: No rashes    Results Reviewed:  LAB RESULTS:      Lab 24  0413 24  1728   WBC 4.07 4.46   HEMOGLOBIN 11.8* 15.1   HEMATOCRIT 35.2 45.2   PLATELETS 208 232   NEUTROS ABS 2.68 3.19   IMMATURE GRANS (ABS) 0.01 0.01   LYMPHS ABS 0.88 0.88   MONOS ABS 0.46 0.30   EOS ABS 0.01 0.03   MCV 90.7 92.4   PROCALCITONIN  --  0.15   LACTATE  --  0.9         Lab 24  0911 24  0413 24  2142 24  1728   SODIUM  --  142 138 135*   POTASSIUM 3.5 3.0* 2.5* 2.5*   CHLORIDE  --  107 99 92*   CO2  --  26.0 27.0 23.0   ANION GAP  --  9.0 12.0 20.0*   BUN  --  14 17 18   CREATININE  --  0.76 1.00 1.03*   EGFR  --  75.5 54.3* 52.4*   GLUCOSE  --  109* 134* 61*   CALCIUM  --  8.2* 8.7 9.2   IONIZED CALCIUM  --  1.20  --  1.23   MAGNESIUM  --  2.0  --  1.7   PHOSPHORUS  -- "  2.3*  --  2.9   HEMOGLOBIN A1C  --   --   --  5.30   TSH  --   --   --  1.510         Lab 06/02/24  0413 06/01/24  2142 06/01/24  1728   TOTAL PROTEIN 5.7* 6.2 7.0   ALBUMIN 3.0* 3.5 3.6   GLOBULIN 2.7 2.7 3.4   ALT (SGPT) 18 24 23   AST (SGOT) 35* 49* 49*   BILIRUBIN 0.7 0.9 1.0   ALK PHOS 37* 42 45                     Brief Urine Lab Results  (Last result in the past 365 days)        Color   Clarity   Blood   Leuk Est   Nitrite   Protein   CREAT   Urine HCG        06/01/24 1758 Yellow   Clear   Negative   Trace   Negative   Negative                   Microbiology Results Abnormal       None            CT Head Without Contrast    Result Date: 6/1/2024  CT HEAD WO CONTRAST Date of Exam: 6/1/2024 8:21 PM EDT Indication: unwitnessed fall; confusion - unknown baseline. Comparison: 11/8/2022. Technique: Axial CT images were obtained of the head without contrast administration.  Automated exposure control and iterative construction methods were used. Findings: There is no evidence of hemorrhage. There is no mass effect or midline shift. There is no extracerebral collection. Ventricles are normal in size and configuration for patient's stated age.  Posterior fossa is within normal limits. Calvarium and skull base appear intact.   Visualized sinuses show no air fluid levels. Visualized orbits are unremarkable.     Impression: Impression: No acute intracranial process. Electronically Signed: Yady Villegas MD  6/1/2024 8:25 PM EDT  Workstation ID: [id number]    XR Chest 1 View    Result Date: 6/1/2024  XR CHEST 1 VW Date of Exam: 6/1/2024 8:08 PM EDT Indication: cough, oxygen sat 91 room air Comparison: 11/7/2022. Findings: There are no airspace consolidations. No pleural fluid. No pneumothorax. The pulmonary vasculature appears within normal limits. The cardiac and mediastinal silhouette appear unremarkable. No acute osseous abnormality identified.     Impression: Impression: No acute cardiopulmonary process. Electronically  Signed: Yady Villegas MD  6/1/2024 8:19 PM EDT  Workstation ID: [id number]     Results for orders placed during the hospital encounter of 03/24/22    Adult Transthoracic Echo Complete W/ Cont if Necessary Per Protocol    Interpretation Summary  · Estimated left ventricular EF = 60%  · No hemodynamically significant valvular heart disease      Current medications:  Scheduled Meds:aspirin, 81 mg, Oral, Daily  heparin (porcine), 2,500 Units, Subcutaneous, Q12H  potassium chloride, 40 mEq, Oral, Q4H  senna-docusate sodium, 2 tablet, Oral, BID  sodium chloride, 10 mL, Intravenous, Q12H      Continuous Infusions:dextrose 5 % and sodium chloride 0.9 % with KCl 20 mEq, 75 mL/hr      PRN Meds:.  acetaminophen **OR** acetaminophen **OR** acetaminophen    senna-docusate sodium **AND** polyethylene glycol **AND** bisacodyl **AND** bisacodyl    Calcium Replacement - Follow Nurse / BPA Driven Protocol    dextrose    dextrose    glucagon (human recombinant)    Magnesium Standard Dose Replacement - Follow Nurse / BPA Driven Protocol    ondansetron ODT **OR** ondansetron    Phosphorus Replacement - Follow Nurse / BPA Driven Protocol    Potassium Replacement - Follow Nurse / BPA Driven Protocol    sodium chloride    sodium chloride    sodium chloride    Assessment & Plan   Assessment & Plan     Active Hospital Problems    Diagnosis  POA    **Weakness generalized [R53.1]  Yes    Unwitnessed fall [R29.6]  Unknown    History of diabetes mellitus, type II [Z86.39]  Unknown    Dementia without behavioral disturbance [F03.90]  Unknown    Hypoglycemia [E16.2]  Unknown    Hypokalemia [E87.6]  Yes    Hypertension [I10]  Yes    History of Stroke [I63.9]  Yes    Hyperlipidemia [E78.5]  Yes      Resolved Hospital Problems   No resolved problems to display.        Brief Hospital Course to date:  Susan Tucker is a 89 y.o. female with PMHx of HTN, HLD, CVA, DM who presents to the ED for evaluation after a fall at her living facility and low blood  pressure per transferring documentation.     This patient's problems and plans were partially entered by my partner and updated as appropriate by me 06/02/24.    All problems are new to me today.    Weakness  Unwitnessed fall  -  CT head w/o contrast negative  - PT/OT consult  - fall precautions  - likely dehydrated / hypoglycemia related     Hypokalemia  Hypoglycemia  - k 2.5 on admission--> 3 this am, replace  - mag 1.7 on admission, replace 2 grams IV mag x 1  - glucose 61 on labs, now improved  - hypoglycemia protocol  - monitor glucose q 1 hour until glu > 100 x 2 consecutive checks then can change to less frequent monitoring  - fluids, D5 w/ 20 kcl @ 75 ml/hr    New Atrial Fibrillation  - rate controlled  - verified per EKG  - patient already on aspirin 81mg PO daily- high fall risk- defer other anticoagulation     JULIA, mild- resolved  - s/p fluids  - hold lisinopril and lasix     HTN  HLD  - hold lisinopril, episode of hypotension reported from nursing home     Dementia  - unclear baseline     Hx T2DM  - not on medications  - A1c: 5.3     Hx CVA  - continue aspirin    Expected Discharge Location and Transportation: snf  Expected Discharge   Expected Discharge Date: 6/4/2024; Expected Discharge Time:      DVT prophylaxis:  Medical DVT prophylaxis orders are present.         AM-PAC 6 Clicks Score (PT): 11 (06/02/24 5084)    CODE STATUS:   Code Status and Medical Interventions:   Ordered at: 06/01/24 1941     Medical Intervention Limits:    NO intubation (DNI)     Code Status (Patient has no pulse and is not breathing):    No CPR (Do Not Attempt to Resuscitate)     Medical Interventions (Patient has pulse or is breathing):    Limited Support       Zenia Lee,   06/02/24

## 2024-06-02 NOTE — PLAN OF CARE
Goal Outcome Evaluation:  Plan of Care Reviewed With: patient            Pt resting in bed, disoriented to location, incontinence x2. Pt very friendly, low appetite. On RM, a-fib, very unsteady on her feet, x2 assist. Safety precautions utilized and maintained

## 2024-06-02 NOTE — THERAPY EVALUATION
Patient Name: Susan Tucker  : 1935    MRN: [id number]                              Today's Date: 2024       Admit Date: 2024    Visit Dx:     ICD-10-CM ICD-9-CM   1. Generalized weakness  R53.1 780.79   2. Hypotensive episode  I95.9 458.9   3. Unwitnessed fall  R29.6 [id number]   4. Altered mental status, unspecified altered mental status type  R41.82 780.97   5. Hypokalemia  E87.6 276.8     Patient Active Problem List   Diagnosis    Sepsis    Elevated liver enzymes    Dehydration    Hypertension    History of Stroke    Acute UTI (urinary tract infection)    Calculus of bile duct with acute cholecystitis and obstruction    Enterocolitis    Gallstone ileus of small intestine    Syncope and collapse    Frequent falls    Type 2 diabetes mellitus without complication    Hyperlipidemia    Hypokalemia    Weakness generalized    Unwitnessed fall    History of diabetes mellitus, type II    Dementia without behavioral disturbance    Hypoglycemia     Past Medical History:   Diagnosis Date    Diabetes mellitus     Elevated cholesterol     Hypertension     Stroke      Past Surgical History:   Procedure Laterality Date    ERCP N/A 2018    Procedure: ENDOSCOPIC RETROGRADE CHOLANGIOPANCREATOGRAPHY;  Surgeon: Liam Manning MD;  Location:  [facility];  Service: Gastroenterology    EXPLORATORY LAPAROTOMY N/A 2/10/2020    Procedure: LAPAROTOMY EXPLORATORY BOWEL RESECTION;  Surgeon: Carlos Zurita MD;  Location: [facility];  Service: General;  Laterality: N/A;    HYSTERECTOMY        General Information       Row Name 24 0915          Physical Therapy Time and Intention    Document Type evaluation  -LS     Mode of Treatment physical therapy  -LS       Row Name 24 0915          General Information    Patient Profile Reviewed yes  -LS     Prior Level of Function --  unsure of PLOF. at times, pt said she walks, and at other times, she said she does not.  -LS     Existing Precautions/Restrictions  --  EMS reported unwitnessed fall and low BP. Facility staff has reported increased confusion over the past 2-3 months.  -LS     Barriers to Rehab medically complex;previous functional deficit;cognitive status  -       Row Name 06/02/24 0915          Living Environment    People in Home facility resident  -LS       Row Name 06/02/24 0915          Home Main Entrance    Number of Stairs, Main Entrance none  -LS       Row Name 06/02/24 0915          Stairs Within Home, Primary    Number of Stairs, Within Home, Primary none  -LS       Row Name 06/02/24 0915          Cognition    Orientation Status (Cognition) oriented to;person;disoriented to;situation;time  pt knew she was in the hospital but not which one.  -LS       Row Name 06/02/24 0915          Safety Issues, Functional Mobility    Safety Issues Affecting Function (Mobility) ability to follow commands;insight into deficits/self-awareness;judgment;sequencing abilities;other (see comments)  pt very fearful of falling  -LS     Impairments Affecting Function (Mobility) balance;cognition;endurance/activity tolerance;strength  -LS     Cognitive Impairments, Mobility Safety/Performance insight into deficits/self-awareness;judgment;problem-solving/reasoning;sequencing abilities  -LS               User Key  (r) = Recorded By, (t) = Taken By, (c) = Cosigned By      Initials Name Provider Type    LS Janki Campa, PT Physical Therapist                   Mobility       Row Name 06/02/24 0915          Bed Mobility    Bed Mobility supine-sit  -LS     Supine-Sit [place] (Bed Mobility) verbal cues;moderate assist (50% patient effort)  -LS     Assistive Device (Bed Mobility) head of bed elevated  -LS     Comment, (Bed Mobility) vcs for sequencing  -       Row Name 06/02/24 0915          Bed-Chair Transfer    Bed-Chair [place] (Transfers) verbal cues;maximum assist (25% patient effort)  -LS     Assistive Device (Bed-Chair Transfers) other (see comments)  -LS      Comment, (Bed-Chair Transfer) transferred bed to bedside commode max A of 1 (very fearful of feet sliding and falling); B feet were blocked by therapist. pt then stood with min A with PT while RN cleaned her bottom (pt said she was not scared because someone was in front of her and in back of her). pt needed max A to pivot from bedside commode to chair. vcs for sequencing.  -       Row Name 06/02/24 0915          Sit-Stand Transfer    Sit-Stand [place] (Transfers) maximum assist (25% patient effort)  -LS     Assistive Device (Sit-Stand Transfers) other (see comments)  BUE support  -     Comment, (Sit-Stand Transfer) Pt needed max A of 1 when there was only one person assisting and min A of 1 sit to stand and to stand statically when there were 2 people present.  -       Row Name 06/02/24 0915          Gait/Stairs (Locomotion)    Patient was able to Ambulate yes  -LS     Distance in Feet (Gait) 2  -LS     Deviations/Abnormal Patterns (Gait) stride length decreased  -LS     Bilateral Gait Deviations forward flexed posture  -LS     Comment, (Gait/Stairs) pt took a few steps during transfers, needing max A and vcs.  -               User Key  (r) = Recorded By, (t) = Taken By, (c) = Cosigned By      Initials Name Provider Type    LS Janki Campa, PT Physical Therapist                   Obj/Interventions       Row Name 06/02/24 0915          Range of Motion Comprehensive    General Range of Motion bilateral lower extremity ROM WFL  -       Row Name 06/02/24 0915          Strength Comprehensive (MMT)    Comment, General Manual Muscle Testing (MMT) Assessment B hip flexors 4. B knee extensors and B ankle dorsiflexors 5/5.  -       Row Name 06/02/24 0915          Balance    Balance Assessment sitting static balance;standing static balance;standing dynamic balance  -     Static Sitting Balance standby assist  -LS     Position, Sitting Balance sitting edge of bed  -     Static Standing Balance  maximum assist  -LS     Dynamic Standing Balance maximum assist  -LS     Position/Device Used, Standing Balance other (see comments)  BUE support  -LS     Balance Interventions standing;sit to stand;supported;weight shifting activity  -LS               User Key  (r) = Recorded By, (t) = Taken By, (c) = Cosigned By      Initials Name Provider Type    LS Janki Campa, PT Physical Therapist                   Goals/Plan       Row Name 06/02/24 0915          Bed Mobility Goal 1 (PT)    Activity/Assistive Device (Bed Mobility Goal 1, PT) sit to supine/supine to sit  -LS     [place] Level/Cues Needed (Bed Mobility Goal 1, PT) minimum assist (75% or more patient effort)  -LS     Time Frame (Bed Mobility Goal 1, PT) short term goal (STG);5 days  -LS     Progress/Outcomes (Bed Mobility Goal 1, PT) goal ongoing  -       Row Name 06/02/24 0915          Transfer Goal 1 (PT)    Activity/Assistive Device (Transfer Goal 1, PT) sit-to-stand/stand-to-sit;walker, rolling  -LS     [place] Level/Cues Needed (Transfer Goal 1, PT) moderate assist (50-74% patient effort)  -LS     Time Frame (Transfer Goal 1, PT) 10 days;long term goal (LTG)  -LS     Progress/Outcome (Transfer Goal 1, PT) goal ongoing  -       Row Name 06/02/24 0915          Gait Training Goal 1 (PT)    Activity/Assistive Device (Gait Training Goal 1, PT) gait (walking locomotion);assistive device use;walker, rolling  -LS     [place] Level (Gait Training Goal 1, PT) moderate assist (50-74% patient effort)  -LS     Distance (Gait Training Goal 1, PT) 10  -LS     Time Frame (Gait Training Goal 1, PT) long term goal (LTG);10 days  -LS     Progress/Outcome (Gait Training Goal 1, PT) goal ongoing  -       Row Name 06/02/24 0915          Therapy Assessment/Plan (PT)    Planned Therapy Interventions (PT) balance training;bed mobility training;gait training;home exercise program;transfer training;strengthening;postural re-education;patient/family  education  -               User Key  (r) = Recorded By, (t) = Taken By, (c) = Cosigned By      Initials Name Provider Type    Janki Vital, PT Physical Therapist                   Clinical Impression       Row Name 06/02/24 0915          Pain    Pretreatment Pain Rating 0/10 - no pain  -LS     Posttreatment Pain Rating 0/10 - no pain  -LS       Row Name 06/02/24 0915          Plan of Care Review    Plan of Care Reviewed With patient  -LS     Outcome Evaluation Pt presents with balance and mobility deficits. She would benefit from skilled PT services to improve function. Pt is a questionable historian; therapist is unsure of PLOF. She is limited by extreme fear of falling. She needed max A to stand statically when one person was present and min  when there were 2 people present. Recommend 2 people present for transfers and amb.  -       Row Name 06/02/24 0915          Therapy Assessment/Plan (PT)    Patient/Family Therapy Goals Statement (PT) to walk  -     Rehab Potential (PT) fair, will monitor progress closely  -LS     Criteria for Skilled Interventions Met (PT) yes;meets criteria  -     Therapy Frequency (PT) daily  -     Predicted Duration of Therapy Intervention (PT) 10 days  -       Row Name 06/02/24 0915          Positioning and Restraints    Pre-Treatment Position in bed  -LS     Post Treatment Position chair  -LS     In Chair notified nsg;sitting;call light within reach;encouraged to call for assist;exit alarm on;on mechanical lift sling;waffle cushion;legs elevated;heels elevated  -               User Key  (r) = Recorded By, (t) = Taken By, (c) = Cosigned By      Initials Name Provider Type    Janki Vital, PT Physical Therapist                   Outcome Measures       Row Name 06/02/24 0915 06/02/24 0032       How much help from another person do you currently need...    Turning from your back to your side while in flat bed without using bedrails? 2  -LS 3  -LM     Moving from lying on back to sitting on the side of a flat bed without bedrails? 2  -LS 2  -LM    Moving to and from a bed to a chair (including a wheelchair)? 2  -LS 2  -LM    Standing up from a chair using your arms (e.g., wheelchair, bedside chair)? 2  -LS 2  -LM    Climbing 3-5 steps with a railing? 1  -LS 1  -LM    To walk in hospital room? 2  -LS 2  -LM    AM-PAC 6 Clicks Score (PT) 11  -LS 12  -LM    Highest Level of Mobility Goal 4 --> Transfer to chair/commode  -LS 4 --> Transfer to chair/commode  -LM      Row Name 06/02/24 0915          Functional Assessment    Outcome Measure Options AM-PAC 6 Clicks Basic Mobility (PT)  -               User Key  (r) = Recorded By, (t) = Taken By, (c) = Cosigned By      Initials Name Provider Type    Janki Vital, PT Physical Therapist    Veronica Sneed RN Registered Nurse                                 Physical Therapy Education       Title: PT OT SLP Therapies (In Progress)       Topic: Physical Therapy (In Progress)       Point: Mobility training (Done)       Learning Progress Summary             Patient Acceptance, E, VU,NR by  at 6/2/2024 0915                         Point: Home exercise program (Not Started)       Learner Progress:  Not documented in this visit.              Point: Body mechanics (Not Started)       Learner Progress:  Not documented in this visit.              Point: Precautions (Not Started)       Learner Progress:  Not documented in this visit.                              User Key       Initials Effective Dates Name Provider Type Discipline     07/11/23 -  Janki Campa, PT Physical Therapist PT                  PT Recommendation and Plan  Planned Therapy Interventions (PT): balance training, bed mobility training, gait training, home exercise program, transfer training, strengthening, postural re-education, patient/family education  Plan of Care Reviewed With: patient  Outcome Evaluation: Pt presents with balance  and mobility deficits. She would benefit from skilled PT services to improve function. Pt is a questionable historian; therapist is unsure of PLOF. She is limited by extreme fear of falling. She needed max A to stand statically when one person was present and min  when there were 2 people present. Recommend 2 people present for transfers and amb.     Time Calculation:   PT Evaluation Complexity  History, PT Evaluation Complexity: 3 or more personal factors and/or comorbidities  Examination of Body Systems (PT Eval Complexity): total of 4 or more elements  Clinical Presentation (PT Evaluation Complexity): evolving  Clinical Decision Making (PT Evaluation Complexity): moderate complexity  Overall Complexity (PT Evaluation Complexity): moderate complexity     PT Charges       Row Name 06/02/24 0915             Time Calculation    Start Time 0915  -LS      PT Received On 06/02/24  -LS      PT Goal Re-Cert Due Date 06/12/24  -LS         Timed Charges    02367 - PT Therapeutic Activity Minutes 10  -LS         Untimed Charges    PT Eval/Re-eval Minutes 55  -LS         Total Minutes    Timed Charges Total Minutes 10  -LS      Untimed Charges Total Minutes 55  -LS       Total Minutes 65  -LS                User Key  (r) = Recorded By, (t) = Taken By, (c) = Cosigned By      Initials Name Provider Type    Janki Vital, PT Physical Therapist                  Therapy Charges for Today       Code Description Service Date Service Provider Modifiers Qty    [id number] HC PT EVAL MOD COMPLEXITY 4 6/2/2024 Janki Campa, PT GP 1    [id number]  PT THERAPEUTIC ACT EA 15 MIN 6/2/2024 Janki Campa, PT GP 1            PT G-Codes  Outcome Measure Options: AM-PAC 6 Clicks Basic Mobility (PT)  AM-PAC 6 Clicks Score (PT): 11  PT Discharge Summary  Anticipated Discharge Disposition (PT): extended care facility, home with home health (return to facility with PT services)    Janki Campa, PT  6/2/2024

## 2024-06-02 NOTE — PLAN OF CARE
Goal Outcome Evaluation:   Patient had no acute events during shift  3LNC, oriented to self, Afib  Confused to time, date, location own age  Electrolyte replacement- see MAR - see results   100.3 fever @ 2130- see MAR

## 2024-06-03 PROBLEM — R53.1 GENERALIZED WEAKNESS: Status: ACTIVE | Noted: 2024-06-03

## 2024-06-03 LAB
ADV 40+41 DNA STL QL NAA+NON-PROBE: NOT DETECTED
ALBUMIN SERPL-MCNC: 3 G/DL (ref 3.5–5.2)
ALBUMIN/GLOB SERPL: 1.2 G/DL
ALP SERPL-CCNC: 36 U/L (ref 39–117)
ALT SERPL W P-5'-P-CCNC: 17 U/L (ref 1–33)
ANION GAP SERPL CALCULATED.3IONS-SCNC: 10 MMOL/L (ref 5–15)
AST SERPL-CCNC: 38 U/L (ref 1–32)
ASTRO TYP 1-8 RNA STL QL NAA+NON-PROBE: NOT DETECTED
BILIRUB SERPL-MCNC: 0.6 MG/DL (ref 0–1.2)
BUN SERPL-MCNC: 6 MG/DL (ref 8–23)
BUN/CREAT SERPL: 11.8 (ref 7–25)
C CAYETANENSIS DNA STL QL NAA+NON-PROBE: NOT DETECTED
C COLI+JEJ+UPSA DNA STL QL NAA+NON-PROBE: NOT DETECTED
C DIFF TOX GENS STL QL NAA+PROBE: NOT DETECTED
CA-I SERPL ISE-MCNC: 1.22 MMOL/L (ref 1.12–1.32)
CALCIUM SPEC-SCNC: 8 MG/DL (ref 8.6–10.5)
CHLORIDE SERPL-SCNC: 106 MMOL/L (ref 98–107)
CO2 SERPL-SCNC: 20 MMOL/L (ref 22–29)
CREAT SERPL-MCNC: 0.51 MG/DL (ref 0.57–1)
CRYPTOSP DNA STL QL NAA+NON-PROBE: NOT DETECTED
DEPRECATED RDW RBC AUTO: 51 FL (ref 37–54)
E HISTOLYT DNA STL QL NAA+NON-PROBE: NOT DETECTED
EAEC PAA PLAS AGGR+AATA ST NAA+NON-PRB: NOT DETECTED
EC STX1+STX2 GENES STL QL NAA+NON-PROBE: NOT DETECTED
EGFRCR SERPLBLD CKD-EPI 2021: 89.9 ML/MIN/1.73
EPEC EAE GENE STL QL NAA+NON-PROBE: NOT DETECTED
ERYTHROCYTE [DISTWIDTH] IN BLOOD BY AUTOMATED COUNT: 14.6 % (ref 12.3–15.4)
ETEC LTA+ST1A+ST1B TOX ST NAA+NON-PROBE: NOT DETECTED
G LAMBLIA DNA STL QL NAA+NON-PROBE: NOT DETECTED
GLOBULIN UR ELPH-MCNC: 2.5 GM/DL
GLUCOSE BLDC GLUCOMTR-MCNC: 100 MG/DL (ref 70–130)
GLUCOSE BLDC GLUCOMTR-MCNC: 100 MG/DL (ref 70–130)
GLUCOSE BLDC GLUCOMTR-MCNC: 145 MG/DL (ref 70–130)
GLUCOSE BLDC GLUCOMTR-MCNC: 48 MG/DL (ref 70–130)
GLUCOSE BLDC GLUCOMTR-MCNC: 75 MG/DL (ref 70–130)
GLUCOSE BLDC GLUCOMTR-MCNC: 78 MG/DL (ref 70–130)
GLUCOSE BLDC GLUCOMTR-MCNC: 82 MG/DL (ref 70–130)
GLUCOSE BLDC GLUCOMTR-MCNC: 88 MG/DL (ref 70–130)
GLUCOSE SERPL-MCNC: 86 MG/DL (ref 65–99)
HCT VFR BLD AUTO: 36.3 % (ref 34–46.6)
HGB BLD-MCNC: 11.7 G/DL (ref 12–15.9)
MCH RBC QN AUTO: 30.2 PG (ref 26.6–33)
MCHC RBC AUTO-ENTMCNC: 32.2 G/DL (ref 31.5–35.7)
MCV RBC AUTO: 93.8 FL (ref 79–97)
NOROVIRUS GI+II RNA STL QL NAA+NON-PROBE: NOT DETECTED
P SHIGELLOIDES DNA STL QL NAA+NON-PROBE: NOT DETECTED
PLATELET # BLD AUTO: 174 10*3/MM3 (ref 140–450)
PMV BLD AUTO: 9.7 FL (ref 6–12)
POTASSIUM SERPL-SCNC: 5.1 MMOL/L (ref 3.5–5.2)
PROT SERPL-MCNC: 5.5 G/DL (ref 6–8.5)
QT INTERVAL: 324 MS
QTC INTERVAL: 387 MS
RBC # BLD AUTO: 3.87 10*6/MM3 (ref 3.77–5.28)
RVA RNA STL QL NAA+NON-PROBE: NOT DETECTED
S ENT+BONG DNA STL QL NAA+NON-PROBE: NOT DETECTED
SAPO I+II+IV+V RNA STL QL NAA+NON-PROBE: NOT DETECTED
SHIGELLA SP+EIEC IPAH ST NAA+NON-PROBE: NOT DETECTED
SODIUM SERPL-SCNC: 136 MMOL/L (ref 136–145)
V CHOL+PARA+VUL DNA STL QL NAA+NON-PROBE: NOT DETECTED
V CHOLERAE DNA STL QL NAA+NON-PROBE: NOT DETECTED
WBC NRBC COR # BLD AUTO: 4.05 10*3/MM3 (ref 3.4–10.8)
Y ENTEROCOL DNA STL QL NAA+NON-PROBE: NOT DETECTED

## 2024-06-03 PROCEDURE — 85027 COMPLETE CBC AUTOMATED: CPT | Performed by: HOSPITALIST

## 2024-06-03 PROCEDURE — 25010000002 HEPARIN (PORCINE) PER 1000 UNITS: Performed by: NURSE PRACTITIONER

## 2024-06-03 PROCEDURE — 25810000003 DEXTROSE 5 % AND SODIUM CHLORIDE 0.9 % 5-0.9 % SOLUTION: Performed by: STUDENT IN AN ORGANIZED HEALTH CARE EDUCATION/TRAINING PROGRAM

## 2024-06-03 PROCEDURE — 80053 COMPREHEN METABOLIC PANEL: CPT | Performed by: NURSE PRACTITIONER

## 2024-06-03 PROCEDURE — 99231 SBSQ HOSP IP/OBS SF/LOW 25: CPT | Performed by: HOSPITALIST

## 2024-06-03 PROCEDURE — 82948 REAGENT STRIP/BLOOD GLUCOSE: CPT

## 2024-06-03 PROCEDURE — 93005 ELECTROCARDIOGRAM TRACING: CPT | Performed by: HOSPITALIST

## 2024-06-03 PROCEDURE — 97166 OT EVAL MOD COMPLEX 45 MIN: CPT

## 2024-06-03 PROCEDURE — 82330 ASSAY OF CALCIUM: CPT | Performed by: NURSE PRACTITIONER

## 2024-06-03 PROCEDURE — 93010 ELECTROCARDIOGRAM REPORT: CPT | Performed by: INTERNAL MEDICINE

## 2024-06-03 RX ORDER — LISINOPRIL 20 MG/1
20 TABLET ORAL DAILY
Status: DISCONTINUED | OUTPATIENT
Start: 2024-06-03 | End: 2024-06-08 | Stop reason: HOSPADM

## 2024-06-03 RX ORDER — DEXTROSE MONOHYDRATE AND SODIUM CHLORIDE 5; .9 G/100ML; G/100ML
75 INJECTION, SOLUTION INTRAVENOUS CONTINUOUS
Status: DISCONTINUED | OUTPATIENT
Start: 2024-06-03 | End: 2024-06-04

## 2024-06-03 RX ORDER — FUROSEMIDE 40 MG/1
40 TABLET ORAL DAILY
Status: DISCONTINUED | OUTPATIENT
Start: 2024-06-03 | End: 2024-06-08 | Stop reason: HOSPADM

## 2024-06-03 RX ADMIN — DEXTROSE AND SODIUM CHLORIDE 75 ML/HR: 5; 900 INJECTION, SOLUTION INTRAVENOUS at 03:51

## 2024-06-03 RX ADMIN — Medication 10 ML: at 22:17

## 2024-06-03 RX ADMIN — HEPARIN SODIUM 2500 UNITS: 5000 INJECTION INTRAVENOUS; SUBCUTANEOUS at 22:16

## 2024-06-03 RX ADMIN — DEXTROSE AND SODIUM CHLORIDE 75 ML/HR: 5; 900 INJECTION, SOLUTION INTRAVENOUS at 15:40

## 2024-06-03 RX ADMIN — HEPARIN SODIUM 2500 UNITS: 5000 INJECTION INTRAVENOUS; SUBCUTANEOUS at 08:52

## 2024-06-03 RX ADMIN — ASPIRIN 81 MG: 81 TABLET, COATED ORAL at 08:51

## 2024-06-03 RX ADMIN — ACETAMINOPHEN 650 MG: 325 TABLET ORAL at 14:33

## 2024-06-03 NOTE — NURSING NOTE
WOC consulted for pressure injuries to coccyx, heels and back of head    No pressure injuries/wounds identified to back of head, coccyx or heels.  Patient having fecal incontinence leading to irritant contact dermatitis of her perianal skin.  Incontinence care provided.  Barrier cream applied.  Applied Allevyn sacral dressing to sacrococcygeal area.  Reinforce bilateral heel Allevyn dressings.    Patient at risk for pressure injury development.  Please keep patient turned and offloaded using offloading wedge.  Barrier cream to bilateral lower gluteals daily.          Patient is on Isotoner mattress.  Will hold off on ordering low-air-loss pump.    Pressure injury prevention.    Sign off.    Fidel Tam RN, BSN, CCRN, CWOCN  Wound, Ostomy and Continence (WOC) Department  [facility]

## 2024-06-03 NOTE — PROGRESS NOTES
[facility] Medicine Services  PROGRESS NOTE    Patient Name: Susan Tucker  : 1935  MRN: [id number]    Date of Admission: 2024  Primary Care Physician: Damon Townsend PA    Subjective   Subjective     CC:  Fall / low blood pressure    HPI:  Patient resting in bed, pleasantly confused intermittently. Patient remembered she is supposed to take a water pill and asked me why she had not been receiving it. No other complaints, states she is feeling good this morning.      Objective   Objective     Vital Signs:   Temp:  [97.5 °F (36.4 °C)-100.2 °F (37.9 °C)] 98.6 °F (37 °C)  Heart Rate:  [88] 88  Resp:  [18] 18  BP: (110-154)/(55-83) 137/69     Physical Exam:  Constitutional: No acute distress, awake, alert  HENT: NCAT, mucous membranes moist  Respiratory: Clear to auscultation bilaterally, respiratory effort normal on room air  Cardiovascular: RRR  Gastrointestinal: Positive bowel sounds, soft, nontender, nondistended  Musculoskeletal: No bilateral ankle edema  Psychiatric: Appropriate affect, cooperative  Neurologic: confused, MOSER, speech clear  Skin: No rashes    Results Reviewed:  LAB RESULTS:      Lab 24  0723 24  0413 24  1728   WBC 4.05 4.07 4.46   HEMOGLOBIN 11.7* 11.8* 15.1   HEMATOCRIT 36.3 35.2 45.2   PLATELETS 174 208 232   NEUTROS ABS  --  2.68 3.19   IMMATURE GRANS (ABS)  --  0.01 0.01   LYMPHS ABS  --  0.88 0.88   MONOS ABS  --  0.46 0.30   EOS ABS  --  0.01 0.03   MCV 93.8 90.7 92.4   PROCALCITONIN  --   --  0.15   LACTATE  --   --  0.9         Lab 24  0723 24  1354 24  0911 24  0413 24  2142 24  1728   SODIUM 136  --   --  142 138 135*   POTASSIUM 5.1 4.1 3.5 3.0* 2.5* 2.5*   CHLORIDE 106  --   --  107 99 92*   CO2 20.0*  --   --  26.0 27.0 23.0   ANION GAP 10.0  --   --  9.0 12.0 20.0*   BUN 6*  --   --  14 17 18   CREATININE 0.51*  --   --  0.76 1.00 1.03*   EGFR 89.9  --   --  75.5 54.3* 52.4*   GLUCOSE 86   --   --  109* 134* 61*   CALCIUM 8.0*  --   --  8.2* 8.7 9.2   IONIZED CALCIUM 1.22  --   --  1.20  --  1.23   MAGNESIUM  --   --   --  2.0  --  1.7   PHOSPHORUS  --   --   --  2.3*  --  2.9   HEMOGLOBIN A1C  --   --   --   --   --  5.30   TSH  --   --   --   --   --  1.510         Lab 06/03/24  0723 06/02/24  0413 06/01/24  2142 06/01/24  1728   TOTAL PROTEIN 5.5* 5.7* 6.2 7.0   ALBUMIN 3.0* 3.0* 3.5 3.6   GLOBULIN 2.5 2.7 2.7 3.4   ALT (SGPT) 17 18 24 23   AST (SGOT) 38* 35* 49* 49*   BILIRUBIN 0.6 0.7 0.9 1.0   ALK PHOS 36* 37* 42 45                     Brief Urine Lab Results  (Last result in the past 365 days)        Color   Clarity   Blood   Leuk Est   Nitrite   Protein   CREAT   Urine HCG        06/01/24 1758 Yellow   Clear   Negative   Trace   Negative   Negative                   Microbiology Results Abnormal       Procedure Component Value - Date/Time    Gastrointestinal Panel, PCR - Stool, Per Rectum [[id number]]  (Normal) Collected: 06/02/24 2203    Lab Status: Final result Specimen: Stool from Per Rectum Updated: 06/03/24 0908     Campylobacter Not Detected     Plesiomonas shigelloides Not Detected     Salmonella Not Detected     Vibrio Not Detected     Vibrio cholerae Not Detected     Yersinia enterocolitica Not Detected     Enteroaggregative E. coli (EAEC) Not Detected     Enteropathogenic E. coli (EPEC) Not Detected     Enterotoxigenic E. coli (ETEC) lt/st Not Detected     Shiga-like toxin-producing E. coli (STEC) stx1/stx2 Not Detected     Shigella/Enteroinvasive E. coli (EIEC) Not Detected     Cryptosporidium Not Detected     Cyclospora cayetanensis Not Detected     Entamoeba histolytica Not Detected     Giardia lamblia Not Detected     Adenovirus F40/41 Not Detected     Astrovirus Not Detected     Norovirus GI/GII Not Detected     Rotavirus A Not Detected     Sapovirus (I, II, IV or V) Not Detected    Clostridioides difficile Toxin - Stool, Per Rectum [[id number]]  (Normal) Collected: 06/02/24 1860     Lab Status: Final result Specimen: Stool from Per Rectum Updated: 06/03/24 0838    Narrative:      The following orders were created for panel order Clostridioides difficile Toxin - Stool, Per Rectum.  Procedure                               Abnormality         Status                     ---------                               -----------         ------                     Clostridioides difficile...[[id number]]  Normal              Final result                 Please view results for these tests on the individual orders.    Clostridioides difficile Toxin, PCR - Stool, Per Rectum [[id number]]  (Normal) Collected: 06/02/24 2203    Lab Status: Final result Specimen: Stool from Per Rectum Updated: 06/03/24 0838     Toxigenic C. difficile by PCR Not Detected    Narrative:      The result indicates the absence of toxigenic C. difficile from stool specimen.             CT Head Without Contrast    Result Date: 6/1/2024  CT HEAD WO CONTRAST Date of Exam: 6/1/2024 8:21 PM EDT Indication: unwitnessed fall; confusion - unknown baseline. Comparison: 11/8/2022. Technique: Axial CT images were obtained of the head without contrast administration.  Automated exposure control and iterative construction methods were used. Findings: There is no evidence of hemorrhage. There is no mass effect or midline shift. There is no extracerebral collection. Ventricles are normal in size and configuration for patient's stated age.  Posterior fossa is within normal limits. Calvarium and skull base appear intact.   Visualized sinuses show no air fluid levels. Visualized orbits are unremarkable.     Impression: Impression: No acute intracranial process. Electronically Signed: Yady Villegas MD  6/1/2024 8:25 PM EDT  Workstation ID: [id number]    XR Chest 1 View    Result Date: 6/1/2024  XR CHEST 1 VW Date of Exam: 6/1/2024 8:08 PM EDT Indication: cough, oxygen sat 91 room air Comparison: 11/7/2022. Findings: There are no airspace consolidations. No  pleural fluid. No pneumothorax. The pulmonary vasculature appears within normal limits. The cardiac and mediastinal silhouette appear unremarkable. No acute osseous abnormality identified.     Impression: Impression: No acute cardiopulmonary process. Electronically Signed: Yady Villegas MD  6/1/2024 8:19 PM EDT  Workstation ID: [id number]     Results for orders placed during the hospital encounter of 03/24/22    Adult Transthoracic Echo Complete W/ Cont if Necessary Per Protocol    Interpretation Summary  · Estimated left ventricular EF = 60%  · No hemodynamically significant valvular heart disease      Current medications:  Scheduled Meds:aspirin, 81 mg, Oral, Daily  furosemide, 40 mg, Oral, Daily  heparin (porcine), 2,500 Units, Subcutaneous, Q12H  lisinopril, 20 mg, Oral, Daily  sodium chloride, 10 mL, Intravenous, Q12H      Continuous Infusions:dextrose 5 % and sodium chloride 0.9 %, 75 mL/hr, Last Rate: 75 mL/hr (06/03/24 0351)      PRN Meds:.  acetaminophen **OR** acetaminophen **OR** acetaminophen    Calcium Replacement - Follow Nurse / BPA Driven Protocol    dextrose    dextrose    glucagon (human recombinant)    Magnesium Standard Dose Replacement - Follow Nurse / BPA Driven Protocol    ondansetron ODT **OR** ondansetron    Phosphorus Replacement - Follow Nurse / BPA Driven Protocol    Potassium Replacement - Follow Nurse / BPA Driven Protocol    sodium chloride    sodium chloride    sodium chloride    Assessment & Plan   Assessment & Plan     Active Hospital Problems    Diagnosis  POA    **Weakness generalized [R53.1]  Yes    Unwitnessed fall [R29.6]  Unknown    History of diabetes mellitus, type II [Z86.39]  Unknown    Dementia without behavioral disturbance [F03.90]  Unknown    Hypoglycemia [E16.2]  Unknown    Hypokalemia [E87.6]  Yes    Hypertension [I10]  Yes    History of Stroke [I63.9]  Yes    Hyperlipidemia [E78.5]  Yes      Resolved Hospital Problems   No resolved problems to display.        Brief  Hospital Course to date:  Susan Tucekr is a 89 y.o. female with PMHx of HTN, HLD, CVA, DM who presents to the ED for evaluation after a fall at her living facility and low blood pressure per transferring documentation.     This patient's problems and plans were partially entered by my partner and updated as appropriate by me 06/03/24.    Weakness  Unwitnessed fall  -  CT head w/o contrast negative  - PT/OT consult  - fall precautions  - likely dehydrated / hypoglycemia related     Hypokalemia  Hypoglycemia  - k 2.5 on admission--> 3 this am, replace  - mag 1.7 on admission, replace 2 grams IV mag x 1  - glucose 61 on labs, now improved  - hypoglycemia protocol  - monitor glucose q 1 hour until glu > 100 x 2 consecutive checks then can change to less frequent monitoring  - fluids, D5 w/ 20 kcl @ 75 ml/hr     New Atrial Fibrillation  - rate controlled  - verified per EKG  - patient already on aspirin 81mg PO daily- high fall risk- defer other anticoagulation  - converted back to NSR 6/3/24- no further workup; potassium replaced     JULIA, mild- resolved  - s/p fluids  - continue lisinopril and lasix     HTN  HLD  - continue home meds     Dementia  - unclear baseline     Hx T2DM  - not on medications  - A1c: 5.3     Hx CVA  - continue aspirin    Expected Discharge Location and Transportation: back to [facility]- pending  Expected Discharge   Expected Discharge Date: 6/4/2024; Expected Discharge Time:      DVT prophylaxis:  Medical DVT prophylaxis orders are present.         AM-PAC 6 Clicks Score (PT): 14 (06/03/24 0800)    CODE STATUS:   Code Status and Medical Interventions:   Ordered at: 06/01/24 1941     Medical Intervention Limits:    NO intubation (DNI)     Code Status (Patient has no pulse and is not breathing):    No CPR (Do Not Attempt to Resuscitate)     Medical Interventions (Patient has pulse or is breathing):    Limited Support       Zenia Lee,   06/03/24

## 2024-06-03 NOTE — CASE MANAGEMENT/SOCIAL WORK
Continued Stay Note  [facility]     Patient Name: Susan Tucker  MRN: [id number]  Today's Date: 6/3/2024    Admit Date: 6/1/2024    Plan: Assisted Living with    Discharge Plan       Row Name 06/03/24 1321       Plan    Plan Comments Is current with [facility] for SN and PT. Will need a new order at [place].    Final Discharge Disposition Code 06 - home with home health care      Row Name 06/03/24 1054       Plan    Plan Assisted Living with     Patient/Family in Agreement with Plan yes    Plan Comments I left a message with the nurse at [facility] to call me back to see what is needed for the pt to return. Will need transport. I spoke with the pts daughter Aishwarya.    Final Discharge Disposition Code 06 - home with home health care                   Discharge Codes    No documentation.                 Expected Discharge Date and Time       Expected Discharge Date Expected Discharge Time    Jun 4, 2024               Saray Maldonado RN

## 2024-06-03 NOTE — THERAPY EVALUATION
Patient Name: Susan Tucker  : 1935    MRN: [id number]                              Today's Date: 6/3/2024       Admit Date: 2024    Visit Dx:     ICD-10-CM ICD-9-CM   1. Generalized weakness  R53.1 780.79   2. Hypotensive episode  I95.9 458.9   3. Unwitnessed fall  R29.6 [id number]   4. Altered mental status, unspecified altered mental status type  R41.82 780.97   5. Hypokalemia  E87.6 276.8     Patient Active Problem List   Diagnosis    Sepsis    Elevated liver enzymes    Dehydration    Hypertension    History of Stroke    Acute UTI (urinary tract infection)    Calculus of bile duct with acute cholecystitis and obstruction    Enterocolitis    Gallstone ileus of small intestine    Syncope and collapse    Frequent falls    Type 2 diabetes mellitus without complication    Hyperlipidemia    Hypokalemia    Weakness generalized    Unwitnessed fall    History of diabetes mellitus, type II    Dementia without behavioral disturbance    Hypoglycemia    Generalized weakness     Past Medical History:   Diagnosis Date    Diabetes mellitus     Elevated cholesterol     Hypertension     Stroke      Past Surgical History:   Procedure Laterality Date    ERCP N/A 2018    Procedure: ENDOSCOPIC RETROGRADE CHOLANGIOPANCREATOGRAPHY;  Surgeon: Liam Manning MD;  Location: [facility] ENDOSCOPY;  Service: Gastroenterology    EXPLORATORY LAPAROTOMY N/A 2/10/2020    Procedure: LAPAROTOMY EXPLORATORY BOWEL RESECTION;  Surgeon: Carlos Zurita MD;  Location: [facility] OR;  Service: General;  Laterality: N/A;    HYSTERECTOMY        General Information       Row Name 24 1310          OT Time and Intention    Document Type evaluation  -AC     Mode of Treatment occupational therapy  -AC       Row Name 24 1310          General Information    Patient Profile Reviewed yes  -AC     Prior Level of Function --  inconsistent historian  -AC     Existing Precautions/Restrictions fall  extreme fear of falling  -AC     Barriers to  Rehab medically complex;previous functional deficit;cognitive status  -       Row Name 06/03/24 1310          Living Environment    People in Home facility resident  -       Row Name 06/03/24 1310          Home Main Entrance    Number of Stairs, Main Entrance none  -       Row Name 06/03/24 1310          Stairs Within Home, Primary    Number of Stairs, Within Home, Primary none  -       Row Name 06/03/24 1310          Cognition    Orientation Status (Cognition) oriented to;person;disoriented to;place;situation;time  -       Row Name 06/03/24 1310          Safety Issues, Functional Mobility    Safety Issues Affecting Function (Mobility) at risk behavior observed;awareness of need for assistance;insight into deficits/self-awareness;judgment;problem-solving;safety precaution awareness;safety precautions follow-through/compliance;sequencing abilities  -     Impairments Affecting Function (Mobility) balance;cognition;endurance/activity tolerance;strength;range of motion (ROM)  -     Cognitive Impairments, Mobility Safety/Performance awareness, need for assistance;insight into deficits/self-awareness;judgment;problem-solving/reasoning;safety precaution awareness;safety precaution follow-through;sequencing abilities  -     Comment, Safety Issues/Impairments (Mobility) fear of falling  -               User Key  (r) = Recorded By, (t) = Taken By, (c) = Cosigned By      Initials Name Provider Type    AC Carrie Kwok, OT Occupational Therapist                     Mobility/ADL's       Row Name 06/03/24 1409          Bed Mobility    Bed Mobility supine-sit;sit-supine;rolling left;rolling right  -AC     Rolling Left [place] (Bed Mobility) verbal cues;moderate assist (50% patient effort)  -AC     Rolling Right [place] (Bed Mobility) verbal cues;moderate assist (50% patient effort)  -     Supine-Sit [place] (Bed Mobility) verbal cues;moderate assist (50% patient effort)  -AC     Sit-Supine  [place] (Bed Mobility) verbal cues;moderate assist (50% patient effort);2 person assist  -     Assistive Device (Bed Mobility) bed rails;head of bed elevated  -     Comment, (Bed Mobility) VCs to sequence  -       Row Name 06/03/24 1406          Transfers    Transfers sit-stand transfer  -       Row Name 06/03/24 1406          Sit-Stand Transfer    Sit-Stand [place] (Transfers) verbal cues;moderate assist (50% patient effort);2 person assist  -     Assistive Device (Sit-Stand Transfers) walker, front-wheeled  -     Comment, (Sit-Stand Transfer) pt very fearful and required reassuring  -       Row Name 06/03/24 1406          Functional Mobility    Functional Mobility- Ind. Level verbal cues required;2 person assist required;moderate assist (50% patient effort)  -     Functional Mobility- Device walker, front-wheeled  -AC     Functional Mobility-Distance (Feet) 3  -     Functional Mobility- Comment 2 side steps toward HOB  -       Row Name 06/03/24 1406          Activities of Daily Living    BADL Assessment/Intervention lower body dressing;grooming;feeding;toileting  -       Row Name 06/03/24 [facility]6          Lower Body Dressing Assessment/Training    [place] Level (Lower Body Dressing) don;socks;dependent (less than 25% patient effort)  -     Position (Lower Body Dressing) sitting up in bed  -       Row Name 06/03/24 1406          Grooming Assessment/Training    [place] Level (Grooming) hair care, combing/brushing;moderate assist (50% patient effort)  -     Position (Grooming) sitting up in bed  -       Row Name 06/03/24 1406          Self-Feeding Assessment/Training    [place] Level (Feeding) liquids to mouth;supervision  -     Position (Self-Feeding) sitting up in bed  -       Row Name 06/03/24 1406          Toileting Assessment/Training    [place] Level (Toileting) dependent (less than 25% patient effort)  -     Position (Toileting) supine  -AC                User Key  (r) = Recorded By, (t) = Taken By, (c) = Cosigned By      Initials Name Provider Type    Carrie Petersen, [place]ARA Occupational Therapist                   Obj/Interventions       Row Name 06/03/24 1417          Sensory Assessment (Somatosensory)    Sensory Assessment (Somatosensory) UE sensation intact  -[facility] Name 06/03/24 1417          Range of Motion Comprehensive    Comment, General Range of Motion R shld limited 50%  -[facility] Name 06/03/24 1417          Strength Comprehensive (MMT)    Comment, General Manual Muscle Testing (MMT) Assessment R shld 2+/5,  BUE grossly 4-/5  -       Row Name 06/03/24 1417          Balance    Balance Assessment sitting static balance;standing static balance;standing dynamic balance  -     Static Sitting Balance minimal assist  -AC     Position, Sitting Balance sitting edge of bed  -     Static Standing Balance verbal cues;moderate assist;2-person assist  -AC     Dynamic Standing Balance verbal cues;moderate assist;2-person assist  -AC     Position/Device Used, Standing Balance supported;walker, front-wheeled  -               User Key  (r) = Recorded By, (t) = Taken By, (c) = Cosigned By      Initials Name Provider Type    Carrie Petersen, OT Occupational Therapist                   Goals/Plan       [facility] Name 06/03/24 1423          Bed Mobility Goal 1 (OT)    Activity/Assistive Device (Bed Mobility Goal 1, OT) sit to supine;supine to sit  -AC     [place] Level/Cues Needed (Bed Mobility Goal 1, OT) verbal cues required;minimum assist (75% or more patient effort)  -AC     Time Frame (Bed Mobility Goal 1, OT) long term goal (LTG);10 days  -AC     Progress/Outcomes (Bed Mobility Goal 1, OT) new goal;goal ongoing  -[facility] Name 06/03/24 1423          Transfer Goal 1 (OT)    Activity/Assistive Device (Transfer Goal 1, OT) bed-to-chair/chair-to-bed;toilet  -AC     [place] Level/Cues Needed (Transfer Goal 1, OT) verbal cues required;minimum  assist (75% or more patient effort)  -AC     Time Frame (Transfer Goal 1, OT) long term goal (LTG);10 days  -AC     Progress/Outcome (Transfer Goal 1, OT) new goal;goal ongoing  -AC       Row Name 06/03/24 1423          Grooming Goal 1 (OT)    Activity/Device (Grooming Goal 1, OT) oral care  -AC     [place] (Grooming Goal 1, OT) set-up required;contact guard required  -AC     Time Frame (Grooming Goal 1, OT) short term goal (STG);5 days  -AC     Strategies/Barriers (Grooming Goal 1, OT) seated EOB  -AC     Progress/Outcome (Grooming Goal 1, OT) new goal;goal ongoing  -AC       Row Name 06/03/24 1423          Therapy Assessment/Plan (OT)    Planned Therapy Interventions (OT) activity tolerance training;BADL retraining;functional balance retraining;occupation/activity based interventions;patient/caregiver education/training;transfer/mobility retraining;strengthening exercise  -AC               User Key  (r) = Recorded By, (t) = Taken By, (c) = Cosigned By      Initials Name Provider Type    AC Carrie Kwok, OT Occupational Therapist                   Clinical Impression       Row Name 06/03/24 1418          Pain Assessment    Pretreatment Pain Rating 0/10 - no pain  -AC     Posttreatment Pain Rating 0/10 - no pain  -AC       Row Name 06/03/24 1418          Plan of Care Review    Plan of Care Reviewed With patient  -AC     Outcome Evaluation Pt presents below baseline with self care/mobility d/t fear of falling, weakness, decr balance and activity tolerance. Pt mod A to brush hair, supervision cup to mouth, mod A x2 to take 3 side steps toward HOB with RW.  Recommend SNF upon d/c.  -AC       Row Name 06/03/24 1418          Therapy Assessment/Plan (OT)    Criteria for Skilled Therapeutic Interventions Met (OT) yes;skilled treatment is necessary  -AC     Therapy Frequency (OT) daily  -AC       Row Name 06/03/24 1418          Therapy Plan Review/Discharge Plan (OT)    Anticipated Discharge Disposition (OT) skilled  nursing facility  -       Row Name 06/03/24 1418          Positioning and Restraints    Pre-Treatment Position in bed  -AC     Post Treatment Position bed  -AC     In Bed notified nsg;supine;call light within reach;encouraged to call for assist;exit alarm on;with nsg;heels elevated  -AC               User Key  (r) = Recorded By, (t) = Taken By, (c) = Cosigned By      Initials Name Provider Type    Carrie Petersen, OT Occupational Therapist                   Outcome Measures       Row Name 06/03/24 1424          How much help from another is currently needed...    Putting on and taking off regular lower body clothing? 1  -AC     Bathing (including washing, rinsing, and drying) 2  -AC     Toileting (which includes using toilet bed pan or urinal) 1  -AC     Taking care of personal grooming (such as brushing teeth) 2  -AC     Eating meals 3  -AC       Row Name 06/03/24 0800          How much help from another person do you currently need...    Turning from your back to your side while in flat bed without using bedrails? 3  -DM     Moving from lying on back to sitting on the side of a flat bed without bedrails? 3  -DM     Moving to and from a bed to a chair (including a wheelchair)? 2  -DM     Standing up from a chair using your arms (e.g., wheelchair, bedside chair)? 2  -DM     Climbing 3-5 steps with a railing? 2  -DM     To walk in hospital room? 2  -DM     AM-PAC 6 Clicks Score (PT) 14  -DM     Highest Level of Mobility Goal 4 --> Transfer to chair/commode  -DM       Row Name 06/03/24 1424          Functional Assessment    Outcome Measure Options AM-PAC 6 Clicks Daily Activity (OT)  -AC               User Key  (r) = Recorded By, (t) = Taken By, (c) = Cosigned By      Initials Name Provider Type    Carrie Petersen, OT Occupational Therapist    Cherri Covarrubias, RN Registered Nurse                    Occupational Therapy Education       Title: PT OT SLP Therapies (In Progress)       Topic: Occupational Therapy  (In Progress)       Point: ADL training (In Progress)       Description:   Instruct learner(s) on proper safety adaptation and remediation techniques during self care or transfers.   Instruct in proper use of assistive devices.                  Learning Progress Summary             Patient Acceptance, E, NR by  at 6/3/2024 3051                         Point: Home exercise program (Not Started)       Description:   Instruct learner(s) on appropriate technique for monitoring, assisting and/or progressing therapeutic exercises/activities.                  Learner Progress:  Not documented in this visit.              Point: Precautions (Not Started)       Description:   Instruct learner(s) on prescribed precautions during self-care and functional transfers.                  Learner Progress:  Not documented in this visit.              Point: Body mechanics (Not Started)       Description:   Instruct learner(s) on proper positioning and spine alignment during self-care, functional mobility activities and/or exercises.                  Learner Progress:  Not documented in this visit.                              User Key       Initials Effective Dates Name Provider Type Discipline     02/03/23 -  Carrie Kwok, OT Occupational Therapist OT                  OT Recommendation and Plan  Planned Therapy Interventions (OT): activity tolerance training, BADL retraining, functional balance retraining, occupation/activity based interventions, patient/caregiver education/training, transfer/mobility retraining, strengthening exercise  Therapy Frequency (OT): daily  Plan of Care Review  Plan of Care Reviewed With: patient  Outcome Evaluation: Pt presents below baseline with self care/mobility d/t fear of falling, weakness, decr balance and activity tolerance. Pt mod A to brush hair, supervision cup to mouth, mod A x2 to take 3 side steps toward HOB with RW.  Recommend SNF upon d/c.     Time Calculation:   Evaluation Complexity  (OT)  Review Occupational Profile/Medical/Therapy History Complexity: expanded/moderate complexity  Assessment, Occupational Performance/Identification of Deficit Complexity: 3-5 performance deficits  Clinical Decision Making Complexity (OT): detailed assessment/moderate complexity  Overall Complexity of Evaluation (OT): moderate complexity     Time Calculation- OT       Row Name 06/03/24 1310             Time Calculation- OT    OT Start Time 1310  -AC      OT Received On 06/03/24  -AC      OT Goal Re-Cert Due Date 06/13/24  -AC         Untimed Charges    OT Eval/Re-eval Minutes 55  -AC         Total Minutes    Untimed Charges Total Minutes 55  -AC       Total Minutes 55  -AC                User Key  (r) = Recorded By, (t) = Taken By, (c) = Cosigned By      Initials Name Provider Type    AC Carrie Kwok, OT Occupational Therapist                  Therapy Charges for Today       Code Description Service Date Service Provider Modifiers Qty    [id number]  OT EVAL MOD COMPLEXITY 4 6/3/2024 Carrie Kwok, OT GO 1    [id number]  OT THER SUPP EA 15 MIN 6/3/2024 Carrie Kwok OT GO 2                 Carrie Kwok OT  6/3/2024

## 2024-06-03 NOTE — PLAN OF CARE
Goal Outcome Evaluation:  Plan of Care Reviewed With: patient           Outcome Evaluation: Pt presents below baseline with self care/mobility d/t fear of falling, weakness, decr balance and activity tolerance. Pt mod A to brush hair, supervision cup to mouth, mod A x2 to take 3 side steps toward HOB with RW.  Recommend SNF upon d/c.      Anticipated Discharge Disposition (OT): skilled nursing facility

## 2024-06-03 NOTE — CASE MANAGEMENT/SOCIAL WORK
Discharge Planning Assessment  [facility]     Patient Name: Susan Tucker  MRN: [id number]  Today's Date: 6/3/2024    Admit Date: 6/1/2024    Plan: Assisted Living with HH   Discharge Needs Assessment       Row Name 06/03/24 1052       Living Environment    People in Home facility resident    Current Living Arrangements assisted living facility    Living Arrangement Comments The pt is from assisted living at [facility] [phone]. Staff givers her meds. The pt is WC bound with staff assist.       Discharge Needs Assessment    Equipment Currently Used at Home wheelchair;walker, rolling    Equipment Needed After Discharge none    Discharge Coordination/Progress Facility staff assists with care needs. Has [facility]. [facility] correct in [facility]                   Discharge Plan       Row Name 06/03/24 1054       Plan    Plan Assisted Living with     Patient/Family in Agreement with Plan yes    Plan Comments I left a message with the nurse at [facility] to call me back to see what is needed for the pt to return. Will need transport. I spoke with the pts daughter Aishwarya.    Final Discharge Disposition Code 06 - home with home health care      Row Name 06/03/24 0831       Plan    Final Discharge Disposition Code 01 - home or self-care                  Continued Care and Services - Admitted Since 6/1/2024    No active coordination exists for this encounter.       Expected Discharge Date and Time       Expected Discharge Date Expected Discharge Time    Jun 4, 2024            Demographic Summary    No documentation.                  Functional Status       Row Name 06/03/24 1051       Functional Status    Usual Activity Tolerance fair       Functional Status, IADL    Medications other (see comments)    Meal Preparation other (see comments)    Housekeeping other (see comments)    Laundry other (see comments)    Shopping other (see comments)                   Psychosocial    No documentation.                   Abuse/Neglect    No documentation.                  Legal    No documentation.                  Substance Abuse    No documentation.                  Patient Forms    No documentation.                     Saray Maldonado RN

## 2024-06-04 ENCOUNTER — APPOINTMENT (OUTPATIENT)
Dept: GENERAL RADIOLOGY | Facility: HOSPITAL | Age: 89
End: 2024-06-04
Payer: MEDICARE

## 2024-06-04 PROBLEM — J18.9 COMMUNITY ACQUIRED PNEUMONIA OF LEFT LOWER LOBE OF LUNG: Status: ACTIVE | Noted: 2024-06-04

## 2024-06-04 PROBLEM — E44.0 MODERATE MALNUTRITION: Status: ACTIVE | Noted: 2024-06-04

## 2024-06-04 LAB
ALBUMIN SERPL-MCNC: 3 G/DL (ref 3.5–5.2)
ALBUMIN/GLOB SERPL: 1.6 G/DL
ALP SERPL-CCNC: 37 U/L (ref 39–117)
ALT SERPL W P-5'-P-CCNC: 19 U/L (ref 1–33)
ANION GAP SERPL CALCULATED.3IONS-SCNC: 10 MMOL/L (ref 5–15)
AST SERPL-CCNC: 42 U/L (ref 1–32)
BACTERIA UR QL AUTO: NORMAL /HPF
BILIRUB SERPL-MCNC: 0.6 MG/DL (ref 0–1.2)
BILIRUB UR QL STRIP: NEGATIVE
BUN SERPL-MCNC: 5 MG/DL (ref 8–23)
BUN/CREAT SERPL: 10.9 (ref 7–25)
CALCIUM SPEC-SCNC: 7.8 MG/DL (ref 8.6–10.5)
CHLORIDE SERPL-SCNC: 106 MMOL/L (ref 98–107)
CLARITY UR: CLEAR
CO2 SERPL-SCNC: 19 MMOL/L (ref 22–29)
COLOR UR: YELLOW
CREAT SERPL-MCNC: 0.46 MG/DL (ref 0.57–1)
DEPRECATED RDW RBC AUTO: 46.2 FL (ref 37–54)
EGFRCR SERPLBLD CKD-EPI 2021: 92.2 ML/MIN/1.73
ERYTHROCYTE [DISTWIDTH] IN BLOOD BY AUTOMATED COUNT: 14.3 % (ref 12.3–15.4)
GLOBULIN UR ELPH-MCNC: 1.9 GM/DL
GLUCOSE BLDC GLUCOMTR-MCNC: 104 MG/DL (ref 70–130)
GLUCOSE BLDC GLUCOMTR-MCNC: 105 MG/DL (ref 70–130)
GLUCOSE BLDC GLUCOMTR-MCNC: 105 MG/DL (ref 70–130)
GLUCOSE BLDC GLUCOMTR-MCNC: 63 MG/DL (ref 70–130)
GLUCOSE BLDC GLUCOMTR-MCNC: 76 MG/DL (ref 70–130)
GLUCOSE BLDC GLUCOMTR-MCNC: 80 MG/DL (ref 70–130)
GLUCOSE BLDC GLUCOMTR-MCNC: 88 MG/DL (ref 70–130)
GLUCOSE BLDC GLUCOMTR-MCNC: 98 MG/DL (ref 70–130)
GLUCOSE SERPL-MCNC: 98 MG/DL (ref 65–99)
GLUCOSE UR STRIP-MCNC: NEGATIVE MG/DL
HCT VFR BLD AUTO: 33.3 % (ref 34–46.6)
HGB BLD-MCNC: 11.2 G/DL (ref 12–15.9)
HGB UR QL STRIP.AUTO: NEGATIVE
HYALINE CASTS UR QL AUTO: NORMAL /LPF
KETONES UR QL STRIP: NEGATIVE
LEUKOCYTE ESTERASE UR QL STRIP.AUTO: ABNORMAL
MCH RBC QN AUTO: 30 PG (ref 26.6–33)
MCHC RBC AUTO-ENTMCNC: 33.6 G/DL (ref 31.5–35.7)
MCV RBC AUTO: 89.3 FL (ref 79–97)
MRSA DNA SPEC QL NAA+PROBE: NEGATIVE
NITRITE UR QL STRIP: NEGATIVE
PH UR STRIP.AUTO: <=5 [PH] (ref 5–8)
PLATELET # BLD AUTO: 177 10*3/MM3 (ref 140–450)
PMV BLD AUTO: 10.2 FL (ref 6–12)
POTASSIUM SERPL-SCNC: 4.1 MMOL/L (ref 3.5–5.2)
PROCALCITONIN SERPL-MCNC: 0.15 NG/ML (ref 0–0.25)
PROT SERPL-MCNC: 4.9 G/DL (ref 6–8.5)
PROT UR QL STRIP: NEGATIVE
RBC # BLD AUTO: 3.73 10*6/MM3 (ref 3.77–5.28)
RBC # UR STRIP: NORMAL /HPF
REF LAB TEST METHOD: NORMAL
SODIUM SERPL-SCNC: 135 MMOL/L (ref 136–145)
SP GR UR STRIP: 1.01 (ref 1–1.03)
SQUAMOUS #/AREA URNS HPF: NORMAL /HPF
UROBILINOGEN UR QL STRIP: ABNORMAL
WBC # UR STRIP: NORMAL /HPF
WBC NRBC COR # BLD AUTO: 5.77 10*3/MM3 (ref 3.4–10.8)

## 2024-06-04 PROCEDURE — 80053 COMPREHEN METABOLIC PANEL: CPT | Performed by: HOSPITALIST

## 2024-06-04 PROCEDURE — 82948 REAGENT STRIP/BLOOD GLUCOSE: CPT

## 2024-06-04 PROCEDURE — 93005 ELECTROCARDIOGRAM TRACING: CPT | Performed by: INTERNAL MEDICINE

## 2024-06-04 PROCEDURE — 93010 ELECTROCARDIOGRAM REPORT: CPT | Performed by: INTERNAL MEDICINE

## 2024-06-04 PROCEDURE — 87899 AGENT NOS ASSAY W/OPTIC: CPT | Performed by: INTERNAL MEDICINE

## 2024-06-04 PROCEDURE — 81001 URINALYSIS AUTO W/SCOPE: CPT | Performed by: INTERNAL MEDICINE

## 2024-06-04 PROCEDURE — 25010000002 HEPARIN (PORCINE) PER 1000 UNITS: Performed by: NURSE PRACTITIONER

## 2024-06-04 PROCEDURE — 71045 X-RAY EXAM CHEST 1 VIEW: CPT

## 2024-06-04 PROCEDURE — 25810000003 DEXTROSE 5 % AND SODIUM CHLORIDE 0.9 % 5-0.9 % SOLUTION: Performed by: STUDENT IN AN ORGANIZED HEALTH CARE EDUCATION/TRAINING PROGRAM

## 2024-06-04 PROCEDURE — 87040 BLOOD CULTURE FOR BACTERIA: CPT | Performed by: INTERNAL MEDICINE

## 2024-06-04 PROCEDURE — 87641 MR-STAPH DNA AMP PROBE: CPT | Performed by: INTERNAL MEDICINE

## 2024-06-04 PROCEDURE — 25010000002 CEFTRIAXONE PER 250 MG: Performed by: INTERNAL MEDICINE

## 2024-06-04 PROCEDURE — 85027 COMPLETE CBC AUTOMATED: CPT | Performed by: HOSPITALIST

## 2024-06-04 PROCEDURE — 84145 PROCALCITONIN (PCT): CPT | Performed by: INTERNAL MEDICINE

## 2024-06-04 PROCEDURE — 99232 SBSQ HOSP IP/OBS MODERATE 35: CPT | Performed by: INTERNAL MEDICINE

## 2024-06-04 PROCEDURE — 87449 NOS EACH ORGANISM AG IA: CPT | Performed by: INTERNAL MEDICINE

## 2024-06-04 PROCEDURE — 25810000003 DEXTROSE 5 % AND SODIUM CHLORIDE 0.9 % 5-0.9 % SOLUTION: Performed by: INTERNAL MEDICINE

## 2024-06-04 RX ORDER — DEXTROSE MONOHYDRATE AND SODIUM CHLORIDE 5; .9 G/100ML; G/100ML
50 INJECTION, SOLUTION INTRAVENOUS CONTINUOUS
Status: DISCONTINUED | OUTPATIENT
Start: 2024-06-04 | End: 2024-06-06

## 2024-06-04 RX ORDER — DOXYCYCLINE 100 MG/1
100 CAPSULE ORAL EVERY 12 HOURS SCHEDULED
Status: DISCONTINUED | OUTPATIENT
Start: 2024-06-04 | End: 2024-06-08 | Stop reason: HOSPADM

## 2024-06-04 RX ADMIN — DEXTROSE AND SODIUM CHLORIDE 75 ML/HR: 5; 900 INJECTION, SOLUTION INTRAVENOUS at 03:43

## 2024-06-04 RX ADMIN — ASPIRIN 81 MG: 81 TABLET, COATED ORAL at 08:43

## 2024-06-04 RX ADMIN — HEPARIN SODIUM 2500 UNITS: 5000 INJECTION INTRAVENOUS; SUBCUTANEOUS at 08:44

## 2024-06-04 RX ADMIN — DEXTROSE AND SODIUM CHLORIDE 75 ML/HR: 5; 900 INJECTION, SOLUTION INTRAVENOUS at 17:47

## 2024-06-04 RX ADMIN — ACETAMINOPHEN 650 MG: 325 TABLET ORAL at 08:43

## 2024-06-04 RX ADMIN — CEFTRIAXONE 2000 MG: 2 INJECTION, POWDER, FOR SOLUTION INTRAMUSCULAR; INTRAVENOUS at 13:27

## 2024-06-04 RX ADMIN — FUROSEMIDE 40 MG: 40 TABLET ORAL at 08:44

## 2024-06-04 RX ADMIN — HEPARIN SODIUM 2500 UNITS: 5000 INJECTION INTRAVENOUS; SUBCUTANEOUS at 20:52

## 2024-06-04 RX ADMIN — DOXYCYCLINE 100 MG: 100 CAPSULE ORAL at 12:27

## 2024-06-04 RX ADMIN — LISINOPRIL 20 MG: 20 TABLET ORAL at 08:44

## 2024-06-04 RX ADMIN — Medication 10 ML: at 20:52

## 2024-06-04 RX ADMIN — DOXYCYCLINE 100 MG: 100 CAPSULE ORAL at 20:52

## 2024-06-04 NOTE — PLAN OF CARE
Goal Outcome Evaluation:           Progress: no change  Outcome Evaluation: Patient confuse, can follow comands but is vry forgetful. SERGEI ALMAZAN on the monitor. Pulled out her IV. New IV placed. Incontinent. Turned q2hr.

## 2024-06-04 NOTE — PROGRESS NOTES
[facility] Medicine Services  PROGRESS NOTE    Patient Name: Susan Tucker  : 1935  MRN: [id number]    Date of Admission: 2024  Primary Care Physician: Damon Townsend PA    Subjective   Subjective     CC:  Weakness, fever     HPI:  No acute events. States she is feeling ok.       Objective   Objective     Vital Signs:   Temp:  [98.6 °F (37 °C)-100.5 °F (38.1 °C)] 98.6 °F (37 °C)  Heart Rate:  [] 101  Resp:  [12-18] 18  BP: (119-162)/(59-96) 122/64     Physical Exam:  Constitutional: No acute distress, awake, frail  HENT: NCAT, mucous membranes moist  Respiratory: poor effort but clear  Cardiovascular: RRR, no murmurs, rubs, or gallops  Gastrointestinal: Positive bowel sounds, soft, nontender, nondistended  Musculoskeletal: No bilateral ankle edema  Psychiatric: Appropriate affect, cooperative  Neurologic: strength symmetric in all extremities, Cranial Nerves grossly intact to confrontation, speech clear  Skin: No rashes      Results Reviewed:  LAB RESULTS:      Lab 24  0524 24  0723 24  0413 24  1728   WBC 5.77 4.05 4.07 4.46   HEMOGLOBIN 11.2* 11.7* 11.8* 15.1   HEMATOCRIT 33.3* 36.3 35.2 45.2   PLATELETS 177 174 208 232   NEUTROS ABS  --   --  2.68 3.19   IMMATURE GRANS (ABS)  --   --  0.01 0.01   LYMPHS ABS  --   --  0.88 0.88   MONOS ABS  --   --  0.46 0.30   EOS ABS  --   --  0.01 0.03   MCV 89.3 93.8 90.7 92.4   PROCALCITONIN 0.15  --   --  0.15   LACTATE  --   --   --  0.9         Lab 24  0524 24  0723 24  1354 24  0911 24  0413 24  2142 24  1728   SODIUM 135* 136  --   --  142 138 135*   POTASSIUM 4.1 5.1 4.1 3.5 3.0* 2.5* 2.5*   CHLORIDE 106 106  --   --  107 99 92*   CO2 19.0* 20.0*  --   --  26.0 27.0 23.0   ANION GAP 10.0 10.0  --   --  9.0 12.0 20.0*   BUN 5* 6*  --   --  14 17 18   CREATININE 0.46* 0.51*  --   --  0.76 1.00 1.03*   EGFR 92.2 89.9  --   --  75.5 54.3* 52.4*   GLUCOSE 98 86   --   --  109* 134* 61*   CALCIUM 7.8* 8.0*  --   --  8.2* 8.7 9.2   IONIZED CALCIUM  --  1.22  --   --  1.20  --  1.23   MAGNESIUM  --   --   --   --  2.0  --  1.7   PHOSPHORUS  --   --   --   --  2.3*  --  2.9   HEMOGLOBIN A1C  --   --   --   --   --   --  5.30   TSH  --   --   --   --   --   --  1.510         Lab 06/04/24  0524 06/03/24  0723 06/02/24  0413 06/01/24  2142 06/01/24  1728   TOTAL PROTEIN 4.9* 5.5* 5.7* 6.2 7.0   ALBUMIN 3.0* 3.0* 3.0* 3.5 3.6   GLOBULIN 1.9 2.5 2.7 2.7 3.4   ALT (SGPT) 19 17 18 24 23   AST (SGOT) 42* 38* 35* 49* 49*   BILIRUBIN 0.6 0.6 0.7 0.9 1.0   ALK PHOS 37* 36* 37* 42 45                     Brief Urine Lab Results  (Last result in the past 365 days)        Color   Clarity   Blood   Leuk Est   Nitrite   Protein   CREAT   Urine HCG        06/01/24 1758 Yellow   Clear   Negative   Trace   Negative   Negative                   Microbiology Results Abnormal       Procedure Component Value - Date/Time    Gastrointestinal Panel, PCR - Stool, Per Rectum [[id number]]  (Normal) Collected: 06/02/24 2203    Lab Status: Final result Specimen: Stool from Per Rectum Updated: 06/03/24 0908     Campylobacter Not Detected     Plesiomonas shigelloides Not Detected     Salmonella Not Detected     Vibrio Not Detected     Vibrio cholerae Not Detected     Yersinia enterocolitica Not Detected     Enteroaggregative E. coli (EAEC) Not Detected     Enteropathogenic E. coli (EPEC) Not Detected     Enterotoxigenic E. coli (ETEC) lt/st Not Detected     Shiga-like toxin-producing E. coli (STEC) stx1/stx2 Not Detected     Shigella/Enteroinvasive E. coli (EIEC) Not Detected     Cryptosporidium Not Detected     Cyclospora cayetanensis Not Detected     Entamoeba histolytica Not Detected     Giardia lamblia Not Detected     Adenovirus F40/41 Not Detected     Astrovirus Not Detected     Norovirus GI/GII Not Detected     Rotavirus A Not Detected     Sapovirus (I, II, IV or V) Not Detected    Clostridioides difficile  Toxin - Stool, Per Rectum [[id number]]  (Normal) Collected: 06/02/24 2203    Lab Status: Final result Specimen: Stool from Per Rectum Updated: 06/03/24 0838    Narrative:      The following orders were created for panel order Clostridioides difficile Toxin - Stool, Per Rectum.  Procedure                               Abnormality         Status                     ---------                               -----------         ------                     Clostridioides difficile...[[id number]]  Normal              Final result                 Please view results for these tests on the individual orders.    Clostridioides difficile Toxin, PCR - Stool, Per Rectum [[id number]]  (Normal) Collected: 06/02/24 2203    Lab Status: Final result Specimen: Stool from Per Rectum Updated: 06/03/24 0838     Toxigenic C. difficile by PCR Not Detected    Narrative:      The result indicates the absence of toxigenic C. difficile from stool specimen.             XR Chest 1 View    Result Date: 6/4/2024  XR CHEST 1 VW Date of Exam: 6/4/2024 8:24 AM EDT Indication: fever Comparison: 6/1/2024. Findings: There is new infiltrate of the left lower lobe with obscured left hemidiaphragm and small left effusion. Heart and pulmonary vessels appear within normal limits for technique. The right lung is clear. There is no right effusion.     Impression: Impression: New left lower lobe infiltrate may represent atelectasis and/or pneumonia, with small left effusion. Electronically Signed: Betsy Oquendo MD  6/4/2024 8:40 AM EDT  Workstation ID: [id number]     Results for orders placed during the hospital encounter of 03/24/22    Adult Transthoracic Echo Complete W/ Cont if Necessary Per Protocol    Interpretation Summary  · Estimated left ventricular EF = 60%  · No hemodynamically significant valvular heart disease      Current medications:  Scheduled Meds:aspirin, 81 mg, Oral, Daily  cefTRIAXone, 2,000 mg, Intravenous, Q24H  doxycycline, 100 mg, Oral,  Q12H  furosemide, 40 mg, Oral, Daily  heparin (porcine), 2,500 Units, Subcutaneous, Q12H  lisinopril, 20 mg, Oral, Daily  sodium chloride, 10 mL, Intravenous, Q12H      Continuous Infusions:   PRN Meds:.  acetaminophen **OR** acetaminophen **OR** acetaminophen    Calcium Replacement - Follow Nurse / BPA Driven Protocol    dextrose    dextrose    glucagon (human recombinant)    Magnesium Standard Dose Replacement - Follow Nurse / BPA Driven Protocol    ondansetron ODT **OR** ondansetron    Phosphorus Replacement - Follow Nurse / BPA Driven Protocol    Potassium Replacement - Follow Nurse / BPA Driven Protocol    sodium chloride    sodium chloride    sodium chloride    Assessment & Plan   Assessment & Plan     Active Hospital Problems    Diagnosis  POA    **Weakness generalized [R53.1]  Yes    Moderate malnutrition [E44.0]  Yes    Community acquired pneumonia of left lower lobe of lung [J18.9]  Unknown    Generalized weakness [R53.1]  Yes    Unwitnessed fall [R29.6]  Unknown    History of diabetes mellitus, type II [Z86.39]  Unknown    Dementia without behavioral disturbance [F03.90]  Unknown    Hypoglycemia [E16.2]  Unknown    Hypokalemia [E87.6]  Yes    Hypertension [I10]  Yes    History of Stroke [I63.9]  Yes    Hyperlipidemia [E78.5]  Yes      Resolved Hospital Problems   No resolved problems to display.        Brief Hospital Course to date:  Susan Tucker is a 89 y.o. female with PMHx of HTN, HLD, CVA, DM who presents to the ED for evaluation after a fall at her living facility and low blood pressure per transferring documentation.      This patient's problems and plans were partially entered by my partner and updated as appropriate by me 06/04/24.     Weakness  Unwitnessed fall  -  CT head w/o contrast negative  - PT/OT consult  - fall precautions  - likely dehydrated / hypoglycemia related    Fever  LLL PNA   - CXR 6/4 with LLL infiltrate   - UA pending  - Bcx pending; strep/legionella/MRSA pending   -  rocephin/doxy      Hypokalemia  Hypoglycemia  - k 2.5 on admission  - mag 1.7 on admission  - glucose 61 on labs, now improved  - Replace electrolytes   - Monitor off D5       New Atrial Fibrillation  - rate controlled  - verified per EKG; now back into sinus rhythm  - patient already on aspirin 81mg PO daily- high fall risk- defer other anticoagulation  - converted back to NSR 6/3/24- no further workup; potassium replaced     JULIA, mild- resolved  - s/p fluids  - continue lisinopril and lasix     HTN  HLD  - continue home meds     Dementia  - unclear baseline     Hx T2DM  - not on medications  - A1c: 5.3     Hx CVA  - continue aspirin    Expected Discharge Location and Transportation: [facility] assisted [facility] vs memory care   Expected Discharge   Expected Discharge Date: 6/5/2024; Expected Discharge Time:      DVT prophylaxis:  Medical DVT prophylaxis orders are present.         AM-PAC 6 Clicks Score (PT): 12 (06/04/24 0800)    CODE STATUS:   Code Status and Medical Interventions:   Ordered at: 06/01/24 1941     Medical Intervention Limits:    NO intubation (DNI)     Code Status (Patient has no pulse and is not breathing):    No CPR (Do Not Attempt to Resuscitate)     Medical Interventions (Patient has pulse or is breathing):    Limited Support       Eli Francisco DO  06/04/24

## 2024-06-04 NOTE — PROGRESS NOTES
Malnutrition Severity Assessment    Patient Name:  Susan Tucker  YOB: 1935  MRN: [id number]  Admit Date:  6/1/2024    Patient meets criteria for : Moderate (non-severe) Malnutrition (Pt meets criteria for moderate chronic malnturition based on wasting.)    Comments:      Malnutrition Severity Assessment  Malnutrition Type: Chronic Disease - Related Malnutrition  Malnutrition Type (Last 8 Hours)       Malnutrition Severity Assessment       Row Name 06/03/24 2319       Malnutrition Severity Assessment    Malnutrition Type Chronic Disease - Related Malnutrition      Row Name 06/03/24 2319       Insufficient Energy Intake     Insufficient Energy Intake Findings --  Unable to quantify PTA      Row Name 06/03/24 2319       Unintentional Weight Loss     Unintentional Weight Loss Findings --  unable to confirm timeframe      Row Name 06/03/24 2319       Muscle Loss    Loss of Muscle Mass Findings Moderate    [place] Region Moderate - slight depression    Clavicle Bone Region Moderate - some protrusion in females, visible in males    Acromion Bone Region Moderate - acromion may slightly protrude    Scapular Bone Region Moderate - mild depression, bones may show slightly    Dorsal Hand Region --  mild    Patellar Region --  unable to determine at this time    Anterior Thigh Region --  unable to determine at this time    Posterior Calf Region --  unable to determine at this time      Row Name 06/03/24 2319       Fat Loss    Subcutaneous Fat Loss Findings Mild    Orbital Region  --  mild    Upper Arm Region --  mild    Thoracic & Lumbar Region Moderate - ribs visible with mild depressions, iliac crest somewhat prominent      Row Name 06/03/24 2319       Criteria Met (Must meet criteria for severity in at least 2 of these categories: M Wasting, Fat Loss, Fluid, Secondary Signs, Wt. Status, Intake)    Patient meets criteria for  Moderate (non-severe) Malnutrition  Pt meets criteria for moderate chronic malnturition  based on wasting.                    Electronically signed by:  Lenora Nguyen RD  06/03/24 23:30 EDT

## 2024-06-04 NOTE — NURSING NOTE
"RN notified attending that telemetry appeared that the patient flipped back in to afib, EKG stated \"undetermined rhythm\" but upon assessment it appears to be normal sinus with PACs. Leticia Francisco DO aware; no new orders at this time.   RN advised attending that patient's sugar has been trending back down since the discontinuation of D5NS. D5NS restarted-see MAR. No additional orders at this time.,   "

## 2024-06-04 NOTE — PLAN OF CARE
Goal Outcome Evaluation:      VSS,   Problem: Adult Inpatient Plan of Care  Goal: Plan of Care Review  6/4/2024 1713 by Deisy Read RNA  Outcome: Ongoing, Progressing  6/4/2024 1713 by Deisy Read RNA  Outcome: Ongoing, Progressing  6/4/2024 1711 by Deisy Read RNA  Outcome: Ongoing, Progressing  Goal: Patient-Specific Goal (Individualized)  6/4/2024 1713 by Deisy Read RNA  Outcome: Ongoing, Progressing  6/4/2024 1713 by Deisy Read RNA  Outcome: Ongoing, Progressing  6/4/2024 1711 by Deisy Read RNA  Outcome: Ongoing, Progressing  Goal: Absence of Hospital-Acquired Illness or Injury  6/4/2024 1713 by Deisy Read RNA  Outcome: Ongoing, Progressing  6/4/2024 1713 by Deisy Read RNA  Outcome: Ongoing, Progressing  6/4/2024 1711 by Deisy Read RNA  Outcome: Ongoing, Progressing  Intervention: Identify and Manage Fall Risk  Recent Flowsheet Documentation  Taken 6/4/2024 1600 by Deisy Read RNA  Safety Promotion/Fall Prevention:   safety round/check completed   room organization consistent   nonskid shoes/slippers when out of bed   fall prevention program maintained   clutter free environment maintained   assistive device/personal items within reach  Taken 6/4/2024 1400 by Deisy Read RNA  Safety Promotion/Fall Prevention:   safety round/check completed   room organization consistent   toileting scheduled   nonskid shoes/slippers when out of bed   fall prevention program maintained   clutter free environment maintained   assistive device/personal items within reach  Taken 6/4/2024 1200 by Deisy Read RNA  Safety Promotion/Fall Prevention:   safety round/check completed   room organization consistent   nonskid shoes/slippers when out of bed   clutter free environment maintained   assistive device/personal items within reach   fall prevention program maintained  Taken 6/4/2024 1000 by Deisy Read RNA  Safety Promotion/Fall Prevention:   toileting scheduled   safety round/check  completed   room organization consistent   nonskid shoes/slippers when out of bed   fall prevention program maintained   clutter free environment maintained   assistive device/personal items within reach  Taken 6/4/2024 0800 by Deisy Read RNA  Safety Promotion/Fall Prevention:   safety round/check completed   room organization consistent   nonskid shoes/slippers when out of bed   fall prevention program maintained   clutter free environment maintained   assistive device/personal items within reach  Intervention: Prevent Skin Injury  Recent Flowsheet Documentation  Taken 6/4/2024 1600 by Deisy Read RNA  Body Position: supine  Skin Protection:   tubing/devices free from skin contact   transparent dressing maintained   adhesive use limited   incontinence pads utilized  Taken 6/4/2024 1400 by Deisy Read RNA  Body Position: supine  Skin Protection:   tubing/devices free from skin contact   transparent dressing maintained   adhesive use limited  Taken 6/4/2024 1200 by Deisy Read RNA  Body Position: supine  Skin Protection:   adhesive use limited   tubing/devices free from skin contact   transparent dressing maintained   skin sealant/moisture barrier applied  Taken 6/4/2024 1000 by Deisy Read RNA  Body Position: supine  Skin Protection:   adhesive use limited   tubing/devices free from skin contact   transparent dressing maintained   incontinence pads utilized  Taken 6/4/2024 0800 by Deisy Read RNA  Body Position:   position changed independently   supine  Skin Protection:   adhesive use limited   tubing/devices free from skin contact   transparent dressing maintained   incontinence pads utilized  Intervention: Prevent and Manage VTE (Venous Thromboembolism) Risk  Recent Flowsheet Documentation  Taken 6/4/2024 1600 by Deisy Read RNA  Activity Management: activity minimized  Taken 6/4/2024 1400 by Deisy Read RNA  Activity Management: activity minimized  Taken 6/4/2024 1200 by Deisy Read  RNA  Activity Management: activity minimized  Taken 6/4/2024 1000 by Deisy Read RNA  Activity Management: activity minimized  Taken 6/4/2024 0800 by Deisy Read RNA  Activity Management: activity minimized  VTE Prevention/Management: sequential compression devices off  Range of Motion: active ROM (range of motion) encouraged  Intervention: Prevent Infection  Recent Flowsheet Documentation  Taken 6/4/2024 1600 by Deisy Read RNA  Infection Prevention:   single patient room provided   rest/sleep promoted  Taken 6/4/2024 1400 by Deisy Read RNA  Infection Prevention:   single patient room provided   rest/sleep promoted  Taken 6/4/2024 1200 by Deisy Read RNA  Infection Prevention:   single patient room provided   rest/sleep promoted  Taken 6/4/2024 1000 by Deisy Read RNA  Infection Prevention:   cohorting utilized   environmental surveillance performed   rest/sleep promoted   single patient room provided  Taken 6/4/2024 0800 by Deisy Read RNA  Infection Prevention:   environmental surveillance performed   cohorting utilized   rest/sleep promoted   single patient room provided  Goal: Optimal Comfort and Wellbeing  6/4/2024 1713 by Deisy Read RNA  Outcome: Ongoing, Progressing  6/4/2024 1713 by Deisy Read RNA  Outcome: Ongoing, Progressing  6/4/2024 1711 by Deisy Read RNA  Outcome: Ongoing, Progressing  Intervention: Provide Person-Centered Care  Recent Flowsheet Documentation  Taken 6/4/2024 0800 by Deisy Read RNA  Trust Relationship/Rapport:   care explained   empathic listening provided   choices provided  Goal: Readiness for Transition of Care  6/4/2024 1713 by Deisy Read RNA  Outcome: Ongoing, Progressing  6/4/2024 1713 by Deisy Read RNA  Outcome: Ongoing, Progressing  6/4/2024 1711 by Deisy Read RNA  Outcome: Ongoing, Progressing     Problem: Fall Injury Risk  Goal: Absence of Fall and Fall-Related Injury  6/4/2024 1713 by Deisy Read RNA  Outcome: Ongoing,  Progressing  6/4/2024 1713 by Deisy Read RNA  Outcome: Ongoing, Progressing  6/4/2024 1711 by Deisy Read RNA  Outcome: Ongoing, Progressing  Intervention: Identify and Manage Contributors  Recent Flowsheet Documentation  Taken 6/4/2024 1600 by Deisy Read RNA  Medication Review/Management: medications reviewed  Taken 6/4/2024 1400 by Deisy Read RNA  Medication Review/Management: medications reviewed  Taken 6/4/2024 1200 by Deisy Read RNA  Medication Review/Management: medications reviewed  Taken 6/4/2024 1000 by Deisy Read RNA  Medication Review/Management: medications reviewed  Taken 6/4/2024 0800 by Deisy Read RNA  Medication Review/Management:   medications reviewed   infusion initiated  Intervention: Promote Injury-Free Environment  Recent Flowsheet Documentation  Taken 6/4/2024 1600 by Deisy Read RNA  Safety Promotion/Fall Prevention:   safety round/check completed   room organization consistent   nonskid shoes/slippers when out of bed   fall prevention program maintained   clutter free environment maintained   assistive device/personal items within reach  Taken 6/4/2024 1400 by Deisy Read RNA  Safety Promotion/Fall Prevention:   safety round/check completed   room organization consistent   toileting scheduled   nonskid shoes/slippers when out of bed   fall prevention program maintained   clutter free environment maintained   assistive device/personal items within reach  Taken 6/4/2024 1200 by Deisy Read RNA  Safety Promotion/Fall Prevention:   safety round/check completed   room organization consistent   nonskid shoes/slippers when out of bed   clutter free environment maintained   assistive device/personal items within reach   fall prevention program maintained  Taken 6/4/2024 1000 by Deisy Read RNA  Safety Promotion/Fall Prevention:   toileting scheduled   safety round/check completed   room organization consistent   nonskid shoes/slippers when out of bed   fall  prevention program maintained   clutter free environment maintained   assistive device/personal items within reach  Taken 6/4/2024 0800 by Deisy Read RNA  Safety Promotion/Fall Prevention:   safety round/check completed   room organization consistent   nonskid shoes/slippers when out of bed   fall prevention program maintained   clutter free environment maintained   assistive device/personal items within reach     Problem: Behavioral Health Comorbidity  Goal: Maintenance of Behavioral Health Symptom Control  6/4/2024 1713 by Deisy Read RNA  Outcome: Ongoing, Progressing  6/4/2024 1713 by Deisy Read RNA  Outcome: Ongoing, Progressing  6/4/2024 1711 by Deisy Read RNA  Outcome: Ongoing, Progressing  Intervention: Maintain Behavioral Health Symptom Control  Recent Flowsheet Documentation  Taken 6/4/2024 1600 by Deisy Read RNA  Medication Review/Management: medications reviewed  Taken 6/4/2024 1400 by Deisy Read RNA  Medication Review/Management: medications reviewed  Taken 6/4/2024 1200 by Deisy Read RNA  Medication Review/Management: medications reviewed  Taken 6/4/2024 1000 by Deisy Read RNA  Medication Review/Management: medications reviewed  Taken 6/4/2024 0800 by Deisy Read RNA  Medication Review/Management:   medications reviewed   infusion initiated     Problem: Diabetes Comorbidity  Goal: Blood Glucose Level Within Targeted Range  6/4/2024 1713 by Deisy Read RNA  Outcome: Ongoing, Progressing  6/4/2024 1713 by Deisy Read RNA  Outcome: Ongoing, Progressing  6/4/2024 1711 by Deisy Read RNA  Outcome: Ongoing, Progressing     Problem: Hypertension Comorbidity  Goal: Blood Pressure in Desired Range  6/4/2024 1713 by Deisy Read RNA  Outcome: Ongoing, Progressing  6/4/2024 1713 by Deisy Read RNA  Outcome: Ongoing, Progressing  6/4/2024 1711 by Deisy Read RNA  Outcome: Ongoing, Progressing  Intervention: Maintain Blood Pressure Management  Recent Flowsheet  Documentation  Taken 6/4/2024 1600 by Deisy Read RNA  Medication Review/Management: medications reviewed  Taken 6/4/2024 1400 by Deisy Read RNA  Medication Review/Management: medications reviewed  Taken 6/4/2024 1200 by Deisy Read RNA  Medication Review/Management: medications reviewed  Taken 6/4/2024 1000 by Deisy Read RNA  Medication Review/Management: medications reviewed  Taken 6/4/2024 0800 by Deisy Read RNA  Medication Review/Management:   medications reviewed   infusion initiated     Problem: Osteoarthritis Comorbidity  Goal: Maintenance of Osteoarthritis Symptom Control  6/4/2024 1713 by Deisy Read RNA  Outcome: Ongoing, Progressing  6/4/2024 1713 by Deisy Read RNA  Outcome: Ongoing, Progressing  6/4/2024 1711 by Deisy Read RNA  Outcome: Ongoing, Progressing  Intervention: Maintain Osteoarthritis Symptom Control  Recent Flowsheet Documentation  Taken 6/4/2024 1600 by Deisy Read RNA  Activity Management: activity minimized  Medication Review/Management: medications reviewed  Taken 6/4/2024 1400 by Deisy Read RNA  Activity Management: activity minimized  Medication Review/Management: medications reviewed  Taken 6/4/2024 1200 by Deisy Read RNA  Activity Management: activity minimized  Medication Review/Management: medications reviewed  Taken 6/4/2024 1000 by Deisy Read RNA  Activity Management: activity minimized  Medication Review/Management: medications reviewed  Taken 6/4/2024 0800 by Deisy Read RNA  Activity Management: activity minimized  Medication Review/Management:   medications reviewed   infusion initiated     Problem: Pain Chronic (Persistent) (Comorbidity Management)  Goal: Acceptable Pain Control and Functional Ability  6/4/2024 1713 by Deisy Read RNA  Outcome: Ongoing, Progressing  6/4/2024 1713 by Deisy Read RNA  Outcome: Ongoing, Progressing  6/4/2024 1711 by Deisy Read RNA  Outcome: Ongoing, Progressing  Intervention: Manage  Persistent Pain  Recent Flowsheet Documentation  Taken 6/4/2024 1600 by Deisy Read RNA  Sleep/Rest Enhancement:   awakenings minimized   regular sleep/rest pattern promoted  Medication Review/Management: medications reviewed  Taken 6/4/2024 1400 by Deisy Read RNA  Sleep/Rest Enhancement: regular sleep/rest pattern promoted  Medication Review/Management: medications reviewed  Taken 6/4/2024 1200 by Deisy Read RNA  Sleep/Rest Enhancement: regular sleep/rest pattern promoted  Medication Review/Management: medications reviewed  Taken 6/4/2024 1000 by Deisy Read RNA  Sleep/Rest Enhancement: regular sleep/rest pattern promoted  Medication Review/Management: medications reviewed  Taken 6/4/2024 0800 by Deisy Read RNA  Bowel Elimination Promotion: adequate fluid intake promoted  Sleep/Rest Enhancement: regular sleep/rest pattern promoted  Medication Review/Management:   medications reviewed   infusion initiated  Intervention: Optimize Psychosocial Wellbeing  Recent Flowsheet Documentation  Taken 6/4/2024 1400 by Deisy Read RNA  Supportive Measures: active listening utilized  Taken 6/4/2024 1200 by Deisy Read RNA  Supportive Measures: active listening utilized  Taken 6/4/2024 0800 by Deisy Read RNA  Supportive Measures: active listening utilized     Problem: Skin Injury Risk Increased  Goal: Skin Health and Integrity  6/4/2024 1713 by Deisy Read RNA  Outcome: Ongoing, Progressing  6/4/2024 1713 by Deisy Read RNA  Outcome: Ongoing, Progressing  6/4/2024 1711 by Deisy Read RNA  Outcome: Ongoing, Progressing  Intervention: Optimize Skin Protection  Recent Flowsheet Documentation  Taken 6/4/2024 1600 by Deisy Read RNA  Pressure Reduction Techniques:   heels elevated off bed   pressure points protected  Head of Bed (HOB) Positioning: HOB elevated  Pressure Reduction Devices:   pressure-redistributing mattress utilized   heel offloading device utilized  Skin Protection:    tubing/devices free from skin contact   transparent dressing maintained   adhesive use limited   incontinence pads utilized  Taken 6/4/2024 1400 by Deisy Read RNA  Pressure Reduction Techniques: weight shift assistance provided  Head of Bed ([facility]) Positioning: [facility] elevated  Pressure Reduction Devices:   heel offloading device utilized   pressure-redistributing mattress utilized  Skin Protection:   tubing/devices free from skin contact   transparent dressing maintained   adhesive use limited  Taken 6/4/2024 1200 by Deisy Read RNA  Pressure Reduction Techniques:   heels elevated off bed   weight shift assistance provided  Head of Bed ([facility]) Positioning: [facility] elevated  Pressure Reduction Devices:   heel offloading device utilized   pressure-redistributing mattress utilized  Skin Protection:   adhesive use limited   tubing/devices free from skin contact   transparent dressing maintained   skin sealant/moisture barrier applied  Taken 6/4/2024 1000 by Deisy Read RNA  Pressure Reduction Techniques:   heels elevated off bed   weight shift assistance provided  Head of Bed ([facility]) Positioning: [facility] elevated  Pressure Reduction Devices:   heel offloading device utilized   pressure-redistributing mattress utilized  Skin Protection:   adhesive use limited   tubing/devices free from skin contact   transparent dressing maintained   incontinence pads utilized  Taken 6/4/2024 0800 by Deisy Read RNA  Pressure Reduction Techniques:   heels elevated off bed   pressure points protected   weight shift assistance provided  Head of Bed ([facility]) Positioning: [facility] elevated  Pressure Reduction Devices:   heel offloading device utilized   pressure-redistributing mattress utilized  Skin Protection:   adhesive use limited   tubing/devices free from skin contact   transparent dressing maintained   incontinence pads utilized     Problem: Adjustment to Illness (Sepsis/Septic Shock)  Goal: Optimal Coping  6/4/2024 1713 by Deisy Read  RNA  Outcome: Ongoing, Progressing  6/4/2024 1713 by Deisy Read RNA  Outcome: Ongoing, Progressing  6/4/2024 1711 by Deisy Read RNA  Outcome: Ongoing, Progressing  Intervention: Optimize Psychosocial Adjustment to Illness  Recent Flowsheet Documentation  Taken 6/4/2024 1400 by Deisy Read RNA  Supportive Measures: active listening utilized  Taken 6/4/2024 1200 by Deisy Read RNA  Supportive Measures: active listening utilized  Taken 6/4/2024 0800 by Deisy Read RNA  Supportive Measures: active listening utilized     Problem: Bleeding (Sepsis/Septic Shock)  Goal: Absence of Bleeding  6/4/2024 1713 by Deisy Read RNA  Outcome: Ongoing, Progressing  6/4/2024 1713 by Deisy Read RNA  Outcome: Ongoing, Progressing  6/4/2024 1711 by Deisy Read RNA  Outcome: Ongoing, Progressing     Problem: Glycemic Control Impaired (Sepsis/Septic Shock)  Goal: Blood Glucose Level Within Desired Range  6/4/2024 1713 by Deisy Read RNA  Outcome: Ongoing, Progressing  6/4/2024 1713 by Deisy Read RNA  Outcome: Ongoing, Progressing  6/4/2024 1711 by Deisy Read RNA  Outcome: Ongoing, Progressing     Problem: Infection Progression (Sepsis/Septic Shock)  Goal: Absence of Infection Signs and Symptoms  6/4/2024 1713 by Deisy Read RNA  Outcome: Ongoing, Progressing  6/4/2024 1713 by Deisy Read RNA  Outcome: Ongoing, Progressing  6/4/2024 1711 by Deisy Read RNA  Outcome: Ongoing, Progressing  Intervention: Initiate Sepsis Management  Recent Flowsheet Documentation  Taken 6/4/2024 1600 by Deisy Read RNA  Infection Prevention:   single patient room provided   rest/sleep promoted  Taken 6/4/2024 1400 by Deisy Read RNA  Infection Prevention:   single patient room provided   rest/sleep promoted  Taken 6/4/2024 1200 by Deisy Read RNA  Infection Prevention:   single patient room provided   rest/sleep promoted  Taken 6/4/2024 1000 by Deisy Read RNA  Infection Prevention:   cohorting  utilized   environmental surveillance performed   rest/sleep promoted   single patient room provided  Taken 6/4/2024 0800 by Deisy Read RNA  Infection Prevention:   environmental surveillance performed   cohorting utilized   rest/sleep promoted   single patient room provided  Intervention: Promote Recovery  Recent Flowsheet Documentation  Taken 6/4/2024 1600 by Deisy Read RNA  Activity Management: activity minimized  Sleep/Rest Enhancement:   awakenings minimized   regular sleep/rest pattern promoted  Taken 6/4/2024 1400 by Deisy Read RNA  Activity Management: activity minimized  Sleep/Rest Enhancement: regular sleep/rest pattern promoted  Taken 6/4/2024 1200 by Deisy Read RNA  Activity Management: activity minimized  Sleep/Rest Enhancement: regular sleep/rest pattern promoted  Taken 6/4/2024 1000 by Deisy Read RNA  Activity Management: activity minimized  Sleep/Rest Enhancement: regular sleep/rest pattern promoted  Taken 6/4/2024 0800 by Deisy Read RNA  Activity Management: activity minimized  Sleep/Rest Enhancement: regular sleep/rest pattern promoted     Problem: Nutrition Impaired (Sepsis/Septic Shock)  Goal: Optimal Nutrition Intake  6/4/2024 1713 by Deisy Read RNA  Outcome: Ongoing, Progressing  6/4/2024 1713 by Deisy Read RNA  Outcome: Ongoing, Progressing  6/4/2024 1711 by Deisy Read RNA  Outcome: Ongoing, Progressing   Pt on room air, incontinent, purewick in use, takes meds whole, gets very confused at times but, overall has been pleasant. Will continue plan of care.

## 2024-06-04 NOTE — PROGRESS NOTES
"          Clinical Nutrition Assessment     Patient Name: Susan Tucker  YOB: 1935  MRN: [id number]  Date of Encounter: 06/03/24 23:23 EDT  Admission date: 6/1/2024  Reason for Visit: BMI, Malnutrition Severity Assessment    Assessment   Nutrition Assessment   Admission Diagnosis:  Weakness generalized [R53.1]  Generalized weakness [R53.1]    Problem List:    Weakness generalized    Hypertension    History of Stroke    Hyperlipidemia    Hypokalemia    Unwitnessed fall    History of diabetes mellitus, type II    Dementia without behavioral disturbance    Hypoglycemia    Generalized weakness      PMH:   She  has a past medical history of Diabetes mellitus, Elevated cholesterol, Hypertension, and Stroke.    PSH:  She  has a past surgical history that includes ERCP (N/A, 6/27/2018); Hysterectomy; and Exploratory Laparotomy (N/A, 2/10/2020).    Applicable Nutrition History:       Anthropometrics     Height: Height: 157.5 cm (62\")  Last Filed Weight: Weight: 43 kg (94 lb 12.8 oz) (06/01/24 2133)  Method: Weight Method: Bed scale  BMI: BMI (Calculated): 17.3    UBW:  Per EMR wt of 115 lbs on 7/29/23,   Weight change: IF bed wt accurate loss of 21 lbs 18% of body wt timeframe unk.    Nutrition Focused Physical Exam    Date: 5/3    Patient meets criteria for malnutrition diagnosis, see MSA note.     Subjective   Reported/Observed/Food/Nutrition Related History:     6/3   Pt allows eat less than she used to and wt around 93-95 lbs, used to weigh more. Allows would use some of suppl.     Current Nutrition Prescription   PO: Diet: Regular/House; Texture: Mechanical Ground (NDD 2); Fluid Consistency: Thin (IDDSI 0)  Oral Nutrition Supplement: Boost Plus daily added  Intake: 2 Days: 38% x 2 meals recorded    Assessment & Plan   Nutrition Diagnosis   Date:  6/3            Updated:    Problem Malnutrition  moderate chronic   Etiology Effect of decr intake w wt loss    Signs/Symptoms Wasting   Status:       Goal / " Objectives:   Nutrition to support treatment and Increase intake      Nutrition Intervention      Follow treatment progress, Care plan reviewed, Advise alternate selection, Menu provided, Encourage intake, Supplement provided        Monitoring/Evaluation:   Per protocol, I&O, PO intake, Supplement intake, Pertinent labs, Weight, Symptoms    Lenora Nguyen RD  Time Spent:30 min

## 2024-06-05 LAB
ANION GAP SERPL CALCULATED.3IONS-SCNC: 9 MMOL/L (ref 5–15)
BUN SERPL-MCNC: 6 MG/DL (ref 8–23)
BUN/CREAT SERPL: 14.3 (ref 7–25)
CALCIUM SPEC-SCNC: 7.5 MG/DL (ref 8.6–10.5)
CHLORIDE SERPL-SCNC: 106 MMOL/L (ref 98–107)
CO2 SERPL-SCNC: 20 MMOL/L (ref 22–29)
CREAT SERPL-MCNC: 0.42 MG/DL (ref 0.57–1)
DEPRECATED RDW RBC AUTO: 47.9 FL (ref 37–54)
EGFRCR SERPLBLD CKD-EPI 2021: 94.2 ML/MIN/1.73
ERYTHROCYTE [DISTWIDTH] IN BLOOD BY AUTOMATED COUNT: 14.2 % (ref 12.3–15.4)
GLUCOSE BLDC GLUCOMTR-MCNC: 128 MG/DL (ref 70–130)
GLUCOSE BLDC GLUCOMTR-MCNC: 70 MG/DL (ref 70–130)
GLUCOSE BLDC GLUCOMTR-MCNC: 78 MG/DL (ref 70–130)
GLUCOSE BLDC GLUCOMTR-MCNC: 87 MG/DL (ref 70–130)
GLUCOSE BLDC GLUCOMTR-MCNC: 96 MG/DL (ref 70–130)
GLUCOSE SERPL-MCNC: 79 MG/DL (ref 65–99)
HCT VFR BLD AUTO: 34.4 % (ref 34–46.6)
HGB BLD-MCNC: 11.4 G/DL (ref 12–15.9)
L PNEUMO1 AG UR QL IA: NEGATIVE
MCH RBC QN AUTO: 30.4 PG (ref 26.6–33)
MCHC RBC AUTO-ENTMCNC: 33.1 G/DL (ref 31.5–35.7)
MCV RBC AUTO: 91.7 FL (ref 79–97)
PLATELET # BLD AUTO: 173 10*3/MM3 (ref 140–450)
PMV BLD AUTO: 10.3 FL (ref 6–12)
POTASSIUM SERPL-SCNC: 3.5 MMOL/L (ref 3.5–5.2)
POTASSIUM SERPL-SCNC: 3.5 MMOL/L (ref 3.5–5.2)
QT INTERVAL: 322 MS
QTC INTERVAL: 411 MS
RBC # BLD AUTO: 3.75 10*6/MM3 (ref 3.77–5.28)
S PNEUM AG SPEC QL LA: NEGATIVE
SODIUM SERPL-SCNC: 135 MMOL/L (ref 136–145)
WBC NRBC COR # BLD AUTO: 4.59 10*3/MM3 (ref 3.4–10.8)

## 2024-06-05 PROCEDURE — 97530 THERAPEUTIC ACTIVITIES: CPT

## 2024-06-05 PROCEDURE — 85027 COMPLETE CBC AUTOMATED: CPT | Performed by: INTERNAL MEDICINE

## 2024-06-05 PROCEDURE — 99232 SBSQ HOSP IP/OBS MODERATE 35: CPT | Performed by: INTERNAL MEDICINE

## 2024-06-05 PROCEDURE — 80048 BASIC METABOLIC PNL TOTAL CA: CPT | Performed by: INTERNAL MEDICINE

## 2024-06-05 PROCEDURE — 84132 ASSAY OF SERUM POTASSIUM: CPT | Performed by: INTERNAL MEDICINE

## 2024-06-05 PROCEDURE — 92610 EVALUATE SWALLOWING FUNCTION: CPT

## 2024-06-05 PROCEDURE — 25010000002 CEFTRIAXONE PER 250 MG: Performed by: INTERNAL MEDICINE

## 2024-06-05 PROCEDURE — 97110 THERAPEUTIC EXERCISES: CPT

## 2024-06-05 PROCEDURE — 25010000002 HEPARIN (PORCINE) PER 1000 UNITS: Performed by: NURSE PRACTITIONER

## 2024-06-05 PROCEDURE — 82948 REAGENT STRIP/BLOOD GLUCOSE: CPT

## 2024-06-05 RX ORDER — POTASSIUM CHLORIDE 20 MEQ/1
40 TABLET, EXTENDED RELEASE ORAL EVERY 4 HOURS
Status: DISPENSED | OUTPATIENT
Start: 2024-06-05 | End: 2024-06-05

## 2024-06-05 RX ORDER — ASPIRIN 81 MG/1
81 TABLET, CHEWABLE ORAL DAILY
Status: DISCONTINUED | OUTPATIENT
Start: 2024-06-06 | End: 2024-06-08 | Stop reason: HOSPADM

## 2024-06-05 RX ADMIN — CEFTRIAXONE 2000 MG: 2 INJECTION, POWDER, FOR SOLUTION INTRAMUSCULAR; INTRAVENOUS at 11:57

## 2024-06-05 RX ADMIN — Medication 12.5 MG: at 14:09

## 2024-06-05 RX ADMIN — POTASSIUM CHLORIDE 40 MEQ: 1500 TABLET, EXTENDED RELEASE ORAL at 09:15

## 2024-06-05 RX ADMIN — ASPIRIN 81 MG: 81 TABLET, COATED ORAL at 09:15

## 2024-06-05 RX ADMIN — HEPARIN SODIUM 2500 UNITS: 5000 INJECTION INTRAVENOUS; SUBCUTANEOUS at 20:51

## 2024-06-05 RX ADMIN — DOXYCYCLINE 100 MG: 100 CAPSULE ORAL at 20:51

## 2024-06-05 RX ADMIN — LISINOPRIL 20 MG: 20 TABLET ORAL at 09:15

## 2024-06-05 RX ADMIN — DOXYCYCLINE 100 MG: 100 CAPSULE ORAL at 09:15

## 2024-06-05 RX ADMIN — HEPARIN SODIUM 2500 UNITS: 5000 INJECTION INTRAVENOUS; SUBCUTANEOUS at 09:15

## 2024-06-05 RX ADMIN — Medication 10 ML: at 20:51

## 2024-06-05 RX ADMIN — Medication 12.5 MG: at 20:51

## 2024-06-05 NOTE — CASE MANAGEMENT/SOCIAL WORK
Continued Stay Note  [facility]     Patient Name: Susan Tucker  MRN: [id number]  Today's Date: 6/5/2024    Admit Date: 6/1/2024    Plan: Assisted Living with HH vs Memory Care vs SNF   Discharge Plan       Row Name 06/05/24 1547       Plan    Plan Assisted Living with HH vs Memory Care vs SNF    Patient/Family in Agreement with Plan yes    Plan Comments I spoke with the nurse at [facility]. She is reaching out to have someone call me back regarding the pt returning to [facility] vs Memory Care there. I have asked for this for a couple of days but have not heard anything. I spoke with the pts daughter who states [facility] was considering moving the pt to memory care PTA. I am trying to find out if the pt will qualify. The daughters hope the pt can return to her usual [facility] room wheere she is familiar. They are considering LTC at some point. I will fax to [place] per family request. The daughter will let me know about other facilties if the pt can not return to [facility].    Final Discharge Disposition Code 06 - home with home health care                   Discharge Codes    No documentation.                 Expected Discharge Date and Time       Expected Discharge Date Expected Discharge Time    Jun 6, 2024               Saray Maldonado RN

## 2024-06-05 NOTE — PLAN OF CARE
Goal Outcome Evaluation:  Plan of Care Reviewed With: patient                  Anticipated Discharge Disposition (SLP): skilled nursing facility          SLP Swallowing Diagnosis: R/O pharyngeal dysphagia (06/05/24 5646)

## 2024-06-05 NOTE — PLAN OF CARE
Goal Outcome Evaluation:  Plan of Care Reviewed With: patient        Progress: no change  Outcome Evaluation: Pt continues to be limited in ability to perform mobility tasks secondary to significant fear of falling as well as generalized weakness, balance and coordination deficits, difficulty sequencing tasks, and limited activity tolerance warranting need for continued skilled theray to facilitate improved functional strength and mobility. Recommend return to previous facility with  PT following d/c.      Anticipated Discharge Disposition (PT): extended care facility, home with home health (Return to previous facility with HH PT.)

## 2024-06-05 NOTE — PROGRESS NOTES
[facility]  PROGRESS NOTE    Patient Name: Susan Tucker  : 1935  MRN: [id number]    Date of Admission: 2024  Primary Care Physician: Damon Townsend PA    Subjective   Subjective     CC:  Weakness     HPI:  No acute overnight events.  Patient states that she feels okay.  She is confused.  States she has had some cough.  I did call and update daughter regarding current plan of care.  States that she has had a progressive decline in functional and cognitive status over the last 1 to 2 months.      Objective   Objective     Vital Signs:   Temp:  [98.3 °F (36.8 °C)-98.8 °F (37.1 °C)] 98.4 °F (36.9 °C)  Heart Rate:  [] 90  Resp:  [17-21] 20  BP: (101-155)/(64-99) 155/99  Flow (L/min):  [2] 2     Physical Exam:  Constitutional: No acute distress, frail  HENT: NCAT, mucous membranes moist  Respiratory: Clear to auscultation bilaterally, respiratory effort normal   Cardiovascular: rate controlled but + ectopy, no murmurs, rubs, or gallops  Gastrointestinal: Positive bowel sounds, soft, nontender, nondistended  Musculoskeletal: No bilateral ankle edema  Psychiatric:  cooperative  Neurologic: Oriented x 1, strength symmetric in all extremities, Cranial Nerves grossly intact to confrontation, speech clear  Skin: No rashes      Results Reviewed:  LAB RESULTS:      Lab 24  0457 24  0524 24  0723 24  0413 24  1728   WBC 4.59 5.77 4.05 4.07 4.46   HEMOGLOBIN 11.4* 11.2* 11.7* 11.8* 15.1   HEMATOCRIT 34.4 33.3* 36.3 35.2 45.2   PLATELETS 173 177 174 208 232   NEUTROS ABS  --   --   --  2.68 3.19   IMMATURE GRANS (ABS)  --   --   --  0.01 0.01   LYMPHS ABS  --   --   --  0.88 0.88   MONOS ABS  --   --   --  0.46 0.30   EOS ABS  --   --   --  0.01 0.03   MCV 91.7 89.3 93.8 90.7 92.4   PROCALCITONIN  --  0.15  --   --  0.15   LACTATE  --   --   --   --  0.9         Lab 24  0457 24  0524 24  0723 24  1354  06/02/24  0911 06/02/24 0413 06/01/24 2142 06/01/24  1728   SODIUM 135* 135* 136  --   --  142 138 135*   POTASSIUM 3.5 4.1 5.1 4.1 3.5 3.0* 2.5* 2.5*   CHLORIDE 106 106 106  --   --  107 99 92*   CO2 20.0* 19.0* 20.0*  --   --  26.0 27.0 23.0   ANION GAP 9.0 10.0 10.0  --   --  9.0 12.0 20.0*   BUN 6* 5* 6*  --   --  14 17 18   CREATININE 0.42* 0.46* 0.51*  --   --  0.76 1.00 1.03*   EGFR 94.2 92.2 89.9  --   --  75.5 54.3* 52.4*   GLUCOSE 79 98 86  --   --  109* 134* 61*   CALCIUM 7.5* 7.8* 8.0*  --   --  8.2* 8.7 9.2   IONIZED CALCIUM  --   --  1.22  --   --  1.20  --  1.23   MAGNESIUM  --   --   --   --   --  2.0  --  1.7   PHOSPHORUS  --   --   --   --   --  2.3*  --  2.9   HEMOGLOBIN A1C  --   --   --   --   --   --   --  5.30   TSH  --   --   --   --   --   --   --  1.510         Lab 06/04/24  0524 06/03/24 0723 06/02/24 0413 06/01/24 2142 06/01/24  1728   TOTAL PROTEIN 4.9* 5.5* 5.7* 6.2 7.0   ALBUMIN 3.0* 3.0* 3.0* 3.5 3.6   GLOBULIN 1.9 2.5 2.7 2.7 3.4   ALT (SGPT) 19 17 18 24 23   AST (SGOT) 42* 38* 35* 49* 49*   BILIRUBIN 0.6 0.6 0.7 0.9 1.0   ALK PHOS 37* 36* 37* 42 45                     Brief Urine Lab Results  (Last result in the past 365 days)        Color   Clarity   Blood   Leuk Est   Nitrite   Protein   CREAT   Urine HCG        06/04/24 1630 Yellow   Clear   Negative   Trace   Negative   Negative                   Microbiology Results Abnormal       Procedure Component Value - Date/Time    S. Pneumo Ag Urine or CSF - Urine, Urine, Clean Catch [[id number]]  (Normal) Collected: 06/04/24 1629    Lab Status: Final result Specimen: Urine, Clean Catch Updated: 06/05/24 0712     Strep Pneumo Ag Negative    Legionella Antigen, Urine - Urine, Urine, Clean Catch [[id number]]  (Normal) Collected: 06/04/24 1629    Lab Status: Final result Specimen: Urine, Clean Catch Updated: 06/05/24 0712     LEGIONELLA ANTIGEN, URINE Negative    MRSA Screen, PCR (Inpatient) - Swab, Nares [[id number]]  (Normal) Collected:  06/04/24 1630    Lab Status: Final result Specimen: Swab from Nares Updated: 06/04/24 1752     MRSA PCR Negative    Narrative:      The negative predictive value of this diagnostic test is high and should only be used to consider de-escalating anti-MRSA therapy. A positive result may indicate colonization with MRSA and must be correlated clinically.  MRSA Negative    Gastrointestinal Panel, PCR - Stool, Per Rectum [[id number]]  (Normal) Collected: 06/02/24 2203    Lab Status: Final result Specimen: Stool from Per Rectum Updated: 06/03/24 0908     Campylobacter Not Detected     Plesiomonas shigelloides Not Detected     Salmonella Not Detected     Vibrio Not Detected     Vibrio cholerae Not Detected     Yersinia enterocolitica Not Detected     Enteroaggregative E. coli (EAEC) Not Detected     Enteropathogenic E. coli (EPEC) Not Detected     Enterotoxigenic E. coli (ETEC) lt/st Not Detected     Shiga-like toxin-producing E. coli (STEC) stx1/stx2 Not Detected     Shigella/Enteroinvasive E. coli (EIEC) Not Detected     Cryptosporidium Not Detected     Cyclospora cayetanensis Not Detected     Entamoeba histolytica Not Detected     Giardia lamblia Not Detected     Adenovirus F40/41 Not Detected     Astrovirus Not Detected     Norovirus GI/GII Not Detected     Rotavirus A Not Detected     Sapovirus (I, II, IV or V) Not Detected    Clostridioides difficile Toxin - Stool, Per Rectum [[id number]]  (Normal) Collected: 06/02/24 2203    Lab Status: Final result Specimen: Stool from Per Rectum Updated: 06/03/24 0838    Narrative:      The following orders were created for panel order Clostridioides difficile Toxin - Stool, Per Rectum.  Procedure                               Abnormality         Status                     ---------                               -----------         ------                     Clostridioides difficile...[[id number]]  Normal              Final result                 Please view results for these tests on  the individual orders.    Clostridioides difficile Toxin, PCR - Stool, Per Rectum [[id number]]  (Normal) Collected: 06/02/24 2203    Lab Status: Final result Specimen: Stool from Per Rectum Updated: 06/03/24 0838     Toxigenic C. difficile by PCR Not Detected    Narrative:      The result indicates the absence of toxigenic C. difficile from stool specimen.             XR Chest 1 View    Result Date: 6/4/2024  XR CHEST 1 VW Date of Exam: 6/4/2024 8:24 AM EDT Indication: fever Comparison: 6/1/2024. Findings: There is new infiltrate of the left lower lobe with obscured left hemidiaphragm and small left effusion. Heart and pulmonary vessels appear within normal limits for technique. The right lung is clear. There is no right effusion.     Impression: Impression: New left lower lobe infiltrate may represent atelectasis and/or pneumonia, with small left effusion. Electronically Signed: Betsy Oquendo MD  6/4/2024 8:40 AM EDT  Workstation ID: [id number]     Results for orders placed during the hospital encounter of 03/24/22    Adult Transthoracic Echo Complete W/ Cont if Necessary Per Protocol    Interpretation Summary  · Estimated left ventricular EF = 60%  · No hemodynamically significant valvular heart disease      Current medications:  Scheduled Meds:aspirin, 81 mg, Oral, Daily  cefTRIAXone, 2,000 mg, Intravenous, Q24H  doxycycline, 100 mg, Oral, Q12H  [Held by provider] furosemide, 40 mg, Oral, Daily  heparin (porcine), 2,500 Units, Subcutaneous, Q12H  lisinopril, 20 mg, Oral, Daily  potassium chloride ER, 40 mEq, Oral, Q4H  sodium chloride, 10 mL, Intravenous, Q12H      Continuous Infusions:dextrose 5 % and sodium chloride 0.9 %, 75 mL/hr, Last Rate: 75 mL/hr (06/04/24 7210)      PRN Meds:.  acetaminophen **OR** acetaminophen **OR** acetaminophen    Calcium Replacement - Follow Nurse / BPA Driven Protocol    dextrose    dextrose    glucagon (human recombinant)    Magnesium Standard Dose Replacement - Follow Nurse / BPA  Driven Protocol    ondansetron ODT **OR** ondansetron    Phosphorus Replacement - Follow Nurse / BPA Driven Protocol    Potassium Replacement - Follow Nurse / BPA Driven Protocol    sodium chloride    sodium chloride    sodium chloride    Assessment & Plan   Assessment & Plan     Active Hospital Problems    Diagnosis  POA    **Weakness generalized [R53.1]  Yes    Moderate malnutrition [E44.0]  Yes    Community acquired pneumonia of left lower lobe of lung [J18.9]  Unknown    Generalized weakness [R53.1]  Yes    Unwitnessed fall [R29.6]  Unknown    History of diabetes mellitus, type II [Z86.39]  Unknown    Dementia without behavioral disturbance [F03.90]  Unknown    Hypoglycemia [E16.2]  Unknown    Hypokalemia [E87.6]  Yes    Hypertension [I10]  Yes    History of Stroke [I63.9]  Yes    Hyperlipidemia [E78.5]  Yes      Resolved Hospital Problems   No resolved problems to display.        Brief Hospital Course to date:  Susan Tucker is a 89 y.o. female with PMHx of HTN, HLD, CVA, DM who presents to the ED for evaluation after a fall at her living facility and low blood pressure per transferring documentation.      This patient's problems and plans were partially entered by my partner and updated as appropriate by me 06/05/24.     Weakness  Unwitnessed fall  -  CT head w/o contrast negative  - PT/OT consult  - fall precautions  - likely dehydrated / hypoglycemia related  - Reviewed with daughter -- downward trajectory in her functional status and cognitive function over the last few months     Fever  LLL PNA   - CXR 6/4 with LLL infiltrate   - UA  negative   - Bcx pending; strep/legionella/MRSA -- negative  - rocephin/doxy      Hypokalemia  Hypoglycemia  - k 2.5 on admission  - mag 1.7 on admission  - glucose 61 on labs, now improved  - Replace electrolytes   -  decrease rate of D5. Boost TID. Assist with feeding     New Atrial Fibrillation  - verified per EKG   - patient already on aspirin 81mg PO daily- high fall risk-  defer other anticoagulation  - converted back to NSR 6/3/24- no further workup; potassium replaced     JULIA, mild- resolved  - s/p fluids  - continue lisinopril and lasix     HTN  HLD  - continue home meds     Dementia  - unclear baseline but progressive decline per nigel      Hx T2DM  - not on medications  - A1c: 5.3     Hx CVA  - continue aspirin    Expected Discharge Location and Transportation: SNF vs her assisted living   Expected Discharge   Expected Discharge Date: 6/6/2024; Expected Discharge Time:      DVT prophylaxis:  Medical DVT prophylaxis orders are present.         AM-PAC 6 Clicks Score (PT): 12 (06/04/24 2000)    CODE STATUS:   Code Status and Medical Interventions:   Ordered at: 06/01/24 1941     Medical Intervention Limits:    NO intubation (DNI)     Code Status (Patient has no pulse and is not breathing):    No CPR (Do Not Attempt to Resuscitate)     Medical Interventions (Patient has pulse or is breathing):    Limited Support       Eli Francisco,   06/05/24

## 2024-06-05 NOTE — DISCHARGE PLACEMENT REQUEST
"To [place]  From Saray Maldonado [phone]    Susan Palomino (89 y.o. Female)       Date of Birth   1935    Social Security Number       Address   [address] [address]    Home Phone   [phone]    MRN   [id number]       Samaritan   Gnosticism    Marital Status                               Admission Date   6/1/24    Admission Type   Emergency    Admitting Provider   Eli Francisco DO    Attending Provider   Eli Francisco DO    Department, Room/Bed   [address], S528/1       Discharge Date       Discharge Disposition       Discharge Destination                                 Attending Provider: Eli Francisco DO    Allergies: Amoxicillin, Atorvastatin, Cephalexin    Isolation: None   Infection: None   Code Status: No CPR    Ht: 157.5 cm (62\")   Wt: 45.1 kg (99 lb 6.4 oz)    Admission Cmt: None   Principal Problem: Weakness generalized [R53.1]                   Active Insurance as of 6/1/2024       Primary Coverage       Payor Plan Insurance Group Employer/Plan Group    MEDICARE MEDICARE A & B        Payor Plan Address Payor Plan Phone Number Payor Plan Fax Number Effective Dates    [address] [phone]  4/1/2001 - None Entered    [facility] [id number]         Subscriber Name Subscriber Birth Date Member ID       USSAN PALOMINO 1935 [id number]               Secondary Coverage       Payor Plan Insurance Group Employer/Plan Group    AARP MC SUP AAR HEALTH CARE OPTIONS        Payor Plan Address Payor Plan Phone Number Payor Plan Fax Number Effective Dates    [facility] [phone]  1/1/2018 - None Entered    [address]       [facility] [address]         Subscriber Name Subscriber Birth Date Member ID       SUSAN PALOMINO 1935 [id number]                     Emergency Contacts        (Rel.) Home Phone Work Phone Mobile Phone    CHRISTIE (ASHLEY)JOSE (Daughter) [phone] -- [phone]    ISABELLASEBASTIAN (Daughter) [phone] -- " [phone]    ESTELITA PALOMINO (Son) [phone] -- [phone]    NATE PALOMINO (Son) [phone] -- [phone]                 History & Physical        Carrie Arenas, APRN at 24       Attestation signed by Stefan Rocha MD at 24 0028    I have reviewed this documentation and agree.                      Psychiatric Medicine Services  HISTORY AND PHYSICAL    Patient Name: Susan Palomino  : 1935  MRN: [id number]  Primary Care Physician: Damon Townsend PA  Date of admission: 2024    Subjective  Subjective     Chief Complaint:  Fall / low blood pressure    HPI:  Susan Palomino is a 89 y.o. female with PMHx of HTN, HLD, CVA, DM who presents to the ED for evaluation after a fall at her living facility and low blood pressure per transferring documentation. 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 Per ED provider documentation, EMS reported unwitnessed fall and low BP,  on their arrival. Per facility, increased confusion over the past 2-3 months reported by nursing staff. She is being admitted to hospital medicine service.    Reviewing documentation, looks like she was prescribed doxycycline and prednisone 2024. Also prescribed cipro x 3 days on 2024. She has not been seen in this ER since 2024 which was for LE pain, at that time noted to be on diuretics for BLE edema.    Review of Systems   Unable to perform ROS: Dementia            Personal History     Past Medical History:   Diagnosis Date    Diabetes mellitus     Elevated cholesterol     Hypertension     Stroke              Past Surgical History:   Procedure Laterality Date    ERCP N/A 2018    Procedure: ENDOSCOPIC RETROGRADE CHOLANGIOPANCREATOGRAPHY;  Surgeon: Estelita Manning MD;  Location: [facility];  Service: Gastroenterology    EXPLORATORY LAPAROTOMY N/A 2/10/2020     Procedure: LAPAROTOMY EXPLORATORY BOWEL RESECTION;  Surgeon: Carlos Zurita MD;  Location: [facility];  Service: General;  Laterality: N/A;    HYSTERECTOMY         Family History:  family history includes Early death in her brother; Hypertension in her father, maternal grandmother, mother, and paternal grandmother; Learning disabilities in an other family member.     Social History:  reports that she has never smoked. She has never used smokeless tobacco. She reports that she does not drink alcohol and does not use drugs.  Social History     Social History Narrative    Not on file       Medications:  acetaminophen, aspirin, fenofibrate, furosemide, and lisinopril    Allergies   Allergen Reactions    Amoxicillin Hives     Has tolerated Zosyn    Atorvastatin Nausea Only    Cephalexin Rash     Has tolerated Zosyn       Objective  Objective     Vital Signs:   Temp:  [98.2 °F (36.8 °C)] 98.2 °F (36.8 °C)  Heart Rate:  [] 85  Resp:  [16] 16  BP: (111-147)/(60-82) 147/77    Physical Exam  Constitutional:       General: She is not in acute distress.     Appearance: She is underweight.   HENT:      Head: Normocephalic and atraumatic.      Nose: Nose normal.      Mouth/Throat:      Mouth: Mucous membranes are dry.      Dentition: Abnormal dentition. Dental caries present.      Pharynx: Oropharynx is clear.   Eyes:      Extraocular Movements: Extraocular movements intact.      Conjunctiva/sclera: Conjunctivae normal.      Pupils: Pupils are equal, round, and reactive to light.   Cardiovascular:      Rate and Rhythm: Normal rate and regular rhythm.      Pulses: Normal pulses.      Heart sounds: Normal heart sounds. No murmur heard.  Pulmonary:      Effort: Pulmonary effort is normal. No respiratory distress.      Breath sounds: Normal breath sounds.   Abdominal:      General: Bowel sounds are normal. There is no distension.      Palpations: Abdomen is soft.      Tenderness: There is no abdominal tenderness.  There is no guarding.   Musculoskeletal:         General: No swelling. Normal range of motion.      Cervical back: Normal range of motion and neck supple.   Skin:     General: Skin is warm and dry.      Capillary Refill: Capillary refill takes less than 2 seconds.      Comments: Scattered scabs and skin tears BLE     Neurological:      Mental Status: She is alert. She is disoriented.      Cranial Nerves: No cranial nerve deficit.      Comments: Unknown baseline but has hx of dementia   Psychiatric:         Mood and Affect: Mood normal.         Behavior: Behavior normal.          Result Review:  I have personally reviewed the results from the time of this admission to 6/1/2024 20:14 EDT and agree with these findings:  [x]  Laboratory list / accordion  []  Microbiology  []  Radiology  [x]  EKG/Telemetry   []  Cardiology/Vascular   []  Pathology  []  Old records  []  Other:  Most notable findings include:     LAB RESULTS:      Lab 06/01/24  1728   WBC 4.46   HEMOGLOBIN 15.1   HEMATOCRIT 45.2   PLATELETS 232   NEUTROS ABS 3.19   IMMATURE GRANS (ABS) 0.01   LYMPHS ABS 0.88   MONOS ABS 0.30   EOS ABS 0.03   MCV 92.4   PROCALCITONIN 0.15   LACTATE 0.9         Lab 06/01/24  1728   SODIUM 135*   POTASSIUM 2.5*   CHLORIDE 92*   CO2 23.0   ANION GAP 20.0*   BUN 18   CREATININE 1.03*   EGFR 52.4*   GLUCOSE 61*   CALCIUM 9.2   IONIZED CALCIUM 1.23   MAGNESIUM 1.7   PHOSPHORUS 2.9         Lab 06/01/24  1728   TOTAL PROTEIN 7.0   ALBUMIN 3.6   GLOBULIN 3.4   ALT (SGPT) 23   AST (SGOT) 49*   BILIRUBIN 1.0   ALK PHOS 45                     Brief Urine Lab Results  (Last result in the past 365 days)        Color   Clarity   Blood   Leuk Est   Nitrite   Protein   CREAT   Urine HCG        06/01/24 1758 Yellow   Clear   Negative   Trace   Negative   Negative                 Microbiology Results (last 10 days)       ** No results found for the last 240 hours. **            No radiology results from the last 24 hrs    Results for orders  placed during the hospital encounter of 03/24/22    Adult Transthoracic Echo Complete W/ Cont if Necessary Per Protocol    Interpretation Summary  · Estimated left ventricular EF = 60%  · No hemodynamically significant valvular heart disease      Assessment & Plan  Assessment & Plan       Weakness generalized    Hypertension    History of Stroke    Hyperlipidemia    Hypokalemia    Unwitnessed fall    History of diabetes mellitus, type II    Dementia without behavioral disturbance    Hypoglycemia    Weakness  Unwitnessed fall  - check CT head w/o contrast  - PT/OT consult  - fall precautions  - likely dehydrated / hypoglycemia related    Hypokalemia  Hypoglycemia  - k 2.5, replace  - mag 1.7, replace 2 grams IV mag x 1  - glucose 61 on labs, down to 50 w/ repeat fingerstick  - hypoglycemia protocol  - monitor glucose q 1 hour until glu > 100 x 2 consecutive checks then can change to less frequent monitoring  - fluids, D5 w/ 20 kcl @ 75 ml/hr    JULIA, mild  - fluids  - hold lisinopril and lasix    HTN  HLD  - hold lisinopril, episode of hypotension reported from nursing home    Dementia  - unclear baseline    Hx T2DM  - not on medications  - check A1c    Hx CVA  - continue aspirin    DVT prophylaxis:  Heparin SC BID    CODE STATUS:    Medical Intervention Limits: NO intubation (DNI)  Code Status (Patient has no pulse and is not breathing): No CPR (Do Not Attempt to Resuscitate)  Medical Interventions (Patient has pulse or is breathing): Limited Support      Expected Discharge    Expected Discharge Date: 6/2/2024; Expected Discharge Time:     Signature: Electronically signed by JOESPH Browne, 06/01/24, 8:14 PM EDT      Total time spent: 60 minutes  Time spent includes time reviewing chart, face-to-face time, counseling patient/family/caregiver, ordering medications/tests/procedures, communicating with other health care professionals, documenting clinical information in the electronic health record, and  coordination of care.            Electronically signed by Stefan Rocha MD at 06/02/24 0028       Current Facility-Administered Medications   Medication Dose Route Frequency Provider Last Rate Last Admin    acetaminophen (TYLENOL) tablet 650 mg  650 mg Oral Q4H PRN Carrie Arenas APRN   650 mg at 06/04/24 0843    Or    acetaminophen (TYLENOL) 160 MG/5ML oral solution 650 mg  650 mg Oral Q4H PRN Carrie Arenas APRN   650 mg at 06/01/24 2143    Or    acetaminophen (TYLENOL) suppository 650 mg  650 mg Rectal Q4H PRN Carrie Arenas APRN        [START ON 6/6/2024] aspirin chewable tablet 81 mg  81 mg Oral Daily Eli Francisco DO        Calcium Replacement - Follow Nurse / BPA Driven Protocol   Does not apply PRN Zenia Lee DO        cefTRIAXone (ROCEPHIN) 2,000 mg in sodium chloride 0.9 % 100 mL MBP  2,000 mg Intravenous Q24H Eli Francisco  mL/hr at 06/05/24 1157 2,000 mg at 06/05/24 1157    dextrose (D50W) (25 g/50 mL) IV injection 25 g  25 g Intravenous Q15 Min PRN Carrie Arenas APRN   25 g at 06/01/24 2026    dextrose (GLUTOSE) oral gel 15 g  15 g Oral Q15 Min PRN Carrie Arenas APRN        dextrose 5 % and sodium chloride 0.9 % infusion  50 mL/hr Intravenous Continuous Eli Francisco DO 50 mL/hr at 06/05/24 1110 50 mL/hr at 06/05/24 1110    doxycycline (MONODOX) capsule 100 mg  100 mg Oral Q12H Eli Francisco DO   100 mg at 06/05/24 0915    [Held by provider] furosemide (LASIX) tablet 40 mg  40 mg Oral Daily Zenia Lee DO   40 mg at 06/04/24 0844    glucagon (GLUCAGEN) injection 1 mg  1 mg Subcutaneous Q15 Min PRN Carrie Arenas APRN        heparin (porcine) 5000 UNIT/ML injection 2,500 Units  2,500 Units Subcutaneous Q12H Carrie Arenas APRN   2,500 Units at 06/05/24 0915    lisinopril (PRINIVIL,ZESTRIL) tablet 20 mg  20 mg Oral Daily Zenia Lee DO   20 mg at 06/05/24 0915    Magnesium Standard Dose Replacement - Follow Nurse / BPA Driven Protocol   Does not  apply PRN Jesus, Zenia P, DO        metoprolol tartrate (LOPRESSOR) half tablet 12.5 mg  12.5 mg Oral Q12H Eli Francisco DO   12.5 mg at 24 1409    ondansetron ODT (ZOFRAN-ODT) disintegrating tablet 4 mg  4 mg Oral Q6H PRN Vibluther, Carrie M, APRN        Or    ondansetron (ZOFRAN) injection 4 mg  4 mg Intravenous Q6H PRN Deepa, Carrie M, APRN        Phosphorus Replacement - Follow Nurse / BPA Driven Protocol   Does not apply PRN Jesus, Zenia P, DO        Potassium Replacement - Follow Nurse / BPA Driven Protocol   Does not apply PRN Jesus, Zenia P, DO        sodium chloride 0.9 % flush 10 mL  10 mL Intravenous PRN Vibluther, Carrie M, APRN        sodium chloride 0.9 % flush 10 mL  10 mL Intravenous Q12H Vibluther Carrie M, APRN   10 mL at 24    sodium chloride 0.9 % flush 10 mL  10 mL Intravenous PRN Deepa Carrie M, APRN        sodium chloride 0.9 % infusion 40 mL  40 mL Intravenous PRN Carrie Arenas M, APRN            Physician Progress Notes (most recent note)        Eli Francisco DO at 24 0957              [facility] Medicine Services  PROGRESS NOTE    Patient Name: Susan Tucker  : 1935  MRN: [id number]    Date of Admission: 2024  Primary Care Physician: Damon Townsend PA    Subjective   Subjective     CC:  Weakness     HPI:  No acute overnight events.  Patient states that she feels okay.  She is confused.  States she has had some cough.  I did call and update daughter regarding current plan of care.  States that she has had a progressive decline in functional and cognitive status over the last 1 to 2 months.      Objective   Objective     Vital Signs:   Temp:  [98.3 °F (36.8 °C)-98.8 °F (37.1 °C)] 98.4 °F (36.9 °C)  Heart Rate:  [] 90  Resp:  [17-21] 20  BP: (101-155)/(64-99) 155/99  Flow (L/min):  [2] 2     Physical Exam:  Constitutional: No acute distress, frail  HENT: NCAT, mucous membranes moist  Respiratory: Clear to auscultation  bilaterally, respiratory effort normal   Cardiovascular: rate controlled but + ectopy, no murmurs, rubs, or gallops  Gastrointestinal: Positive bowel sounds, soft, nontender, nondistended  Musculoskeletal: No bilateral ankle edema  Psychiatric:  cooperative  Neurologic: Oriented x 1, strength symmetric in all extremities, Cranial Nerves grossly intact to confrontation, speech clear  Skin: No rashes      Results Reviewed:  LAB RESULTS:      Lab 06/05/24  0457 06/04/24  0524 06/03/24  0723 06/02/24  0413 06/01/24  1728   WBC 4.59 5.77 4.05 4.07 4.46   HEMOGLOBIN 11.4* 11.2* 11.7* 11.8* 15.1   HEMATOCRIT 34.4 33.3* 36.3 35.2 45.2   PLATELETS 173 177 174 208 232   NEUTROS ABS  --   --   --  2.68 3.19   IMMATURE GRANS (ABS)  --   --   --  0.01 0.01   LYMPHS ABS  --   --   --  0.88 0.88   MONOS ABS  --   --   --  0.46 0.30   EOS ABS  --   --   --  0.01 0.03   MCV 91.7 89.3 93.8 90.7 92.4   PROCALCITONIN  --  0.15  --   --  0.15   LACTATE  --   --   --   --  0.9         Lab 06/05/24  0457 06/04/24  0524 06/03/24  0723 06/02/24  1354 06/02/24  0911 06/02/24  0413 06/01/24  2142 06/01/24  1728   SODIUM 135* 135* 136  --   --  142 138 135*   POTASSIUM 3.5 4.1 5.1 4.1 3.5 3.0* 2.5* 2.5*   CHLORIDE 106 106 106  --   --  107 99 92*   CO2 20.0* 19.0* 20.0*  --   --  26.0 27.0 23.0   ANION GAP 9.0 10.0 10.0  --   --  9.0 12.0 20.0*   BUN 6* 5* 6*  --   --  14 17 18   CREATININE 0.42* 0.46* 0.51*  --   --  0.76 1.00 1.03*   EGFR 94.2 92.2 89.9  --   --  75.5 54.3* 52.4*   GLUCOSE 79 98 86  --   --  109* 134* 61*   CALCIUM 7.5* 7.8* 8.0*  --   --  8.2* 8.7 9.2   IONIZED CALCIUM  --   --  1.22  --   --  1.20  --  1.23   MAGNESIUM  --   --   --   --   --  2.0  --  1.7   PHOSPHORUS  --   --   --   --   --  2.3*  --  2.9   HEMOGLOBIN A1C  --   --   --   --   --   --   --  5.30   TSH  --   --   --   --   --   --   --  1.510         Lab 06/04/24  0524 06/03/24  0723 06/02/24  0413 06/01/24  2142 06/01/24  1728   TOTAL PROTEIN 4.9* 5.5*  5.7* 6.2 7.0   ALBUMIN 3.0* 3.0* 3.0* 3.5 3.6   GLOBULIN 1.9 2.5 2.7 2.7 3.4   ALT (SGPT) 19 17 18 24 23   AST (SGOT) 42* 38* 35* 49* 49*   BILIRUBIN 0.6 0.6 0.7 0.9 1.0   ALK PHOS 37* 36* 37* 42 45                     Brief Urine Lab Results  (Last result in the past 365 days)        Color   Clarity   Blood   Leuk Est   Nitrite   Protein   CREAT   Urine HCG        06/04/24 1630 Yellow   Clear   Negative   Trace   Negative   Negative                   Microbiology Results Abnormal       Procedure Component Value - Date/Time    S. Pneumo Ag Urine or CSF - Urine, Urine, Clean Catch [[id number]]  (Normal) Collected: 06/04/24 1629    Lab Status: Final result Specimen: Urine, Clean Catch Updated: 06/05/24 0712     Strep Pneumo Ag Negative    Legionella Antigen, Urine - Urine, Urine, Clean Catch [[id number]]  (Normal) Collected: 06/04/24 1629    Lab Status: Final result Specimen: Urine, Clean Catch Updated: 06/05/24 0712     LEGIONELLA ANTIGEN, URINE Negative    MRSA Screen, PCR (Inpatient) - Swab, Nares [[id number]]  (Normal) Collected: 06/04/24 1630    Lab Status: Final result Specimen: Swab from Nares Updated: 06/04/24 1752     MRSA PCR Negative    Narrative:      The negative predictive value of this diagnostic test is high and should only be used to consider de-escalating anti-MRSA therapy. A positive result may indicate colonization with MRSA and must be correlated clinically.  MRSA Negative    Gastrointestinal Panel, PCR - Stool, Per Rectum [[id number]]  (Normal) Collected: 06/02/24 2203    Lab Status: Final result Specimen: Stool from Per Rectum Updated: 06/03/24 0908     Campylobacter Not Detected     Plesiomonas shigelloides Not Detected     Salmonella Not Detected     Vibrio Not Detected     Vibrio cholerae Not Detected     Yersinia enterocolitica Not Detected     Enteroaggregative E. coli (EAEC) Not Detected     Enteropathogenic E. coli (EPEC) Not Detected     Enterotoxigenic E. coli (ETEC) lt/st Not Detected      Shiga-like toxin-producing E. coli (STEC) stx1/stx2 Not Detected     Shigella/Enteroinvasive E. coli (EIEC) Not Detected     Cryptosporidium Not Detected     Cyclospora cayetanensis Not Detected     Entamoeba histolytica Not Detected     Giardia lamblia Not Detected     Adenovirus F40/41 Not Detected     Astrovirus Not Detected     Norovirus GI/GII Not Detected     Rotavirus A Not Detected     Sapovirus (I, II, IV or V) Not Detected    Clostridioides difficile Toxin - Stool, Per Rectum [[id number]]  (Normal) Collected: 06/02/24 2203    Lab Status: Final result Specimen: Stool from Per Rectum Updated: 06/03/24 0838    Narrative:      The following orders were created for panel order Clostridioides difficile Toxin - Stool, Per Rectum.  Procedure                               Abnormality         Status                     ---------                               -----------         ------                     Clostridioides difficile...[[id number]]  Normal              Final result                 Please view results for these tests on the individual orders.    Clostridioides difficile Toxin, PCR - Stool, Per Rectum [[id number]]  (Normal) Collected: 06/02/24 2203    Lab Status: Final result Specimen: Stool from Per Rectum Updated: 06/03/24 0838     Toxigenic C. difficile by PCR Not Detected    Narrative:      The result indicates the absence of toxigenic C. difficile from stool specimen.             XR Chest 1 View    Result Date: 6/4/2024  XR CHEST 1 VW Date of Exam: 6/4/2024 8:24 AM EDT Indication: fever Comparison: 6/1/2024. Findings: There is new infiltrate of the left lower lobe with obscured left hemidiaphragm and small left effusion. Heart and pulmonary vessels appear within normal limits for technique. The right lung is clear. There is no right effusion.     Impression: Impression: New left lower lobe infiltrate may represent atelectasis and/or pneumonia, with small left effusion. Electronically Signed: Betsy  MD Azra  6/4/2024 8:40 AM EDT  Workstation ID: [id number]     Results for orders placed during the hospital encounter of 03/24/22    Adult Transthoracic Echo Complete W/ Cont if Necessary Per Protocol    Interpretation Summary  · Estimated left ventricular EF = 60%  · No hemodynamically significant valvular heart disease      Current medications:  Scheduled Meds:aspirin, 81 mg, Oral, Daily  cefTRIAXone, 2,000 mg, Intravenous, Q24H  doxycycline, 100 mg, Oral, Q12H  [Held by provider] furosemide, 40 mg, Oral, Daily  heparin (porcine), 2,500 Units, Subcutaneous, Q12H  lisinopril, 20 mg, Oral, Daily  potassium chloride ER, 40 mEq, Oral, Q4H  sodium chloride, 10 mL, Intravenous, Q12H      Continuous Infusions:dextrose 5 % and sodium chloride 0.9 %, 75 mL/hr, Last Rate: 75 mL/hr (06/04/24 1747)      PRN Meds:.  acetaminophen **OR** acetaminophen **OR** acetaminophen    Calcium Replacement - Follow Nurse / BPA Driven Protocol    dextrose    dextrose    glucagon (human recombinant)    Magnesium Standard Dose Replacement - Follow Nurse / BPA Driven Protocol    ondansetron ODT **OR** ondansetron    Phosphorus Replacement - Follow Nurse / BPA Driven Protocol    Potassium Replacement - Follow Nurse / BPA Driven Protocol    sodium chloride    sodium chloride    sodium chloride    Assessment & Plan   Assessment & Plan     Active Hospital Problems    Diagnosis  POA    **Weakness generalized [R53.1]  Yes    Moderate malnutrition [E44.0]  Yes    Community acquired pneumonia of left lower lobe of lung [J18.9]  Unknown    Generalized weakness [R53.1]  Yes    Unwitnessed fall [R29.6]  Unknown    History of diabetes mellitus, type II [Z86.39]  Unknown    Dementia without behavioral disturbance [F03.90]  Unknown    Hypoglycemia [E16.2]  Unknown    Hypokalemia [E87.6]  Yes    Hypertension [I10]  Yes    History of Stroke [I63.9]  Yes    Hyperlipidemia [E78.5]  Yes      Resolved Hospital Problems   No resolved problems to display.         Brief Hospital Course to date:  Susan Tucker is a 89 y.o. female with PMHx of HTN, HLD, CVA, DM who presents to the ED for evaluation after a fall at her living facility and low blood pressure per transferring documentation.      This patient's problems and plans were partially entered by my partner and updated as appropriate by me 06/05/24.     Weakness  Unwitnessed fall  -  CT head w/o contrast negative  - PT/OT consult  - fall precautions  - likely dehydrated / hypoglycemia related  - Reviewed with daughter -- downward trajectory in her functional status and cognitive function over the last few months     Fever  LLL PNA   - CXR 6/4 with LLL infiltrate   - UA  negative   - Bcx pending; strep/legionella/MRSA -- negative  - rocephin/doxy      Hypokalemia  Hypoglycemia  - k 2.5 on admission  - mag 1.7 on admission  - glucose 61 on labs, now improved  - Replace electrolytes   -  decrease rate of D5. Boost TID. Assist with feeding     New Atrial Fibrillation  - verified per EKG   - patient already on aspirin 81mg PO daily- high fall risk- defer other anticoagulation  - converted back to NSR 6/3/24- no further workup; potassium replaced     JULIA, mild- resolved  - s/p fluids  - continue lisinopril and lasix     HTN  HLD  - continue home meds     Dementia  - unclear baseline but progressive decline per nigel      Hx T2DM  - not on medications  - A1c: 5.3     Hx CVA  - continue aspirin    Expected Discharge Location and Transportation: SNF vs her assisted living   Expected Discharge   Expected Discharge Date: 6/6/2024; Expected Discharge Time:      DVT prophylaxis:  Medical DVT prophylaxis orders are present.         AM-PAC 6 Clicks Score (PT): 12 (06/04/24 2000)    CODE STATUS:   Code Status and Medical Interventions:   Ordered at: 06/01/24 1941     Medical Intervention Limits:    NO intubation (DNI)     Code Status (Patient has no pulse and is not breathing):    No CPR (Do Not Attempt to Resuscitate)     Medical  Interventions (Patient has pulse or is breathing):    Limited Support       Eli Francisco DO  24        Electronically signed by Eli Francisco DO at 24 1010          Physical Therapy Notes (most recent note)        Sagrario Joseph, PT at 24 1430  Version 1 of 1         Patient Name: Susan Tucker  : 1935    MRN: [id number]                              Today's Date: 2024       Admit Date: 2024    Visit Dx:     ICD-10-CM ICD-9-CM   1. Generalized weakness  R53.1 780.79   2. Hypotensive episode  I95.9 458.9   3. Unwitnessed fall  R29.6 [id number]   4. Altered mental status, unspecified altered mental status type  R41.82 780.97   5. Hypokalemia  E87.6 276.8   6. Dysphagia, unspecified type  R13.10 787.20     Patient Active Problem List   Diagnosis    Sepsis    Elevated liver enzymes    Dehydration    Hypertension    History of Stroke    Acute UTI (urinary tract infection)    Calculus of bile duct with acute cholecystitis and obstruction    Enterocolitis    Gallstone ileus of small intestine    Syncope and collapse    Frequent falls    Type 2 diabetes mellitus without complication    Hyperlipidemia    Hypokalemia    Weakness generalized    Unwitnessed fall    History of diabetes mellitus, type II    Dementia without behavioral disturbance    Hypoglycemia    Generalized weakness    Moderate malnutrition    Community acquired pneumonia of left lower lobe of lung     Past Medical History:   Diagnosis Date    Diabetes mellitus     Elevated cholesterol     Hypertension     Stroke      Past Surgical History:   Procedure Laterality Date    ERCP N/A 2018    Procedure: ENDOSCOPIC RETROGRADE CHOLANGIOPANCREATOGRAPHY;  Surgeon: Liam Manning MD;  Location: [facility] ENDOSCOPY;  Service: Gastroenterology    EXPLORATORY LAPAROTOMY N/A 2/10/2020    Procedure: LAPAROTOMY EXPLORATORY BOWEL RESECTION;  Surgeon: Carlos Zurita MD;  Location: [facility] OR;  Service: General;  Laterality: N/A;     HYSTERECTOMY        General Information       Row Name 06/05/24 1458          Physical Therapy Time and Intention    Document Type therapy note (daily note)  -ND     Mode of Treatment physical therapy  -ND       Row Name 06/05/24 1458          General Information    Patient Profile Reviewed yes  -ND     Existing Precautions/Restrictions fall;other (see comments)  Severe fear of falling. Confused.  -ND     Barriers to Rehab medically complex;previous functional deficit;cognitive status  -ND       Row Name 06/05/24 1458          Cognition    Orientation Status (Cognition) oriented to;person;disoriented to;place;situation;time  -ND       Row Name 06/05/24 1458          Safety Issues, Functional Mobility    Safety Issues Affecting Function (Mobility) at risk behavior observed;awareness of need for assistance;insight into deficits/self-awareness;judgment;problem-solving;safety precaution awareness;safety precautions follow-through/compliance;sequencing abilities  -ND     Impairments Affecting Function (Mobility) balance;cognition;endurance/activity tolerance;motor planning;postural/trunk control;strength  -ND     Cognitive Impairments, Mobility Safety/Performance awareness, need for assistance;insight into deficits/self-awareness;judgment;problem-solving/reasoning;safety precaution awareness;safety precaution follow-through;sequencing abilities  -ND               User Key  (r) = Recorded By, (t) = Taken By, (c) = Cosigned By      Initials Name Provider Type    ND Sagrario Joseph PT Physical Therapist                   Mobility       Row Name 06/05/24 1459          Bed Mobility    Bed Mobility rolling left;rolling right  -ND     Rolling Left [place] (Bed Mobility) 1 person assist;verbal cues;nonverbal cues (demo/gesture);minimum assist (75% patient effort)  -ND     Rolling Right [place] (Bed Mobility) minimum assist (75% patient effort);1 person assist;verbal cues;nonverbal cues (demo/gesture)  -ND      Supine-Sit [place] (Bed Mobility) moderate assist (50% patient effort);1 person assist;verbal cues;nonverbal cues (demo/gesture)  -ND     Sit-Supine [place] (Bed Mobility) maximum assist (25% patient effort);1 person assist;verbal cues;nonverbal cues (demo/gesture)  -ND     Assistive Device (Bed Mobility) bed rails;head of bed elevated;draw sheet  -ND     Comment, (Bed Mobility) Supine <> sit with cues for sequencing and assist at trunk and BLE. Pt rolling L and R with both bed rails up to ease pt fears of falling and mod-A.  -ND       Row Name 06/05/24 1459          Bed-Chair Transfer    Comment, (Bed-Chair Transfer) Deferred due to pt fatigue.  -ND       Row Name 06/05/24 1459          Sit-Stand Transfer    Sit-Stand [place] (Transfers) maximum assist (25% patient effort);1 person assist;verbal cues;nonverbal cues (demo/gesture)  -ND     Assistive Device (Sit-Stand Transfers) other (see comments)  BUE support initially and then bedrails on either side of pt sitting EOB.  -ND     Comment, (Sit-Stand Transfer) Pt required max encouragement and reassurance to attempt STS from EOB x2. Pt achieves 75% upright posture before fear sets in and pt requests to return to sitting. Bilateral foot and knee block provided for both attempts.  -ND       Row Name 06/05/24 1459          Gait/Stairs (Locomotion)    [place] Level (Gait) unable to assess  -ND     Comment, (Gait/Stairs) Unable to assess due to significant fear of falling.  -ND               User Key  (r) = Recorded By, (t) = Taken By, (c) = Cosigned By      Initials Name Provider Type    ND Sagrario Joseph, ANI Physical Therapist                   Obj/Interventions       Row Name 06/05/24 1508          Motor Skills    Therapeutic Exercise hip;knee;ankle  -ND       Row Name 06/05/24 1508          Hip (Therapeutic Exercise)    Hip (Therapeutic Exercise) strengthening exercise  -ND     Hip Strengthening (Therapeutic Exercise) bilateral;marching while  seated;10 repetitions  -ND       Row Name 06/05/24 1508          Knee (Therapeutic Exercise)    Knee (Therapeutic Exercise) strengthening exercise  -ND     Knee Strengthening (Therapeutic Exercise) bilateral;LAQ (long arc quad);10 repetitions  -ND       Row Name 06/05/24 1508          Ankle (Therapeutic Exercise)    Ankle (Therapeutic Exercise) AROM (active range of motion)  -ND     Ankle AROM (Therapeutic Exercise) bilateral;dorsiflexion;plantarflexion;10 repetitions  -ND       Row Name 06/05/24 1508          Balance    Balance Assessment sitting static balance;sitting dynamic balance;sit to stand dynamic balance  -ND     Static Sitting Balance minimal assist  -ND     Dynamic Sitting Balance minimal assist  -ND     Position, Sitting Balance unsupported;sitting edge of bed  -ND     Sit to Stand Dynamic Balance maximum assist;1-person assist;verbal cues;non-verbal cues (demo/gesture)  -ND     Static Standing Balance maximum assist;1-person assist;verbal cues;non-verbal cues (demo/gesture)  -ND     Dynamic Standing Balance unable to assess  -ND     Position/Device Used, Standing Balance supported;other (see comments)  BUE support and bilateral bed rails.  -ND     Balance Interventions sitting;standing;sit to stand;supported;trunk training exercise;dynamic reaching  -ND     Comment, Balance Due to pt significant fear of falling pt requires increased cues to maintain BUE on bedrails to maintain sitting balance CGA, when pt forgets sitting balance is min-A. Dynamic reaching and trunk training for anterior lean practiced to reinforce pt ability to perform mobility tasks and facilitate skills required to initiate STS with increased independence.  -ND               User Key  (r) = Recorded By, (t) = Taken By, (c) = Cosigned By      Initials Name Provider Type    Sagrario Hurd PT Physical Therapist                   Goals/Plan    No documentation.                  Clinical Impression       Row Name 06/05/24 9428           Pain    Pretreatment Pain Rating 0/10 - no pain  -ND     Posttreatment Pain Rating 0/10 - no pain  -ND     Additional Documentation Pain Scale: FACES Pre/Post-Treatment (Group)  -ND       [facility] Name 06/05/24 1511          Pain Scale: FACES Pre/Post-Treatment    Pain: FACES Scale, Pretreatment 0-->no hurt  -ND     Posttreatment Pain Rating 0-->no hurt  -ND       Row Name 06/05/24 1511          Plan of Care Review    Plan of Care Reviewed With patient  -ND     Progress no change  -ND     Outcome Evaluation Pt continues to be limited in ability to perform mobility tasks secondary to significant fear of falling as well as generalized weakness, balance and coordination deficits, difficulty sequencing tasks, and limited activity tolerance warranting need for continued skilled theray to facilitate improved functional strength and mobility. Recommend return to previous facility with HH PT following d/c.  -ND       Row Name 06/05/24 1511          Vital Signs    Pre Systolic BP Rehab 154  -ND     Pre Treatment Diastolic BP 94  -ND     Pretreatment Heart Rate (beats/min) 100  -ND     Posttreatment Heart Rate (beats/min) 103  -ND     O2 Delivery Pre Treatment nasal cannula  -ND     O2 Delivery Intra Treatment nasal cannula  -ND     O2 Delivery Post Treatment nasal cannula  -ND     Pre Patient Position Supine  -ND     Intra Patient Position Standing  -ND     Post Patient Position Supine  -ND       Row Name 06/05/24 1511          Positioning and Restraints    Pre-Treatment Position in bed  -ND     Post Treatment Position bed  -ND     In Bed notified nsg;supine;call light within reach;encouraged to call for assist;exit alarm on;side rails up x3;heels elevated  -ND               User Key  (r) = Recorded By, (t) = Taken By, (c) = Cosigned By      Initials Name Provider Type    Sagrario Hurd, PT Physical Therapist                   Outcome Measures       Row Name 06/05/24 1514 06/05/24 0900       How much help from  another person do you currently need...    Turning from your back to your side while in flat bed without using bedrails? 3  -ND 3  -LS (r)  MW (c)    Moving from lying on back to sitting on the side of a flat bed without bedrails? 2  -ND 2  -LS (r)  MW (c)    Moving to and from a bed to a chair (including a wheelchair)? 2  -ND 2  -LS (r)  MW (c)    Standing up from a chair using your arms (e.g., wheelchair, bedside chair)? 2  -ND 2  -LS (r)  MW (c)    Climbing 3-5 steps with a railing? 1  -ND 1  -LS (r)  MW (c)    To walk in hospital room? 2  -ND 2  -LS (r)  MW (c)    AM-PAC 6 Clicks Score (PT) 12  -ND 12  -LS    Highest Level of Mobility Goal 4 --> Transfer to chair/commode  -ND 4 --> Transfer to chair/commode  -LS      Row Name 06/05/24 1514          Functional Assessment    Outcome Measure Options AM-PAC 6 Clicks Basic Mobility (PT)  -ND               User Key  (r) = Recorded By, (t) = Taken By, (c) = Cosigned By      Initials Name Provider Type    ND Sagrario Joseph, PT Physical Therapist    Ryder Christianson, RN Registered Nurse    Deisy Reina RNA Registered Nurse                                 Physical Therapy Education       Title: PT OT SLP Therapies (In Progress)       Topic: Physical Therapy (In Progress)       Point: Mobility training (In Progress)       Learning Progress Summary             Patient Acceptance, E, NR by ND at 6/5/2024 1514    Acceptance, E, VU,NR by LS at 6/2/2024 0915                         Point: Home exercise program (In Progress)       Learning Progress Summary             Patient Acceptance, E, NR by ND at 6/5/2024 1514                         Point: Body mechanics (In Progress)       Learning Progress Summary             Patient Acceptance, E, NR by ND at 6/5/2024 1514                         Point: Precautions (In Progress)       Learning Progress Summary             Patient Acceptance, E, NR by ND at 6/5/2024 1514                                         User Key        Initials Effective Dates Name Provider Type Discipline    LS 07/11/23 -  Janki Campa, PT Physical Therapist PT    ND 11/16/23 -  Sagrario Joseph PT Physical Therapist PT                  PT Recommendation and Plan     Plan of Care Reviewed With: patient  Progress: no change  Outcome Evaluation: Pt continues to be limited in ability to perform mobility tasks secondary to significant fear of falling as well as generalized weakness, balance and coordination deficits, difficulty sequencing tasks, and limited activity tolerance warranting need for continued skilled theray to facilitate improved functional strength and mobility. Recommend return to previous facility with HH PT following d/c.     Time Calculation:         PT Charges       Row Name 06/05/24 1515             Time Calculation    Start Time 1430  -ND      PT Received On 06/05/24  -ND         Timed Charges    79520 - PT Therapeutic Exercise Minutes 8  -ND      46031 - PT Therapeutic Activity Minutes 15  -ND         Total Minutes    Timed Charges Total Minutes 23 -ND       Total Minutes 23 -ND                User Key  (r) = Recorded By, (t) = Taken By, (c) = Cosigned By      Initials Name Provider Type    ND Sagrario Joseph, ANI Physical Therapist                  Therapy Charges for Today       Code Description Service Date Service Provider Modifiers Qty    49331706395 HC PT THER PROC EA 15 MIN 6/5/2024 Sagrario Joseph, PT GP 1    56047277769 HC PT THERAPEUTIC ACT EA 15 MIN 6/5/2024 Sagrario Joseph, PT GP 1            PT G-Codes  Outcome Measure Options: AM-PAC 6 Clicks Basic Mobility (PT)  AM-PAC 6 Clicks Score (PT): 12  PT Discharge Summary  Anticipated Discharge Disposition (PT): extended care facility, home with home health (Return to previous facility with HH PT.)    Sagrario Joseph PT  6/5/2024      Electronically signed by Sagrario Joseph PT at 06/05/24 4833          Occupational Therapy Notes (most recent note)         Carrie Kwok, OT at 24 1310          Patient Name: Susan Tucker  : 1935    MRN: [id number]                              Today's Date: 6/3/2024       Admit Date: 2024    Visit Dx:     ICD-10-CM ICD-9-CM   1. Generalized weakness  R53.1 780.79   2. Hypotensive episode  I95.9 458.9   3. Unwitnessed fall  R29.6 [id number]   4. Altered mental status, unspecified altered mental status type  R41.82 780.97   5. Hypokalemia  E87.6 276.8     Patient Active Problem List   Diagnosis    Sepsis    Elevated liver enzymes    Dehydration    Hypertension    History of Stroke    Acute UTI (urinary tract infection)    Calculus of bile duct with acute cholecystitis and obstruction    Enterocolitis    Gallstone ileus of small intestine    Syncope and collapse    Frequent falls    Type 2 diabetes mellitus without complication    Hyperlipidemia    Hypokalemia    Weakness generalized    Unwitnessed fall    History of diabetes mellitus, type II    Dementia without behavioral disturbance    Hypoglycemia    Generalized weakness     Past Medical History:   Diagnosis Date    Diabetes mellitus     Elevated cholesterol     Hypertension     Stroke      Past Surgical History:   Procedure Laterality Date    ERCP N/A 2018    Procedure: ENDOSCOPIC RETROGRADE CHOLANGIOPANCREATOGRAPHY;  Surgeon: Liam Manning MD;  Location: [facility] ENDOSCOPY;  Service: Gastroenterology    EXPLORATORY LAPAROTOMY N/A 2/10/2020    Procedure: LAPAROTOMY EXPLORATORY BOWEL RESECTION;  Surgeon: Carlos Zurita MD;  Location: [facility] OR;  Service: General;  Laterality: N/A;    HYSTERECTOMY        General Information       Row Name 24 1310          OT Time and Intention    Document Type evaluation  -AC     Mode of Treatment occupational therapy  -AC       Row Name 24 1310          General Information    Patient Profile Reviewed yes  -AC     Prior Level of Function --  inconsistent historian  -AC     Existing Precautions/Restrictions  fall  extreme fear of falling  -     Barriers to Rehab medically complex;previous functional deficit;cognitive status  -       Row Name 06/03/24 1310          Living Environment    People in Home facility resident  -       Row Name 06/03/24 1310          Home Main Entrance    Number of Stairs, Main Entrance none  -       Row Name 06/03/24 1310          Stairs Within Home, Primary    Number of Stairs, Within Home, Primary none  -       Row Name 06/03/24 1310          Cognition    Orientation Status (Cognition) oriented to;person;disoriented to;place;situation;time  -       Row Name 06/03/24 1310          Safety Issues, Functional Mobility    Safety Issues Affecting Function (Mobility) at risk behavior observed;awareness of need for assistance;insight into deficits/self-awareness;judgment;problem-solving;safety precaution awareness;safety precautions follow-through/compliance;sequencing abilities  -     Impairments Affecting Function (Mobility) balance;cognition;endurance/activity tolerance;strength;range of motion (ROM)  -     Cognitive Impairments, Mobility Safety/Performance awareness, need for assistance;insight into deficits/self-awareness;judgment;problem-solving/reasoning;safety precaution awareness;safety precaution follow-through;sequencing abilities  -     Comment, Safety Issues/Impairments (Mobility) fear of falling  -               User Key  (r) = Recorded By, (t) = Taken By, (c) = Cosigned By      Initials Name Provider Type    AC Carrie Kwok, OT Occupational Therapist                     Mobility/ADL's       Row Name 06/03/24 5425          Bed Mobility    Bed Mobility supine-sit;sit-supine;rolling left;rolling right  -     Rolling Left [place] (Bed Mobility) verbal cues;moderate assist (50% patient effort)  -AC     Rolling Right [place] (Bed Mobility) verbal cues;moderate assist (50% patient effort)  -     Supine-Sit [place] (Bed Mobility) verbal cues;moderate  assist (50% patient effort)  -     Sit-Supine [place] (Bed Mobility) verbal cues;moderate assist (50% patient effort);2 person assist  -     Assistive Device (Bed Mobility) bed rails;head of bed elevated  -     Comment, (Bed Mobility) VCs to sequence  -       Row Name 06/03/24 1406          Transfers    Transfers sit-stand transfer  -       Row Name 06/03/24 1406          Sit-Stand Transfer    Sit-Stand [place] (Transfers) verbal cues;moderate assist (50% patient effort);2 person assist  -     Assistive Device (Sit-Stand Transfers) walker, front-wheeled  -     Comment, (Sit-Stand Transfer) pt very fearful and required reassuring  -       Row Name 06/03/24 1406          Functional Mobility    Functional Mobility- Ind. Level verbal cues required;2 person assist required;moderate assist (50% patient effort)  -     Functional Mobility- Device walker, front-wheeled  -AC     Functional Mobility-Distance (Feet) 3  -     Functional Mobility- Comment 2 side steps toward HOB  -       Row Name 06/03/24 [facility]6          Activities of Daily Living    BADL Assessment/Intervention lower body dressing;grooming;feeding;toileting  -       Row Name 06/03/24 1406          Lower Body Dressing Assessment/Training    [place] Level (Lower Body Dressing) don;socks;dependent (less than 25% patient effort)  -     Position (Lower Body Dressing) sitting up in bed  -       Row Name 06/03/24 1406          Grooming Assessment/Training    [place] Level (Grooming) hair care, combing/brushing;moderate assist (50% patient effort)  -     Position (Grooming) sitting up in bed  -       Row Name 06/03/24 1406          Self-Feeding Assessment/Training    [place] Level (Feeding) liquids to mouth;supervision  -     Position (Self-Feeding) sitting up in bed  -       Row Name 06/03/24 1406          Toileting Assessment/Training    [place] Level (Toileting) dependent (less than 25% patient effort)   -AC     Position (Toileting) supine  -AC               User Key  (r) = Recorded By, (t) = Taken By, (c) = Cosigned By      Initials Name Provider Type    Carrie Petersen, OT Occupational Therapist                   Obj/Interventions       Row Name 06/03/24 1417          Sensory Assessment (Somatosensory)    Sensory Assessment (Somatosensory) UE sensation intact  -AC       Row Name 06/03/24 1417          Range of Motion Comprehensive    Comment, General Range of Motion R shld limited 50%  -AC       Row Name 06/03/24 1417          Strength Comprehensive (MMT)    Comment, General Manual Muscle Testing (MMT) Assessment R shld 2+/5,  BUE grossly 4-/5  -AC       Row Name 06/03/24 1417          Balance    Balance Assessment sitting static balance;standing static balance;standing dynamic balance  -AC     Static Sitting Balance minimal assist  -AC     Position, Sitting Balance sitting edge of bed  -AC     Static Standing Balance verbal cues;moderate assist;2-person assist  -AC     Dynamic Standing Balance verbal cues;moderate assist;2-person assist  -AC     Position/Device Used, Standing Balance supported;walker, front-wheeled  -AC               User Key  (r) = Recorded By, (t) = Taken By, (c) = Cosigned By      Initials Name Provider Type    Carrie Petersen, OT Occupational Therapist                   Goals/Plan       Row Name 06/03/24 1423          Bed Mobility Goal 1 (OT)    Activity/Assistive Device (Bed Mobility Goal 1, OT) sit to supine;supine to sit  -AC     [place] Level/Cues Needed (Bed Mobility Goal 1, OT) verbal cues required;minimum assist (75% or more patient effort)  -AC     Time Frame (Bed Mobility Goal 1, OT) long term goal (LTG);10 days  -AC     Progress/Outcomes (Bed Mobility Goal 1, OT) new goal;goal ongoing  -       Row Name 06/03/24 1423          Transfer Goal 1 (OT)    Activity/Assistive Device (Transfer Goal 1, OT) bed-to-chair/chair-to-bed;toilet  -AC     [place] Level/Cues Needed  (Transfer Goal 1, OT) verbal cues required;minimum assist (75% or more patient effort)  -AC     Time Frame (Transfer Goal 1, OT) long term goal (LTG);10 days  -AC     Progress/Outcome (Transfer Goal 1, OT) new goal;goal ongoing  -AC       Row Name 06/03/24 1423          Grooming Goal 1 (OT)    Activity/Device (Grooming Goal 1, OT) oral care  -AC     [place] (Grooming Goal 1, OT) set-up required;contact guard required  -AC     Time Frame (Grooming Goal 1, OT) short term goal (STG);5 days  -AC     Strategies/Barriers (Grooming Goal 1, OT) seated EOB  -AC     Progress/Outcome (Grooming Goal 1, OT) new goal;goal ongoing  -AC       Row Name 06/03/24 1423          Therapy Assessment/Plan (OT)    Planned Therapy Interventions (OT) activity tolerance training;BADL retraining;functional balance retraining;occupation/activity based interventions;patient/caregiver education/training;transfer/mobility retraining;strengthening exercise  -AC               User Key  (r) = Recorded By, (t) = Taken By, (c) = Cosigned By      Initials Name Provider Type    AC Carrie Kwok, OT Occupational Therapist                   Clinical Impression       Row Name 06/03/24 1418          Pain Assessment    Pretreatment Pain Rating 0/10 - no pain  -AC     Posttreatment Pain Rating 0/10 - no pain  -AC       Row Name 06/03/24 1418          Plan of Care Review    Plan of Care Reviewed With patient  -AC     Outcome Evaluation Pt presents below baseline with self care/mobility d/t fear of falling, weakness, decr balance and activity tolerance. Pt mod A to brush hair, supervision cup to mouth, mod A x2 to take 3 side steps toward HOB with RW.  Recommend SNF upon d/c.  -       Row Name 06/03/24 1418          Therapy Assessment/Plan (OT)    Criteria for Skilled Therapeutic Interventions Met (OT) yes;skilled treatment is necessary  -AC     Therapy Frequency (OT) daily  -AC       Row Name 06/03/24 1418          Therapy Plan Review/Discharge Plan (OT)     Anticipated Discharge Disposition (OT) skilled nursing facility  -       Row Name 06/03/24 1418          Positioning and Restraints    Pre-Treatment Position in bed  -AC     Post Treatment Position bed  -AC     In Bed notified nsg;supine;call light within reach;encouraged to call for assist;exit alarm on;with nsg;heels elevated  -AC               User Key  (r) = Recorded By, (t) = Taken By, (c) = Cosigned By      Initials Name Provider Type    Carrie Petersen, OT Occupational Therapist                   Outcome Measures       Row Name 06/03/24 1424          How much help from another is currently needed...    Putting on and taking off regular lower body clothing? 1  -AC     Bathing (including washing, rinsing, and drying) 2  -AC     Toileting (which includes using toilet bed pan or urinal) 1  -AC     Taking care of personal grooming (such as brushing teeth) 2  -AC     Eating meals 3  -AC       Row Name 06/03/24 0800          How much help from another person do you currently need...    Turning from your back to your side while in flat bed without using bedrails? 3  -DM     Moving from lying on back to sitting on the side of a flat bed without bedrails? 3  -DM     Moving to and from a bed to a chair (including a wheelchair)? 2  -DM     Standing up from a chair using your arms (e.g., wheelchair, bedside chair)? 2  -DM     Climbing 3-5 steps with a railing? 2  -DM     To walk in hospital room? 2  -DM     AM-PAC 6 Clicks Score (PT) 14  -DM     Highest Level of Mobility Goal 4 --> Transfer to chair/commode  -DM       Row Name 06/03/24 1424          Functional Assessment    Outcome [employer] AM-PAC 6 Clicks Daily Activity (OT)  -               User Key  (r) = Recorded By, (t) = Taken By, (c) = Cosigned By      Initials Name Provider Type    Carrie Petersen OT Occupational Therapist    Cherri Covarrubias, RN Registered Nurse                    Occupational Therapy Education       Title: PT OT SLP Therapies  (In Progress)       Topic: Occupational Therapy (In Progress)       Point: ADL training (In Progress)       Description:   Instruct learner(s) on proper safety adaptation and remediation techniques during self care or transfers.   Instruct in proper use of assistive devices.                  Learning Progress Summary             Patient Acceptance, E, NR by  at 6/3/2024 1557                         Point: Home exercise program (Not Started)       Description:   Instruct learner(s) on appropriate technique for monitoring, assisting and/or progressing therapeutic exercises/activities.                  Learner Progress:  Not documented in this visit.              Point: Precautions (Not Started)       Description:   Instruct learner(s) on prescribed precautions during self-care and functional transfers.                  Learner Progress:  Not documented in this visit.              Point: Body mechanics (Not Started)       Description:   Instruct learner(s) on proper positioning and spine alignment during self-care, functional mobility activities and/or exercises.                  Learner Progress:  Not documented in this visit.                              User Key       Initials Effective Dates Name Provider Type Discipline     02/03/23 -  Carrie Kwok, OT Occupational Therapist OT                  OT Recommendation and Plan  Planned Therapy Interventions (OT): activity tolerance training, BADL retraining, functional balance retraining, occupation/activity based interventions, patient/caregiver education/training, transfer/mobility retraining, strengthening exercise  Therapy Frequency (OT): daily  Plan of Care Review  Plan of Care Reviewed With: patient  Outcome Evaluation: Pt presents below baseline with self care/mobility d/t fear of falling, weakness, decr balance and activity tolerance. Pt mod A to brush hair, supervision cup to mouth, mod A x2 to take 3 side steps toward HOB with RW.  Recommend SNF upon  d/c.     Time Calculation:   Evaluation Complexity (OT)  Review Occupational Profile/Medical/Therapy History Complexity: expanded/moderate complexity  Assessment, Occupational Performance/Identification of Deficit Complexity: 3-5 performance deficits  Clinical Decision Making Complexity (OT): detailed assessment/moderate complexity  Overall Complexity of Evaluation (OT): moderate complexity     Time Calculation- OT       Row Name 24 1310             Time Calculation- OT    OT Start Time 1310  -AC      OT Received On 24  -AC      OT Goal Re-Cert Due Date 24  -AC         Untimed Charges    OT Eval/Re-eval Minutes 55  -AC         Total Minutes    Untimed Charges Total Minutes 55  -AC       Total Minutes 55  -AC                User Key  (r) = Recorded By, (t) = Taken By, (c) = Cosigned By      Initials Name Provider Type    AC Carrie Kwok, OT Occupational Therapist                  Therapy Charges for Today       Code Description Service Date Service Provider Modifiers Qty    45911107866  OT EVAL MOD COMPLEXITY 4 6/3/2024 Carrie Kwok OT GO 1    [id number]  OT THER SUPP EA 15 MIN 6/3/2024 Carrie Kwok OT GO 2                 Carrie Kwok OT  6/3/2024    Electronically signed by Carrie Kwok OT at 24 1430          Speech Language Pathology Notes (most recent note)        Sinai Dunn MS CCC-SLP at 24 1157          Acute Care - Speech Language Pathology   Swallow Initial Evaluation [facility]  Clinical Swallow Evaluation     Patient Name: Susan Tucker  : 1935  MRN: [id number]  Today's Date: 2024               Admit Date: 2024    Visit Dx:     ICD-10-CM ICD-9-CM   1. Generalized weakness  R53.1 780.79   2. Hypotensive episode  I95.9 458.9   3. Unwitnessed fall  R29.6 [id number]   4. Altered mental status, unspecified altered mental status type  R41.82 780.97   5. Hypokalemia  E87.6 276.8   6. Dysphagia, unspecified type  R13.10 787.20     Patient Active  Problem List   Diagnosis    Sepsis    Elevated liver enzymes    Dehydration    Hypertension    History of Stroke    Acute UTI (urinary tract infection)    Calculus of bile duct with acute cholecystitis and obstruction    Enterocolitis    Gallstone ileus of small intestine    Syncope and collapse    Frequent falls    Type 2 diabetes mellitus without complication    Hyperlipidemia    Hypokalemia    Weakness generalized    Unwitnessed fall    History of diabetes mellitus, type II    Dementia without behavioral disturbance    Hypoglycemia    Generalized weakness    Moderate malnutrition    Community acquired pneumonia of left lower lobe of lung     Past Medical History:   Diagnosis Date    Diabetes mellitus     Elevated cholesterol     Hypertension     Stroke      Past Surgical History:   Procedure Laterality Date    ERCP N/A 6/27/2018    Procedure: ENDOSCOPIC RETROGRADE CHOLANGIOPANCREATOGRAPHY;  Surgeon: Liam Manning MD;  Location:  [facility];  Service: Gastroenterology    EXPLORATORY LAPAROTOMY N/A 2/10/2020    Procedure: LAPAROTOMY EXPLORATORY BOWEL RESECTION;  Surgeon: Carlos Zurita MD;  Location:  [facility];  Service: General;  Laterality: N/A;    HYSTERECTOMY         SLP Recommendation and Plan  SLP Swallowing Diagnosis: R/O pharyngeal dysphagia (06/05/24 1050)  SLP Diet Recommendation: mechanical ground textures, thin liquids (as tolerated) (06/05/24 1050)  Recommended Precautions and Strategies: upright posture during/after eating, general aspiration precautions, 1:1 supervision, assist with feeding (06/05/24 1050)  SLP Rec. for Method of Medication Administration: meds whole, meds crushed, with puree, as tolerated (06/05/24 1050)     Monitor for Signs of Aspiration: yes, notify SLP if any concerns (06/05/24 1050)  Recommended Diagnostics: VFSS (MBS), other (see comments) (consider if aligns w/ GOC--RN reported dtr/POA arriving this afternoon; will attempt to discuss w/ her) (06/05/24  1050)  Swallow Criteria for Skilled Therapeutic Interventions Met: demonstrates skilled criteria (06/05/24 1050)  Anticipated Discharge Disposition (SLP): skilled nursing facility (06/05/24 1050)  Rehab Potential/Prognosis, Swallowing: re-evaluate goals as necessary (06/05/24 1050)        Oral Care Recommendations: Oral Care BID/PRN, Toothbrush (06/05/24 1050)                                        Plan of Care Reviewed With: patient      SWALLOW EVALUATION (Last 72 Hours)       SLP Adult Swallow Evaluation       Row Name 06/05/24 1050                   Rehab Evaluation    Document Type evaluation  -AC        Subjective Information no complaints  -AC        Patient Observations alert;cooperative  -AC        Patient/Family/Caregiver Comments/Observations Pt pleasantly confused. No family present.  -AC        Patient Effort good  -AC           General Information    Patient Profile Reviewed yes  -AC        Pertinent History Of Current Problem Adm from [facility] after fall. General weakness, malnutrition, LLL pna. General cog/strength decline per chart. Hx CVA, dementia, uses wheelchair. Consulted 2' difficulty swallowing pills.  -AC        Current Method of Nutrition mechanical ground textures;thin liquids  -AC        Precautions/Limitations, Vision WFL;for purposes of eval  -AC        Precautions/Limitations, Hearing WFL;for purposes of eval  -AC        Prior Level of Function-Communication cognitive-linguistic impairment  -AC        Prior Level of Function-Swallowing --  pt indicated softer textures 2' dentition  -AC        Plans/Goals Discussed with patient;agreed upon  -AC        Barriers to Rehab cognitive status  -AC        Patient's Goals for Discharge patient did not state  -AC           Pain    Additional Documentation Pain Scale: FACES Pre/Post-Treatment (Group)  -AC           Pain Scale: FACES Pre/Post-Treatment    Pain: FACES Scale, Pretreatment 0-->no hurt  -AC        Posttreatment Pain Rating 0-->no hurt  -AC            Oral Motor Structure and Function    Dentition Assessment missing teeth;teeth are in poor condition  -AC        Secretion Management WNL/WFL  -AC        Mucosal Quality moist, healthy  -AC        Volitional Cough weak  -AC           Oral Musculature and Cranial Nerve Assessment    Oral Motor General Assessment generalized oral motor weakness  -           General Eating/Swallowing Observations    Respiratory Support Currently in Use nasal cannula  -        Eating/Swallowing Skills fed by SLP;self-fed;needed assist  -AC        Positioning During Eating upright 90 degree;upright in bed  -AC        Utensils Used spoon;cup;straw  -AC        Consistencies Trialed thin liquids;nectar/syrup-thick liquids;pureed;soft to chew textures;chopped  -           Clinical Swallow Eval    Oral Prep Phase impaired  -AC        Oral Transit WFL  -AC        Oral Residue WFL  -AC        Clinical Swallow Evaluation Summary Noted strained, falsetto, weak vocal quality. Congested coughing noted @ baseline, prior to PO trials, and continued t/o eval. No difference w/ nectar-thick liquids. No aversion. Difficult to r/o aspiration clinically @ bedside. Generally at risk.  -AC           Oral Prep Concerns    Oral Prep Concerns increased prep time  -AC        Increased Prep Time regular consistencies  -AC        Oral Prep Concerns, Comment Suspect largely 2' dental status.  -AC           SLP Evaluation Clinical Impression    SLP Swallowing Diagnosis R/O pharyngeal dysphagia  -        Functional Impact risk of aspiration/pneumonia;risk of malnutrition;risk of dehydration  -        Rehab Potential/Prognosis, Swallowing re-evaluate goals as necessary  -        Swallow Criteria for Skilled Therapeutic Interventions Met demonstrates skilled criteria  -AC           Recommendations    SLP Diet Recommendation mechanical ground textures;thin liquids  as tolerated  -        Recommended Diagnostics VFSS (MBS);other (see comments)   consider if aligns w/ GOC--RN reported dtr/POA arriving this afternoon; will attempt to discuss w/ her  -AC        Recommended Precautions and Strategies upright posture during/after eating;general aspiration precautions;1:1 supervision;assist with feeding  -AC        Oral Care Recommendations Oral Care BID/PRN;Toothbrush  -AC        SLP Rec. for Method of Medication Administration meds whole;meds crushed;with puree;as tolerated  -AC        Monitor for Signs of Aspiration yes;notify SLP if any concerns  -AC        Anticipated Discharge Disposition (SLP) skilled nursing facility  -                  User Key  (r) = Recorded By, (t) = Taken By, (c) = Cosigned By      Initials Name Effective Dates     Sinai Dunn MS CCC-SLP 02/03/23 -                     EDUCATION  The patient has been educated in the following areas:   Dysphagia (Swallowing Impairment) Modified Diet Instruction.                Time Calculation:    Time Calculation- SLP       Row Name 06/05/24 1156             Time Calculation- SLP    SLP Start Time 1050  -      SLP Received On 06/05/24  -         Untimed Charges    [id number] Eval Oral Pharyng Swallow Minutes 40  -AC         Total Minutes    Untimed Charges Total Minutes 40  -AC       Total Minutes 40  -AC                User Key  (r) = Recorded By, (t) = Taken By, (c) = Cosigned By      Initials Name Provider Type     Sinai Dunn MS CCC-SLP Speech and Language Pathologist                    Therapy Charges for Today       Code Description Service Date Service Provider Modifiers Qty    [id number] HC ST EVAL ORAL PHARYNG SWALLOW 3 6/5/2024 Sinai Dunn MS CCC-SLP GN 1                 Sinai Dunn MS CCC-SLP  6/5/2024    Electronically signed by Sinai Dunn MS CCC-SLP at 06/05/24 9832

## 2024-06-05 NOTE — THERAPY TREATMENT NOTE
Patient Name: Susan Tucker  : 1935    MRN: [id number]                              Today's Date: 2024       Admit Date: 2024    Visit Dx:     ICD-10-CM ICD-9-CM   1. Generalized weakness  R53.1 780.79   2. Hypotensive episode  I95.9 458.9   3. Unwitnessed fall  R29.6 [id number]   4. Altered mental status, unspecified altered mental status type  R41.82 780.97   5. Hypokalemia  E87.6 276.8   6. Dysphagia, unspecified type  R13.10 787.20     Patient Active Problem List   Diagnosis    Sepsis    Elevated liver enzymes    Dehydration    Hypertension    History of Stroke    Acute UTI (urinary tract infection)    Calculus of bile duct with acute cholecystitis and obstruction    Enterocolitis    Gallstone ileus of small intestine    Syncope and collapse    Frequent falls    Type 2 diabetes mellitus without complication    Hyperlipidemia    Hypokalemia    Weakness generalized    Unwitnessed fall    History of diabetes mellitus, type II    Dementia without behavioral disturbance    Hypoglycemia    Generalized weakness    Moderate malnutrition    Community acquired pneumonia of left lower lobe of lung     Past Medical History:   Diagnosis Date    Diabetes mellitus     Elevated cholesterol     Hypertension     Stroke      Past Surgical History:   Procedure Laterality Date    ERCP N/A 2018    Procedure: ENDOSCOPIC RETROGRADE CHOLANGIOPANCREATOGRAPHY;  Surgeon: Liam Manning MD;  Location: [facility] ENDOSCOPY;  Service: Gastroenterology    EXPLORATORY LAPAROTOMY N/A 2/10/2020    Procedure: LAPAROTOMY EXPLORATORY BOWEL RESECTION;  Surgeon: Carlos Zurita MD;  Location: [facility] OR;  Service: General;  Laterality: N/A;    HYSTERECTOMY        General Information       Row Name 24 1458          Physical Therapy Time and Intention    Document Type therapy note (daily note)  -ND     Mode of Treatment physical therapy  -ND       Row Name 24 1458          General Information    Patient Profile Reviewed  yes  -ND     Existing Precautions/Restrictions fall;other (see comments)  Severe fear of falling. Confused.  -ND     Barriers to Rehab medically complex;previous functional deficit;cognitive status  -ND       Row Name 06/05/24 1458          Cognition    Orientation Status (Cognition) oriented to;person;disoriented to;place;situation;time  -ND       Row Name 06/05/24 1458          Safety Issues, Functional Mobility    Safety Issues Affecting Function (Mobility) at risk behavior observed;awareness of need for assistance;insight into deficits/self-awareness;judgment;problem-solving;safety precaution awareness;safety precautions follow-through/compliance;sequencing abilities  -ND     Impairments Affecting Function (Mobility) balance;cognition;endurance/activity tolerance;motor planning;postural/trunk control;strength  -ND     Cognitive Impairments, Mobility Safety/Performance awareness, need for assistance;insight into deficits/self-awareness;judgment;problem-solving/reasoning;safety precaution awareness;safety precaution follow-through;sequencing abilities  -ND               User Key  (r) = Recorded By, (t) = Taken By, (c) = Cosigned By      Initials Name Provider Type    ND Sagrario Joseph PT Physical Therapist                   Mobility       Row Name 06/05/24 1459          Bed Mobility    Bed Mobility rolling left;rolling right  -ND     Rolling Left [place] (Bed Mobility) 1 person assist;verbal cues;nonverbal cues (demo/gesture);minimum assist (75% patient effort)  -ND     Rolling Right [place] (Bed Mobility) minimum assist (75% patient effort);1 person assist;verbal cues;nonverbal cues (demo/gesture)  -ND     Supine-Sit [place] (Bed Mobility) moderate assist (50% patient effort);1 person assist;verbal cues;nonverbal cues (demo/gesture)  -ND     Sit-Supine [place] (Bed Mobility) maximum assist (25% patient effort);1 person assist;verbal cues;nonverbal cues (demo/gesture)  -ND     Assistive  Device (Bed Mobility) bed rails;head of bed elevated;draw sheet  -ND     Comment, (Bed Mobility) Supine <> sit with cues for sequencing and assist at trunk and BLE. Pt rolling L and R with both bed rails up to ease pt fears of falling and mod-A.  -ND       Row Name 06/05/24 1459          Bed-Chair Transfer    Comment, (Bed-Chair Transfer) Deferred due to pt fatigue.  -ND       Row Name 06/05/24 1459          Sit-Stand Transfer    Sit-Stand [place] (Transfers) maximum assist (25% patient effort);1 person assist;verbal cues;nonverbal cues (demo/gesture)  -ND     Assistive Device (Sit-Stand Transfers) other (see comments)  BUE support initially and then bedrails on either side of pt sitting EOB.  -ND     Comment, (Sit-Stand Transfer) Pt required max encouragement and reassurance to attempt STS from EOB x2. Pt achieves 75% upright posture before fear sets in and pt requests to return to sitting. Bilateral foot and knee block provided for both attempts.  -ND       Row Name 06/05/24 1450          Gait/Stairs (Locomotion)    [place] Level (Gait) unable to assess  -ND     Comment, (Gait/Stairs) Unable to assess due to significant fear of falling.  -ND               User Key  (r) = Recorded By, (t) = Taken By, (c) = Cosigned By      Initials Name Provider Type    Sagrario Hurd, PT Physical Therapist                   Obj/Interventions       Row Name 06/05/24 1508          Motor Skills    Therapeutic Exercise hip;knee;ankle  -ND       Row Name 06/05/24 1508          Hip (Therapeutic Exercise)    Hip (Therapeutic Exercise) strengthening exercise  -ND     Hip Strengthening (Therapeutic Exercise) bilateral;marching while seated;10 repetitions  -ND       Row Name 06/05/24 1508          Knee (Therapeutic Exercise)    Knee (Therapeutic Exercise) strengthening exercise  -ND     Knee Strengthening (Therapeutic Exercise) bilateral;LAQ (long arc quad);10 repetitions  -ND       Row Name 06/05/24 1508          Ankle  (Therapeutic Exercise)    Ankle (Therapeutic Exercise) AROM (active range of motion)  -ND     Ankle AROM (Therapeutic Exercise) bilateral;dorsiflexion;plantarflexion;10 repetitions  -ND       Row Name 06/05/24 1508          Balance    Balance Assessment sitting static balance;sitting dynamic balance;sit to stand dynamic balance  -ND     Static Sitting Balance minimal assist  -ND     Dynamic Sitting Balance minimal assist  -ND     Position, Sitting Balance unsupported;sitting edge of bed  -ND     Sit to Stand Dynamic Balance maximum assist;1-person assist;verbal cues;non-verbal cues (demo/gesture)  -ND     Static Standing Balance maximum assist;1-person assist;verbal cues;non-verbal cues (demo/gesture)  -ND     Dynamic Standing Balance unable to assess  -ND     Position/Device Used, Standing Balance supported;other (see comments)  BUE support and bilateral bed rails.  -ND     Balance Interventions sitting;standing;sit to stand;supported;trunk training exercise;dynamic reaching  -ND     Comment, Balance Due to pt significant fear of falling pt requires increased cues to maintain BUE on bedrails to maintain sitting balance CGA, when pt forgets sitting balance is min-A. Dynamic reaching and trunk training for anterior lean practiced to reinforce pt ability to perform mobility tasks and facilitate skills required to initiate STS with increased independence.  -ND               User Key  (r) = Recorded By, (t) = Taken By, (c) = Cosigned By      Initials Name Provider Type    Sagrario Hurd PT Physical Therapist                   Goals/Plan    No documentation.                  Clinical Impression       Row Name 06/05/24 1511          Pain    Pretreatment Pain Rating 0/10 - no pain  -ND     Posttreatment Pain Rating 0/10 - no pain  -ND     Additional Documentation Pain Scale: FACES Pre/Post-Treatment (Group)  -ND       Row Name 06/05/24 1511          Pain Scale: FACES Pre/Post-Treatment    Pain: FACES Scale,  Pretreatment 0-->no hurt  -ND     Posttreatment Pain Rating 0-->no hurt  -ND       Row Name 06/05/24 1511          Plan of Care Review    Plan of Care Reviewed With patient  -ND     Progress no change  -ND     Outcome Evaluation Pt continues to be limited in ability to perform mobility tasks secondary to significant fear of falling as well as generalized weakness, balance and coordination deficits, difficulty sequencing tasks, and limited activity tolerance warranting need for continued skilled theray to facilitate improved functional strength and mobility. Recommend return to previous facility with HH PT following d/c.  -ND       Row Name 06/05/24 1511          Vital Signs    Pre Systolic BP Rehab 154  -ND     Pre Treatment Diastolic BP 94  -ND     Pretreatment Heart Rate (beats/min) 100  -ND     Posttreatment Heart Rate (beats/min) 103  -ND     O2 Delivery Pre Treatment nasal cannula  -ND     O2 Delivery Intra Treatment nasal cannula  -ND     O2 Delivery Post Treatment nasal cannula  -ND     Pre Patient Position Supine  -ND     Intra Patient Position Standing  -ND     Post Patient Position Supine  -ND       Row Name 06/05/24 1511          Positioning and Restraints    Pre-Treatment Position in bed  -ND     Post Treatment Position bed  -ND     In Bed notified nsg;supine;call light within reach;encouraged to call for assist;exit alarm on;side rails up x3;heels elevated  -ND               User Key  (r) = Recorded By, (t) = Taken By, (c) = Cosigned By      Initials Name Provider Type    Sagrario Hurd, PT Physical Therapist                   Outcome Measures       Row Name 06/05/24 1514 06/05/24 0900       How much help from another person do you currently need...    Turning from your back to your side while in flat bed without using bedrails? 3  -ND 3  -LS (r)  MW (c)    Moving from lying on back to sitting on the side of a flat bed without bedrails? 2  -ND 2  -LS (r)  MW (c)    Moving to and from a bed to a  chair (including a wheelchair)? 2  -ND 2  -LS (r)  MW (c)    Standing up from a chair using your arms (e.g., wheelchair, bedside chair)? 2  -ND 2  -LS (r)  MW (c)    Climbing 3-5 steps with a railing? 1  -ND 1  -LS (r)  MW (c)    To walk in hospital room? 2  -ND 2  -LS (r)  MW (c)    AM-PAC 6 Clicks Score (PT) 12  -ND 12  -LS    Highest Level of Mobility Goal 4 --> Transfer to chair/commode  -ND 4 --> Transfer to chair/commode  -LS      Row Name 06/05/24 1514          Functional Assessment    Outcome Measure Options AM-PAC 6 Clicks Basic Mobility (PT)  -ND               User Key  (r) = Recorded By, (t) = Taken By, (c) = Cosigned By      Initials Name Provider Type    ND Sagrario Joseph, PT Physical Therapist    Ryder Christianson, RN Registered Nurse    Deisy Reina RNA Registered Nurse                                 Physical Therapy Education       Title: PT OT SLP Therapies (In Progress)       Topic: Physical Therapy (In Progress)       Point: Mobility training (In Progress)       Learning Progress Summary             Patient Acceptance, E, NR by ND at 6/5/2024 1514    Acceptance, E, VU,NR by  at 6/2/2024 0915                         Point: Home exercise program (In Progress)       Learning Progress Summary             Patient Acceptance, E, NR by ND at 6/5/2024 1514                         Point: Body mechanics (In Progress)       Learning Progress Summary             Patient Acceptance, E, NR by ND at 6/5/2024 1514                         Point: Precautions (In Progress)       Learning Progress Summary             Patient Acceptance, E, NR by ND at 6/5/2024 1514                                         User Key       Initials Effective Dates Name Provider Type Discipline     07/11/23 -  Janki Campa, PT Physical Therapist PT    ND 11/16/23 -  Sagrario Joseph, ANI Physical Therapist PT                  PT Recommendation and Plan     Plan of Care Reviewed With: patient  Progress: no  change  Outcome Evaluation: Pt continues to be limited in ability to perform mobility tasks secondary to significant fear of falling as well as generalized weakness, balance and coordination deficits, difficulty sequencing tasks, and limited activity tolerance warranting need for continued skilled theray to facilitate improved functional strength and mobility. Recommend return to previous facility with HH PT following d/c.     Time Calculation:         PT Charges       Row Name 06/05/24 1515             Time Calculation    Start Time 1430  -ND      PT Received On 06/05/24  -ND         Timed Charges    26992 - PT Therapeutic Exercise Minutes 8  -ND      41427 - PT Therapeutic Activity Minutes 15  -ND         Total Minutes    Timed Charges Total Minutes 23  -ND       Total Minutes 23  -ND                User Key  (r) = Recorded By, (t) = Taken By, (c) = Cosigned By      Initials Name Provider Type    ND Sagrario Joseph, ANI Physical Therapist                  Therapy Charges for Today       Code Description Service Date Service Provider Modifiers Qty    12480201841  PT THER PROC EA 15 MIN 6/5/2024 Sagrario Joseph, PT GP 1    46547333301 HC PT THERAPEUTIC ACT EA 15 MIN 6/5/2024 Sagrario Joseph, PT GP 1            PT G-Codes  Outcome Measure Options: AM-PAC 6 Clicks Basic Mobility (PT)  AM-PAC 6 Clicks Score (PT): 12  PT Discharge Summary  Anticipated Discharge Disposition (PT): extended care facility, home with home health (Return to previous facility with HH PT.)    Sagrario Joseph PT  6/5/2024

## 2024-06-05 NOTE — THERAPY EVALUATION
Acute Care - Speech Language Pathology   Swallow Initial Evaluation [facility]  Clinical Swallow Evaluation     Patient Name: Susan Tucker  : 1935  MRN: [id number]  Today's Date: 2024               Admit Date: 2024    Visit Dx:     ICD-10-CM ICD-9-CM   1. Generalized weakness  R53.1 780.79   2. Hypotensive episode  I95.9 458.9   3. Unwitnessed fall  R29.6 [id number]   4. Altered mental status, unspecified altered mental status type  R41.82 780.97   5. Hypokalemia  E87.6 276.8   6. Dysphagia, unspecified type  R13.10 787.20     Patient Active Problem List   Diagnosis    Sepsis    Elevated liver enzymes    Dehydration    Hypertension    History of Stroke    Acute UTI (urinary tract infection)    Calculus of bile duct with acute cholecystitis and obstruction    Enterocolitis    Gallstone ileus of small intestine    Syncope and collapse    Frequent falls    Type 2 diabetes mellitus without complication    Hyperlipidemia    Hypokalemia    Weakness generalized    Unwitnessed fall    History of diabetes mellitus, type II    Dementia without behavioral disturbance    Hypoglycemia    Generalized weakness    Moderate malnutrition    Community acquired pneumonia of left lower lobe of lung     Past Medical History:   Diagnosis Date    Diabetes mellitus     Elevated cholesterol     Hypertension     Stroke      Past Surgical History:   Procedure Laterality Date    ERCP N/A 2018    Procedure: ENDOSCOPIC RETROGRADE CHOLANGIOPANCREATOGRAPHY;  Surgeon: Liam Manning MD;  Location: [facility] ENDOSCOPY;  Service: Gastroenterology    EXPLORATORY LAPAROTOMY N/A 2/10/2020    Procedure: LAPAROTOMY EXPLORATORY BOWEL RESECTION;  Surgeon: Carlos Zurita MD;  Location: [facility] OR;  Service: General;  Laterality: N/A;    HYSTERECTOMY         SLP Recommendation and Plan  SLP Swallowing Diagnosis: R/O pharyngeal dysphagia (24 1050)  SLP Diet Recommendation: mechanical ground textures, thin liquids (as tolerated)  (06/05/24 1050)  Recommended Precautions and Strategies: upright posture during/after eating, general aspiration precautions, 1:1 supervision, assist with feeding (06/05/24 1050)  SLP Rec. for Method of Medication Administration: meds whole, meds crushed, with puree, as tolerated (06/05/24 1050)     Monitor for Signs of Aspiration: yes, notify SLP if any concerns (06/05/24 1050)  Recommended Diagnostics: VFSS (MBS), other (see comments) (consider if aligns w/ GOC--RN reported dtr/POA arriving this afternoon; will attempt to discuss w/ her) (06/05/24 1050)  Swallow Criteria for Skilled Therapeutic Interventions Met: demonstrates skilled criteria (06/05/24 1050)  Anticipated Discharge Disposition (SLP): skilled nursing facility (06/05/24 1050)  Rehab Potential/Prognosis, Swallowing: re-evaluate goals as necessary (06/05/24 1050)        Oral Care Recommendations: Oral Care BID/PRN, Toothbrush (06/05/24 1050)                                        Plan of Care Reviewed With: patient    ADDENDUM 6/5 7722: Spoke to pt's dtr/healthcare surrogate, Dinah. She would like to proceed w/ MBS to gain insight into her mother's swallowing difficulty and whether or not possible to reduce aspiration risk. Will plan for MBS tomorrow.     SWALLOW EVALUATION (Last 72 Hours)       SLP Adult Swallow Evaluation       Row Name 06/05/24 1050                   Rehab Evaluation    Document Type evaluation  -AC        Subjective Information no complaints  -AC        Patient Observations alert;cooperative  -AC        Patient/Family/Caregiver Comments/Observations Pt pleasantly confused. No family present.  -AC        Patient Effort good  -AC           General Information    Patient Profile Reviewed yes  -AC        Pertinent History Of Current Problem Adm from [facility] after fall. General weakness, malnutrition, LLL pna. General cog/strength decline per chart. Hx CVA, dementia, uses wheelchair. Consulted 2' difficulty swallowing pills.  -AC         Current Method of Nutrition mechanical ground textures;thin liquids  -AC        Precautions/Limitations, Vision WFL;for purposes of eval  -AC        Precautions/Limitations, Hearing WFL;for purposes of eval  -AC        Prior Level of Function-Communication cognitive-linguistic impairment  -AC        Prior Level of Function-Swallowing --  pt indicated softer textures 2' dentition  -AC        Plans/Goals Discussed with patient;agreed upon  -AC        Barriers to Rehab cognitive status  -AC        Patient's Goals for Discharge patient did not state  -AC           Pain    Additional Documentation Pain Scale: FACES Pre/Post-Treatment (Group)  -AC           Pain Scale: FACES Pre/Post-Treatment    Pain: FACES Scale, Pretreatment 0-->no hurt  -AC        Posttreatment Pain Rating 0-->no hurt  -AC           Oral Motor Structure and Function    Dentition Assessment missing teeth;teeth are in poor condition  -AC        Secretion Management WNL/WFL  -AC        Mucosal Quality moist, healthy  -AC        Volitional Cough weak  -AC           Oral Musculature and Cranial Nerve Assessment    Oral Motor General Assessment generalized oral motor weakness  -AC           General Eating/Swallowing Observations    Respiratory Support Currently in Use nasal cannula  -AC        Eating/Swallowing Skills fed by SLP;self-fed;needed assist  -AC        Positioning During Eating upright 90 degree;upright in bed  -AC        Utensils Used spoon;cup;straw  -AC        Consistencies Trialed thin liquids;nectar/syrup-thick liquids;pureed;soft to chew textures;chopped  -AC           Clinical Swallow Eval    Oral Prep Phase impaired  -AC        Oral Transit WFL  -AC        Oral Residue WFL  -AC        Clinical Swallow Evaluation Summary Noted strained, falsetto, weak vocal quality. Congested coughing noted @ baseline, prior to PO trials, and continued t/o eval. No difference w/ nectar-thick liquids. No aversion. Difficult to r/o aspiration clinically @  bedside. Generally at risk.  -           Oral Prep Concerns    Oral Prep Concerns increased prep time  -AC        Increased Prep Time regular consistencies  -AC        Oral Prep Concerns, Comment Suspect largely 2' dental status.  -           SLP Evaluation Clinical Impression    SLP Swallowing Diagnosis R/O pharyngeal dysphagia  -        Functional Impact risk of aspiration/pneumonia;risk of malnutrition;risk of dehydration  -        Rehab Potential/Prognosis, Swallowing re-evaluate goals as necessary  -        Swallow Criteria for Skilled Therapeutic Interventions Met demonstrates skilled criteria  -           Recommendations    SLP Diet Recommendation mechanical ground textures;thin liquids  as tolerated  -        Recommended Diagnostics VFSS (MBS);other (see comments)  consider if aligns w/ GOC--RN reported dtr/POA arriving this afternoon; will attempt to discuss w/ her  -        Recommended Precautions and Strategies upright posture during/after eating;general aspiration precautions;1:1 supervision;assist with feeding  -        Oral Care Recommendations Oral Care BID/PRN;Toothbrush  -        SLP Rec. for Method of Medication Administration meds whole;meds crushed;with puree;as tolerated  -        Monitor for Signs of Aspiration yes;notify SLP if any concerns  -        Anticipated Discharge Disposition (SLP) skilled nursing facility  -                  User Key  (r) = Recorded By, (t) = Taken By, (c) = Cosigned By      Initials Name Effective Dates     Sinai Dnun, MS [facility]-SLP 02/03/23 -                     EDUCATION  The patient has been educated in the following areas:   Dysphagia (Swallowing Impairment) Modified Diet Instruction.                Time Calculation:    Time Calculation- SLP       Row Name 06/05/24 1156             Time Calculation- SLP    SLP Start Time 1050  -      SLP Received On 06/05/24  -         Untimed Charges    29400-CH Eval Oral Pharyng Swallow Minutes  40  -AC         Total Minutes    Untimed Charges Total Minutes 40  -AC       Total Minutes 40  -AC                User Key  (r) = Recorded By, (t) = Taken By, (c) = Cosigned By      Initials Name Provider Type    AC Sinai Dunn MS CCC-SLP Speech and Language Pathologist                    Therapy Charges for Today       Code Description Service Date Service Provider Modifiers Qty    59705732952 HC ST EVAL ORAL PHARYNG SWALLOW 3 6/5/2024 Sinai Dunn MS CCC-SLP GN 1                 Sinai Dunn MS CCC-SLP  6/5/2024

## 2024-06-06 ENCOUNTER — APPOINTMENT (OUTPATIENT)
Dept: GENERAL RADIOLOGY | Facility: HOSPITAL | Age: 89
End: 2024-06-06
Payer: MEDICARE

## 2024-06-06 LAB
GLUCOSE BLDC GLUCOMTR-MCNC: 132 MG/DL (ref 70–130)
GLUCOSE BLDC GLUCOMTR-MCNC: 63 MG/DL (ref 70–130)
GLUCOSE BLDC GLUCOMTR-MCNC: 74 MG/DL (ref 70–130)
GLUCOSE BLDC GLUCOMTR-MCNC: 74 MG/DL (ref 70–130)
GLUCOSE BLDC GLUCOMTR-MCNC: 78 MG/DL (ref 70–130)
GLUCOSE BLDC GLUCOMTR-MCNC: 81 MG/DL (ref 70–130)
GLUCOSE BLDC GLUCOMTR-MCNC: 84 MG/DL (ref 70–130)
GLUCOSE BLDC GLUCOMTR-MCNC: 89 MG/DL (ref 70–130)
GLUCOSE BLDC GLUCOMTR-MCNC: 96 MG/DL (ref 70–130)
POTASSIUM SERPL-SCNC: 3.8 MMOL/L (ref 3.5–5.2)

## 2024-06-06 PROCEDURE — 82948 REAGENT STRIP/BLOOD GLUCOSE: CPT

## 2024-06-06 PROCEDURE — 99232 SBSQ HOSP IP/OBS MODERATE 35: CPT | Performed by: INTERNAL MEDICINE

## 2024-06-06 PROCEDURE — 25010000002 FUROSEMIDE PER 20 MG: Performed by: INTERNAL MEDICINE

## 2024-06-06 PROCEDURE — 25810000003 DEXTROSE 5 % AND SODIUM CHLORIDE 0.9 % 5-0.9 % SOLUTION: Performed by: INTERNAL MEDICINE

## 2024-06-06 PROCEDURE — 84132 ASSAY OF SERUM POTASSIUM: CPT | Performed by: INTERNAL MEDICINE

## 2024-06-06 PROCEDURE — 71045 X-RAY EXAM CHEST 1 VIEW: CPT

## 2024-06-06 PROCEDURE — 25010000002 HEPARIN (PORCINE) PER 1000 UNITS: Performed by: NURSE PRACTITIONER

## 2024-06-06 PROCEDURE — 92611 MOTION FLUOROSCOPY/SWALLOW: CPT

## 2024-06-06 PROCEDURE — 25010000002 POTASSIUM CHLORIDE 10 MEQ/100ML SOLUTION

## 2024-06-06 PROCEDURE — 74230 X-RAY XM SWLNG FUNCJ C+: CPT

## 2024-06-06 RX ORDER — IPRATROPIUM BROMIDE AND ALBUTEROL SULFATE 2.5; .5 MG/3ML; MG/3ML
3 SOLUTION RESPIRATORY (INHALATION)
Status: DISCONTINUED | OUTPATIENT
Start: 2024-06-06 | End: 2024-06-08 | Stop reason: HOSPADM

## 2024-06-06 RX ORDER — POTASSIUM CHLORIDE 7.45 MG/ML
10 INJECTION INTRAVENOUS
Status: COMPLETED | OUTPATIENT
Start: 2024-06-06 | End: 2024-06-06

## 2024-06-06 RX ORDER — NYSTATIN 100000 [USP'U]/G
POWDER TOPICAL EVERY 12 HOURS SCHEDULED
Status: DISCONTINUED | OUTPATIENT
Start: 2024-06-06 | End: 2024-06-08 | Stop reason: HOSPADM

## 2024-06-06 RX ORDER — FUROSEMIDE 10 MG/ML
20 INJECTION INTRAMUSCULAR; INTRAVENOUS ONCE
Status: COMPLETED | OUTPATIENT
Start: 2024-06-06 | End: 2024-06-06

## 2024-06-06 RX ORDER — CEFDINIR 300 MG/1
300 CAPSULE ORAL EVERY 12 HOURS SCHEDULED
Qty: 10 CAPSULE | Refills: 0 | Status: DISCONTINUED | OUTPATIENT
Start: 2024-06-06 | End: 2024-06-08 | Stop reason: HOSPADM

## 2024-06-06 RX ORDER — POTASSIUM CHLORIDE 29.8 MG/ML
20 INJECTION INTRAVENOUS
Status: DISCONTINUED | OUTPATIENT
Start: 2024-06-06 | End: 2024-06-06

## 2024-06-06 RX ADMIN — Medication 12.5 MG: at 08:51

## 2024-06-06 RX ADMIN — BARIUM SULFATE 100 ML: 0.81 POWDER, FOR SUSPENSION ORAL at 09:47

## 2024-06-06 RX ADMIN — POTASSIUM CHLORIDE 10 MEQ: 7.46 INJECTION, SOLUTION INTRAVENOUS at 02:24

## 2024-06-06 RX ADMIN — CEFDINIR 300 MG: 300 CAPSULE ORAL at 12:31

## 2024-06-06 RX ADMIN — POTASSIUM CHLORIDE 10 MEQ: 7.46 INJECTION, SOLUTION INTRAVENOUS at 00:56

## 2024-06-06 RX ADMIN — DEXTROSE AND SODIUM CHLORIDE 50 ML/HR: 5; 900 INJECTION, SOLUTION INTRAVENOUS at 00:53

## 2024-06-06 RX ADMIN — CEFDINIR 300 MG: 300 CAPSULE ORAL at 21:23

## 2024-06-06 RX ADMIN — ACETAMINOPHEN 650 MG: 650 SOLUTION ORAL at 03:34

## 2024-06-06 RX ADMIN — LISINOPRIL 20 MG: 20 TABLET ORAL at 08:51

## 2024-06-06 RX ADMIN — Medication 10 ML: at 21:21

## 2024-06-06 RX ADMIN — FUROSEMIDE 20 MG: 10 INJECTION, SOLUTION INTRAMUSCULAR; INTRAVENOUS at 22:53

## 2024-06-06 RX ADMIN — POTASSIUM CHLORIDE 10 MEQ: 7.46 INJECTION, SOLUTION INTRAVENOUS at 03:34

## 2024-06-06 RX ADMIN — HEPARIN SODIUM 2500 UNITS: 5000 INJECTION INTRAVENOUS; SUBCUTANEOUS at 08:52

## 2024-06-06 RX ADMIN — HEPARIN SODIUM 2500 UNITS: 5000 INJECTION INTRAVENOUS; SUBCUTANEOUS at 21:20

## 2024-06-06 RX ADMIN — NYSTATIN: 100000 POWDER TOPICAL at 21:20

## 2024-06-06 RX ADMIN — Medication 12.5 MG: at 21:20

## 2024-06-06 RX ADMIN — ACETAMINOPHEN 650 MG: 650 SOLUTION ORAL at 12:19

## 2024-06-06 RX ADMIN — POTASSIUM CHLORIDE 10 MEQ: 7.46 INJECTION, SOLUTION INTRAVENOUS at 04:48

## 2024-06-06 RX ADMIN — BARIUM SULFATE 20 ML: 400 PASTE ORAL at 09:47

## 2024-06-06 RX ADMIN — DOXYCYCLINE 100 MG: 100 CAPSULE ORAL at 21:20

## 2024-06-06 RX ADMIN — DOXYCYCLINE 100 MG: 100 CAPSULE ORAL at 08:51

## 2024-06-06 RX ADMIN — ASPIRIN 81 MG: 81 TABLET, CHEWABLE ORAL at 08:52

## 2024-06-06 NOTE — PLAN OF CARE
Goal Outcome Evaluation:       Problem: Adult Inpatient Plan of Care  Goal: Plan of Care Review  6/6/2024 1702 by Deisy Read RNA  Outcome: Ongoing, Progressing  6/6/2024 1701 by Deisy Read RNA  Outcome: Ongoing, Progressing  Goal: Patient-Specific Goal (Individualized)  6/6/2024 1702 by Deisy Read RNA  Outcome: Ongoing, Progressing  6/6/2024 1701 by Deisy Read RNA  Outcome: Ongoing, Progressing  Goal: Absence of Hospital-Acquired Illness or Injury  6/6/2024 1702 by Deisy Read RNA  Outcome: Ongoing, Progressing  6/6/2024 1701 by Deisy Read RNA  Outcome: Ongoing, Progressing  Intervention: Identify and Manage Fall Risk  Recent Flowsheet Documentation  Taken 6/6/2024 1600 by Deisy Read RNA  Safety Promotion/Fall Prevention:   assistive device/personal items within reach   clutter free environment maintained   fall prevention program maintained   nonskid shoes/slippers when out of bed   room organization consistent   safety round/check completed  Taken 6/6/2024 1400 by Deisy Read RNA  Safety Promotion/Fall Prevention:   toileting scheduled   safety round/check completed   room organization consistent   nonskid shoes/slippers when out of bed   fall prevention program maintained   clutter free environment maintained   assistive device/personal items within reach  Taken 6/6/2024 1219 by Deisy Raed RNA  Safety Promotion/Fall Prevention:   safety round/check completed   room organization consistent   nonskid shoes/slippers when out of bed   fall prevention program maintained   clutter free environment maintained   assistive device/personal items within reach  Taken 6/6/2024 0800 by Deisy Read RNA  Safety Promotion/Fall Prevention:   safety round/check completed   room organization consistent   nonskid shoes/slippers when out of bed   fall prevention program maintained   lighting adjusted   clutter free environment maintained   assistive device/personal items within reach  Intervention:  Prevent Skin Injury  Recent Flowsheet Documentation  Taken 6/6/2024 1600 by eDisy Read RNA  Body Position: supine  Skin Protection:   adhesive use limited   tubing/devices free from skin contact   transparent dressing maintained  Taken 6/6/2024 1400 by Deisy Read RNA  Body Position: supine  Skin Protection:   adhesive use limited   tubing/devices free from skin contact   transparent dressing maintained  Taken 6/6/2024 1219 by Deisy Read RNA  Body Position: supine  Skin Protection:   adhesive use limited   tubing/devices free from skin contact   transparent dressing maintained  Taken 6/6/2024 1022 by Deisy Read RNA  Body Position: supine  Skin Protection:   adhesive use limited   tubing/devices free from skin contact   transparent dressing maintained  Taken 6/6/2024 0800 by Deisy Read RNA  Body Position: supine  Skin Protection:   adhesive use limited   tubing/devices free from skin contact   transparent dressing maintained  Intervention: Prevent and Manage VTE (Venous Thromboembolism) Risk  Recent Flowsheet Documentation  Taken 6/6/2024 1600 by Deisy Read RNA  Activity Management: activity minimized  Taken 6/6/2024 1400 by Deisy Read RNA  Activity Management: activity minimized  Taken 6/6/2024 1219 by Deisy Read RNA  Activity Management: activity minimized  Taken 6/6/2024 1022 by Deisy Read RNA  Activity Management: activity minimized  Taken 6/6/2024 0800 by Deisy Read RNA  Activity Management: activity minimized  VTE Prevention/Management: sequential compression devices off  Range of Motion: active ROM (range of motion) encouraged  Intervention: Prevent Infection  Recent Flowsheet Documentation  Taken 6/6/2024 1600 by Deisy Read RNA  Infection Prevention:   cohorting utilized   environmental surveillance performed   rest/sleep promoted   single patient room provided  Taken 6/6/2024 1400 by Deisy Read RNA  Infection Prevention:   cohorting utilized   environmental  surveillance performed   rest/sleep promoted   single patient room provided  Taken 6/6/2024 1219 by Deisy Read RNA  Infection Prevention:   cohorting utilized   environmental surveillance performed   rest/sleep promoted   single patient room provided  Taken 6/6/2024 1022 by Deisy Read RNA  Infection Prevention:   cohorting utilized   environmental surveillance performed   rest/sleep promoted   equipment surfaces disinfected   single patient room provided  Taken 6/6/2024 0800 by Deisy Read RNA  Infection Prevention:   cohorting utilized   environmental surveillance performed   single patient room provided   rest/sleep promoted  Goal: Optimal Comfort and Wellbeing  6/6/2024 1702 by Deisy Read RNA  Outcome: Ongoing, Progressing  6/6/2024 1701 by Deisy Read RNA  Outcome: Ongoing, Progressing  Intervention: Monitor Pain and Promote Comfort  Recent Flowsheet Documentation  Taken 6/6/2024 1219 by Deisy Read RNA  Pain Management Interventions:   see MAR   pillow support provided  Intervention: Provide Person-Centered Care  Recent Flowsheet Documentation  Taken 6/6/2024 0800 by Deisy Read RNA  Trust Relationship/Rapport:   care explained   questions answered   questions encouraged   thoughts/feelings acknowledged  Goal: Readiness for Transition of Care  6/6/2024 1702 by Deisy Read RNA  Outcome: Ongoing, Progressing  6/6/2024 1701 by Deisy Read RNA  Outcome: Ongoing, Progressing     Problem: Fall Injury Risk  Goal: Absence of Fall and Fall-Related Injury  6/6/2024 1702 by Deisy Read RNA  Outcome: Ongoing, Progressing  6/6/2024 1701 by Deisy Read RNA  Outcome: Ongoing, Progressing  Intervention: Identify and Manage Contributors  Recent Flowsheet Documentation  Taken 6/6/2024 1600 by Deisy Read RNA  Medication Review/Management: medications reviewed  Self-Care Promotion: independence encouraged  Taken 6/6/2024 1400 by Deisy Read RNA  Medication Review/Management: medications  reviewed  Self-Care Promotion: independence encouraged  Taken 6/6/2024 1219 by Deisy Read RNA  Medication Review/Management: medications reviewed  Taken 6/6/2024 1022 by Deisy Read RNA  Self-Care Promotion: independence encouraged  Taken 6/6/2024 0800 by Deisy Read RNA  Medication Review/Management: medications reviewed  Intervention: Promote Injury-Free Environment  Recent Flowsheet Documentation  Taken 6/6/2024 1600 by Deisy Read RNA  Safety Promotion/Fall Prevention:   assistive device/personal items within reach   clutter free environment maintained   fall prevention program maintained   nonskid shoes/slippers when out of bed   room organization consistent   safety round/check completed  Taken 6/6/2024 1400 by Deisy Read RNA  Safety Promotion/Fall Prevention:   toileting scheduled   safety round/check completed   room organization consistent   nonskid shoes/slippers when out of bed   fall prevention program maintained   clutter free environment maintained   assistive device/personal items within reach  Taken 6/6/2024 1219 by Deisy Read RNA  Safety Promotion/Fall Prevention:   safety round/check completed   room organization consistent   nonskid shoes/slippers when out of bed   fall prevention program maintained   clutter free environment maintained   assistive device/personal items within reach  Taken 6/6/2024 0800 by Deisy Read RNA  Safety Promotion/Fall Prevention:   safety round/check completed   room organization consistent   nonskid shoes/slippers when out of bed   fall prevention program maintained   lighting adjusted   clutter free environment maintained   assistive device/personal items within reach     Problem: Behavioral Health Comorbidity  Goal: Maintenance of Behavioral Health Symptom Control  6/6/2024 1702 by Deisy Read RNA  Outcome: Ongoing, Progressing  6/6/2024 1701 by Deisy Read RNA  Outcome: Ongoing, Progressing  Intervention: Maintain Behavioral Health  Symptom Control  Recent Flowsheet Documentation  Taken 6/6/2024 1600 by Deisy Read RNA  Medication Review/Management: medications reviewed  Taken 6/6/2024 1400 by Deisy Read RNA  Medication Review/Management: medications reviewed  Taken 6/6/2024 1219 by Deisy Read RNA  Medication Review/Management: medications reviewed  Taken 6/6/2024 0800 by Deisy Read RNA  Medication Review/Management: medications reviewed     Problem: Diabetes Comorbidity  Goal: Blood Glucose Level Within Targeted Range  6/6/2024 1702 by Deisy Read RNA  Outcome: Ongoing, Progressing  6/6/2024 1701 by Deisy Read RNA  Outcome: Ongoing, Progressing  Intervention: Monitor and Manage Glycemia  Recent Flowsheet Documentation  Taken 6/6/2024 1600 by Deisy Read RNA  Glycemic Management: blood glucose monitored  Taken 6/6/2024 1400 by Deisy Read RNA  Glycemic Management: blood glucose monitored  Taken 6/6/2024 1219 by Deisy Read RNA  Glycemic Management: blood glucose monitored  Taken 6/6/2024 0800 by Deisy Read RNA  Glycemic Management: blood glucose monitored     Problem: Hypertension Comorbidity  Goal: Blood Pressure in Desired Range  6/6/2024 1702 by Deisy Read RNA  Outcome: Ongoing, Progressing  6/6/2024 1701 by Deisy Read RNA  Outcome: Ongoing, Progressing  Intervention: Maintain Blood Pressure Management  Recent Flowsheet Documentation  Taken 6/6/2024 1600 by Deisy Read RNA  Medication Review/Management: medications reviewed  Taken 6/6/2024 1400 by Deisy Read RNA  Medication Review/Management: medications reviewed  Taken 6/6/2024 1219 by Deisy Read RNA  Medication Review/Management: medications reviewed  Taken 6/6/2024 0800 by Deisy Read RNA  Medication Review/Management: medications reviewed     Problem: Osteoarthritis Comorbidity  Goal: Maintenance of Osteoarthritis Symptom Control  6/6/2024 1702 by Deisy Read RNA  Outcome: Ongoing, Progressing  6/6/2024 1701 by Deisy Read  RNA  Outcome: Ongoing, Progressing  Intervention: Maintain Osteoarthritis Symptom Control  Recent Flowsheet Documentation  Taken 6/6/2024 1600 by Deisy Read RNA  Activity Management: activity minimized  Assistive Device Utilized: walker  Medication Review/Management: medications reviewed  Taken 6/6/2024 1400 by Deisy Read RNA  Activity Management: activity minimized  Assistive Device Utilized: walker  Medication Review/Management: medications reviewed  Taken 6/6/2024 1219 by Deisy Read RNA  Activity Management: activity minimized  Assistive Device Utilized: walker  Medication Review/Management: medications reviewed  Taken 6/6/2024 1022 by Deisy Read RNA  Activity Management: activity minimized  Assistive Device Utilized: walker  Taken 6/6/2024 0800 by Deisy Read RNA  Activity Management: activity minimized  Assistive Device Utilized: walker  Medication Review/Management: medications reviewed     Problem: Pain Chronic (Persistent) (Comorbidity Management)  Goal: Acceptable Pain Control and Functional Ability  6/6/2024 1702 by Deisy Read RNA  Outcome: Ongoing, Progressing  6/6/2024 1701 by Deisy Read RNA  Outcome: Ongoing, Progressing  Intervention: Manage Persistent Pain  Recent Flowsheet Documentation  Taken 6/6/2024 1600 by Deisy Read RNA  Bowel Elimination Promotion: adequate fluid intake promoted  Sleep/Rest Enhancement: regular sleep/rest pattern promoted  Medication Review/Management: medications reviewed  Taken 6/6/2024 1400 by Deisy Read RNA  Bowel Elimination Promotion: adequate fluid intake promoted  Sleep/Rest Enhancement: regular sleep/rest pattern promoted  Medication Review/Management: medications reviewed  Taken 6/6/2024 1219 by Deisy Read RNA  Bowel Elimination Promotion: adequate fluid intake promoted  Sleep/Rest Enhancement: regular sleep/rest pattern promoted  Medication Review/Management: medications reviewed  Taken 6/6/2024 1022 by Deisy Read RNA  Bowel  Elimination Promotion: adequate fluid intake promoted  Sleep/Rest Enhancement: regular sleep/rest pattern promoted  Taken 6/6/2024 0800 by Deisy Read RNA  Bowel Elimination Promotion: adequate fluid intake promoted  Sleep/Rest Enhancement: regular sleep/rest pattern promoted  Medication Review/Management: medications reviewed  Intervention: Develop Pain Management Plan  Recent Flowsheet Documentation  Taken 6/6/2024 1219 by Deisy Read RNA  Pain Management Interventions:   see MAR   pillow support provided  Intervention: Optimize Psychosocial Wellbeing  Recent Flowsheet Documentation  Taken 6/6/2024 1400 by Deisy Read RNA  Supportive Measures: active listening utilized  Taken 6/6/2024 1219 by Deisy Read RNA  Supportive Measures: active listening utilized  Taken 6/6/2024 1022 by Deisy Read RNA  Supportive Measures: active listening utilized  Taken 6/6/2024 0800 by Deisy Read RNA  Supportive Measures: active listening utilized     Problem: Skin Injury Risk Increased  Goal: Skin Health and Integrity  6/6/2024 1702 by Deisy Read RNA  Outcome: Ongoing, Progressing  6/6/2024 1701 by Deisy Read RNA  Outcome: Ongoing, Progressing  Intervention: Optimize Skin Protection  Recent Flowsheet Documentation  Taken 6/6/2024 1600 by Deisy Read RNA  Pressure Reduction Techniques:   heels elevated off bed   weight shift assistance provided  Head of Bed (HOB) Positioning: HOB elevated  Pressure Reduction Devices: pressure-redistributing mattress utilized  Skin Protection:   adhesive use limited   tubing/devices free from skin contact   transparent dressing maintained  Taken 6/6/2024 1400 by Deisy Read RNA  Pressure Reduction Techniques:   heels elevated off bed   weight shift assistance provided  Head of Bed (HOB) Positioning: HOB elevated  Pressure Reduction Devices: pressure-redistributing mattress utilized  Skin Protection:   adhesive use limited   tubing/devices free from skin contact    transparent dressing maintained  Taken 6/6/2024 1219 by Deisy Read RNA  Pressure Reduction Techniques:   weight shift assistance provided   heels elevated off bed  Head of Bed (HOB) Positioning: HOB elevated  Pressure Reduction Devices: pressure-redistributing mattress utilized  Skin Protection:   adhesive use limited   tubing/devices free from skin contact   transparent dressing maintained  Taken 6/6/2024 1022 by Deisy Read RNA  Pressure Reduction Techniques:   weight shift assistance provided   heels elevated off bed  Head of Bed (HOB) Positioning: HOB elevated  Pressure Reduction Devices:   pressure-redistributing mattress utilized   positioning supports utilized  Skin Protection:   adhesive use limited   tubing/devices free from skin contact   transparent dressing maintained  Taken 6/6/2024 0800 by Deisy Read RNA  Pressure Reduction Techniques:   weight shift assistance provided   heels elevated off bed   pressure points protected  Head of Bed (HOB) Positioning: HOB elevated  Pressure Reduction Devices: pressure-redistributing mattress utilized  Skin Protection:   adhesive use limited   tubing/devices free from skin contact   transparent dressing maintained     Problem: Adjustment to Illness (Sepsis/Septic Shock)  Goal: Optimal Coping  6/6/2024 1702 by Deisy Read RNA  Outcome: Ongoing, Progressing  6/6/2024 1701 by Deisy Read RNA  Outcome: Ongoing, Progressing  Intervention: Optimize Psychosocial Adjustment to Illness  Recent Flowsheet Documentation  Taken 6/6/2024 1400 by Deisy Read RNA  Supportive Measures: active listening utilized  Taken 6/6/2024 1219 by Deisy Read RNA  Supportive Measures: active listening utilized  Taken 6/6/2024 1022 by Deisy Read RNA  Supportive Measures: active listening utilized  Taken 6/6/2024 0800 by Deisy Read RNA  Supportive Measures: active listening utilized     Problem: Bleeding (Sepsis/Septic Shock)  Goal: Absence of Bleeding  6/6/2024 1702  by Read, Deisy, RNA  Outcome: Ongoing, Progressing  6/6/2024 1701 by Deisy Reda RNA  Outcome: Ongoing, Progressing     Problem: Glycemic Control Impaired (Sepsis/Septic Shock)  Goal: Blood Glucose Level Within Desired Range  6/6/2024 1702 by Deisy Read RNA  Outcome: Ongoing, Progressing  6/6/2024 1701 by Deisy Read RNA  Outcome: Ongoing, Progressing  Intervention: Optimize Glycemic Control  Recent Flowsheet Documentation  Taken 6/6/2024 1600 by Deisy Read RNA  Glycemic Management: blood glucose monitored  Taken 6/6/2024 1400 by Deisy Read RNA  Glycemic Management: blood glucose monitored  Taken 6/6/2024 1219 by Deisy Read RNA  Glycemic Management: blood glucose monitored  Taken 6/6/2024 0800 by Deisy Read RNA  Glycemic Management: blood glucose monitored     Problem: Infection Progression (Sepsis/Septic Shock)  Goal: Absence of Infection Signs and Symptoms  6/6/2024 1702 by Deisy Read RNA  Outcome: Ongoing, Progressing  6/6/2024 1701 by Deisy Read RNA  Outcome: Ongoing, Progressing  Intervention: Initiate Sepsis Management  Recent Flowsheet Documentation  Taken 6/6/2024 1600 by Deisy Read RNA  Infection Prevention:   cohorting utilized   environmental surveillance performed   rest/sleep promoted   single patient room provided  Taken 6/6/2024 1400 by Deisy Read RNA  Infection Prevention:   cohorting utilized   environmental surveillance performed   rest/sleep promoted   single patient room provided  Taken 6/6/2024 1219 by Deisy Read RNA  Infection Prevention:   cohorting utilized   environmental surveillance performed   rest/sleep promoted   single patient room provided  Taken 6/6/2024 1022 by Deisy Read RNA  Infection Prevention:   cohorting utilized   environmental surveillance performed   rest/sleep promoted   equipment surfaces disinfected   single patient room provided  Taken 6/6/2024 0800 by Deisy Read RNA  Infection Prevention:   cohorting utilized    environmental surveillance performed   single patient room provided   rest/sleep promoted  Intervention: Promote Recovery  Recent Flowsheet Documentation  Taken 6/6/2024 1600 by Deisy Read RNA  Activity Management: activity minimized  Sleep/Rest Enhancement: regular sleep/rest pattern promoted  Taken 6/6/2024 1400 by Deisy Read RNA  Activity Management: activity minimized  Sleep/Rest Enhancement: regular sleep/rest pattern promoted  Taken 6/6/2024 1219 by Deisy Read RNA  Activity Management: activity minimized  Sleep/Rest Enhancement: regular sleep/rest pattern promoted  Taken 6/6/2024 1022 by Deisy Read RNA  Activity Management: activity minimized  Sleep/Rest Enhancement: regular sleep/rest pattern promoted  Taken 6/6/2024 0800 by Deisy Read RNA  Activity Management: activity minimized  Sleep/Rest Enhancement: regular sleep/rest pattern promoted  Intervention: Promote Stabilization  Recent Flowsheet Documentation  Taken 6/6/2024 1600 by Deisy Read RNA  Fluid/Electrolyte Management: fluids provided  Taken 6/6/2024 1400 by Deisy Read RNA  Fluid/Electrolyte Management: fluids provided  Taken 6/6/2024 1219 by Deisy Read RNA  Fluid/Electrolyte Management: fluids provided  Taken 6/6/2024 0800 by Deisy Read RNA  Fluid/Electrolyte Management: fluids provided     Problem: Nutrition Impaired (Sepsis/Septic Shock)  Goal: Optimal Nutrition Intake  6/6/2024 1702 by Deisy Read RNA  Outcome: Ongoing, Progressing  6/6/2024 1701 by Deisy Read RNA  Outcome: Ongoing, Progressing  Intervention: Promote and Optimize Nutrition Delivery  Recent Flowsheet Documentation  Taken 6/6/2024 1600 by Deisy Read RNA  Nutrition Support Management: (Boost) other (see comments)  Taken 6/6/2024 1400 by Deisy Read RNA  Nutrition Support Management: (Boost) other (see comments)  Taken 6/6/2024 1219 by Deisy Read RNA  Nutrition Support Management: (Boost) other (see comments)  Taken 6/6/2024 0800 by  Deisy Read, RNA  Nutrition Support Management: (Boost drink) other (see comments)

## 2024-06-06 NOTE — CASE MANAGEMENT/SOCIAL WORK
Continued Stay Note  [facility]     Patient Name: Susan Tucker  MRN: [id number]  Today's Date: 6/6/2024    Admit Date: 6/1/2024    Plan: Personal Care at [facility] with HH   Discharge Plan       Row Name 06/06/24 1416       Plan    Plan Personal Care at [facility] with     Patient/Family in Agreement with Plan yes    Plan Comments I spoke with Pau at [facility]. She has done an onsite evaluation and states the pt will be able to return to  at [facility]. The pt may end up moving to memory care there at some point. Plan transfer tomorrow if ready. [facility] ambulance will transport at 1145 on 6-7-24. They will  at the room. Report will need to be called to [phone] for nurse. I will fax the DC summary to [phone]. [facility] in [place] is the facility pharmacy and it is placed in [facility]. I updated the pt and her daughter Dinah. I placed a  order and will call [facility] in the am.    Final Discharge Disposition Code 06 - home with home health care                   Discharge Codes    No documentation.                 Expected Discharge Date and Time       Expected Discharge Date Expected Discharge Time    Jun 6, 2024               Saray Maldonado RN

## 2024-06-06 NOTE — PLAN OF CARE
Goal Outcome Evaluation:                     Anticipated Discharge Disposition (SLP): skilled nursing facility          SLP Swallowing Diagnosis: other (see comments), mild, oral dysphagia (trace-mild pharyngeal dysphagia) (06/06/24 6488)

## 2024-06-06 NOTE — MBS/VFSS/FEES
Acute Care - Speech Language Pathology   Swallow Initial Evaluation [facility]  Modified Barium Swallow Study (MBS)       Patient Name: Susan Tucker  : 1935  MRN: [id number]  Today's Date: 2024               Admit Date: 2024    Visit Dx:     ICD-10-CM ICD-9-CM   1. Generalized weakness  R53.1 780.79   2. Hypotensive episode  I95.9 458.9   3. Unwitnessed fall  R29.6 [id number]   4. Altered mental status, unspecified altered mental status type  R41.82 780.97   5. Hypokalemia  E87.6 276.8   6. Oropharyngeal dysphagia  R13.12 787.22     Patient Active Problem List   Diagnosis    Sepsis    Elevated liver enzymes    Dehydration    Hypertension    History of Stroke    Acute UTI (urinary tract infection)    Calculus of bile duct with acute cholecystitis and obstruction    Enterocolitis    Gallstone ileus of small intestine    Syncope and collapse    Frequent falls    Type 2 diabetes mellitus without complication    Hyperlipidemia    Hypokalemia    Weakness generalized    Unwitnessed fall    History of diabetes mellitus, type II    Dementia without behavioral disturbance    Hypoglycemia    Generalized weakness    Moderate malnutrition    Community acquired pneumonia of left lower lobe of lung     Past Medical History:   Diagnosis Date    Diabetes mellitus     Elevated cholesterol     Hypertension     Stroke      Past Surgical History:   Procedure Laterality Date    ERCP N/A 2018    Procedure: ENDOSCOPIC RETROGRADE CHOLANGIOPANCREATOGRAPHY;  Surgeon: Liam Manning MD;  Location: [facility] ENDOSCOPY;  Service: Gastroenterology    EXPLORATORY LAPAROTOMY N/A 2/10/2020    Procedure: LAPAROTOMY EXPLORATORY BOWEL RESECTION;  Surgeon: Carlos Zurita MD;  Location: [facility] OR;  Service: General;  Laterality: N/A;    HYSTERECTOMY         SLP Recommendation and Plan  SLP Swallowing Diagnosis: other (see comments), mild, oral dysphagia (trace-mild pharyngeal dysphagia) ([id number] [phone])  SLP Diet Recommendation:  mechanical ground textures, thin liquids (06/06/24 0950)  Recommended Precautions and Strategies: upright posture during/after eating, general aspiration precautions, 1:1 supervision, assist with feeding (06/06/24 0950)  SLP Rec. for Method of Medication Administration: meds whole, meds crushed, with puree, as tolerated (06/06/24 0950)     Monitor for Signs of Aspiration: yes, notify SLP if any concerns (06/06/24 0950)  Recommended Diagnostics: other (see comments) (diet tolerance/educaton) (06/06/24 0950)  Swallow Criteria for Skilled Therapeutic Interventions Met: demonstrates skilled criteria (06/06/24 0950)  Anticipated Discharge Disposition (SLP): skilled nursing facility (06/06/24 0950)  Rehab Potential/Prognosis, Swallowing: re-evaluate goals as necessary (06/06/24 0950)  Therapy Frequency (Swallow): PRN (06/06/24 0950)  Predicted Duration Therapy Intervention (Days): until discharge (06/06/24 0950)  Oral Care Recommendations: Oral Care BID/PRN, Toothbrush (06/06/24 0950)                                               SWALLOW EVALUATION (Last 72 Hours)       SLP Adult Swallow Evaluation       Row Name 06/06/24 0950 06/05/24 1050                Rehab Evaluation    Document Type -- evaluation  -AC       Subjective Information -- no complaints  -       Patient Observations -- alert;cooperative  -       Patient/Family/Caregiver Comments/Observations -- Pt pleasantly confused. No family present.  -AC       Patient Effort -- good  -AC          General Information    Patient Profile Reviewed -- yes  -AC       Pertinent History Of Current Problem -- Adm from [facility] after fall. General weakness, malnutrition, LLL pna. General cog/strength decline per chart. Hx CVA, dementia, uses wheelchair. Consulted 2' difficulty swallowing pills.  -AC       Current Method of Nutrition mechanical ground textures;thin liquids  - mechanical ground textures;thin liquids  -AC       Precautions/Limitations, Vision -- WFL;for purposes  of eval  -AC       Precautions/Limitations, Hearing -- WFL;for purposes of eval  -       Prior Level of Function-Communication cognitive-linguistic impairment  - cognitive-linguistic impairment  -       Prior Level of Function-Swallowing unknown  - --  pt indicated softer textures 2' dentition  -       Plans/Goals Discussed with patient;agreed upon  - patient;agreed upon  -       Barriers to Rehab cognitive status  - cognitive status  -       Patient's Goals for Discharge patient did not state  - patient did not state  -          Pain    Additional Documentation Pain Scale: FACES Pre/Post-Treatment (Group)  - Pain Scale: FACES Pre/Post-Treatment (Group)  -          Pain Scale: FACES Pre/Post-Treatment    Pain: FACES Scale, Pretreatment 0-->no hurt  - 0-->no hurt  -       Posttreatment Pain Rating 0-->no hurt  - 0-->no hurt  -          Oral Motor Structure and Function    Dentition Assessment -- missing teeth;teeth are in poor condition  -       Secretion Management -- WNL/WFL  -AC       Mucosal Quality -- moist, healthy  -       Volitional Cough -- weak  -          Oral Musculature and Cranial Nerve Assessment    Oral Motor General Assessment -- generalized oral motor weakness  -          General Eating/Swallowing Observations    Respiratory Support Currently in Use -- nasal cannula  -       Eating/Swallowing Skills -- fed by SLP;self-fed;needed assist  -       Positioning During Eating -- upright 90 degree;upright in bed  -       Utensils Used -- spoon;cup;straw  -       Consistencies Trialed -- thin liquids;nectar/syrup-thick liquids;pureed;soft to chew textures;chopped  -          Clinical Swallow Eval    Oral Prep Phase -- impaired  -AC       Oral Transit -- WFL  -AC       Oral Residue -- WFL  -AC       Clinical Swallow Evaluation Summary -- Noted strained, falsetto, weak vocal quality. Congested coughing noted @ baseline, prior to PO trials, and continued t/o  eval. No difference w/ nectar-thick liquids. No aversion. Difficult to r/o aspiration clinically @ bedside. Generally at risk.  -AC          Oral Prep Concerns    Oral Prep Concerns -- increased prep time  -AC       Increased Prep Time -- regular consistencies  -AC       Oral Prep Concerns, Comment -- Suspect largely 2' dental status.  -          MBS/VFSS    Utensils Used spoon;cup;straw  - --       Consistencies Trialed thin liquids;pudding thick;regular textures  - --          MBS/VFSS Interpretation    Oral Prep Phase impaired oral phase of swallowing  - --       Oral Transit Phase impaired  - --       Oral Residue impaired  - --       VFSS Summary Mild oral and trace-mild pharyngeal dysphagia. Aspiration w/ thins via initial tsp only 2' mistiming. Delayed cough in response to aspiration, however ineffective in clearing. U/a to replicate accross multiple additional trials. No aspiration w/ thins via cup or straw presentation. Intermittent, high transient penetration w/ thins; material observed to fully clear upon completion of the swallow. No penetration or aspiration w/ pudding or regular solid trials. Ok to continue mechanical ground solid diet w/ thin liquids, assistance w/ feeding, general aspiration precautions. SLP will f/u for diet tolerance/education  - --          Oral Preparatory Phase    Oral Preparatory Phase prolonged manipulation  - --       Prolonged Manipulation pudding/puree;regular textures;secondary to impaired cognitive status;secondary to reduced lingual range of motion  - --          Oral Transit Phase    Impaired Oral Transit Phase increased A-P transit time;premature spillage of liquids into pharynx  - --       Increased A-P Transit Time all consistencies tested;secondary to impaired cognitive status;discoordination of lingual movement  - --       Premature Spillage of Liquids into Pharynx thin liquids;secondary to impaired cognitive status;discoordination of lingual  movement  -MH --          Oral Residue    Impaired Oral Residue diffuse residue throughout oral cavity  -MH --       Diffuse Residue throughout Oral Cavity all consistencies tested;secondary to reduced lingual range of motion  -MH --       Response to Oral Residue partial residue clearance;with spontaneous subsequent swallow;with cued swallow  - --          Initiation of Pharyngeal Swallow    Initiation of Pharyngeal Swallow bolus in pyriform sinuses  - --       Pharyngeal Phase impaired pharyngeal phase of swallowing  -MH --       Anatomical abnormalities noted osteophyte/bone spur per radiology report;other (see comments)  C2-C5  -MH --       Anatomical abnormalities functional impact functional impact on swallowing;other (see comments)  contributes to residue  -MH --       Aspiration Before the Swallow thin liquids;secondary to reduced back of tongue control;secondary to delayed swallow initiation or mistiming;other (see comments)  via initial tsp only  - --       Penetration During the Swallow thin liquids;secondary to reduced laryngeal elevation;secondary to delayed swallow initiation or mistiming;secondary to reduced vestibular closure  - --       Aspiration During the Swallow thin liquids;secondary to reduced vestibular closure;other (see comments);secondary to delayed swallow initiation or mistiming  via initial tsp only  - --       Depth of Penetration shallow;transient  - --       Response to Penetration Yes  - --       Responsed to penetration with other (see comments)  cleared upon completion of swallow  - --       Response to Aspiration Yes  - --       Responded to aspiration with cough;non-effective  -MH --       Rosenbek's Scale thin:;2--->level 2;pudding/puree:;regular textures:;1--->level 1  -MH --       Pharyngeal Residue all consistencies tested;diffuse within pharynx;secondary to reduced hyolaryngeal excursion;secondary to reduced posterior pharyngeal wall stripping;secondary to  reduced laryngeal elevation  - --       Response to Residue partial residue clearance;cleared residue with liquid wash;cleared residue with cued swallow;cleared residue with spontaneous subsequent swallow  - --       Attempted Compensatory Maneuvers bolus size;bolus presentation style;additional subsequent swallow;multiple swallows  - --       Response to Attempted Compensatory Maneuvers reduced residue  - --       Successful Compensatory Maneuver Competency patient able to;demonstrate compensations;with cues  - --          Esophageal Phase    Esophageal Phase other (see comments)  Delayed bolus passage through upper 1/3 esophagus  - --          SLP Evaluation Clinical Impression    SLP Swallowing Diagnosis other (see comments);mild;oral dysphagia  trace-mild pharyngeal dysphagia  - R/O pharyngeal dysphagia  -       Functional Impact risk of aspiration/pneumonia;risk of malnutrition;risk of dehydration  - risk of aspiration/pneumonia;risk of malnutrition;risk of dehydration  -       Rehab Potential/Prognosis, Swallowing re-evaluate goals as necessary  - re-evaluate goals as necessary  -       Swallow Criteria for Skilled Therapeutic Interventions Met demonstrates skilled criteria  - demonstrates skilled criteria  -          Recommendations    Therapy Frequency (Swallow) PRN  - --       Predicted Duration Therapy Intervention (Days) until discharge  - --       SLP Diet Recommendation mechanical ground textures;thin liquids  - mechanical ground textures;thin liquids  as tolerated  -       Recommended Diagnostics other (see comments)  diet tolerance/educaton  - VFSS (MBS);other (see comments)  consider if aligns w/ GOC--RN reported dtr/POA arriving this afternoon; will attempt to discuss w/ her  -       Recommended Precautions and Strategies upright posture during/after eating;general aspiration precautions;1:1 supervision;assist with feeding  - upright posture during/after  eating;general aspiration precautions;1:1 supervision;assist with feeding  -       Oral Care Recommendations Oral Care BID/PRN;Toothbrush  - Oral Care BID/PRN;Toothbrush  -       SLP Rec. for Method of Medication Administration meds whole;meds crushed;with puree;as tolerated  - meds whole;meds crushed;with puree;as tolerated  -       Monitor for Signs of Aspiration yes;notify SLP if any concerns  - yes;notify SLP if any concerns  -       Anticipated Discharge Disposition (SLP) skilled nursing facility  - skilled nursing facility  -                 User Key  (r) = Recorded By, (t) = Taken By, (c) = Cosigned By      Initials Name Effective Dates     Sinai Dunn, MS CCC-SLP 02/03/23 -      Kellee Glasgow, MS CCC-SLP 05/12/23 -                     EDUCATION  The patient has been educated in the following areas:   Dysphagia (Swallowing Impairment) Modified Diet Instruction.        SLP GOALS       Row Name 06/06/24 0950             (LTG) Patient will demonstrate functional swallow for    Diet Texture (Demonstrate functional swallow) regular textures  -      Liquid viscosity (Demonstrate functional swallow) thin liquids  -      [place] (Demonstrate functional swallow) independently (over 90% accuracy)  -      Time Frame (Demonstrate functional swallow) by discharge  -      Progress/Outcomes (Demonstrate functional swallow) new goal  -         (STG) Patient will tolerate trials of    Consistencies Trialed (Tolerate trials) mechanical ground textures;thin liquids  delayed cough in response to aspiration on MBS  -      Desired Outcome (Tolerate trials) without signs/symptoms of aspiration;with adequate oral prep/transit/clearance  -      [place] (Tolerate trials) with moderate cues (50-74% accuracy)  -      Time Frame (Tolerate trials) 1 week  -      Progress/Outcomes (Tolerate trials) new goal  -                User Key  (r) = Recorded By, (t) = Taken By, (c) =  Cosigned By      Initials Name Provider Type    Kellee Deluna MS CCC-SLP Speech and Language Pathologist                         Time Calculation:    Time Calculation- SLP       Row Name 06/06/24 1046             Time Calculation- SLP    SLP Start Time 0950  -      SLP Received On 06/06/24  -         Untimed Charges    02531-WA Motion Fluoro Eval Swallow Minutes 65  -         Total Minutes    Untimed Charges Total Minutes 65  -       Total Minutes 65  -                User Key  (r) = Recorded By, (t) = Taken By, (c) = Cosigned By      Initials Name Provider Type    Kellee Deluna MS CCC-SLP Speech and Language Pathologist                    Therapy Charges for Today       Code Description Service Date Service Provider Modifiers Qty    [id number] HC ST MOTION FLUORO EVAL SWALLOW 4 6/6/2024 Kellee Glasgow MS CCC-MIREYA GN 1                 MS HERMANN Lang  6/6/2024

## 2024-06-06 NOTE — NURSING NOTE
"[facility] consult for   \"  Reassess please     \"    Visited patient and noted allevyn is too high on backside, not protecting coccyx area. No evidence of pressure injury noted, but patient is at high risk for pressure injury in this area due to prominent tailbone and extremely loose skin.     Area was cleansed with teal perineal wipes, barrier film spray applied, allevyn applied as seen in photo below. Antifungal powder applied, sealed in with barrier film spray, z guard applied (very thin amount).     Heels intact with allevyn and pillows.     Left arm with skin tears to forearm and antecubital region-treated per protocol with xeroform-continue q3 days.             "

## 2024-06-06 NOTE — PROGRESS NOTES
[facility]  PROGRESS NOTE    Patient Name: Susan Tucker  : 1935  MRN: [id number]    Date of Admission: 2024  Primary Care Physician: Damon Townsend PA    Subjective   Subjective     CC:  Fall, weakness    HPI:  No acute events. States she feels ok. + Cough      Objective   Objective     Vital Signs:   Temp:  [97.5 °F (36.4 °C)-98.2 °F (36.8 °C)] 97.5 °F (36.4 °C)  Heart Rate:  [] 76  Resp:  [16-20] 18  BP: (139-166)/(69-94) 139/69  Flow (L/min):  [2] 2     Physical Exam:  Constitutional: No acute distress, frail  HENT: NCAT, mucous membranes moist  Respiratory: Clear to auscultation bilaterally, respiratory effort normal   Cardiovascular: + ectopy no murmurs, rubs, or gallops  Gastrointestinal: Positive bowel sounds, soft, nontender, nondistended  Musculoskeletal: No bilateral ankle edema  Psychiatric: cooperative  Neurologic: Oriented x 1, strength symmetric in all extremities, Cranial Nerves grossly intact to confrontation, speech clear  Skin: No rashes      Results Reviewed:  LAB RESULTS:      Lab 24  04524  0723 24  0413 24  1728   WBC 4.59 5.77 4.05 4.07 4.46   HEMOGLOBIN 11.4* 11.2* 11.7* 11.8* 15.1   HEMATOCRIT 34.4 33.3* 36.3 35.2 45.2   PLATELETS 173 177 174 208 232   NEUTROS ABS  --   --   --  2.68 3.19   IMMATURE GRANS (ABS)  --   --   --  0.01 0.01   LYMPHS ABS  --   --   --  0.88 0.88   MONOS ABS  --   --   --  0.46 0.30   EOS ABS  --   --   --  0.01 0.03   MCV 91.7 89.3 93.8 90.7 92.4   PROCALCITONIN  --  0.15  --   --  0.15   LACTATE  --   --   --   --  0.9         Lab 24  1803 24  0457 24  05 24  0723 24  1354 24  0911 24  0413 24  2142 24  1728   SODIUM  --  135* 135* 136  --   --  142 138 135*   POTASSIUM 3.5 3.5 4.1 5.1 4.1   < > 3.0* 2.5* 2.5*   CHLORIDE  --  106 106 106  --   --  107 99 92*   CO2  --  20.0* 19.0* 20.0*  --   --  26.0  27.0 23.0   ANION GAP  --  9.0 10.0 10.0  --   --  9.0 12.0 20.0*   BUN  --  6* 5* 6*  --   --  14 17 18   CREATININE  --  0.42* 0.46* 0.51*  --   --  0.76 1.00 1.03*   EGFR  --  94.2 92.2 89.9  --   --  75.5 54.3* 52.4*   GLUCOSE  --  79 98 86  --   --  109* 134* 61*   CALCIUM  --  7.5* 7.8* 8.0*  --   --  8.2* 8.7 9.2   IONIZED CALCIUM  --   --   --  1.22  --   --  1.20  --  1.23   MAGNESIUM  --   --   --   --   --   --  2.0  --  1.7   PHOSPHORUS  --   --   --   --   --   --  2.3*  --  2.9   HEMOGLOBIN A1C  --   --   --   --   --   --   --   --  5.30   TSH  --   --   --   --   --   --   --   --  1.510    < > = values in this interval not displayed.         Lab 06/04/24  0524 06/03/24  0723 06/02/24  0413 06/01/24  2142 06/01/24  1728   TOTAL PROTEIN 4.9* 5.5* 5.7* 6.2 7.0   ALBUMIN 3.0* 3.0* 3.0* 3.5 3.6   GLOBULIN 1.9 2.5 2.7 2.7 3.4   ALT (SGPT) 19 17 18 24 23   AST (SGOT) 42* 38* 35* 49* 49*   BILIRUBIN 0.6 0.6 0.7 0.9 1.0   ALK PHOS 37* 36* 37* 42 45                     Brief Urine Lab Results  (Last result in the past 365 days)        Color   Clarity   Blood   Leuk Est   Nitrite   Protein   CREAT   Urine HCG        06/04/24 1630 Yellow   Clear   Negative   Trace   Negative   Negative                   Microbiology Results Abnormal       Procedure Component Value - Date/Time    Blood Culture - Blood, Hand, Right [[id number]]  (Normal) Collected: 06/04/24 1317    Lab Status: Preliminary result Specimen: Blood from Hand, Right Updated: 06/05/24 1330     Blood Culture No growth at 24 hours    Narrative:      Less than seven (7) mL's of blood was collected.  Insufficient quantity may yield false negative results.    Blood Culture - Blood, Arm, Right [[id number]]  (Normal) Collected: 06/04/24 1317    Lab Status: Preliminary result Specimen: Blood from Arm, Right Updated: 06/05/24 1330     Blood Culture No growth at 24 hours    Narrative:      Less than seven (7) mL's of blood was collected.  Insufficient quantity may  yield false negative results.    S. Pneumo Ag Urine or CSF - Urine, Urine, Clean Catch [[id number]]  (Normal) Collected: 06/04/24 1629    Lab Status: Final result Specimen: Urine, Clean Catch Updated: 06/05/24 0712     Strep Pneumo Ag Negative    Legionella Antigen, Urine - Urine, Urine, Clean Catch [[id number]]  (Normal) Collected: 06/04/24 1629    Lab Status: Final result Specimen: Urine, Clean Catch Updated: 06/05/24 0712     LEGIONELLA ANTIGEN, URINE Negative    MRSA Screen, PCR (Inpatient) - Swab, Nares [[id number]]  (Normal) Collected: 06/04/24 1630    Lab Status: Final result Specimen: Swab from Nares Updated: 06/04/24 1752     MRSA PCR Negative    Narrative:      The negative predictive value of this diagnostic test is high and should only be used to consider de-escalating anti-MRSA therapy. A positive result may indicate colonization with MRSA and must be correlated clinically.  MRSA Negative    Gastrointestinal Panel, PCR - Stool, Per Rectum [[id number]]  (Normal) Collected: 06/02/24 2203    Lab Status: Final result Specimen: Stool from Per Rectum Updated: 06/03/24 0908     Campylobacter Not Detected     Plesiomonas shigelloides Not Detected     Salmonella Not Detected     Vibrio Not Detected     Vibrio cholerae Not Detected     Yersinia enterocolitica Not Detected     Enteroaggregative E. coli (EAEC) Not Detected     Enteropathogenic E. coli (EPEC) Not Detected     Enterotoxigenic E. coli (ETEC) lt/st Not Detected     Shiga-like toxin-producing E. coli (STEC) stx1/stx2 Not Detected     Shigella/Enteroinvasive E. coli (EIEC) Not Detected     Cryptosporidium Not Detected     Cyclospora cayetanensis Not Detected     Entamoeba histolytica Not Detected     Giardia lamblia Not Detected     Adenovirus F40/41 Not Detected     Astrovirus Not Detected     Norovirus GI/GII Not Detected     Rotavirus A Not Detected     Sapovirus (I, II, IV or V) Not Detected    Clostridioides difficile Toxin - Stool, Per Rectum  [[id number]]  (Normal) Collected: 06/02/24 2203    Lab Status: Final result Specimen: Stool from Per Rectum Updated: 06/03/24 0838    Narrative:      The following orders were created for panel order Clostridioides difficile Toxin - Stool, Per Rectum.  Procedure                               Abnormality         Status                     ---------                               -----------         ------                     Clostridioides difficile...[[id number]]  Normal              Final result                 Please view results for these tests on the individual orders.    Clostridioides difficile Toxin, PCR - Stool, Per Rectum [[id number]]  (Normal) Collected: 06/02/24 2203    Lab Status: Final result Specimen: Stool from Per Rectum Updated: 06/03/24 0838     Toxigenic C. difficile by PCR Not Detected    Narrative:      The result indicates the absence of toxigenic C. difficile from stool specimen.             No radiology results from the last 24 hrs    Results for orders placed during the hospital encounter of 03/24/22    Adult Transthoracic Echo Complete W/ Cont if Necessary Per Protocol    Interpretation Summary  · Estimated left ventricular EF = 60%  · No hemodynamically significant valvular heart disease      Current medications:  Scheduled Meds:aspirin, 81 mg, Oral, Daily  cefTRIAXone, 2,000 mg, Intravenous, Q24H  doxycycline, 100 mg, Oral, Q12H  [Held by provider] furosemide, 40 mg, Oral, Daily  heparin (porcine), 2,500 Units, Subcutaneous, Q12H  lisinopril, 20 mg, Oral, Daily  metoprolol tartrate, 12.5 mg, Oral, Q12H  sodium chloride, 10 mL, Intravenous, Q12H      Continuous Infusions:dextrose 5 % and sodium chloride 0.9 %, 50 mL/hr, Last Rate: 50 mL/hr (06/06/24 0053)      PRN Meds:.  acetaminophen **OR** acetaminophen **OR** acetaminophen    Calcium Replacement - Follow Nurse / BPA Driven Protocol    dextrose    dextrose    glucagon (human recombinant)    Magnesium Standard Dose Replacement - Follow Nurse  / BPA Driven Protocol    ondansetron ODT **OR** ondansetron    Phosphorus Replacement - Follow Nurse / BPA Driven Protocol    Potassium Replacement - Follow Nurse / BPA Driven Protocol    sodium chloride    sodium chloride    sodium chloride    Assessment & Plan   Assessment & Plan     Active Hospital Problems    Diagnosis  POA    **Weakness generalized [R53.1]  Yes    Moderate malnutrition [E44.0]  Yes    Community acquired pneumonia of left lower lobe of lung [J18.9]  Unknown    Generalized weakness [R53.1]  Yes    Unwitnessed fall [R29.6]  Unknown    History of diabetes mellitus, type II [Z86.39]  Unknown    Dementia without behavioral disturbance [F03.90]  Unknown    Hypoglycemia [E16.2]  Unknown    Hypokalemia [E87.6]  Yes    Hypertension [I10]  Yes    History of Stroke [I63.9]  Yes    Hyperlipidemia [E78.5]  Yes      Resolved Hospital Problems   No resolved problems to display.        Brief Hospital Course to date:  Susan Tucker is a 89 y.o. female with PMHx of HTN, HLD, CVA, DM who presents to the ED for evaluation after a fall at her living facility and low blood pressure per transferring documentation.      This patient's problems and plans were partially entered by my partner and updated as appropriate by me 06/06/24.     Weakness  Unwitnessed fall  -  CT head w/o contrast negative  - PT/OT consult  - fall precautions  - likely dehydrated / hypoglycemia related  - Reviewed with daughter -- downward trajectory in her functional status and cognitive function over the last few months   - Dispo pending -- may need SNF    Fever  LLL PNA   - CXR 6/4 with LLL infiltrate   - UA  negative   - Bcx NGTD; strep/legionella/MRSA -- negative  - initially on rocephin/doxy -- change to omnicef/doxy to complete 5 days      Hypokalemia  Hypoglycemia  - k 2.5 on admission  - mag 1.7 on admission  - glucose 61 on admission  - Replace electrolytes   -  monitor off d5. Boost TID. Assist with feeding     New Atrial Fibrillation  -  verified per EKG   - patient already on aspirin 81mg PO daily- high fall risk- defer other anticoagulation  - converted back to NSR 6/3/24- no further workup; potassium replaced     JULIA, mild- resolved  - s/p fluids  - continue lisinopril and lasix     HTN  HLD  - continue home meds     Dementia  - unclear baseline but progressive decline over the last few months per daughter     Hx T2DM  - not on medications  - A1c: 5.3     Hx CVA  - continue aspirin    Expected Discharge Location and Transportation: assisted living vs SNF  Expected Discharge   Expected Discharge Date: 6/6/2024; Expected Discharge Time:      DVT prophylaxis:  Medical DVT prophylaxis orders are present.         AM-PAC 6 Clicks Score (PT): 12 (06/06/24 0800)    CODE STATUS:   Code Status and Medical Interventions:   Ordered at: 06/01/24 1941     Medical Intervention Limits:    NO intubation (DNI)     Code Status (Patient has no pulse and is not breathing):    No CPR (Do Not Attempt to Resuscitate)     Medical Interventions (Patient has pulse or is breathing):    Limited Support       Eli Francisco DO  06/06/24

## 2024-06-06 NOTE — CASE MANAGEMENT/SOCIAL WORK
Continued Stay Note  [facility]     Patient Name: Susan Tucker  MRN: [id number]  Today's Date: 6/6/2024    Admit Date: 6/1/2024    Plan: Assisted Living with HH vs Memory Care vs SNF   Discharge Plan       Row Name 06/06/24 1022       Plan    Plan Comments I spoke with Pau ([phone]) at [facility]. The pt is in personal care level of care. She will come to the hospital around 1030 to assess if the pt can return to that LOC.    Final Discharge Disposition Code 06 - home with home health care                   Discharge Codes    No documentation.                 Expected Discharge Date and Time       Expected Discharge Date Expected Discharge Time    Jun 6, 2024               Saray Maldonado RN

## 2024-06-06 NOTE — PLAN OF CARE
Goal Outcome Evaluation:   Patient had no acute events during shift  Oriented only to self  RA, NSR w PACs  C/of right knee pain alleviated with elevation  (pillow) behind knee  x4 loose BMS  Congested and coarse

## 2024-06-07 LAB
GLUCOSE BLDC GLUCOMTR-MCNC: 105 MG/DL (ref 70–130)
GLUCOSE BLDC GLUCOMTR-MCNC: 106 MG/DL (ref 70–130)
GLUCOSE BLDC GLUCOMTR-MCNC: 119 MG/DL (ref 70–130)
GLUCOSE BLDC GLUCOMTR-MCNC: 125 MG/DL (ref 70–130)
GLUCOSE BLDC GLUCOMTR-MCNC: 126 MG/DL (ref 70–130)
GLUCOSE BLDC GLUCOMTR-MCNC: 53 MG/DL (ref 70–130)
GLUCOSE BLDC GLUCOMTR-MCNC: 64 MG/DL (ref 70–130)
GLUCOSE BLDC GLUCOMTR-MCNC: 70 MG/DL (ref 70–130)
GLUCOSE BLDC GLUCOMTR-MCNC: 81 MG/DL (ref 70–130)
GLUCOSE BLDC GLUCOMTR-MCNC: 95 MG/DL (ref 70–130)
GLUCOSE BLDC GLUCOMTR-MCNC: 98 MG/DL (ref 70–130)

## 2024-06-07 PROCEDURE — 94799 UNLISTED PULMONARY SVC/PX: CPT

## 2024-06-07 PROCEDURE — 92526 ORAL FUNCTION THERAPY: CPT

## 2024-06-07 PROCEDURE — 25810000003 DEXTROSE 5 % AND SODIUM CHLORIDE 0.9 % 5-0.9 % SOLUTION: Performed by: INTERNAL MEDICINE

## 2024-06-07 PROCEDURE — 82948 REAGENT STRIP/BLOOD GLUCOSE: CPT

## 2024-06-07 PROCEDURE — 25010000002 HEPARIN (PORCINE) PER 1000 UNITS: Performed by: NURSE PRACTITIONER

## 2024-06-07 PROCEDURE — 99232 SBSQ HOSP IP/OBS MODERATE 35: CPT | Performed by: INTERNAL MEDICINE

## 2024-06-07 RX ORDER — DEXTROSE MONOHYDRATE AND SODIUM CHLORIDE 5; .9 G/100ML; G/100ML
50 INJECTION, SOLUTION INTRAVENOUS CONTINUOUS
Status: DISCONTINUED | OUTPATIENT
Start: 2024-06-07 | End: 2024-06-07

## 2024-06-07 RX ADMIN — DEXTROSE 15 G: 15 GEL ORAL at 05:16

## 2024-06-07 RX ADMIN — Medication 10 ML: at 09:29

## 2024-06-07 RX ADMIN — Medication 12.5 MG: at 22:00

## 2024-06-07 RX ADMIN — DOXYCYCLINE 100 MG: 100 CAPSULE ORAL at 09:29

## 2024-06-07 RX ADMIN — NYSTATIN: 100000 POWDER TOPICAL at 09:29

## 2024-06-07 RX ADMIN — CEFDINIR 300 MG: 300 CAPSULE ORAL at 22:00

## 2024-06-07 RX ADMIN — IPRATROPIUM BROMIDE AND ALBUTEROL SULFATE 3 ML: 2.5; .5 SOLUTION RESPIRATORY (INHALATION) at 17:10

## 2024-06-07 RX ADMIN — LISINOPRIL 20 MG: 20 TABLET ORAL at 09:29

## 2024-06-07 RX ADMIN — CEFDINIR 300 MG: 300 CAPSULE ORAL at 09:29

## 2024-06-07 RX ADMIN — NYSTATIN: 100000 POWDER TOPICAL at 22:00

## 2024-06-07 RX ADMIN — Medication 10 ML: at 22:00

## 2024-06-07 RX ADMIN — IPRATROPIUM BROMIDE AND ALBUTEROL SULFATE 3 ML: 2.5; .5 SOLUTION RESPIRATORY (INHALATION) at 07:27

## 2024-06-07 RX ADMIN — IPRATROPIUM BROMIDE AND ALBUTEROL SULFATE 3 ML: 2.5; .5 SOLUTION RESPIRATORY (INHALATION) at 13:12

## 2024-06-07 RX ADMIN — ACETAMINOPHEN 650 MG: 650 SOLUTION ORAL at 22:00

## 2024-06-07 RX ADMIN — HEPARIN SODIUM 2500 UNITS: 5000 INJECTION INTRAVENOUS; SUBCUTANEOUS at 09:29

## 2024-06-07 RX ADMIN — ACETAMINOPHEN 650 MG: 325 TABLET ORAL at 12:20

## 2024-06-07 RX ADMIN — Medication 12.5 MG: at 09:29

## 2024-06-07 RX ADMIN — HEPARIN SODIUM 2500 UNITS: 5000 INJECTION INTRAVENOUS; SUBCUTANEOUS at 22:00

## 2024-06-07 RX ADMIN — ASPIRIN 81 MG: 81 TABLET, CHEWABLE ORAL at 09:29

## 2024-06-07 RX ADMIN — DEXTROSE MONOHYDRATE 25 G: 25 INJECTION, SOLUTION INTRAVENOUS at 05:46

## 2024-06-07 RX ADMIN — DOXYCYCLINE 100 MG: 100 CAPSULE ORAL at 22:00

## 2024-06-07 RX ADMIN — DEXTROSE AND SODIUM CHLORIDE 50 ML/HR: 5; 900 INJECTION, SOLUTION INTRAVENOUS at 08:30

## 2024-06-07 RX ADMIN — ACETAMINOPHEN 650 MG: 325 TABLET ORAL at 17:20

## 2024-06-07 NOTE — PLAN OF CARE
"Goal Outcome Evaluation:  Plan of Care Reviewed With: patient, daughter (spoke with dtr Jada by phone regarding pain history)        Progress: no change  Outcome Evaluation: Palliative consult for GOC/ACP; pt has ACP doc on file, dtr Marbella Mcmillan designated HCS (updated in emergency contacts). Pt seen this morning by Dr. VALENCIA Villalpando; by Palliative RN at 1120. Pt reported no pain to Dr. Villalpando; when seen by RN, pt crying, frowning, trying to move her legs out of the bed, c/o cramping pain in her legs, pain in her knees. Unit RN and PCT came and repositioned, prn Tylenol administered. When pt seen 40 minutes later, denied pain, smiling, getting ready to eat some lunch. Pt reported some relief of her cramping with passive dorsiflexion, no palpable cramp appreciated in lower legs. Pt's daughter, Jada, was at that time listed first in emergency contacts, with \"POA\" listed as part of her name; called Jada for information about pain history, reported pt has had a diagnosis of arthritis, and has long complained of pain in her knees, has not used any medication other than Tylenol for her pain to Jada's knowledge. Recommend being proactive with prn Tylenol and documentation of response for consideration of scheduled dosing. Plan for pt to return to [facility] tomorrow; referral sent to [facility] for outpatient follow up.    1300 Palliative IDT meeting:  JOESPH SAHA, RN, SW,   After hours, weekends and holidays, contact Palliative Provider by calling [phone]       Problem: Palliative Care  Goal: Enhanced Quality of Life  Outcome: Ongoing, Progressing  Intervention: Promote Advance Care Planning  Flowsheets (Taken 6/7/2024 1927)  Life Transition/Adjustment:   palliative care discussed   palliative care initiated  Intervention: Maximize Comfort  Flowsheets (Taken 6/7/2024 1427)  Pain Management Interventions: (utilize prn Tylenol, monitor response to assess possible benefit of " scheduled dosing)   pain management plan reviewed with patient/caregiver   other (see comments)  Nutrition Interventions: meal set-up provided  Intervention: Optimize Function  Flowsheets (Taken 6/7/2024 1427)  Sensory Stimulation Regulation: television on  Fatigue Management: paced activity encouraged  Sleep/Rest Enhancement:   consistent schedule promoted   natural light exposure provided  Intervention: Optimize Psychosocial Wellbeing  Flowsheets (Taken 6/7/2024 1427)  Supportive Measures:   active listening utilized   positive reinforcement provided   verbalization of feelings encouraged  Spiritual Activities Assistance: (Spiritual Care consult) other (see comments)  Family/Support System Care: (Palliative information provided at bedside)   involvement promoted   other (see comments)

## 2024-06-07 NOTE — PROGRESS NOTES
[facility] Medicine Services  PROGRESS NOTE    Patient Name: Susan Tucker  : 1935  MRN: [id number]    Date of Admission: 2024  Primary Care Physician: Damon Townsend PA    Subjective   Subjective     CC:  Weakness, hypoglycemia     HPI:  Received lasix overnight. States she feel good today. Denies concerns. Called and updated daughter. Reviewed poor intake and suspect progression of her dementia. Daughter agrees. Would like to speak to palliative and hospice.       Objective   Objective     Vital Signs:   Temp:  [97.5 °F (36.4 °C)-98.2 °F (36.8 °C)] 97.6 °F (36.4 °C)  Heart Rate:  [] 100  Resp:  [18] 18  BP: (124-156)/(74-82) 124/74  Flow (L/min):  [1-2] 1     Physical Exam:  Constitutional: No acute distress, frail  HENT: NCAT, mucous membranes moist  Respiratory: coarse, + cough  Cardiovascular: RRR, no murmurs, rubs, or gallops  Gastrointestinal: Positive bowel sounds, soft, nontender, nondistended  Musculoskeletal: No bilateral ankle edema  Neurologic: Oriented x 1, strength symmetric in all extremities, Cranial Nerves grossly intact to confrontation, speech clear  Skin: No rashes      Results Reviewed:  LAB RESULTS:      Lab 24  0457 24  0524 24  0723 24  0413 24  1728   WBC 4.59 5.77 4.05 4.07 4.46   HEMOGLOBIN 11.4* 11.2* 11.7* 11.8* 15.1   HEMATOCRIT 34.4 33.3* 36.3 35.2 45.2   PLATELETS 173 177 174 208 232   NEUTROS ABS  --   --   --  2.68 3.19   IMMATURE GRANS (ABS)  --   --   --  0.01 0.01   LYMPHS ABS  --   --   --  0.88 0.88   MONOS ABS  --   --   --  0.46 0.30   EOS ABS  --   --   --  0.01 0.03   MCV 91.7 89.3 93.8 90.7 92.4   PROCALCITONIN  --  0.15  --   --  0.15   LACTATE  --   --   --   --  0.9         Lab 06/06/24  1116 24  1803 24  0457 24  0524 24  0723 24  0911 24  0413 24  2142 24  1728   SODIUM  --   --  135* 135* 136  --  142 138 135*   POTASSIUM 3.8 3.5 3.5 4.1 5.1    < > 3.0* 2.5* 2.5*   CHLORIDE  --   --  106 106 106  --  107 99 92*   CO2  --   --  20.0* 19.0* 20.0*  --  26.0 27.0 23.0   ANION GAP  --   --  9.0 10.0 10.0  --  9.0 12.0 20.0*   BUN  --   --  6* 5* 6*  --  14 17 18   CREATININE  --   --  0.42* 0.46* 0.51*  --  0.76 1.00 1.03*   EGFR  --   --  94.2 92.2 89.9  --  75.5 54.3* 52.4*   GLUCOSE  --   --  79 98 86  --  109* 134* 61*   CALCIUM  --   --  7.5* 7.8* 8.0*  --  8.2* 8.7 9.2   IONIZED CALCIUM  --   --   --   --  1.22  --  1.20  --  1.23   MAGNESIUM  --   --   --   --   --   --  2.0  --  1.7   PHOSPHORUS  --   --   --   --   --   --  2.3*  --  2.9   HEMOGLOBIN A1C  --   --   --   --   --   --   --   --  5.30   TSH  --   --   --   --   --   --   --   --  1.510    < > = values in this interval not displayed.         Lab 06/04/24  0524 06/03/24  0723 06/02/24  0413 06/01/24  2142 06/01/24  1728   TOTAL PROTEIN 4.9* 5.5* 5.7* 6.2 7.0   ALBUMIN 3.0* 3.0* 3.0* 3.5 3.6   GLOBULIN 1.9 2.5 2.7 2.7 3.4   ALT (SGPT) 19 17 18 24 23   AST (SGOT) 42* 38* 35* 49* 49*   BILIRUBIN 0.6 0.6 0.7 0.9 1.0   ALK PHOS 37* 36* 37* 42 45                     Brief Urine Lab Results  (Last result in the past 365 days)        Color   Clarity   Blood   Leuk Est   Nitrite   Protein   CREAT   Urine HCG        06/04/24 1630 Yellow   Clear   Negative   Trace   Negative   Negative                   Microbiology Results Abnormal       Procedure Component Value - Date/Time    Blood Culture - Blood, Arm, Right [[id number]]  (Normal) Collected: 06/04/24 1317    Lab Status: Preliminary result Specimen: Blood from Arm, Right Updated: 06/06/24 1330     Blood Culture No growth at 2 days    Narrative:      Less than seven (7) mL's of blood was collected.  Insufficient quantity may yield false negative results.    Blood Culture - Blood, Hand, Right [[id number]]  (Normal) Collected: 06/04/24 1317    Lab Status: Preliminary result Specimen: Blood from Hand, Right Updated: 06/06/24 1330     Blood Culture No  growth at 2 days    Narrative:      Less than seven (7) mL's of blood was collected.  Insufficient quantity may yield false negative results.    S. Pneumo Ag Urine or CSF - Urine, Urine, Clean Catch [[id number]]  (Normal) Collected: 06/04/24 1629    Lab Status: Final result Specimen: Urine, Clean Catch Updated: 06/05/24 0712     Strep Pneumo Ag Negative    Legionella Antigen, Urine - Urine, Urine, Clean Catch [[id number]]  (Normal) Collected: 06/04/24 1629    Lab Status: Final result Specimen: Urine, Clean Catch Updated: 06/05/24 0712     LEGIONELLA ANTIGEN, URINE Negative    MRSA Screen, PCR (Inpatient) - Swab, Nares [[id number]]  (Normal) Collected: 06/04/24 1630    Lab Status: Final result Specimen: Swab from Nares Updated: 06/04/24 1752     MRSA PCR Negative    Narrative:      The negative predictive value of this diagnostic test is high and should only be used to consider de-escalating anti-MRSA therapy. A positive result may indicate colonization with MRSA and must be correlated clinically.  MRSA Negative    Gastrointestinal Panel, PCR - Stool, Per Rectum [[id number]]  (Normal) Collected: 06/02/24 2203    Lab Status: Final result Specimen: Stool from Per Rectum Updated: 06/03/24 0908     Campylobacter Not Detected     Plesiomonas shigelloides Not Detected     Salmonella Not Detected     Vibrio Not Detected     Vibrio cholerae Not Detected     Yersinia enterocolitica Not Detected     Enteroaggregative E. coli (EAEC) Not Detected     Enteropathogenic E. coli (EPEC) Not Detected     Enterotoxigenic E. coli (ETEC) lt/st Not Detected     Shiga-like toxin-producing E. coli (STEC) stx1/stx2 Not Detected     Shigella/Enteroinvasive E. coli (EIEC) Not Detected     Cryptosporidium Not Detected     Cyclospora cayetanensis Not Detected     Entamoeba histolytica Not Detected     Giardia lamblia Not Detected     Adenovirus F40/41 Not Detected     Astrovirus Not Detected     Norovirus GI/GII Not Detected     Rotavirus A Not  Detected     Sapovirus (I, II, IV or V) Not Detected    Clostridioides difficile Toxin - Stool, Per Rectum [[id number]]  (Normal) Collected: 06/02/24 2203    Lab Status: Final result Specimen: Stool from Per Rectum Updated: 06/03/24 0838    Narrative:      The following orders were created for panel order Clostridioides difficile Toxin - Stool, Per Rectum.  Procedure                               Abnormality         Status                     ---------                               -----------         ------                     Clostridioides difficile...[[id number]]  Normal              Final result                 Please view results for these tests on the individual orders.    Clostridioides difficile Toxin, PCR - Stool, Per Rectum [[id number]]  (Normal) Collected: 06/02/24 2203    Lab Status: Final result Specimen: Stool from Per Rectum Updated: 06/03/24 0838     Toxigenic C. difficile by PCR Not Detected    Narrative:      The result indicates the absence of toxigenic C. difficile from stool specimen.             XR Chest 1 View    Result Date: 6/6/2024  XR CHEST 1 VW Date of Exam: 6/6/2024 10:34 PM EDT Indication: SOB Comparison: Chest radiograph 6/4/2024. Findings: Borderline-enlarged cardiac silhouette. Thoracic aortic calcifications. Perihilar predominant interstitial opacities, new/increased. Small bilateral pleural effusions, new on the right and increased on the left. Increased left lower lobe consolidation. No pneumothorax. Osseous structures are unchanged.     Impression: Impression: New perihilar interstitial opacities suggestive of pulmonary vascular congestion/interstitial edema, as well as new/increased small bilateral pleural effusions. Additionally, there is increased left lower lobe consolidation, which could represent atelectasis and/or pneumonia. Electronically Signed: Salvatore Feldman MD  6/6/2024 10:41 PM EDT  Workstation ID: [id number]    FL Video Swallow With Speech Single Contrast    Result Date:  6/6/2024  FL VIDEO SWALLOW W SPEECH SINGLE-CONTRAST Date of Exam: 6/6/2024 9:36 AM EDT Indication: dysphagia.   Comparison: None available. Technique:   The speech pathologist administered food and/or liquid mixed with barium to the patient with cine/video imaging.  Imaging assistance was provided to the speech pathologist and an image was saved. Fluoroscopic Time: 1 minute 18 seconds Number of Images: 9 associated fluoroscopic loops were saved Findings: A single occurrence of aspiration was seen during fluoroscopic guided modified barium swallowing series. Please see speech therapy report for full details and recommendations.     Impression: Impression: Fluoroscopy provided for modified barium swallow. A single occurrence aspiration was seen during swallowing evaluation. Please see speech therapy report for full details and recommendations. Report dictated by: Rachel Sarmiento PA-c  I have personally reviewed this case and agree with the findings above: Electronically Signed: Rubén Persaud MD  6/6/2024 4:52 PM EDT  Workstation ID: [id number]     Results for orders placed during the hospital encounter of 03/24/22    Adult Transthoracic Echo Complete W/ Cont if Necessary Per Protocol    Interpretation Summary  · Estimated left ventricular EF = 60%  · No hemodynamically significant valvular heart disease      Current medications:  Scheduled Meds:aspirin, 81 mg, Oral, Daily  cefdinir, 300 mg, Oral, Q12H  doxycycline, 100 mg, Oral, Q12H  [Held by provider] furosemide, 40 mg, Oral, Daily  heparin (porcine), 2,500 Units, Subcutaneous, Q12H  ipratropium-albuterol, 3 mL, Nebulization, 4x Daily - RT  lisinopril, 20 mg, Oral, Daily  metoprolol tartrate, 12.5 mg, Oral, Q12H  nystatin, , Topical, Q12H  sodium chloride, 10 mL, Intravenous, Q12H      Continuous Infusions:dextrose 5 % and sodium chloride 0.9 %, 50 mL/hr, Last Rate: 50 mL/hr (06/07/24 0830)      PRN Meds:.  acetaminophen **OR** acetaminophen **OR** acetaminophen     Calcium Replacement - Follow Nurse / BPA Driven Protocol    dextrose    dextrose    glucagon (human recombinant)    Magnesium Standard Dose Replacement - Follow Nurse / BPA Driven Protocol    ondansetron ODT **OR** ondansetron    Phosphorus Replacement - Follow Nurse / BPA Driven Protocol    Potassium Replacement - Follow Nurse / BPA Driven Protocol    sodium chloride    sodium chloride    sodium chloride    Assessment & Plan   Assessment & Plan     Active Hospital Problems    Diagnosis  POA    **Weakness generalized [R53.1]  Yes    Moderate malnutrition [E44.0]  Yes    Community acquired pneumonia of left lower lobe of lung [J18.9]  Unknown    Generalized weakness [R53.1]  Yes    Unwitnessed fall [R29.6]  Unknown    History of diabetes mellitus, type II [Z86.39]  Unknown    Dementia without behavioral disturbance [F03.90]  Unknown    Hypoglycemia [E16.2]  Unknown    Hypokalemia [E87.6]  Yes    Hypertension [I10]  Yes    History of Stroke [I63.9]  Yes    Hyperlipidemia [E78.5]  Yes      Resolved Hospital Problems   No resolved problems to display.        Brief Hospital Course to date:  Susan Tucker is a 89 y.o. female with PMHx of HTN, HLD, CVA, DM who presents to the ED for evaluation after a fall at her living facility and low blood pressure per transferring documentation.      This patient's problems and plans were partially entered by my partner and updated as appropriate by me 06/07/24.     Weakness  Unwitnessed fall  Hypoglycemia   -  CT head w/o contrast negative  - PT/OT consult  - fall precautions  - likely dehydrated / hypoglycemia related  - Reviewed with daughter -- downward trajectory in her functional status and cognitive function over the last few months   - Patient with persistent hypoglycemia. Suspect it is related to poor intake. Liberalized diet, shakes TID and assisting with meals with no significant improvement. Suspect progression of dementia. Reviewed this with daughter -- agreeable to  palliative/hospice consult     Fever  LLL PNA   - CXR 6/4 with LLL infiltrate   - UA  negative   - Bcx NGTD; strep/legionella/MRSA -- negative  - Initially on rocephin/doxy -- changed to omnicef/doxy to complete 5 days     Hypoxia   - PNA and volume. S/p lasix. Monitor.      New Atrial Fibrillation  - verified per EKG   - patient already on aspirin 81mg PO daily- high fall risk- defer other anticoagulation  - converted back to NSR 6/3/24- no further workup; potassium replaced     JULIA, mild- resolved  - s/p fluids  - continue lisinopril and lasix     HTN  HLD  - continue home meds     Dementia  - unclear baseline but progressive decline over the last few months per daughter     Hx T2DM  - not on medications  - A1c: 5.3     Hx CVA  - continue aspirin    Expected Discharge Location and Transportation: D  Expected Discharge   Expected Discharge Date: 6/7/2024; Expected Discharge Time:      DVT prophylaxis:  Medical DVT prophylaxis orders are present.         AM-PAC 6 Clicks Score (PT): 12 (06/07/24 0204)    CODE STATUS:   Code Status and Medical Interventions:   Ordered at: 06/01/24 1941     Medical Intervention Limits:    NO intubation (DNI)     Code Status (Patient has no pulse and is not breathing):    No CPR (Do Not Attempt to Resuscitate)     Medical Interventions (Patient has pulse or is breathing):    Limited Support       Eli Francisco DO  06/07/24

## 2024-06-07 NOTE — PLAN OF CARE
Goal Outcome Evaluation:   Patient had no acute events during shift  Wound care performed per order  CXR, duo nebs, lasix, 2LNC - increasing respiratory crackles  Oriented only to self  Hypoglycemia - see results  Patient chocked on glucose gel - iv glucose utilized

## 2024-06-07 NOTE — THERAPY DISCHARGE NOTE
Acute Care - Speech Language Pathology   Swallow Treatment Note/Discharge [facility]     Patient Name: Susan Tucker  : 1935  MRN: [id number]  Today's Date: 2024               Admit Date: 2024    Visit Dx:    ICD-10-CM ICD-9-CM   1. Generalized weakness  R53.1 780.79   2. Hypotensive episode  I95.9 458.9   3. Unwitnessed fall  R29.6 [id number]   4. Altered mental status, unspecified altered mental status type  R41.82 780.97   5. Hypokalemia  E87.6 276.8   6. Oropharyngeal dysphagia  R13.12 787.22   7. Sepsis without acute organ dysfunction, due to unspecified organism  A41.9 038.9     995.91   8. Dehydration  E86.0 276.51   9. Acute UTI (urinary tract infection)  N39.0 599.0   10. Syncope and collapse  R55 780.2   11. Frequent falls  R29.6 V15.88   12. Community acquired pneumonia of left lower lobe of lung  J18.9 486   13. Dementia without behavioral disturbance  F03.90 294.20   14. Weakness generalized  R53.1 780.79     Patient Active Problem List   Diagnosis    Sepsis    Elevated liver enzymes    Dehydration    Hypertension    History of Stroke    Acute UTI (urinary tract infection)    Calculus of bile duct with acute cholecystitis and obstruction    Enterocolitis    Gallstone ileus of small intestine    Syncope and collapse    Frequent falls    Type 2 diabetes mellitus without complication    Hyperlipidemia    Hypokalemia    Weakness generalized    Unwitnessed fall    History of diabetes mellitus, type II    Dementia without behavioral disturbance    Hypoglycemia    Generalized weakness    Moderate malnutrition    Community acquired pneumonia of left lower lobe of lung     Past Medical History:   Diagnosis Date    Diabetes mellitus     Elevated cholesterol     Hypertension     Stroke      Past Surgical History:   Procedure Laterality Date    ERCP N/A 2018    Procedure: ENDOSCOPIC RETROGRADE CHOLANGIOPANCREATOGRAPHY;  Surgeon: Liam Manning MD;  Location: [facility] ENDOSCOPY;  Service:  Gastroenterology    EXPLORATORY LAPAROTOMY N/A 2/10/2020    Procedure: LAPAROTOMY EXPLORATORY BOWEL RESECTION;  Surgeon: Carlos Zurita MD;  Location: [facility] OR;  Service: General;  Laterality: N/A;    HYSTERECTOMY         SLP Recommendation and Plan     SLP Diet Recommendation: mechanical ground textures, thin liquids (06/07/24 1500)     Monitor for Signs of Aspiration: yes, notify SLP if any concerns (06/07/24 1500)  Recommended Diagnostics: No further SLP services recommended (06/07/24 1500)     Anticipated Discharge Disposition (SLP): skilled nursing facility (06/07/24 1500)              Daily Summary of Progress (SLP): prepare for discharge (06/07/24 1500)     Anticipated Discharge Disposition (SLP): skilled nursing facility (06/07/24 1500)                 Treatment Assessment (SLP): toleration of diet, no clinical signs of, oral dysphagia, pharyngeal dysphagia (06/07/24 1500)  Treatment Assessment Comments (SLP): Pt seen for diet tolerance, d/c today was cancelled and rescheduled for 6/8. RN reports no difficulty with PO meds given in thin water earlier. No overt s/sx aspiration or distress w current diet items. Prolonged mastication with expectoration of solids >mechanical ground. Will not advance diet at this time, continue thin liquids. Pt will need assistance w meals. Nothing further for SLP to offer and will sign off. (06/07/24 1500)  Plan for Continued Treatment (SLP): treatment no longer indicated as all goals met (06/07/24 1500)    Plan of Care Reviewed With: patient (06/07/24 1553)  Progress: no change (06/07/24 1553)    SWALLOW EVALUATION (Last 72 Hours)       SLP Adult Swallow Evaluation       Row Name 06/07/24 1500 06/06/24 0950 06/05/24 1053             Rehab Evaluation    Document Type discharge treatment  -RS -- evaluation  -AC      Subjective Information no complaints  -RS -- no complaints  -AC      Patient Observations alert;cooperative  -RS -- alert;cooperative  -AC       Patient/Family/Caregiver Comments/Observations no family present  -RS -- Pt pleasantly confused. No family present.  -AC      Patient Effort adequate  -RS -- good  -AC      Symptoms Noted During/After Treatment none  -RS -- --         General Information    Patient Profile Reviewed -- -- yes  -      Pertinent History Of Current Problem -- -- Adm from [facility] after fall. General weakness, malnutrition, LLL pna. General cog/strength decline per chart. Hx CVA, dementia, uses wheelchair. Consulted 2' difficulty swallowing pills.  -      Current Method of Nutrition -- mechanical ground textures;thin liquids  - mechanical ground textures;thin liquids  -      Precautions/Limitations, Vision -- -- WFL;for purposes of eval  -AC      Precautions/Limitations, Hearing -- -- WFL;for purposes of eval  -      Prior Level of Function-Communication -- cognitive-linguistic impairment  - cognitive-linguistic impairment  -      Prior Level of Function-Swallowing -- unknown  - --  pt indicated softer textures 2' dentition  -      Plans/Goals Discussed with -- patient;agreed upon  - patient;agreed upon  -      Barriers to Rehab -- cognitive status  - cognitive status  -      Patient's Goals for Discharge -- patient did not state  - patient did not state  -         Pain    Additional Documentation Pain Scale: FACES Pre/Post-Treatment (Group)  - Pain Scale: FACES Pre/Post-Treatment (Group)  - Pain Scale: FACES Pre/Post-Treatment (Group)  -         Pain Scale: FACES Pre/Post-Treatment    Pain: FACES Scale, Pretreatment 0-->no hurt  -RS 0-->no hurt  - 0-->no hurt  -      Posttreatment Pain Rating 0-->no hurt  -RS 0-->no hurt  - 0-->no hurt  -AC         Oral Motor Structure and Function    Dentition Assessment -- -- missing teeth;teeth are in poor condition  -      Secretion Management -- -- WNL/WFL  -      Mucosal Quality -- -- moist, healthy  -      Volitional Cough -- -- weak  -AC         Oral  Musculature and Cranial Nerve Assessment    Oral Motor General Assessment -- -- generalized oral motor weakness  -         General Eating/Swallowing Observations    Respiratory Support Currently in Use -- -- nasal cannula  -      Eating/Swallowing Skills -- -- fed by SLP;self-fed;needed assist  -      Positioning During Eating -- -- upright 90 degree;upright in bed  -      Utensils Used -- -- spoon;cup;straw  -      Consistencies Trialed -- -- thin liquids;nectar/syrup-thick liquids;pureed;soft to chew textures;chopped  -         Clinical Swallow Eval    Oral Prep Phase -- -- impaired  -      Oral Transit -- -- WFL  -      Oral Residue -- -- WFL  -      Clinical Swallow Evaluation Summary -- -- Noted strained, falsetto, weak vocal quality. Congested coughing noted @ baseline, prior to PO trials, and continued t/o eval. No difference w/ nectar-thick liquids. No aversion. Difficult to r/o aspiration clinically @ bedside. Generally at risk.  -         Oral Prep Concerns    Oral Prep Concerns -- -- increased prep time  -      Increased Prep Time -- -- regular consistencies  -      Oral Prep Concerns, Comment -- -- Suspect largely 2' dental status.  -         MBS/VFSS    Utensils Used -- spoon;cup;straw  - --      Consistencies Trialed -- thin liquids;pudding thick;regular textures  - --         MBS/VFSS Interpretation    Oral Prep Phase -- impaired oral phase of swallowing  - --      Oral Transit Phase -- impaired  - --      Oral Residue -- impaired  - --      VFSS Summary -- Mild oral and trace-mild pharyngeal dysphagia. Aspiration w/ thins via initial tsp only 2' mistiming. Delayed cough in response to aspiration, however ineffective in clearing. U/a to replicate accross multiple additional trials. No aspiration w/ thins via cup or straw presentation. Intermittent, high transient penetration w/ thins; material observed to fully clear upon completion of the swallow. No penetration or  aspiration w/ pudding or regular solid trials. Ok to continue mechanical ground solid diet w/ thin liquids, assistance w/ feeding, general aspiration precautions. SLP will f/u for diet tolerance/education  - --         Oral Preparatory Phase    Oral Preparatory Phase -- prolonged manipulation  - --      Prolonged Manipulation -- pudding/puree;regular textures;secondary to impaired cognitive status;secondary to reduced lingual range of motion  - --         Oral Transit Phase    Impaired Oral Transit Phase -- increased A-P transit time;premature spillage of liquids into pharynx  - --      Increased A-P Transit Time -- all consistencies tested;secondary to impaired cognitive status;discoordination of lingual movement  - --      Premature Spillage of Liquids into Pharynx -- thin liquids;secondary to impaired cognitive status;discoordination of lingual movement  - --         Oral Residue    Impaired Oral Residue -- diffuse residue throughout oral cavity  - --      Diffuse Residue throughout Oral Cavity -- all consistencies tested;secondary to reduced lingual range of motion  - --      Response to Oral Residue -- partial residue clearance;with spontaneous subsequent swallow;with cued swallow  - --         Initiation of Pharyngeal Swallow    Initiation of Pharyngeal Swallow -- bolus in pyriform sinuses  - --      Pharyngeal Phase -- impaired pharyngeal phase of swallowing  - --      Anatomical abnormalities noted -- osteophyte/bone spur per radiology report;other (see comments)  C2-C5  -MH --      Anatomical abnormalities functional impact -- functional impact on swallowing;other (see comments)  contributes to residue  - --      Aspiration Before the Swallow -- thin liquids;secondary to reduced back of tongue control;secondary to delayed swallow initiation or mistiming;other (see comments)  via initial tsp only  -MH --      Penetration During the Swallow -- thin liquids;secondary to reduced  laryngeal elevation;secondary to delayed swallow initiation or mistiming;secondary to reduced vestibular closure  - --      Aspiration During the Swallow -- thin liquids;secondary to reduced vestibular closure;other (see comments);secondary to delayed swallow initiation or mistiming  via initial tsp only  - --      Depth of Penetration -- shallow;transient  - --      Response to Penetration -- Yes  - --      Responsed to penetration with -- other (see comments)  cleared upon completion of swallow  - --      Response to Aspiration -- Yes  - --      Responded to aspiration with -- cough;non-effective  - --      Rosenbek's Scale -- thin:;2--->level 2;pudding/puree:;regular textures:;1--->level 1  - --      Pharyngeal Residue -- all consistencies tested;diffuse within pharynx;secondary to reduced hyolaryngeal excursion;secondary to reduced posterior pharyngeal wall stripping;secondary to reduced laryngeal elevation  - --      Response to Residue -- partial residue clearance;cleared residue with liquid wash;cleared residue with cued swallow;cleared residue with spontaneous subsequent swallow  - --      Attempted Compensatory Maneuvers -- bolus size;bolus presentation style;additional subsequent swallow;multiple swallows  - --      Response to Attempted Compensatory Maneuvers -- reduced residue  - --      Successful Compensatory Maneuver Competency -- patient able to;demonstrate compensations;with cues  - --         Esophageal Phase    Esophageal Phase -- other (see comments)  Delayed bolus passage through upper 1/3 esophagus  - --         SLP Evaluation Clinical Impression    SLP Swallowing Diagnosis -- other (see comments);mild;oral dysphagia  trace-mild pharyngeal dysphagia  - R/O pharyngeal dysphagia  -AC      Functional Impact -- risk of aspiration/pneumonia;risk of malnutrition;risk of dehydration  - risk of aspiration/pneumonia;risk of malnutrition;risk of dehydration  -      Rehab  Potential/Prognosis, Swallowing -- re-evaluate goals as necessary  - re-evaluate goals as necessary  -      Swallow Criteria for Skilled Therapeutic Interventions Met -- demonstrates skilled criteria  - demonstrates skilled criteria  -         SLP Treatment Clinical Impressions    Treatment Assessment (SLP) toleration of diet;no clinical signs of;oral dysphagia;pharyngeal dysphagia  - -- --      Treatment Assessment Comments (SLP) Pt seen for diet tolerance, d/c today was cancelled and rescheduled for 6/8. RN reports no difficulty with PO meds given in thin water earlier. No overt s/sx aspiration or distress w current diet items. Prolonged mastication with expectoration of solids >mechanical ground. Will not advance diet at this time, continue thin liquids. Pt will need assistance w meals. Nothing further for SLP to offer and will sign off.  - -- --      Daily Summary of Progress (SLP) prepare for discharge  - -- --      Plan for Continued Treatment (SLP) treatment no longer indicated as all goals met  - -- --      Care Plan Review care plan/treatment goals reviewed  - -- --         Recommendations    Therapy Frequency (Swallow) -- PRN  - --      Predicted Duration Therapy Intervention (Days) -- until discharge  - --      SLP Diet Recommendation mechanical ground textures;thin liquids  - mechanical ground textures;thin liquids  - mechanical ground textures;thin liquids  as tolerated  -      Recommended Diagnostics No further SLP services recommended  - other (see comments)  diet tolerance/educaton  - VFSS (MBS);other (see comments)  consider if aligns w/ GOC--RN reported dtr/POA arriving this afternoon; will attempt to discuss w/ her  -      Recommended Precautions and Strategies upright posture during/after eating;general aspiration precautions;1:1 supervision;assist with feeding  - upright posture during/after eating;general aspiration precautions;1:1 supervision;assist with  feeding  - upright posture during/after eating;general aspiration precautions;1:1 supervision;assist with feeding  -      Oral Care Recommendations Oral Care BID/PRN;Toothbrush  - Oral Care BID/PRN;Toothbrush  - Oral Care BID/PRN;Toothbrush  -      SLP Rec. for Method of Medication Administration meds whole;meds crushed;with puree;as tolerated  - meds whole;meds crushed;with puree;as tolerated  - meds whole;meds crushed;with puree;as tolerated  -      Monitor for Signs of Aspiration yes;notify SLP if any concerns  -RS yes;notify SLP if any concerns  - yes;notify SLP if any concerns  -AC      Anticipated Discharge Disposition (SLP) skilled nursing facility  - skilled nursing facility  - skilled nursing facility  -         Swallow Goals (SLP)    Swallow LTGs Patient will demonstrate functional swallow for  -RS -- --      Swallow STGs diet tolerance goal selection (SLP)  -RS -- --      Diet Tolerance Goal Selection (SLP) Patient will tolerate trials of  -RS -- --         (LTG) Patient will demonstrate functional swallow for    Diet Texture (Demonstrate functional swallow) regular textures  -RS -- --      Liquid viscosity (Demonstrate functional swallow) thin liquids  -RS -- --      [place] (Demonstrate functional swallow) independently (over 90% accuracy)  -RS -- --      Time Frame (Demonstrate functional swallow) by discharge  -RS -- --      Progress/Outcomes (Demonstrate functional swallow) goal no longer appropriate  -RS -- --         (STG) Patient will tolerate trials of    Consistencies Trialed (Tolerate trials) mechanical ground textures;thin liquids  -RS -- --      Desired Outcome (Tolerate trials) without signs/symptoms of aspiration;with adequate oral prep/transit/clearance  -RS -- --      [place] (Tolerate trials) with moderate cues (50-74% accuracy)  -RS -- --      Time Frame (Tolerate trials) 1 week  -RS -- --      Progress/Outcomes (Tolerate trials) goal met  -RS -- --                 User Key  (r) = Recorded By, (t) = Taken By, (c) = Cosigned By      Initials Name Effective Dates    AC Shaun Sinai S, MS [facility]-SLP 02/03/23 -      Kellee Glasgow MS [facility]-SLP 05/12/23 -     RS Glen Chang MS CCC-SLP 09/14/23 -                     EDUCATION  The patient has been educated in the following areas:   Modified Diet Instruction.         SLP GOALS       Row Name 06/07/24 1500 06/06/24 0950          (LTG) Patient will demonstrate functional swallow for    Diet Texture (Demonstrate functional swallow) regular textures  -RS regular textures  -     Liquid viscosity (Demonstrate functional swallow) thin liquids  -RS thin liquids  -     [place] (Demonstrate functional swallow) independently (over 90% accuracy)  -RS independently (over 90% accuracy)  -     Time Frame (Demonstrate functional swallow) by discharge  -RS by discharge  -     Progress/Outcomes (Demonstrate functional swallow) goal no longer appropriate  -RS new goal  -        (STG) Patient will tolerate trials of    Consistencies Trialed (Tolerate trials) mechanical ground textures;thin liquids  -RS mechanical ground textures;thin liquids  delayed cough in response to aspiration on MBS  -     Desired Outcome (Tolerate trials) without signs/symptoms of aspiration;with adequate oral prep/transit/clearance  - without signs/symptoms of aspiration;with adequate oral prep/transit/clearance  -     [place] (Tolerate trials) with moderate cues (50-74% accuracy)  -RS with moderate cues (50-74% accuracy)  -     Time Frame (Tolerate trials) 1 week  -RS 1 week  -     Progress/Outcomes (Tolerate trials) goal met  -RS new goal  -               User Key  (r) = Recorded By, (t) = Taken By, (c) = Cosigned By      Initials Name Provider Type     Kellee Glasgow MS CCC-SLP Speech and Language Pathologist    Glen Huddleston MS CCC-SLP Speech and Language Pathologist                           Time Calculation:    Time  Calculation- SLP       Row Name 06/07/24 1552             Time Calculation- SLP    SLP Start Time 1515  -RS      SLP Received On 06/07/24  -RS         Untimed Charges    59721-CE Treatment Swallow Minutes 25  -RS         Total Minutes    Untimed Charges Total Minutes 25  -RS       Total Minutes 25  -RS                User Key  (r) = Recorded By, (t) = Taken By, (c) = Cosigned By      Initials Name Provider Type    RS Glen Chang MS CCC-SLP Speech and Language Pathologist                    Therapy Charges for Today       Code Description Service Date Service Provider Modifiers Qty    [id number]  ST TREATMENT SWALLOW 2 6/7/2024 Glen Chang MS CCC-SLP GN 1                 SLP Discharge Summary  Anticipated Discharge Disposition (SLP): skilled nursing facility    MS HERMANN Lau  6/7/2024

## 2024-06-07 NOTE — CASE MANAGEMENT/SOCIAL WORK
Continued Stay Note  [facility]     Patient Name: Susan Tucker  MRN: [id number]  Today's Date: 6/7/2024    Admit Date: 6/1/2024    Plan: Personal Care at [facility] with    Discharge Plan       Row Name 06/07/24 1017       Plan    Plan Comments I have cancelled transport to [facility] for today and reschedule for 1115 Saturday if ready. I left a VM for Pau at the facility to confirm the pt can transfer on the [facility] if ready. I updated both her daughters by phone.    Final Discharge Disposition Code 06 - home with home health care                   Discharge Codes    No documentation.                 Expected Discharge Date and Time       Expected Discharge Date Expected Discharge Time    Jun 7, 2024               Saray Maldonado RN

## 2024-06-07 NOTE — CASE MANAGEMENT/SOCIAL WORK
Case Management Discharge Note      Final Note: Per Pau at [facility], the pt can return to a [facility] care bed there tomorrow 6-8-24. Please call report to [phone] for the nurse-and send the copied chart and DC summary with the pt. The DC summary needs to be faxed to the facility at [phone]. [facility] will transport at 1115. They will  at the room. The ambulance form is in the computer the ambulance service has access to. I have updated the pts dakenners. [facility] CM on ex [phone] can cancel the ambulance if the pt is not ready. The facility pharmacy is [facility] in [facility].         Selected Continued Care - Admitted Since 6/1/2024       Destination    No services have been selected for the patient.                Durable Medical Equipment    No services have been selected for the patient.                Dialysis/Infusion    No services have been selected for the patient.                Home Medical Care       Service Provider Selected Services Address Phone Fax Patient Preferred    [facility] ,  Home Rehabilitation [address], [address], [place] [address] [phone] --              Therapy    No services have been selected for the patient.                Community Resources    No services have been selected for the patient.                Community & DME    No services have been selected for the patient.                         Final Discharge Disposition Code: 06 - home with home health care

## 2024-06-07 NOTE — CONSULTS
Continued Stay Note  [facility]     Patient Name: Susan Tucker  MRN: [id number]  Today's Date: 6/7/2024    Admit Date: 6/1/2024    Plan: Return to [facility] with home health   Discharge Plan       Row Name 06/07/24 1732       Plan    Plan Return to [facility] with home health    Plan Comments Hospice referral received, chart reviewed. Spoke with Dr. VALENCIA Villalpando, palliative care team, regarding pt. Dr. Villalpando stated has assessed pt and at this time pt does not meet the criteria to be admitted to hospice with end stage dementia. Noted the plan is for pt to return to [facility] with home health and palliative care. Visit made to pt, pt's daughter Jada present. Pt alert, carrying on a conversation with son-in-law, who is assisting pt with eating dinner. Discussed hospice vs palliative care, informed Jada that at this time pt does not meet criteria to be admitted to hospice with end stage dementia. Informed Jada that  is making a referral for the home palliative care team to follow pt at the facility. Discussed having the home hospice team make a follow up call in one month, Jada in agreement with the plan of care. Please call [phone] if can be of further assistance.      Row Name 06/07/24 1518       Plan    Plan     Patient/Family in Agreement with Plan     Final Discharge Disposition Code     Final Note                    Discharge Codes    No documentation.                 Expected Discharge Date and Time       Expected Discharge Date Expected Discharge Time    Jun 8, 2024               Sasha Cordoba RN

## 2024-06-07 NOTE — PLAN OF CARE
Goal Outcome Evaluation:  Plan of Care Reviewed With: patient        Progress: no change         Anticipated Discharge Disposition (SLP): skilled nursing facility             Treatment Assessment (SLP): toleration of diet, no clinical signs of, oral dysphagia, pharyngeal dysphagia (06/07/24 1500)    Plan for Continued Treatment (SLP): treatment no longer indicated as all goals met (06/07/24 1500)

## 2024-06-08 ENCOUNTER — APPOINTMENT (OUTPATIENT)
Facility: HOSPITAL | Age: 89
End: 2024-06-08
Payer: MEDICARE

## 2024-06-08 VITALS
WEIGHT: 110.4 LBS | HEART RATE: 86 BPM | RESPIRATION RATE: 18 BRPM | BODY MASS INDEX: 20.32 KG/M2 | HEIGHT: 62 IN | TEMPERATURE: 98.3 F | DIASTOLIC BLOOD PRESSURE: 64 MMHG | SYSTOLIC BLOOD PRESSURE: 96 MMHG | OXYGEN SATURATION: 100 %

## 2024-06-08 LAB
ANION GAP SERPL CALCULATED.3IONS-SCNC: 7 MMOL/L (ref 5–15)
BUN SERPL-MCNC: 10 MG/DL (ref 8–23)
BUN/CREAT SERPL: 19.2 (ref 7–25)
CALCIUM SPEC-SCNC: 7.7 MG/DL (ref 8.6–10.5)
CHLORIDE SERPL-SCNC: 104 MMOL/L (ref 98–107)
CO2 SERPL-SCNC: 21 MMOL/L (ref 22–29)
CREAT SERPL-MCNC: 0.52 MG/DL (ref 0.57–1)
DEPRECATED RDW RBC AUTO: 45.9 FL (ref 37–54)
EGFRCR SERPLBLD CKD-EPI 2021: 89.5 ML/MIN/1.73
ERYTHROCYTE [DISTWIDTH] IN BLOOD BY AUTOMATED COUNT: 14 % (ref 12.3–15.4)
GLUCOSE BLDC GLUCOMTR-MCNC: 59 MG/DL (ref 70–130)
GLUCOSE BLDC GLUCOMTR-MCNC: 60 MG/DL (ref 70–130)
GLUCOSE BLDC GLUCOMTR-MCNC: 70 MG/DL (ref 70–130)
GLUCOSE BLDC GLUCOMTR-MCNC: 71 MG/DL (ref 70–130)
GLUCOSE BLDC GLUCOMTR-MCNC: 90 MG/DL (ref 70–130)
GLUCOSE SERPL-MCNC: 83 MG/DL (ref 65–99)
HCT VFR BLD AUTO: 34 % (ref 34–46.6)
HGB BLD-MCNC: 11.6 G/DL (ref 12–15.9)
MAGNESIUM SERPL-MCNC: 1.2 MG/DL (ref 1.6–2.4)
MCH RBC QN AUTO: 30.4 PG (ref 26.6–33)
MCHC RBC AUTO-ENTMCNC: 34.1 G/DL (ref 31.5–35.7)
MCV RBC AUTO: 89.2 FL (ref 79–97)
PLATELET # BLD AUTO: 295 10*3/MM3 (ref 140–450)
PMV BLD AUTO: 10.2 FL (ref 6–12)
POTASSIUM SERPL-SCNC: 3.7 MMOL/L (ref 3.5–5.2)
RBC # BLD AUTO: 3.81 10*6/MM3 (ref 3.77–5.28)
SODIUM SERPL-SCNC: 132 MMOL/L (ref 136–145)
WBC NRBC COR # BLD AUTO: 5.26 10*3/MM3 (ref 3.4–10.8)

## 2024-06-08 PROCEDURE — 94664 DEMO&/EVAL PT USE INHALER: CPT

## 2024-06-08 PROCEDURE — 25010000002 MAGNESIUM SULFATE 2 GM/50ML SOLUTION: Performed by: INTERNAL MEDICINE

## 2024-06-08 PROCEDURE — 85027 COMPLETE CBC AUTOMATED: CPT | Performed by: INTERNAL MEDICINE

## 2024-06-08 PROCEDURE — 99238 HOSP IP/OBS DSCHRG MGMT 30/<: CPT | Performed by: INTERNAL MEDICINE

## 2024-06-08 PROCEDURE — 82948 REAGENT STRIP/BLOOD GLUCOSE: CPT

## 2024-06-08 PROCEDURE — 80048 BASIC METABOLIC PNL TOTAL CA: CPT | Performed by: INTERNAL MEDICINE

## 2024-06-08 PROCEDURE — 83735 ASSAY OF MAGNESIUM: CPT | Performed by: INTERNAL MEDICINE

## 2024-06-08 PROCEDURE — 94799 UNLISTED PULMONARY SVC/PX: CPT

## 2024-06-08 PROCEDURE — 25010000002 HEPARIN (PORCINE) PER 1000 UNITS: Performed by: NURSE PRACTITIONER

## 2024-06-08 RX ORDER — CEFDINIR 300 MG/1
300 CAPSULE ORAL EVERY 12 HOURS SCHEDULED
Qty: 6 CAPSULE | Refills: 0 | Status: SHIPPED | OUTPATIENT
Start: 2024-06-08 | End: 2024-06-11

## 2024-06-08 RX ORDER — NYSTATIN 100000 [USP'U]/G
POWDER TOPICAL EVERY 12 HOURS SCHEDULED
Qty: 60 G | Refills: 0 | Status: SHIPPED | OUTPATIENT
Start: 2024-06-08 | End: 2024-06-18

## 2024-06-08 RX ORDER — ALBUTEROL SULFATE 90 UG/1
2 AEROSOL, METERED RESPIRATORY (INHALATION) EVERY 4 HOURS PRN
Qty: 8 G | Refills: 0 | Status: SHIPPED | OUTPATIENT
Start: 2024-06-08

## 2024-06-08 RX ORDER — MAGNESIUM SULFATE HEPTAHYDRATE 40 MG/ML
2 INJECTION, SOLUTION INTRAVENOUS
Status: DISCONTINUED | OUTPATIENT
Start: 2024-06-08 | End: 2024-06-08 | Stop reason: HOSPADM

## 2024-06-08 RX ORDER — DOXYCYCLINE 100 MG/1
100 CAPSULE ORAL EVERY 12 HOURS SCHEDULED
Qty: 6 CAPSULE | Refills: 0 | Status: SHIPPED | OUTPATIENT
Start: 2024-06-08

## 2024-06-08 RX ADMIN — DOXYCYCLINE 100 MG: 100 CAPSULE ORAL at 08:47

## 2024-06-08 RX ADMIN — MAGNESIUM SULFATE HEPTAHYDRATE 2 G: 2 INJECTION, SOLUTION INTRAVENOUS at 08:50

## 2024-06-08 RX ADMIN — NYSTATIN: 100000 POWDER TOPICAL at 08:48

## 2024-06-08 RX ADMIN — DEXTROSE 15 G: 15 GEL ORAL at 02:48

## 2024-06-08 RX ADMIN — LISINOPRIL 20 MG: 20 TABLET ORAL at 08:48

## 2024-06-08 RX ADMIN — HEPARIN SODIUM 2500 UNITS: 5000 INJECTION INTRAVENOUS; SUBCUTANEOUS at 08:47

## 2024-06-08 RX ADMIN — DEXTROSE 15 G: 15 GEL ORAL at 06:25

## 2024-06-08 RX ADMIN — IPRATROPIUM BROMIDE AND ALBUTEROL SULFATE 3 ML: 2.5; .5 SOLUTION RESPIRATORY (INHALATION) at 07:55

## 2024-06-08 RX ADMIN — ASPIRIN 81 MG: 81 TABLET, CHEWABLE ORAL at 08:48

## 2024-06-08 NOTE — DISCHARGE PLACEMENT REQUEST
"Susan Palomino (89 y.o. Female)   Referring: Dr. Eli Francisco  PCP: Damon Townsend  Hospice Dx: CVA  Returning to [facility] today w/ Palliative Care. Family would like a 1 mth f/u visit to assess the pt. Dr. Villalpando does not feel that the pt is appropriate @ the time of the referral      Date of Birth   1935    Social Security Number       Address   [address] [place] [address]     Home Phone   [phone]    MRN   [id number]       Taoism   Religion    Marital Status                               Admission Date   6/1/24    Admission Type   Emergency    Admitting Provider   Meagan Mccann MD    Attending Provider   Meagan Mccann MD    Department, Room/Bed   [address], S528/1       Discharge Date       Discharge Disposition   Long Term Care (DC - External)    Discharge Destination                                 Attending Provider: Meagan Mccann MD    Allergies: Amoxicillin, Atorvastatin, Cephalexin    Isolation: None   Infection: None   Code Status: No CPR    Ht: 157.5 cm (62\")   Wt: 50.1 kg (110 lb 6.4 oz)    Admission Cmt: None   Principal Problem: Weakness generalized [R53.1]                   Active Insurance as of 6/1/2024       Primary Coverage       Payor Plan Insurance Group Employer/Plan Group    MEDICARE MEDICARE A & B        Payor Plan Address Payor Plan Phone Number Payor Plan Fax Number Effective Dates    [address] [phone]  4/1/2001 - None Entered    [facility] [address]         Subscriber Name Subscriber Birth Date Member ID       SUSAN PALOMINO 1935 [id number]               Secondary Coverage       Payor Plan Insurance Group Employer/Plan Group    [employer] MC SUP AAR HEALTH CARE OPTIONS        Payor Plan Address Payor Plan Phone Number Payor Plan Fax Number Effective Dates    [facility] [phone]  1/1/2018 - None Entered    [address]       [facility] [address]         Subscriber Name Subscriber Birth " Date Member ID       SUSAN TUCKER 1935 [id number]                     Emergency Contacts        (Rel.) Home Phone Work Phone Mobile Phone    SEBASTIAN MASON(HCS) (Daughter) [phone] -- [phone]    JOSE SORIANO (Daughter) [phone] -- [phone]    ESTELITA TUCKER (Son) [phone] -- [phone]    NATE TUCKER (Son) [phone] -- [phone]              Emergency Contact Information       Name Relation Home Work Mobile    SEBASTIAN MASON(HCS) Daughter [phone]    JOSE SORIANO Daughter [phone]    ESTELITA TUCKER Son [phone]    NATE TUCKER Son [phone]          Insurance Information                  MEDICARE/MEDICARE A & B Phone: [phone]    Subscriber: Susan Tucker Subscriber#: [id number]    Group#: -- Precert#: --        [facility] HEALTH CARE OPTIONS Phone: [phone]    Subscriber: Susan Tucker Subscriber#: [id number]    Group#: -- Precert#: --          Problem List             Codes Noted - Resolved       Hospital    Moderate malnutrition ICD-10-CM: E44.0  ICD-9-CM: 263.0 6/4/2024 - Present    Community acquired pneumonia of left lower lobe of lung ICD-10-CM: J18.9  ICD-9-CM: 486 6/4/2024 - Present    Generalized weakness ICD-10-CM: R53.1  ICD-9-CM: 780.79 6/3/2024 - Present    * (Principal) Weakness generalized ICD-10-CM: R53.1  ICD-9-CM: 780.79 6/1/2024 - Present    Unwitnessed fall ICD-10-CM: R29.6  ICD-9-CM: [id number] 6/1/2024 - Present    History of diabetes mellitus, type II ICD-10-CM: Z86.39  ICD-9-CM: V12.29 6/1/2024 - Present    Dementia without behavioral disturbance ICD-10-CM: F03.90  ICD-9-CM: 294.20 6/1/2024 - Present    Hypoglycemia ICD-10-CM: E16.2  ICD-9-CM: 251.2 6/1/2024 - Present    Hypokalemia ICD-10-CM: E87.6  ICD-9-CM: 276.8 11/8/2022 - Present    Hypertension ICD-10-CM: I10  ICD-9-CM: 401.9 6/25/2018 - Present    History of Stroke ICD-10-CM: I63.9  ICD-9-CM: 434.91  2018 - Present    Hyperlipidemia ICD-10-CM: E78.5  ICD-9-CM: 272.4 2015 - Present       Non-Hospital    Frequent falls ICD-10-CM: R29.6  ICD-9-CM: V15.88 2022 - Present    Syncope and collapse ICD-10-CM: R55  ICD-9-CM: 780.2 3/24/2022 - Present    Gallstone ileus of small intestine ICD-10-CM: K56.3  ICD-9-CM: 560.31 2/10/2020 - Present    Enterocolitis ICD-10-CM: K52.9  ICD-9-CM: 558.9 2020 - Present    Sepsis ICD-10-CM: A41.9  ICD-9-CM: 038.9, 995.91 2018 - Present    Elevated liver enzymes ICD-10-CM: R74.8  ICD-9-CM: 790.5 2018 - Present    Dehydration ICD-10-CM: E86.0  ICD-9-CM: 276.51 2018 - Present    Acute UTI (urinary tract infection) ICD-10-CM: N39.0  ICD-9-CM: 599.0 2018 - Present    Calculus of bile duct with acute cholecystitis and obstruction ICD-10-CM: K80.43  ICD-9-CM: 574.31 2018 - Present    Type 2 diabetes mellitus without complication ICD-10-CM: E11.9  ICD-9-CM: 250.00 10/3/2016 - Present        History & Physical        Carrie Arenas APRN at 24       Attestation signed by Stefan Rocha MD at 24 0028    I have reviewed this documentation and agree.                      [facility] Medicine Services  HISTORY AND PHYSICAL    Patient Name: Susan Tucker  : 1935  MRN: [id number]  Primary Care Physician: Damon Townsend PA  Date of admission: 2024    Subjective  Subjective     Chief Complaint:  Fall / low blood pressure    HPI:  Susan Tucker is a 89 y.o. female with PMHx of HTN, HLD, CVA, DM who presents to the ED for evaluation after a fall at her living facility and low blood pressure per transferring documentation. 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 Per ED provider documentation, EMS reported unwitnessed fall and low BP,  on their arrival. Per facility, increased confusion over  the past 2-3 months reported by nursing staff. She is being admitted to hospital medicine service.    Reviewing documentation, looks like she was prescribed doxycycline and prednisone 4/24/2024. Also prescribed cipro x 3 days on 5/24/2024. She has not been seen in this ER since 4/1/2024 which was for LE pain, at that time noted to be on diuretics for BLE edema.    Review of Systems   Unable to perform ROS: Dementia            Personal History     Past Medical History:   Diagnosis Date    Diabetes mellitus     Elevated cholesterol     Hypertension     Stroke              Past Surgical History:   Procedure Laterality Date    ERCP N/A 6/27/2018    Procedure: ENDOSCOPIC RETROGRADE CHOLANGIOPANCREATOGRAPHY;  Surgeon: Liam Manning MD;  Location: [facility] ENDOSCOPY;  Service: Gastroenterology    EXPLORATORY LAPAROTOMY N/A 2/10/2020    Procedure: LAPAROTOMY EXPLORATORY BOWEL RESECTION;  Surgeon: Carlos Zurita MD;  Location: [facility] OR;  Service: General;  Laterality: N/A;    HYSTERECTOMY         Family History:  family history includes Early death in her brother; Hypertension in her father, maternal grandmother, mother, and paternal grandmother; Learning disabilities in an other family member.     Social History:  reports that she has never smoked. She has never used smokeless tobacco. She reports that she does not drink alcohol and does not use drugs.  Social History     Social History Narrative    Not on file       Medications:  acetaminophen, aspirin, fenofibrate, furosemide, and lisinopril    Allergies   Allergen Reactions    Amoxicillin Hives     Has tolerated Zosyn    Atorvastatin Nausea Only    Cephalexin Rash     Has tolerated Zosyn       Objective  Objective     Vital Signs:   Temp:  [98.2 °F (36.8 °C)] 98.2 °F (36.8 °C)  Heart Rate:  [] 85  Resp:  [16] 16  BP: (111-147)/(60-82) 147/77    Physical Exam  Constitutional:       General: She is not in acute distress.     Appearance: She is underweight.    HENT:      Head: Normocephalic and atraumatic.      Nose: Nose normal.      Mouth/Throat:      Mouth: Mucous membranes are dry.      Dentition: Abnormal dentition. Dental caries present.      Pharynx: Oropharynx is clear.   Eyes:      Extraocular Movements: Extraocular movements intact.      Conjunctiva/sclera: Conjunctivae normal.      Pupils: Pupils are equal, round, and reactive to light.   Cardiovascular:      Rate and Rhythm: Normal rate and regular rhythm.      Pulses: Normal pulses.      Heart sounds: Normal heart sounds. No murmur heard.  Pulmonary:      Effort: Pulmonary effort is normal. No respiratory distress.      Breath sounds: Normal breath sounds.   Abdominal:      General: Bowel sounds are normal. There is no distension.      Palpations: Abdomen is soft.      Tenderness: There is no abdominal tenderness. There is no guarding.   Musculoskeletal:         General: No swelling. Normal range of motion.      Cervical back: Normal range of motion and neck supple.   Skin:     General: Skin is warm and dry.      Capillary Refill: Capillary refill takes less than 2 seconds.      Comments: Scattered scabs and skin tears BLE     Neurological:      Mental Status: She is alert. She is disoriented.      Cranial Nerves: No cranial nerve deficit.      Comments: Unknown baseline but has hx of dementia   Psychiatric:         Mood and Affect: Mood normal.         Behavior: Behavior normal.          Result Review:  I have personally reviewed the results from the time of this admission to 6/1/2024 20:14 EDT and agree with these findings:  [x]  Laboratory list / accordion  []  Microbiology  []  Radiology  [x]  EKG/Telemetry   []  Cardiology/Vascular   []  Pathology  []  Old records  []  Other:  Most notable findings include:     LAB RESULTS:      Lab 06/01/24  1728   WBC 4.46   HEMOGLOBIN 15.1   HEMATOCRIT 45.2   PLATELETS 232   NEUTROS ABS 3.19   IMMATURE GRANS (ABS) 0.01   LYMPHS ABS 0.88   MONOS ABS 0.30   EOS ABS  0.03   MCV 92.4   PROCALCITONIN 0.15   LACTATE 0.9         Lab 06/01/24  1728   SODIUM 135*   POTASSIUM 2.5*   CHLORIDE 92*   CO2 23.0   ANION GAP 20.0*   BUN 18   CREATININE 1.03*   EGFR 52.4*   GLUCOSE 61*   CALCIUM 9.2   IONIZED CALCIUM 1.23   MAGNESIUM 1.7   PHOSPHORUS 2.9         Lab 06/01/24  1728   TOTAL PROTEIN 7.0   ALBUMIN 3.6   GLOBULIN 3.4   ALT (SGPT) 23   AST (SGOT) 49*   BILIRUBIN 1.0   ALK PHOS 45                     Brief Urine Lab Results  (Last result in the past 365 days)        Color   Clarity   Blood   Leuk Est   Nitrite   Protein   CREAT   Urine HCG        06/01/24 1758 Yellow   Clear   Negative   Trace   Negative   Negative                 Microbiology Results (last 10 days)       ** No results found for the last 240 hours. **            No radiology results from the last 24 hrs    Results for orders placed during the hospital encounter of 03/24/22    Adult Transthoracic Echo Complete W/ Cont if Necessary Per Protocol    Interpretation Summary  · Estimated left ventricular EF = 60%  · No hemodynamically significant valvular heart disease      Assessment & Plan  Assessment & Plan       Weakness generalized    Hypertension    History of Stroke    Hyperlipidemia    Hypokalemia    Unwitnessed fall    History of diabetes mellitus, type II    Dementia without behavioral disturbance    Hypoglycemia    Weakness  Unwitnessed fall  - check CT head w/o contrast  - PT/OT consult  - fall precautions  - likely dehydrated / hypoglycemia related    Hypokalemia  Hypoglycemia  - k 2.5, replace  - mag 1.7, replace 2 grams IV mag x 1  - glucose 61 on labs, down to 50 w/ repeat fingerstick  - hypoglycemia protocol  - monitor glucose q 1 hour until glu > 100 x 2 consecutive checks then can change to less frequent monitoring  - fluids, D5 w/ 20 kcl @ 75 ml/hr    JULIA, mild  - fluids  - hold lisinopril and lasix    HTN  HLD  - hold lisinopril, episode of hypotension reported from nursing home    Dementia  - unclear  baseline    Hx T2DM  - not on medications  - check A1c    Hx CVA  - continue aspirin    DVT prophylaxis:  Heparin SC BID    CODE STATUS:    Medical Intervention Limits: NO intubation (DNI)  Code Status (Patient has no pulse and is not breathing): No CPR (Do Not Attempt to Resuscitate)  Medical Interventions (Patient has pulse or is breathing): Limited Support      Expected Discharge    Expected Discharge Date: 6/2/2024; Expected Discharge Time:     Signature: Electronically signed by JOESPH Browne, 06/01/24, 8:14 PM EDT      Total time spent: 60 minutes  Time spent includes time reviewing chart, face-to-face time, counseling patient/family/caregiver, ordering medications/tests/procedures, communicating with other health care professionals, documenting clinical information in the electronic health record, and coordination of care.            Electronically signed by Stefan Rocha MD at 06/02/24 0028       Current Facility-Administered Medications   Medication Dose Route Frequency Provider Last Rate Last Admin    acetaminophen (TYLENOL) tablet 650 mg  650 mg Oral Q4H PRN Carrie Arenas APRN   650 mg at 06/07/24 1720    Or    acetaminophen (TYLENOL) 160 MG/5ML oral solution 650 mg  650 mg Oral Q4H PRN Carrie Arenas APRN   650 mg at 06/07/24 2200    Or    acetaminophen (TYLENOL) suppository 650 mg  650 mg Rectal Q4H PRN Carrie Arenas APRN        aspirin chewable tablet 81 mg  81 mg Oral Daily Eli Francisco DO   81 mg at 06/08/24 0848    Calcium Replacement - Follow Nurse / BPA Driven Protocol   Does not apply PRN Zenia Lee DO        cefdinir (OMNICEF) capsule 300 mg  300 mg Oral Q12H Eli Francisco DO   300 mg at 06/07/24 2200    dextrose (D50W) (25 g/50 mL) IV injection 25 g  25 g Intravenous Q15 Min PRN Carrie Arenas APRN   25 g at 06/07/24 0546    dextrose (GLUTOSE) oral gel 15 g  15 g Oral Q15 Min PRN Carrie Arenas APRN   15 g at 06/08/24 0625    doxycycline (MONODOX) capsule 100 mg   100 mg Oral Q12H Eli Francisco, DO   100 mg at 06/08/24 0847    furosemide (LASIX) tablet 40 mg  40 mg Oral Daily Eli Francisco DO   40 mg at 06/04/24 0844    glucagon (GLUCAGEN) injection 1 mg  1 mg Subcutaneous Q15 Min PRN Carrie Arenas APRN        heparin (porcine) 5000 UNIT/ML injection 2,500 Units  2,500 Units Subcutaneous Q12H Carrie Arenas APRN   2,500 Units at 06/08/24 0847    ipratropium-albuterol (DUO-NEB) nebulizer solution 3 mL  3 mL Nebulization 4x Daily - RT Stefan Rocha MD   3 mL at 06/08/24 0755    lisinopril (PRINIVIL,ZESTRIL) tablet 20 mg  20 mg Oral Daily Zenia Lee, DO   20 mg at 06/08/24 0848    Magnesium Standard Dose Replacement - Follow Nurse / BPA Driven Protocol   Does not apply PRN Zenia Lee P, DO        magnesium sulfate 2g/50 mL (PREMIX) infusion  2 g Intravenous Q2H Meagan Mccann MD        metoprolol tartrate (LOPRESSOR) half tablet 12.5 mg  12.5 mg Oral Q12H Eli Francisco, DO   12.5 mg at 06/07/24 2200    nystatin (MYCOSTATIN) powder   Topical Q12H Eli Francisco, DO   Given at 06/08/24 0848    ondansetron ODT (ZOFRAN-ODT) disintegrating tablet 4 mg  4 mg Oral Q6H PRN Carrie Arenas APRN        Or    ondansetron (ZOFRAN) injection 4 mg  4 mg Intravenous Q6H PRN Carrie Arenas APRN        Phosphorus Replacement - Follow Nurse / BPA Driven Protocol   Does not apply PRN Chaim Leein P, DO        Potassium Replacement - Follow Nurse / BPA Driven Protocol   Does not apply PRN Zenia Lee P, DO        sodium chloride 0.9 % flush 10 mL  10 mL Intravenous PRN Carrie Arenas APRN        sodium chloride 0.9 % flush 10 mL  10 mL Intravenous Q12H Carrie Arenas APRN   10 mL at 06/07/24 2200    sodium chloride 0.9 % flush 10 mL  10 mL Intravenous PRN Carrie Arenas, APRN        sodium chloride 0.9 % infusion 40 mL  40 mL Intravenous PRN Carrie Arenas, APRN            Physician Progress Notes (last 72 hours)        Eli Francisco,  at  24 1103              [facility] Medicine Services  PROGRESS NOTE    Patient Name: Susan Tucker  : 1935  MRN: [id number]    Date of Admission: 2024  Primary Care Physician: Damon Townsend PA    Subjective   Subjective     CC:  Weakness, hypoglycemia     HPI:  Received lasix overnight. States she feel good today. Denies concerns. Called and updated daughter. Reviewed poor intake and suspect progression of her dementia. Daughter agrees. Would like to speak to palliative and hospice.       Objective   Objective     Vital Signs:   Temp:  [97.5 °F (36.4 °C)-98.2 °F (36.8 °C)] 97.6 °F (36.4 °C)  Heart Rate:  [] 100  Resp:  [18] 18  BP: (124-156)/(74-82) 124/74  Flow (L/min):  [1-2] 1     Physical Exam:  Constitutional: No acute distress, frail  HENT: NCAT, mucous membranes moist  Respiratory: coarse, + cough  Cardiovascular: RRR, no murmurs, rubs, or gallops  Gastrointestinal: Positive bowel sounds, soft, nontender, nondistended  Musculoskeletal: No bilateral ankle edema  Neurologic: Oriented x 1, strength symmetric in all extremities, Cranial Nerves grossly intact to confrontation, speech clear  Skin: No rashes      Results Reviewed:  LAB RESULTS:      Lab 24  0457 24  0524 24  0723 24  0413 24  1728   WBC 4.59 5.77 4.05 4.07 4.46   HEMOGLOBIN 11.4* 11.2* 11.7* 11.8* 15.1   HEMATOCRIT 34.4 33.3* 36.3 35.2 45.2   PLATELETS 173 177 174 208 232   NEUTROS ABS  --   --   --  2.68 3.19   IMMATURE GRANS (ABS)  --   --   --  0.01 0.01   LYMPHS ABS  --   --   --  0.88 0.88   MONOS ABS  --   --   --  0.46 0.30   EOS ABS  --   --   --  0.01 0.03   MCV 91.7 89.3 93.8 90.7 92.4   PROCALCITONIN  --  0.15  --   --  0.15   LACTATE  --   --   --   --  0.9         Lab 24  1116 24  1803 24  0457 24  0524 24  0723 24  0911 24  0413 24  2142 24  1728   SODIUM  --   --  135* 135* 136  --  142 138 135*    POTASSIUM 3.8 3.5 3.5 4.1 5.1   < > 3.0* 2.5* 2.5*   CHLORIDE  --   --  106 106 106  --  107 99 92*   CO2  --   --  20.0* 19.0* 20.0*  --  26.0 27.0 23.0   ANION GAP  --   --  9.0 10.0 10.0  --  9.0 12.0 20.0*   BUN  --   --  6* 5* 6*  --  14 17 18   CREATININE  --   --  0.42* 0.46* 0.51*  --  0.76 1.00 1.03*   EGFR  --   --  94.2 92.2 89.9  --  75.5 54.3* 52.4*   GLUCOSE  --   --  79 98 86  --  109* 134* 61*   CALCIUM  --   --  7.5* 7.8* 8.0*  --  8.2* 8.7 9.2   IONIZED CALCIUM  --   --   --   --  1.22  --  1.20  --  1.23   MAGNESIUM  --   --   --   --   --   --  2.0  --  1.7   PHOSPHORUS  --   --   --   --   --   --  2.3*  --  2.9   HEMOGLOBIN A1C  --   --   --   --   --   --   --   --  5.30   TSH  --   --   --   --   --   --   --   --  1.510    < > = values in this interval not displayed.         Lab 06/04/24  0524 06/03/24  0723 06/02/24  0413 06/01/24  2142 06/01/24  1728   TOTAL PROTEIN 4.9* 5.5* 5.7* 6.2 7.0   ALBUMIN 3.0* 3.0* 3.0* 3.5 3.6   GLOBULIN 1.9 2.5 2.7 2.7 3.4   ALT (SGPT) 19 17 18 24 23   AST (SGOT) 42* 38* 35* 49* 49*   BILIRUBIN 0.6 0.6 0.7 0.9 1.0   ALK PHOS 37* 36* 37* 42 45                     Brief Urine Lab Results  (Last result in the past 365 days)        Color   Clarity   Blood   Leuk Est   Nitrite   Protein   CREAT   Urine HCG        06/04/24 1630 Yellow   Clear   Negative   Trace   Negative   Negative                   Microbiology Results Abnormal       Procedure Component Value - Date/Time    Blood Culture - Blood, Arm, Right [[id number]]  (Normal) Collected: 06/04/24 1317    Lab Status: Preliminary result Specimen: Blood from Arm, Right Updated: 06/06/24 1330     Blood Culture No growth at 2 days    Narrative:      Less than seven (7) mL's of blood was collected.  Insufficient quantity may yield false negative results.    Blood Culture - Blood, Hand, Right [[id number]]  (Normal) Collected: 06/04/24 1317    Lab Status: Preliminary result Specimen: Blood from Hand, Right Updated:  06/06/24 1330     Blood Culture No growth at 2 days    Narrative:      Less than seven (7) mL's of blood was collected.  Insufficient quantity may yield false negative results.    S. Pneumo Ag Urine or CSF - Urine, Urine, Clean Catch [[id number]]  (Normal) Collected: 06/04/24 1629    Lab Status: Final result Specimen: Urine, Clean Catch Updated: 06/05/24 0712     Strep Pneumo Ag Negative    Legionella Antigen, Urine - Urine, Urine, Clean Catch [[id number]]  (Normal) Collected: 06/04/24 1629    Lab Status: Final result Specimen: Urine, Clean Catch Updated: 06/05/24 0712     LEGIONELLA ANTIGEN, URINE Negative    MRSA Screen, PCR (Inpatient) - Swab, Nares [[id number]]  (Normal) Collected: 06/04/24 1630    Lab Status: Final result Specimen: Swab from Nares Updated: 06/04/24 1752     MRSA PCR Negative    Narrative:      The negative predictive value of this diagnostic test is high and should only be used to consider de-escalating anti-MRSA therapy. A positive result may indicate colonization with MRSA and must be correlated clinically.  MRSA Negative    Gastrointestinal Panel, PCR - Stool, Per Rectum [[id number]]  (Normal) Collected: 06/02/24 2203    Lab Status: Final result Specimen: Stool from Per Rectum Updated: 06/03/24 0908     Campylobacter Not Detected     Plesiomonas shigelloides Not Detected     Salmonella Not Detected     Vibrio Not Detected     Vibrio cholerae Not Detected     Yersinia enterocolitica Not Detected     Enteroaggregative E. coli (EAEC) Not Detected     Enteropathogenic E. coli (EPEC) Not Detected     Enterotoxigenic E. coli (ETEC) lt/st Not Detected     Shiga-like toxin-producing E. coli (STEC) stx1/stx2 Not Detected     Shigella/Enteroinvasive E. coli (EIEC) Not Detected     Cryptosporidium Not Detected     Cyclospora cayetanensis Not Detected     Entamoeba histolytica Not Detected     Giardia lamblia Not Detected     Adenovirus F40/41 Not Detected     Astrovirus Not Detected     Norovirus GI/GII  Not Detected     Rotavirus A Not Detected     Sapovirus (I, II, IV or V) Not Detected    Clostridioides difficile Toxin - Stool, Per Rectum [[id number]]  (Normal) Collected: 06/02/24 2203    Lab Status: Final result Specimen: Stool from Per Rectum Updated: 06/03/24 0838    Narrative:      The following orders were created for panel order Clostridioides difficile Toxin - Stool, Per Rectum.  Procedure                               Abnormality         Status                     ---------                               -----------         ------                     Clostridioides difficile...[[id number]]  Normal              Final result                 Please view results for these tests on the individual orders.    Clostridioides difficile Toxin, PCR - Stool, Per Rectum [[id number]]  (Normal) Collected: 06/02/24 2203    Lab Status: Final result Specimen: Stool from Per Rectum Updated: 06/03/24 0838     Toxigenic C. difficile by PCR Not Detected    Narrative:      The result indicates the absence of toxigenic C. difficile from stool specimen.             XR Chest 1 View    Result Date: 6/6/2024  XR CHEST 1 VW Date of Exam: 6/6/2024 10:34 PM EDT Indication: SOB Comparison: Chest radiograph 6/4/2024. Findings: Borderline-enlarged cardiac silhouette. Thoracic aortic calcifications. Perihilar predominant interstitial opacities, new/increased. Small bilateral pleural effusions, new on the right and increased on the left. Increased left lower lobe consolidation. No pneumothorax. Osseous structures are unchanged.     Impression: Impression: New perihilar interstitial opacities suggestive of pulmonary vascular congestion/interstitial edema, as well as new/increased small bilateral pleural effusions. Additionally, there is increased left lower lobe consolidation, which could represent atelectasis and/or pneumonia. Electronically Signed: Salvatore Feldman MD  6/6/2024 10:41 PM EDT  Workstation ID: [id number]    FL Video Swallow With  Speech Single Contrast    Result Date: 6/6/2024  FL VIDEO SWALLOW W SPEECH SINGLE-CONTRAST Date of Exam: 6/6/2024 9:36 AM EDT Indication: dysphagia.   Comparison: None available. Technique:   The speech pathologist administered food and/or liquid mixed with barium to the patient with cine/video imaging.  Imaging assistance was provided to the speech pathologist and an image was saved. Fluoroscopic Time: 1 minute 18 seconds Number of Images: 9 associated fluoroscopic loops were saved Findings: A single occurrence of aspiration was seen during fluoroscopic guided modified barium swallowing series. Please see speech therapy report for full details and recommendations.     Impression: Impression: Fluoroscopy provided for modified barium swallow. A single occurrence aspiration was seen during swallowing evaluation. Please see speech therapy report for full details and recommendations. Report dictated by: Rachel Sarmiento PA-c  I have personally reviewed this case and agree with the findings above: Electronically Signed: Rubén Persaud MD  6/6/2024 4:52 PM EDT  Workstation ID: [id number]     Results for orders placed during the hospital encounter of 03/24/22    Adult Transthoracic Echo Complete W/ Cont if Necessary Per Protocol    Interpretation Summary  · Estimated left ventricular EF = 60%  · No hemodynamically significant valvular heart disease      Current medications:  Scheduled Meds:aspirin, 81 mg, Oral, Daily  cefdinir, 300 mg, Oral, Q12H  doxycycline, 100 mg, Oral, Q12H  [Held by provider] furosemide, 40 mg, Oral, Daily  heparin (porcine), 2,500 Units, Subcutaneous, Q12H  ipratropium-albuterol, 3 mL, Nebulization, 4x Daily - RT  lisinopril, 20 mg, Oral, Daily  metoprolol tartrate, 12.5 mg, Oral, Q12H  nystatin, , Topical, Q12H  sodium chloride, 10 mL, Intravenous, Q12H      Continuous Infusions:dextrose 5 % and sodium chloride 0.9 %, 50 mL/hr, Last Rate: 50 mL/hr (06/07/24 0830)      PRN Meds:.  acetaminophen **OR**  acetaminophen **OR** acetaminophen    Calcium Replacement - Follow Nurse / BPA Driven Protocol    dextrose    dextrose    glucagon (human recombinant)    Magnesium Standard Dose Replacement - Follow Nurse / BPA Driven Protocol    ondansetron ODT **OR** ondansetron    Phosphorus Replacement - Follow Nurse / BPA Driven Protocol    Potassium Replacement - Follow Nurse / BPA Driven Protocol    sodium chloride    sodium chloride    sodium chloride    Assessment & Plan   Assessment & Plan     Active Hospital Problems    Diagnosis  POA    **Weakness generalized [R53.1]  Yes    Moderate malnutrition [E44.0]  Yes    Community acquired pneumonia of left lower lobe of lung [J18.9]  Unknown    Generalized weakness [R53.1]  Yes    Unwitnessed fall [R29.6]  Unknown    History of diabetes mellitus, type II [Z86.39]  Unknown    Dementia without behavioral disturbance [F03.90]  Unknown    Hypoglycemia [E16.2]  Unknown    Hypokalemia [E87.6]  Yes    Hypertension [I10]  Yes    History of Stroke [I63.9]  Yes    Hyperlipidemia [E78.5]  Yes      Resolved Hospital Problems   No resolved problems to display.        Brief Hospital Course to date:  Susan Tucker is a 89 y.o. female with PMHx of HTN, HLD, CVA, DM who presents to the ED for evaluation after a fall at her living facility and low blood pressure per transferring documentation.      This patient's problems and plans were partially entered by my partner and updated as appropriate by me 06/07/24.     Weakness  Unwitnessed fall  Hypoglycemia   -  CT head w/o contrast negative  - PT/OT consult  - fall precautions  - likely dehydrated / hypoglycemia related  - Reviewed with daughter -- downward trajectory in her functional status and cognitive function over the last few months   - Patient with persistent hypoglycemia. Suspect it is related to poor intake. Liberalized diet, shakes TID and assisting with meals with no significant improvement. Suspect progression of dementia. Reviewed this  with daughter -- agreeable to palliative/hospice consult     Fever  LLL PNA   - CXR  with LLL infiltrate   - UA  negative   - Bcx NGTD; strep/legionella/MRSA -- negative  - Initially on rocephin/doxy -- changed to omnicef/doxy to complete 5 days     Hypoxia   - PNA and volume. S/p lasix. Monitor.      New Atrial Fibrillation  - verified per EKG   - patient already on aspirin 81mg PO daily- high fall risk- defer other anticoagulation  - converted back to NSR 6/3/24- no further workup; potassium replaced     JULIA, mild- resolved  - s/p fluids  - continue lisinopril and lasix     HTN  HLD  - continue home meds     Dementia  - unclear baseline but progressive decline over the last few months per daughter     Hx T2DM  - not on medications  - A1c: 5.3     Hx CVA  - continue aspirin    Expected Discharge Location and Transportation: TBD  Expected Discharge   Expected Discharge Date: 2024; Expected Discharge Time:      DVT prophylaxis:  Medical DVT prophylaxis orders are present.         AM-PAC 6 Clicks Score (PT): 12 (24 0204)    CODE STATUS:   Code Status and Medical Interventions:   Ordered at: 24 1941     Medical Intervention Limits:    NO intubation (DNI)     Code Status (Patient has no pulse and is not breathing):    No CPR (Do Not Attempt to Resuscitate)     Medical Interventions (Patient has pulse or is breathing):    Limited Support       Eli Francisco DO  24         Electronically signed by Eli Francisco DO at 24 1110       Eli Francisco DO at 24 1048              [facility] Medicine Services  PROGRESS NOTE    Patient Name: Susan Tucker  : 1935  MRN: [id number]    Date of Admission: 2024  Primary Care Physician: Damon Townsend PA    Subjective   Subjective     CC:  Fall, weakness    HPI:  No acute events. States she feels ok. + Cough      Objective   Objective     Vital Signs:   Temp:  [97.5 °F (36.4 °C)-98.2 °F (36.8 °C)] 97.5 °F  (36.4 °C)  Heart Rate:  [] 76  Resp:  [16-20] 18  BP: (139-166)/(69-94) 139/69  Flow (L/min):  [2] 2     Physical Exam:  Constitutional: No acute distress, frail  HENT: NCAT, mucous membranes moist  Respiratory: Clear to auscultation bilaterally, respiratory effort normal   Cardiovascular: + ectopy no murmurs, rubs, or gallops  Gastrointestinal: Positive bowel sounds, soft, nontender, nondistended  Musculoskeletal: No bilateral ankle edema  Psychiatric: cooperative  Neurologic: Oriented x 1, strength symmetric in all extremities, Cranial Nerves grossly intact to confrontation, speech clear  Skin: No rashes      Results Reviewed:  LAB RESULTS:      Lab 06/05/24  0457 06/04/24  0524 06/03/24  0723 06/02/24 0413 06/01/24  1728   WBC 4.59 5.77 4.05 4.07 4.46   HEMOGLOBIN 11.4* 11.2* 11.7* 11.8* 15.1   HEMATOCRIT 34.4 33.3* 36.3 35.2 45.2   PLATELETS 173 177 174 208 232   NEUTROS ABS  --   --   --  2.68 3.19   IMMATURE GRANS (ABS)  --   --   --  0.01 0.01   LYMPHS ABS  --   --   --  0.88 0.88   MONOS ABS  --   --   --  0.46 0.30   EOS ABS  --   --   --  0.01 0.03   MCV 91.7 89.3 93.8 90.7 92.4   PROCALCITONIN  --  0.15  --   --  0.15   LACTATE  --   --   --   --  0.9         Lab 06/05/24  1803 06/05/24  0457 06/04/24  0524 06/03/24  0723 06/02/24  1354 06/02/24  0911 06/02/24  0413 06/01/24  2142 06/01/24  1728   SODIUM  --  135* 135* 136  --   --  142 138 135*   POTASSIUM 3.5 3.5 4.1 5.1 4.1   < > 3.0* 2.5* 2.5*   CHLORIDE  --  106 106 106  --   --  107 99 92*   CO2  --  20.0* 19.0* 20.0*  --   --  26.0 27.0 23.0   ANION GAP  --  9.0 10.0 10.0  --   --  9.0 12.0 20.0*   BUN  --  6* 5* 6*  --   --  14 17 18   CREATININE  --  0.42* 0.46* 0.51*  --   --  0.76 1.00 1.03*   EGFR  --  94.2 92.2 89.9  --   --  75.5 54.3* 52.4*   GLUCOSE  --  79 98 86  --   --  109* 134* 61*   CALCIUM  --  7.5* 7.8* 8.0*  --   --  8.2* 8.7 9.2   IONIZED CALCIUM  --   --   --  1.22  --   --  1.20  --  1.23   MAGNESIUM  --   --   --   --    --   --  2.0  --  1.7   PHOSPHORUS  --   --   --   --   --   --  2.3*  --  2.9   HEMOGLOBIN A1C  --   --   --   --   --   --   --   --  5.30   TSH  --   --   --   --   --   --   --   --  1.510    < > = values in this interval not displayed.         Lab 06/04/24  0524 06/03/24  0723 06/02/24  0413 06/01/24  2142 06/01/24  1728   TOTAL PROTEIN 4.9* 5.5* 5.7* 6.2 7.0   ALBUMIN 3.0* 3.0* 3.0* 3.5 3.6   GLOBULIN 1.9 2.5 2.7 2.7 3.4   ALT (SGPT) 19 17 18 24 23   AST (SGOT) 42* 38* 35* 49* 49*   BILIRUBIN 0.6 0.6 0.7 0.9 1.0   ALK PHOS 37* 36* 37* 42 45                     Brief Urine Lab Results  (Last result in the past 365 days)        Color   Clarity   Blood   Leuk Est   Nitrite   Protein   CREAT   Urine HCG        06/04/24 1630 Yellow   Clear   Negative   Trace   Negative   Negative                   Microbiology Results Abnormal       Procedure Component Value - Date/Time    Blood Culture - Blood, Hand, Right [[id number]]  (Normal) Collected: 06/04/24 1317    Lab Status: Preliminary result Specimen: Blood from Hand, Right Updated: 06/05/24 1330     Blood Culture No growth at 24 hours    Narrative:      Less than seven (7) mL's of blood was collected.  Insufficient quantity may yield false negative results.    Blood Culture - Blood, Arm, Right [[id number]]  (Normal) Collected: 06/04/24 1317    Lab Status: Preliminary result Specimen: Blood from Arm, Right Updated: 06/05/24 1330     Blood Culture No growth at 24 hours    Narrative:      Less than seven (7) mL's of blood was collected.  Insufficient quantity may yield false negative results.    S. Pneumo Ag Urine or CSF - Urine, Urine, Clean Catch [[id number]]  (Normal) Collected: 06/04/24 1629    Lab Status: Final result Specimen: Urine, Clean Catch Updated: 06/05/24 0712     Strep Pneumo Ag Negative    Legionella Antigen, Urine - Urine, Urine, Clean Catch [[id number]]  (Normal) Collected: 06/04/24 1823    Lab Status: Final result Specimen: Urine, Clean Catch Updated:  06/05/24 0712     LEGIONELLA ANTIGEN, URINE Negative    MRSA Screen, PCR (Inpatient) - Swab, Nares [[id number]]  (Normal) Collected: 06/04/24 1630    Lab Status: Final result Specimen: Swab from Nares Updated: 06/04/24 1752     MRSA PCR Negative    Narrative:      The negative predictive value of this diagnostic test is high and should only be used to consider de-escalating anti-MRSA therapy. A positive result may indicate colonization with MRSA and must be correlated clinically.  MRSA Negative    Gastrointestinal Panel, PCR - Stool, Per Rectum [[id number]]  (Normal) Collected: 06/02/24 2203    Lab Status: Final result Specimen: Stool from Per Rectum Updated: 06/03/24 0908     Campylobacter Not Detected     Plesiomonas shigelloides Not Detected     Salmonella Not Detected     Vibrio Not Detected     Vibrio cholerae Not Detected     Yersinia enterocolitica Not Detected     Enteroaggregative E. coli (EAEC) Not Detected     Enteropathogenic E. coli (EPEC) Not Detected     Enterotoxigenic E. coli (ETEC) lt/st Not Detected     Shiga-like toxin-producing E. coli (STEC) stx1/stx2 Not Detected     Shigella/Enteroinvasive E. coli (EIEC) Not Detected     Cryptosporidium Not Detected     Cyclospora cayetanensis Not Detected     Entamoeba histolytica Not Detected     Giardia lamblia Not Detected     Adenovirus F40/41 Not Detected     Astrovirus Not Detected     Norovirus GI/GII Not Detected     Rotavirus A Not Detected     Sapovirus (I, II, IV or V) Not Detected    Clostridioides difficile Toxin - Stool, Per Rectum [[id number]]  (Normal) Collected: 06/02/24 2203    Lab Status: Final result Specimen: Stool from Per Rectum Updated: 06/03/24 0838    Narrative:      The following orders were created for panel order Clostridioides difficile Toxin - Stool, Per Rectum.  Procedure                               Abnormality         Status                     ---------                               -----------         ------                      Clostridioides difficile...[[id number]]  Normal              Final result                 Please view results for these tests on the individual orders.    Clostridioides difficile Toxin, PCR - Stool, Per Rectum [[id number]]  (Normal) Collected: 06/02/24 2203    Lab Status: Final result Specimen: Stool from Per Rectum Updated: 06/03/24 0838     Toxigenic C. difficile by PCR Not Detected    Narrative:      The result indicates the absence of toxigenic C. difficile from stool specimen.             No radiology results from the last 24 hrs    Results for orders placed during the hospital encounter of 03/24/22    Adult Transthoracic Echo Complete W/ Cont if Necessary Per Protocol    Interpretation Summary  · Estimated left ventricular EF = 60%  · No hemodynamically significant valvular heart disease      Current medications:  Scheduled Meds:aspirin, 81 mg, Oral, Daily  cefTRIAXone, 2,000 mg, Intravenous, Q24H  doxycycline, 100 mg, Oral, Q12H  [Held by provider] furosemide, 40 mg, Oral, Daily  heparin (porcine), 2,500 Units, Subcutaneous, Q12H  lisinopril, 20 mg, Oral, Daily  metoprolol tartrate, 12.5 mg, Oral, Q12H  sodium chloride, 10 mL, Intravenous, Q12H      Continuous Infusions:dextrose 5 % and sodium chloride 0.9 %, 50 mL/hr, Last Rate: 50 mL/hr (06/06/24 0053)      PRN Meds:.  acetaminophen **OR** acetaminophen **OR** acetaminophen    Calcium Replacement - Follow Nurse / BPA Driven Protocol    dextrose    dextrose    glucagon (human recombinant)    Magnesium Standard Dose Replacement - Follow Nurse / BPA Driven Protocol    ondansetron ODT **OR** ondansetron    Phosphorus Replacement - Follow Nurse / BPA Driven Protocol    Potassium Replacement - Follow Nurse / BPA Driven Protocol    sodium chloride    sodium chloride    sodium chloride    Assessment & Plan   Assessment & Plan     Active Hospital Problems    Diagnosis  POA    **Weakness generalized [R53.1]  Yes    Moderate malnutrition [E44.0]  Yes    Community  acquired pneumonia of left lower lobe of lung [J18.9]  Unknown    Generalized weakness [R53.1]  Yes    Unwitnessed fall [R29.6]  Unknown    History of diabetes mellitus, type II [Z86.39]  Unknown    Dementia without behavioral disturbance [F03.90]  Unknown    Hypoglycemia [E16.2]  Unknown    Hypokalemia [E87.6]  Yes    Hypertension [I10]  Yes    History of Stroke [I63.9]  Yes    Hyperlipidemia [E78.5]  Yes      Resolved Hospital Problems   No resolved problems to display.        Brief Hospital Course to date:  Susan Tucker is a 89 y.o. female with PMHx of HTN, HLD, CVA, DM who presents to the ED for evaluation after a fall at her living facility and low blood pressure per transferring documentation.      This patient's problems and plans were partially entered by my partner and updated as appropriate by me 06/06/24.     Weakness  Unwitnessed fall  -  CT head w/o contrast negative  - PT/OT consult  - fall precautions  - likely dehydrated / hypoglycemia related  - Reviewed with daughter -- downward trajectory in her functional status and cognitive function over the last few months   - Dispo pending -- may need SNF    Fever  LLL PNA   - CXR 6/4 with LLL infiltrate   - UA  negative   - Bcx NGTD; strep/legionella/MRSA -- negative  - initially on rocephin/doxy -- change to omnicef/doxy to complete 5 days      Hypokalemia  Hypoglycemia  - k 2.5 on admission  - mag 1.7 on admission  - glucose 61 on admission  - Replace electrolytes   -  monitor off d5. Boost TID. Assist with feeding     New Atrial Fibrillation  - verified per EKG   - patient already on aspirin 81mg PO daily- high fall risk- defer other anticoagulation  - converted back to NSR 6/3/24- no further workup; potassium replaced     JULIA, mild- resolved  - s/p fluids  - continue lisinopril and lasix     HTN  HLD  - continue home meds     Dementia  - unclear baseline but progressive decline over the last few months per daughter     Hx T2DM  - not on medications  - A1c:  5.3     Hx CVA  - continue aspirin    Expected Discharge Location and Transportation: assisted living vs SNF  Expected Discharge   Expected Discharge Date: 2024; Expected Discharge Time:      DVT prophylaxis:  Medical DVT prophylaxis orders are present.         AM-PAC 6 Clicks Score (PT): 12 (24 0800)    CODE STATUS:   Code Status and Medical Interventions:   Ordered at: 24 1941     Medical Intervention Limits:    NO intubation (DNI)     Code Status (Patient has no pulse and is not breathing):    No CPR (Do Not Attempt to Resuscitate)     Medical Interventions (Patient has pulse or is breathing):    Limited Support       Eli Francisco DO  24        Electronically signed by Eli Francisco DO at 24 1054       Eli Francisco DO at 24 0978              [facility] Medicine Services  PROGRESS NOTE    Patient Name: Susan Tucker  : 1935  MRN: [id number]    Date of Admission: 2024  Primary Care Physician: Damon Townsend PA    Subjective   Subjective     CC:  Weakness     HPI:  No acute overnight events.  Patient states that she feels okay.  She is confused.  States she has had some cough.  I did call and update daughter regarding current plan of care.  States that she has had a progressive decline in functional and cognitive status over the last 1 to 2 months.      Objective   Objective     Vital Signs:   Temp:  [98.3 °F (36.8 °C)-98.8 °F (37.1 °C)] 98.4 °F (36.9 °C)  Heart Rate:  [] 90  Resp:  [17-21] 20  BP: (101-155)/(64-99) 155/99  Flow (L/min):  [2] 2     Physical Exam:  Constitutional: No acute distress, frail  HENT: NCAT, mucous membranes moist  Respiratory: Clear to auscultation bilaterally, respiratory effort normal   Cardiovascular: rate controlled but + ectopy, no murmurs, rubs, or gallops  Gastrointestinal: Positive bowel sounds, soft, nontender, nondistended  Musculoskeletal: No bilateral ankle edema  Psychiatric:   cooperative  Neurologic: Oriented x 1, strength symmetric in all extremities, Cranial Nerves grossly intact to confrontation, speech clear  Skin: No rashes      Results Reviewed:  LAB RESULTS:      Lab 06/05/24 0457 06/04/24 0524 06/03/24  0723 06/02/24  0413 06/01/24  1728   WBC 4.59 5.77 4.05 4.07 4.46   HEMOGLOBIN 11.4* 11.2* 11.7* 11.8* 15.1   HEMATOCRIT 34.4 33.3* 36.3 35.2 45.2   PLATELETS 173 177 174 208 232   NEUTROS ABS  --   --   --  2.68 3.19   IMMATURE GRANS (ABS)  --   --   --  0.01 0.01   LYMPHS ABS  --   --   --  0.88 0.88   MONOS ABS  --   --   --  0.46 0.30   EOS ABS  --   --   --  0.01 0.03   MCV 91.7 89.3 93.8 90.7 92.4   PROCALCITONIN  --  0.15  --   --  0.15   LACTATE  --   --   --   --  0.9         Lab 06/05/24  0457 06/04/24 0524 06/03/24  0723 06/02/24  1354 06/02/24  0911 06/02/24 0413 06/01/24  2142 06/01/24  1728   SODIUM 135* 135* 136  --   --  142 138 135*   POTASSIUM 3.5 4.1 5.1 4.1 3.5 3.0* 2.5* 2.5*   CHLORIDE 106 106 106  --   --  107 99 92*   CO2 20.0* 19.0* 20.0*  --   --  26.0 27.0 23.0   ANION GAP 9.0 10.0 10.0  --   --  9.0 12.0 20.0*   BUN 6* 5* 6*  --   --  14 17 18   CREATININE 0.42* 0.46* 0.51*  --   --  0.76 1.00 1.03*   EGFR 94.2 92.2 89.9  --   --  75.5 54.3* 52.4*   GLUCOSE 79 98 86  --   --  109* 134* 61*   CALCIUM 7.5* 7.8* 8.0*  --   --  8.2* 8.7 9.2   IONIZED CALCIUM  --   --  1.22  --   --  1.20  --  1.23   MAGNESIUM  --   --   --   --   --  2.0  --  1.7   PHOSPHORUS  --   --   --   --   --  2.3*  --  2.9   HEMOGLOBIN A1C  --   --   --   --   --   --   --  5.30   TSH  --   --   --   --   --   --   --  1.510         Lab 06/04/24  0524 06/03/24  0723 06/02/24  0413 06/01/24  2142 06/01/24  1728   TOTAL PROTEIN 4.9* 5.5* 5.7* 6.2 7.0   ALBUMIN 3.0* 3.0* 3.0* 3.5 3.6   GLOBULIN 1.9 2.5 2.7 2.7 3.4   ALT (SGPT) 19 17 18 24 23   AST (SGOT) 42* 38* 35* 49* 49*   BILIRUBIN 0.6 0.6 0.7 0.9 1.0   ALK PHOS 37* 36* 37* 42 45                     Brief Urine Lab Results   (Last result in the past 365 days)        Color   Clarity   Blood   Leuk Est   Nitrite   Protein   CREAT   Urine HCG        06/04/24 1630 Yellow   Clear   Negative   Trace   Negative   Negative                   Microbiology Results Abnormal       Procedure Component Value - Date/Time    S. Pneumo Ag Urine or CSF - Urine, Urine, Clean Catch [[id number]]  (Normal) Collected: 06/04/24 1629    Lab Status: Final result Specimen: Urine, Clean Catch Updated: 06/05/24 0712     Strep Pneumo Ag Negative    Legionella Antigen, Urine - Urine, Urine, Clean Catch [[id number]]  (Normal) Collected: 06/04/24 1629    Lab Status: Final result Specimen: Urine, Clean Catch Updated: 06/05/24 0712     LEGIONELLA ANTIGEN, URINE Negative    MRSA Screen, PCR (Inpatient) - Swab, Nares [[id number]]  (Normal) Collected: 06/04/24 1630    Lab Status: Final result Specimen: Swab from Nares Updated: 06/04/24 1752     MRSA PCR Negative    Narrative:      The negative predictive value of this diagnostic test is high and should only be used to consider de-escalating anti-MRSA therapy. A positive result may indicate colonization with MRSA and must be correlated clinically.  MRSA Negative    Gastrointestinal Panel, PCR - Stool, Per Rectum [[id number]]  (Normal) Collected: 06/02/24 2203    Lab Status: Final result Specimen: Stool from Per Rectum Updated: 06/03/24 0908     Campylobacter Not Detected     Plesiomonas shigelloides Not Detected     Salmonella Not Detected     Vibrio Not Detected     Vibrio cholerae Not Detected     Yersinia enterocolitica Not Detected     Enteroaggregative E. coli (EAEC) Not Detected     Enteropathogenic E. coli (EPEC) Not Detected     Enterotoxigenic E. coli (ETEC) lt/st Not Detected     Shiga-like toxin-producing E. coli (STEC) stx1/stx2 Not Detected     Shigella/Enteroinvasive E. coli (EIEC) Not Detected     Cryptosporidium Not Detected     Cyclospora cayetanensis Not Detected     Entamoeba histolytica Not Detected      Giardia lamblia Not Detected     Adenovirus F40/41 Not Detected     Astrovirus Not Detected     Norovirus GI/GII Not Detected     Rotavirus A Not Detected     Sapovirus (I, II, IV or V) Not Detected    Clostridioides difficile Toxin - Stool, Per Rectum [[id number]]  (Normal) Collected: 06/02/24 2203    Lab Status: Final result Specimen: Stool from Per Rectum Updated: 06/03/24 0838    Narrative:      The following orders were created for panel order Clostridioides difficile Toxin - Stool, Per Rectum.  Procedure                               Abnormality         Status                     ---------                               -----------         ------                     Clostridioides difficile...[[id number]]  Normal              Final result                 Please view results for these tests on the individual orders.    Clostridioides difficile Toxin, PCR - Stool, Per Rectum [[id number]]  (Normal) Collected: 06/02/24 2203    Lab Status: Final result Specimen: Stool from Per Rectum Updated: 06/03/24 0838     Toxigenic C. difficile by PCR Not Detected    Narrative:      The result indicates the absence of toxigenic C. difficile from stool specimen.             XR Chest 1 View    Result Date: 6/4/2024  XR CHEST 1 VW Date of Exam: 6/4/2024 8:24 AM EDT Indication: fever Comparison: 6/1/2024. Findings: There is new infiltrate of the left lower lobe with obscured left hemidiaphragm and small left effusion. Heart and pulmonary vessels appear within normal limits for technique. The right lung is clear. There is no right effusion.     Impression: Impression: New left lower lobe infiltrate may represent atelectasis and/or pneumonia, with small left effusion. Electronically Signed: Betsy Oquendo MD  6/4/2024 8:40 AM EDT  Workstation ID: [id number]     Results for orders placed during the hospital encounter of 03/24/22    Adult Transthoracic Echo Complete W/ Cont if Necessary Per Protocol    Interpretation Summary  · Estimated  left ventricular EF = 60%  · No hemodynamically significant valvular heart disease      Current medications:  Scheduled Meds:aspirin, 81 mg, Oral, Daily  cefTRIAXone, 2,000 mg, Intravenous, Q24H  doxycycline, 100 mg, Oral, Q12H  [Held by provider] furosemide, 40 mg, Oral, Daily  heparin (porcine), 2,500 Units, Subcutaneous, Q12H  lisinopril, 20 mg, Oral, Daily  potassium chloride ER, 40 mEq, Oral, Q4H  sodium chloride, 10 mL, Intravenous, Q12H      Continuous Infusions:dextrose 5 % and sodium chloride 0.9 %, 75 mL/hr, Last Rate: 75 mL/hr (06/04/24 4017)      PRN Meds:.  acetaminophen **OR** acetaminophen **OR** acetaminophen    Calcium Replacement - Follow Nurse / BPA Driven Protocol    dextrose    dextrose    glucagon (human recombinant)    Magnesium Standard Dose Replacement - Follow Nurse / BPA Driven Protocol    ondansetron ODT **OR** ondansetron    Phosphorus Replacement - Follow Nurse / BPA Driven Protocol    Potassium Replacement - Follow Nurse / BPA Driven Protocol    sodium chloride    sodium chloride    sodium chloride    Assessment & Plan   Assessment & Plan     Active Hospital Problems    Diagnosis  POA    **Weakness generalized [R53.1]  Yes    Moderate malnutrition [E44.0]  Yes    Community acquired pneumonia of left lower lobe of lung [J18.9]  Unknown    Generalized weakness [R53.1]  Yes    Unwitnessed fall [R29.6]  Unknown    History of diabetes mellitus, type II [Z86.39]  Unknown    Dementia without behavioral disturbance [F03.90]  Unknown    Hypoglycemia [E16.2]  Unknown    Hypokalemia [E87.6]  Yes    Hypertension [I10]  Yes    History of Stroke [I63.9]  Yes    Hyperlipidemia [E78.5]  Yes      Resolved Hospital Problems   No resolved problems to display.        Brief Hospital Course to date:  Susan Tucker is a 89 y.o. female with PMHx of HTN, HLD, CVA, DM who presents to the ED for evaluation after a fall at her living facility and low blood pressure per transferring documentation.      This patient's  problems and plans were partially entered by my partner and updated as appropriate by me 06/05/24.     Weakness  Unwitnessed fall  -  CT head w/o contrast negative  - PT/OT consult  - fall precautions  - likely dehydrated / hypoglycemia related  - Reviewed with daughter -- downward trajectory in her functional status and cognitive function over the last few months     Fever  LLL PNA   - CXR 6/4 with LLL infiltrate   - UA  negative   - Bcx pending; strep/legionella/MRSA -- negative  - rocephin/doxy      Hypokalemia  Hypoglycemia  - k 2.5 on admission  - mag 1.7 on admission  - glucose 61 on labs, now improved  - Replace electrolytes   -  decrease rate of D5. Boost TID. Assist with feeding     New Atrial Fibrillation  - verified per EKG   - patient already on aspirin 81mg PO daily- high fall risk- defer other anticoagulation  - converted back to NSR 6/3/24- no further workup; potassium replaced     JULIA, mild- resolved  - s/p fluids  - continue lisinopril and lasix     HTN  HLD  - continue home meds     Dementia  - unclear baseline but progressive decline per nigel      Hx T2DM  - not on medications  - A1c: 5.3     Hx CVA  - continue aspirin    Expected Discharge Location and Transportation: SNF vs her assisted living   Expected Discharge   Expected Discharge Date: 6/6/2024; Expected Discharge Time:      DVT prophylaxis:  Medical DVT prophylaxis orders are present.         AM-PAC 6 Clicks Score (PT): 12 (06/04/24 2000)    CODE STATUS:   Code Status and Medical Interventions:   Ordered at: 06/01/24 1941     Medical Intervention Limits:    NO intubation (DNI)     Code Status (Patient has no pulse and is not breathing):    No CPR (Do Not Attempt to Resuscitate)     Medical Interventions (Patient has pulse or is breathing):    Limited Support       Eli Francisco DO  06/05/24        Electronically signed by Eli Francisco DO at 06/05/24 1010          Consult Notes (last 72 hours)        Ijeoma Villalpando MD at  06/07/24 1155        Consult Orders    1. Inpatient Palliative Care MD Consult [[id number]] ordered by Eli Francisco DO at 06/07/24 0831                 Palliative Care Initial Consult   Attending Physician: Eli Francisco DO  Referring Provider: Eli Francisco      Reason for Referral:  assistance with clarification of goals of care    Code Status:   Code Status and Medical Interventions:   Ordered at: 06/01/24 1941     Medical Intervention Limits:    NO intubation (DNI)     Code Status (Patient has no pulse and is not breathing):    No CPR (Do Not Attempt to Resuscitate)     Medical Interventions (Patient has pulse or is breathing):    Limited Support      Advanced Directives: Advance Directive Status: Patient has advance directive, copy in chart   Family/Support: dtr/children     Goals of Care:    Goals of Care/Treatment Preferences:    Treat       HPI:   88 yo female with hx of dementia who resides in personal care presented to hospital after an unwitnessed fall.  There is reported increasing confusion over the last few months.  Reports with low BP at facility but normal in ED.  Has had decreased appetite and has required D5 for low blood sugar. CXR 6/4 for fever did demonstrate infiltrate.  EMR demonstrates no weight loss.   Planned for return to personal care with home health tomorrow.      ROS:   Denies pain or SOA        Past Medical History:   Diagnosis Date    Diabetes mellitus     Elevated cholesterol     Hypertension     Stroke      Past Surgical History:   Procedure Laterality Date    ERCP N/A 6/27/2018    Procedure: ENDOSCOPIC RETROGRADE CHOLANGIOPANCREATOGRAPHY;  Surgeon: Liam Manning MD;  Location: [facility] ENDOSCOPY;  Service: Gastroenterology    EXPLORATORY LAPAROTOMY N/A 2/10/2020    Procedure: LAPAROTOMY EXPLORATORY BOWEL RESECTION;  Surgeon: Carlos Zurita MD;  Location: [facility] OR;  Service: General;  Laterality: N/A;    HYSTERECTOMY       Social History     Socioeconomic History     Marital status:    Tobacco Use    Smoking status: Never    Smokeless tobacco: Never   Vaping Use    Vaping status: Never Used   Substance and Sexual Activity    Alcohol use: No    Drug use: No     Family History   Problem Relation Age of Onset    Hypertension Mother     Hypertension Father     Early death Brother     Hypertension Maternal Grandmother     Hypertension Paternal Grandmother     Learning disabilities Other        Allergies   Allergen Reactions    Amoxicillin Hives     Has tolerated Zosyn    Atorvastatin Nausea Only    Cephalexin Rash     Has tolerated Zosyn         Current Facility-Administered Medications:     acetaminophen (TYLENOL) tablet 650 mg, 650 mg, Oral, Q4H PRN, 650 mg at 06/04/24 0843 **OR** acetaminophen (TYLENOL) 160 MG/5ML oral solution 650 mg, 650 mg, Oral, Q4H PRN, 650 mg at 06/06/24 1219 **OR** acetaminophen (TYLENOL) suppository 650 mg, 650 mg, Rectal, Q4H PRN, Carrie Arenas, APRN    aspirin chewable tablet 81 mg, 81 mg, Oral, Daily, Eli Francisco DO, 81 mg at 06/07/24 0929    Calcium Replacement - Follow Nurse / BPA Driven Protocol, , Does not apply, PRN, Zenia Lee, DO    cefdinir (OMNICEF) capsule 300 mg, 300 mg, Oral, Q12H, Eli Francisco DO, 300 mg at 06/07/24 0929    dextrose (D50W) (25 g/50 mL) IV injection 25 g, 25 g, Intravenous, Q15 Min PRN, Carrie Arenas, APRN, 25 g at 06/07/24 0546    dextrose (GLUTOSE) oral gel 15 g, 15 g, Oral, Q15 Min PRN, Carrie Arenas, APRN, 15 g at 06/07/24 0516    dextrose 5 % and sodium chloride 0.9 % infusion, 50 mL/hr, Intravenous, Continuous, Eli Francisco DO, Last Rate: 50 mL/hr at 06/07/24 0830, 50 mL/hr at 06/07/24 0830    doxycycline (MONODOX) capsule 100 mg, 100 mg, Oral, Q12H, Eli Francisco DO, 100 mg at 06/07/24 0929    furosemide (LASIX) tablet 40 mg, 40 mg, Oral, Daily, Eli Francisco DO, 40 mg at 06/04/24 0844    glucagon (GLUCAGEN) injection 1 mg, 1 mg, Subcutaneous, Q15 Min PRN, Carrie Arenas, APRN    " heparin (porcine) 5000 UNIT/ML injection 2,500 Units, 2,500 Units, Subcutaneous, Q12H, Carrie Arenas APRN, 2,500 Units at 06/07/24 0929    ipratropium-albuterol (DUO-NEB) nebulizer solution 3 mL, 3 mL, Nebulization, 4x Daily - RT, Stefan Rocha MD, 3 mL at 06/07/24 0727    lisinopril (PRINIVIL,ZESTRIL) tablet 20 mg, 20 mg, Oral, Daily, Zenia Lee, DO, 20 mg at 06/07/24 0929    Magnesium Standard Dose Replacement - Follow Nurse / BPA Driven Protocol, , Does not apply, PRN, Zenia Lee P, DO    metoprolol tartrate (LOPRESSOR) half tablet 12.5 mg, 12.5 mg, Oral, Q12H, Eli Francisco, , 12.5 mg at 06/07/24 0929    nystatin (MYCOSTATIN) powder, , Topical, Q12H, Eli Francisco DO, Given at 06/07/24 0929    ondansetron ODT (ZOFRAN-ODT) disintegrating tablet 4 mg, 4 mg, Oral, Q6H PRN **OR** ondansetron (ZOFRAN) injection 4 mg, 4 mg, Intravenous, Q6H PRN, Carrie Arenas APRN    Phosphorus Replacement - Follow Nurse / BPA Driven Protocol, , Does not apply, PRN, Zenia Lee P, DO    Potassium Replacement - Follow Nurse / BPA Driven Protocol, , Does not apply, PRN, Zenia Lee P, DO    sodium chloride 0.9 % flush 10 mL, 10 mL, Intravenous, PRN, Carrie Aernas, APRN    sodium chloride 0.9 % flush 10 mL, 10 mL, Intravenous, Q12H, Carrie Arenas APRN, 10 mL at 06/07/24 0929    sodium chloride 0.9 % flush 10 mL, 10 mL, Intravenous, PRN, Carrie Arenas, APRN    sodium chloride 0.9 % infusion 40 mL, 40 mL, Intravenous, PRN, Carrie Arenas, APRN     Palliative Performance Scale Score:      /74 (BP Location: Left arm, Patient Position: Lying)   Pulse 100   Temp 97.6 °F (36.4 °C) (Oral)   Resp 18   Ht 157.5 cm (62\")   Wt 50.1 kg (110 lb 6.4 oz)   SpO2 97%   BMI 20.19 kg/m²     Intake/Output Summary (Last 24 hours) at 6/7/2024 1155  Last data filed at 6/7/2024 0040  Gross per 24 hour   Intake 360 ml   Output 900 ml   Net -540 ml       Physical Exam:    General Appearance:    Alert, " cooperative, NAD   HEENT:    NC/AT, EOMI, anicteric, MMM, face relaxed   Neck:   supple, trachea midline, no JVD   Lungs:     CTA bilat, diminished in bases; respirations regular, even     and unlabored    Heart:    RRR, normal S1 and S2, no M/R/G   Abdomen:     Normal bowel sounds, soft, nontender, nondistended   G/U:   Deferred   MSK/Extremities:   No clubbing , cyanosis or edema, No wasting   Pulses:   Pulses palpable and equal bilaterally   Skin:   Warm, dry, no mottling   Neurologic:   A/Ox1, cooperative, moves extremities x 4, no tremor, nl     Tone, speech confused but intelligible   Psych:   Calm, appropriate         Labs:   Results from last 7 days   Lab Units 06/05/24  0457   WBC 10*3/mm3 4.59   HEMOGLOBIN g/dL 11.4*   HEMATOCRIT % 34.4   PLATELETS 10*3/mm3 173     Results from last 7 days   Lab Units 06/06/24  1116 06/05/24  1803 06/05/24  0457 06/04/24  0524   SODIUM mmol/L  --   --  135* 135*   POTASSIUM mmol/L 3.8   < > 3.5 4.1   CHLORIDE mmol/L  --   --  106 106   CO2 mmol/L  --   --  20.0* 19.0*   BUN mg/dL  --   --  6* 5*   CREATININE mg/dL  --   --  0.42* 0.46*   CALCIUM mg/dL  --   --  7.5* 7.8*   BILIRUBIN mg/dL  --   --   --  0.6   ALK PHOS U/L  --   --   --  37*   ALT (SGPT) U/L  --   --   --  19   AST (SGOT) U/L  --   --   --  42*   GLUCOSE mg/dL  --   --  79 98    < > = values in this interval not displayed.     Imaging Results (Last 72 Hours)       Procedure Component Value Units Date/Time    XR Chest 1 View [[id number]] Collected: 06/06/24 2237     Updated: 06/06/24 2244    Narrative:      XR CHEST 1 VW    Date of Exam: 6/6/2024 10:34 PM EDT    Indication: SOB    Comparison: Chest radiograph 6/4/2024.    Findings:  Borderline-enlarged cardiac silhouette. Thoracic aortic calcifications. Perihilar predominant interstitial opacities, new/increased. Small bilateral pleural effusions, new on the right and increased on the left. Increased left lower lobe consolidation.   No pneumothorax. Osseous  structures are unchanged.      Impression:      Impression:  New perihilar interstitial opacities suggestive of pulmonary vascular congestion/interstitial edema, as well as new/increased small bilateral pleural effusions. Additionally, there is increased left lower lobe consolidation, which could represent   atelectasis and/or pneumonia.      Electronically Signed: Salvatore Feldman MD    6/6/2024 10:41 PM EDT    Workstation ID: [id number]    FL Video Swallow With Speech Single Contrast [[id number]] Collected: 06/06/24 1033     Updated: 06/06/24 1655    Narrative:      FL VIDEO SWALLOW W SPEECH SINGLE-CONTRAST    Date of Exam: 6/6/2024 9:36 AM EDT    Indication: dysphagia.       Comparison: None available.    Technique:   The speech pathologist administered food and/or liquid mixed with barium to the patient with cine/video imaging.  Imaging assistance was provided to the speech pathologist and an image was saved.    Fluoroscopic Time: 1 minute 18 seconds    Number of Images: 9 associated fluoroscopic loops were saved    Findings:  A single occurrence of aspiration was seen during fluoroscopic guided modified barium swallowing series. Please see speech therapy report for full details and recommendations.      Impression:      Impression:  Fluoroscopy provided for modified barium swallow. A single occurrence aspiration was seen during swallowing evaluation. Please see speech therapy report for full details and recommendations.      Report dictated by: Rachel Sarmiento PA-c      I have personally reviewed this case and agree with the findings above:    Electronically Signed: Rubén Persaud MD    6/6/2024 4:52 PM EDT    Workstation ID: [id number]              Lab 06/01/24  1728   HEMOGLOBIN A1C 5.30       Diagnostics:   No valid procedures specified.    A:     Weakness generalized    Hypertension    History of Stroke    Hyperlipidemia    Hypokalemia    Unwitnessed fall    History of diabetes mellitus, type II    Dementia without  behavioral disturbance    Hypoglycemia    Generalized weakness    Moderate malnutrition    Community acquired pneumonia of left lower lobe of lung         Impression:   Dementia  DM 2 - A1c 5.3  Fall  LLL PNA       Symptoms:  Debility       P:    At this time believe pt would best be served by palliative f/u at facility to determine any ongoing palliative needs.  No symptoms to manage at this time and uncertain of degree of decline.  If able to remain in personal care then pt does not meet hospice criteria.  Also planned for home health.  Weight changes are questionable in the available EMR and would be best followed up at facility for better idea of trend.  Will make referral. Thank you for this consult and allowing us to participate in patient's plan of care. Palliative Care Team will continue to follow patient.       Ijeoma Villalpando MD, 2024, 11:55 EDT        Electronically signed by Ijeoma Villalpando MD at 24 1224       [unfilled]     Discharge Summary        Meagan Mccann MD at 24 0927              [facility] Medicine Services  DISCHARGE SUMMARY    Patient Name: Susan Tucker  : 1935  MRN: [id number]    Date of Admission: 2024  2:05 PM  Date of Discharge: 2024  Primary Care Physician: Damon Townsend PA    Consults       Date and Time Order Name Status Description    2024  8:31 AM Inpatient Palliative Care MD Consult Completed             Hospital Course     Presenting Problem:     Active Hospital Problems    Diagnosis  POA    **Weakness generalized [R53.1]  Yes    Moderate malnutrition [E44.0]  Yes    Community acquired pneumonia of left lower lobe of lung [J18.9]  Unknown    Generalized weakness [R53.1]  Yes    Unwitnessed fall [R29.6]  Unknown    History of diabetes mellitus, type II [Z86.39]  Unknown    Dementia without behavioral disturbance [F03.90]  Unknown    Hypoglycemia [E16.2]  Unknown    Hypokalemia [E87.6]  Yes     Hypertension [I10]  Yes    History of Stroke [I63.9]  Yes    Hyperlipidemia [E78.5]  Yes      Resolved Hospital Problems   No resolved problems to display.          Hospital Course:  Susan Tucker is a 89 y.o. female with PMHx of HTN, HLD, CVA, DM who presents to the ED for evaluation after a fall at her living facility and low blood pressure per transferring documentation.  She was found to be dehydrated and having left lower lobe pneumonia.  Blood pressure improved with IV fluids.  Pneumonia treated with Rocephin and doxycycline and transition to cefdinir and doxycycline upon discharge..  While hospitalized, she was found to have new onset A-fib.  Heart rate was controlled with metoprolol.  Anticoagulation deferred because of her increased risk of fall.  She was discharged back to her long-term care       Weakness  Unwitnessed fall  Hypoglycemia   CT head w/o contrast negative  Continue fall precautions  Follow-up likely secondary to dehydrated / hypoglycemia   My partner reviewed with daughter -- downward trajectory in her functional status and cognitive function over the last few months   Patient with persistent hypoglycemia. Suspect it is related to poor intake. Liberalized diet, shakes TID and assisting with meals with no significant improvement. Suspect progression of dementia. Reviewed this with daughter -- agreeable to palliative/hospice consult   Patient was evaluated by hospice team and felt not to be candidate at the time being.  She will be discharged to her long-term care facility with palliative care and hospice team with follow her up in 1 month    Fever  LLL PNA   CXR 6/4 with LLL infiltrate   UA  negative   Bcx NGTD; strep/legionella/MRSA -- negative  Initially on rocephin/doxy -- changed to omnicef/doxy to complete 5 days      Hypoxia, improved  Secondary to PNA and volume. S/p lasix.   Hypoxia improved     New Atrial Fibrillation  The patient already on aspirin 81mg PO daily- high fall risk-  defer other anticoagulation  Converted back to NSR 6/3/24- no further workup; potassium replaced     JULIA, mild- resolved  Status post luids  Continue lisinopril and lasix     HTN  HLD  Continue home meds     Dementia  Unclear baseline but progressive decline over the last few months per daughter     Hx T2DM  Not on medications  A1c: 5.3     Hx CVA  Continue aspirin       Discharge Follow Up Recommendations for outpatient labs/diagnostics:   Facility physician 1 to 2 days  Hospice follow-up in 1 month    Day of Discharge     HPI:   Patient seen and examined this morning.  Comfortable in bed.  Off oxygen.  Pleasantly confused and cannot contribute to HPI.  No acute events per nursing report        Vital Signs:   Temp:  [97.7 °F (36.5 °C)-98.6 °F (37 °C)] 98.3 °F (36.8 °C)  Heart Rate:  [] 86  Resp:  [16-18] 18  BP: ()/(56-85) 96/64  Flow (L/min):  [1] 1      Physical Exam:  General: Pleasantly confused   Head: Atraumatic and normocephalic  Eyes: No Icterus. No pallor  Ears:  Ears appear intact with no abnormalities noted  Throat: No oral lesions, no thrush  Neck: Supple, trachea midline  Lungs: Clear to auscultation bilaterally, equal air entry, no wheezing or crackles  Heart:  Normal S1 and S2, no murmur, no gallop, No JVD, no lower extremity swelling  Abdomen:  Soft, no tenderness, no organomegaly, normal bowel sounds, no organomegaly  Extremities: pulses equal bilaterally  Skin: No bleeding, bruising or rash, normal skin turgor and elasticity  Neurologic: Cranial nerves appear intact with no evidence of facial asymmetry, normal motor and sensory functions in all 4 extremities    Pertinent  and/or Most Recent Results     LAB RESULTS:      Lab 06/08/24  0419 06/05/24  0457 06/04/24  0524 06/03/24  0723 06/02/24  0413 06/01/24  1728   WBC 5.26 4.59 5.77 4.05 4.07 4.46   HEMOGLOBIN 11.6* 11.4* 11.2* 11.7* 11.8* 15.1   HEMATOCRIT 34.0 34.4 33.3* 36.3 35.2 45.2   PLATELETS 295 173 177 174 208 232   NEUTROS  ABS  --   --   --   --  2.68 3.19   IMMATURE GRANS (ABS)  --   --   --   --  0.01 0.01   LYMPHS ABS  --   --   --   --  0.88 0.88   MONOS ABS  --   --   --   --  0.46 0.30   EOS ABS  --   --   --   --  0.01 0.03   MCV 89.2 91.7 89.3 93.8 90.7 92.4   PROCALCITONIN  --   --  0.15  --   --  0.15   LACTATE  --   --   --   --   --  0.9         Lab 06/08/24  0419 06/06/24  1116 06/05/24  1803 06/05/24  0457 06/04/24  0524 06/03/24  0723 06/02/24  0911 06/02/24 0413 06/01/24  2142 06/01/24  1728   SODIUM 132*  --   --  135* 135* 136  --  142   < > 135*   POTASSIUM 3.7 3.8 3.5 3.5 4.1 5.1   < > 3.0*   < > 2.5*   CHLORIDE 104  --   --  106 106 106  --  107   < > 92*   CO2 21.0*  --   --  20.0* 19.0* 20.0*  --  26.0   < > 23.0   ANION GAP 7.0  --   --  9.0 10.0 10.0  --  9.0   < > 20.0*   BUN 10  --   --  6* 5* 6*  --  14   < > 18   CREATININE 0.52*  --   --  0.42* 0.46* 0.51*  --  0.76   < > 1.03*   EGFR 89.5  --   --  94.2 92.2 89.9  --  75.5   < > 52.4*   GLUCOSE 83  --   --  79 98 86  --  109*   < > 61*   CALCIUM 7.7*  --   --  7.5* 7.8* 8.0*  --  8.2*   < > 9.2   IONIZED CALCIUM  --   --   --   --   --  1.22  --  1.20  --  1.23   MAGNESIUM 1.2*  --   --   --   --   --   --  2.0  --  1.7   PHOSPHORUS  --   --   --   --   --   --   --  2.3*  --  2.9   HEMOGLOBIN A1C  --   --   --   --   --   --   --   --   --  5.30   TSH  --   --   --   --   --   --   --   --   --  1.510    < > = values in this interval not displayed.         Lab 06/04/24  0524 06/03/24  0723 06/02/24  0413 06/01/24  2142 06/01/24  1728   TOTAL PROTEIN 4.9* 5.5* 5.7* 6.2 7.0   ALBUMIN 3.0* 3.0* 3.0* 3.5 3.6   GLOBULIN 1.9 2.5 2.7 2.7 3.4   ALT (SGPT) 19 17 18 24 23   AST (SGOT) 42* 38* 35* 49* 49*   BILIRUBIN 0.6 0.6 0.7 0.9 1.0   ALK PHOS 37* 36* 37* 42 45                     Brief Urine Lab Results  (Last result in the past 365 days)        Color   Clarity   Blood   Leuk Est   Nitrite   Protein   CREAT   Urine HCG        06/04/24 1630 Yellow   Clear    Negative   Trace   Negative   Negative                 Microbiology Results (last 10 days)       Procedure Component Value - Date/Time    MRSA Screen, PCR (Inpatient) - Swab, Nares [[id number]]  (Normal) Collected: 06/04/24 1630    Lab Status: Final result Specimen: Swab from Nares Updated: 06/04/24 1752     MRSA PCR Negative    Narrative:      The negative predictive value of this diagnostic test is high and should only be used to consider de-escalating anti-MRSA therapy. A positive result may indicate colonization with MRSA and must be correlated clinically.  MRSA Negative    S. Pneumo Ag Urine or CSF - Urine, Urine, Clean Catch [[id number]]  (Normal) Collected: 06/04/24 1629    Lab Status: Final result Specimen: Urine, Clean Catch Updated: 06/05/24 0712     Strep Pneumo Ag Negative    Legionella Antigen, Urine - Urine, Urine, Clean Catch [[id number]]  (Normal) Collected: 06/04/24 1629    Lab Status: Final result Specimen: Urine, Clean Catch Updated: 06/05/24 0712     LEGIONELLA ANTIGEN, URINE Negative    Blood Culture - Blood, Hand, Right [[id number]]  (Normal) Collected: 06/04/24 1317    Lab Status: Preliminary result Specimen: Blood from Hand, Right Updated: 06/07/24 1330     Blood Culture No growth at 3 days    Narrative:      Less than seven (7) mL's of blood was collected.  Insufficient quantity may yield false negative results.    Blood Culture - Blood, Arm, Right [[id number]]  (Normal) Collected: 06/04/24 1317    Lab Status: Preliminary result Specimen: Blood from Arm, Right Updated: 06/07/24 1330     Blood Culture No growth at 3 days    Narrative:      Less than seven (7) mL's of blood was collected.  Insufficient quantity may yield false negative results.    Gastrointestinal Panel, PCR - Stool, Per Rectum [[id number]]  (Normal) Collected: 06/02/24 2203    Lab Status: Final result Specimen: Stool from Per Rectum Updated: 06/03/24 0908     Campylobacter Not Detected     Plesiomonas shigelloides Not Detected      Salmonella Not Detected     Vibrio Not Detected     Vibrio cholerae Not Detected     Yersinia enterocolitica Not Detected     Enteroaggregative E. coli (EAEC) Not Detected     Enteropathogenic E. coli (EPEC) Not Detected     Enterotoxigenic E. coli (ETEC) lt/st Not Detected     Shiga-like toxin-producing E. coli (STEC) stx1/stx2 Not Detected     Shigella/Enteroinvasive E. coli (EIEC) Not Detected     Cryptosporidium Not Detected     Cyclospora cayetanensis Not Detected     Entamoeba histolytica Not Detected     Giardia lamblia Not Detected     Adenovirus F40/41 Not Detected     Astrovirus Not Detected     Norovirus GI/GII Not Detected     Rotavirus A Not Detected     Sapovirus (I, II, IV or V) Not Detected    Clostridioides difficile Toxin - Stool, Per Rectum [[id number]]  (Normal) Collected: 06/02/24 2203    Lab Status: Final result Specimen: Stool from Per Rectum Updated: 06/03/24 0838    Narrative:      The following orders were created for panel order Clostridioides difficile Toxin - Stool, Per Rectum.  Procedure                               Abnormality         Status                     ---------                               -----------         ------                     Clostridioides difficile...[[id number]]  Normal              Final result                 Please view results for these tests on the individual orders.    Clostridioides difficile Toxin, PCR - Stool, Per Rectum [[id number]]  (Normal) Collected: 06/02/24 2203    Lab Status: Final result Specimen: Stool from Per Rectum Updated: 06/03/24 0838     Toxigenic C. difficile by PCR Not Detected    Narrative:      The result indicates the absence of toxigenic C. difficile from stool specimen.             XR Chest 1 View    Result Date: 6/6/2024  XR CHEST 1 VW Date of Exam: 6/6/2024 10:34 PM EDT Indication: SOB Comparison: Chest radiograph 6/4/2024. Findings: Borderline-enlarged cardiac silhouette. Thoracic aortic calcifications. Perihilar predominant  interstitial opacities, new/increased. Small bilateral pleural effusions, new on the right and increased on the left. Increased left lower lobe consolidation. No pneumothorax. Osseous structures are unchanged.     Impression: New perihilar interstitial opacities suggestive of pulmonary vascular congestion/interstitial edema, as well as new/increased small bilateral pleural effusions. Additionally, there is increased left lower lobe consolidation, which could represent atelectasis and/or pneumonia. Electronically Signed: Salvatore Feldman MD  6/6/2024 10:41 PM EDT  Workstation ID: [id number]    FL Video Swallow With Speech Single Contrast    Result Date: 6/6/2024  FL VIDEO SWALLOW W SPEECH SINGLE-CONTRAST Date of Exam: 6/6/2024 9:36 AM EDT Indication: dysphagia.   Comparison: None available. Technique:   The speech pathologist administered food and/or liquid mixed with barium to the patient with cine/video imaging.  Imaging assistance was provided to the speech pathologist and an image was saved. Fluoroscopic Time: 1 minute 18 seconds Number of Images: 9 associated fluoroscopic loops were saved Findings: A single occurrence of aspiration was seen during fluoroscopic guided modified barium swallowing series. Please see speech therapy report for full details and recommendations.     Impression: Fluoroscopy provided for modified barium swallow. A single occurrence aspiration was seen during swallowing evaluation. Please see speech therapy report for full details and recommendations. Report dictated by: Rachel Sarmiento PA-c  I have personally reviewed this case and agree with the findings above: Electronically Signed: Rubén Persaud MD  6/6/2024 4:52 PM EDT  Workstation ID: [id number]    XR Chest 1 View    Result Date: 6/4/2024  XR CHEST 1 VW Date of Exam: 6/4/2024 8:24 AM EDT Indication: fever Comparison: 6/1/2024. Findings: There is new infiltrate of the left lower lobe with obscured left hemidiaphragm and small left effusion.  Heart and pulmonary vessels appear within normal limits for technique. The right lung is clear. There is no right effusion.     Impression: New left lower lobe infiltrate may represent atelectasis and/or pneumonia, with small left effusion. Electronically Signed: Betsy Oquendo MD  6/4/2024 8:40 AM EDT  Workstation ID: [id number]    CT Head Without Contrast    Result Date: 6/1/2024  CT HEAD WO CONTRAST Date of Exam: 6/1/2024 8:21 PM EDT Indication: unwitnessed fall; confusion - unknown baseline. Comparison: 11/8/2022. Technique: Axial CT images were obtained of the head without contrast administration.  Automated exposure control and iterative construction methods were used. Findings: There is no evidence of hemorrhage. There is no mass effect or midline shift. There is no extracerebral collection. Ventricles are normal in size and configuration for patient's stated age.  Posterior fossa is within normal limits. Calvarium and skull base appear intact.   Visualized sinuses show no air fluid levels. Visualized orbits are unremarkable.     Impression: No acute intracranial process. Electronically Signed: Yady Villegas MD  6/1/2024 8:25 PM EDT  Workstation ID: [id number]    XR Chest 1 View    Result Date: 6/1/2024  XR CHEST 1 VW Date of Exam: 6/1/2024 8:08 PM EDT Indication: cough, oxygen sat 91 room air Comparison: 11/7/2022. Findings: There are no airspace consolidations. No pleural fluid. No pneumothorax. The pulmonary vasculature appears within normal limits. The cardiac and mediastinal silhouette appear unremarkable. No acute osseous abnormality identified.     Impression: No acute cardiopulmonary process. Electronically Signed: Yady Villegas MD  6/1/2024 8:19 PM EDT  Workstation ID: [id number]     Results for orders placed during the hospital encounter of 03/24/22    Duplex Carotid Ultrasound CAR    Interpretation Summary  · Proximal right internal carotid artery plaque without significant stenosis.  · Right internal  carotid artery stenosis of 0-49%.  · Proximal left internal carotid artery plaque without significant stenosis.  · Left internal carotid artery stenosis of 0-49%.  · Nominal antegrade bilateral vertebral artery flow demonstrated.      Results for orders placed during the hospital encounter of 03/24/22    Duplex Carotid Ultrasound CAR    Interpretation Summary  · Proximal right internal carotid artery plaque without significant stenosis.  · Right internal carotid artery stenosis of 0-49%.  · Proximal left internal carotid artery plaque without significant stenosis.  · Left internal carotid artery stenosis of 0-49%.  · Nominal antegrade bilateral vertebral artery flow demonstrated.      Results for orders placed during the hospital encounter of 03/24/22    Adult Transthoracic Echo Complete W/ Cont if Necessary Per Protocol    Interpretation Summary  · Estimated left ventricular EF = 60%  · No hemodynamically significant valvular heart disease      Plan for Follow-up of Pending Labs/Results:   Pending Labs       Order Current Status    Blood Culture - Blood, Arm, Right Preliminary result    Blood Culture - Blood, Hand, Right Preliminary result          Discharge Details        Discharge Medications        New Medications        Instructions Start Date   albuterol sulfate  (90 Base) MCG/ACT inhaler  Commonly known as: PROVENTIL HFA;VENTOLIN HFA;PROAIR HFA   2 puffs, Inhalation, Every 4 Hours PRN      cefdinir 300 MG capsule  Commonly known as: OMNICEF   300 mg, Oral, Every 12 Hours Scheduled      doxycycline 100 MG capsule  Commonly known as: MONODOX   100 mg, Oral, Every 12 Hours Scheduled      metoprolol tartrate 25 MG tablet  Commonly known as: LOPRESSOR   12.5 mg, Oral, Every 12 Hours Scheduled      nystatin 245775 UNIT/GM powder  Commonly known as: MYCOSTATIN   Topical, Every 12 Hours Scheduled             Changes to Medications        Instructions Start Date   acetaminophen 325 MG tablet  Commonly known as:  TYLENOL  What changed: when to take this   650 mg, Oral, Every 4 Hours PRN             Continue These Medications        Instructions Start Date   aspirin 81 MG EC tablet   81 mg, Oral, Daily      fenofibrate 145 MG tablet  Commonly known as: TRICOR   145 mg, Oral, Daily      furosemide 20 MG tablet  Commonly known as: LASIX   Take 2 tablets by mouth Daily.      lisinopril 20 MG tablet  Commonly known as: PRINIVIL,ZESTRIL   20 mg, Oral, Daily      melatonin 5 MG tablet tablet   5 mg, Oral, Nightly PRN      ondansetron 4 MG tablet  Commonly known as: ZOFRAN   4 mg, Oral, Every 6 Hours PRN      vitamin B-12 1000 MCG tablet  Commonly known as: CYANOCOBALAMIN   1,000 mcg, Oral, Daily               Allergies   Allergen Reactions    Amoxicillin Hives     Has tolerated Zosyn    Atorvastatin Nausea Only    Cephalexin Rash     Has tolerated Zosyn         Discharge Disposition:  Long Term Care (DC - External)    Diet:  Hospital:  Diet Order   Procedures    Diet: Regular/House; Texture: Mechanical Ground (NDD 2); Fluid Consistency: Thin (IDDSI 0)       Diet Instructions       Diet: Regular/House Diet; Mechanical Ground (NDD 2); Thin (IDDSI 0)      Discharge Diet: Regular/House Diet    Texture: Mechanical Ground (NDD 2)    Fluid Consistency: Thin (IDDSI 0)             Activity:  Activity Instructions       Activity as Tolerated              Restrictions or Other Recommendations:  None        CODE STATUS:    Code Status and Medical Interventions:   Ordered at: 06/01/24 1941     Medical Intervention Limits:    NO intubation (DNI)     Code Status (Patient has no pulse and is not breathing):    No CPR (Do Not Attempt to Resuscitate)     Medical Interventions (Patient has pulse or is breathing):    Limited Support       Future Appointments   Date Time Provider Department Center   6/8/2024 11:30 AM MED [address] [place] [address] EMS S None       Additional Instructions for the Follow-ups that You Need to Schedule       Ambulatory Referral to Home  Health   As directed      Resume home health    Order Comments: Resume home health    Face to Face Visit Date: 6/6/2024   Follow-up provider for Plan of Care?: I treated the patient in an acute care facility and will not continue treatment after discharge.   Follow-up provider: BRONWYN DOHERTY [[id number]]   Reason/Clinical Findings: sp hospital stay   Describe mobility limitations that make leaving home difficult: weakness, confusion   Nursing/Therapeutic Services Requested: Skilled Nursing Physical Therapy   Skilled nursing orders: Medication education Neurovascular assessments   PT orders: Therapeutic exercise Gait Training Transfer training Strengthening Home safety assessment   Weight Bearing Status: As Tolerated   Frequency: 1 Week 1                      Meagan Mccann MD  06/08/24      Time Spent on Discharge:  I spent  25  minutes on this discharge activity which included: face-to-face encounter with the patient, reviewing the data in the system, coordination of the care with the nursing staff as well as consultants, documentation, and entering orders.            Electronically signed by Meagan Mccann MD at 06/08/24 0932

## 2024-06-08 NOTE — PROGRESS NOTES
"          Clinical Nutrition Assessment     Patient Name: Susan Tucker  YOB: 1935  MRN: [id number]  Date of Encounter: 06/08/24 01:02 EDT  Admission date: 6/1/2024  Reason for Visit: Follow-up protocol    Assessment   Nutrition Assessment   Admission Diagnosis:  Weakness generalized [R53.1]  Generalized weakness [R53.1]    Problem List:    Weakness generalized    Hypertension    History of Stroke    Hyperlipidemia    Hypokalemia    Unwitnessed fall    History of diabetes mellitus, type II    Dementia without behavioral disturbance    Hypoglycemia    Generalized weakness    Moderate malnutrition    Community acquired pneumonia of left lower lobe of lung      PMH:   She  has a past medical history of Diabetes mellitus, Elevated cholesterol, Hypertension, and Stroke.    PSH:  She  has a past surgical history that includes ERCP (N/A, 6/27/2018); Hysterectomy; and Exploratory Laparotomy (N/A, 2/10/2020).    Applicable Nutrition History:       Anthropometrics     Height: Height: 157.5 cm (62\")  Last Filed Weight: Weight: 50.1 kg (110 lb 6.4 oz) (06/07/24 0600)  Method: Weight Method: Bed scale  BMI: BMI (Calculated): 20.2    UBW:  Per EMR wt of 115 lbs on 7/29/23,   Weight change: IF bed wt accurate loss of 21 lbs 18% of body wt timeframe unk.    Nutrition Focused Physical Exam    Date: 5/3    Patient meets criteria for malnutrition diagnosis, see MSA note.     Subjective   Reported/Observed/Food/Nutrition Related History:     6/7  Allows using suppl. Family suggesting useful on all trays.    6/3   Pt allows eat less than she used to and wt around 93-95 lbs, used to weigh more. Allows would use some of suppl.     Current Nutrition Prescription   PO: Diet: Regular/House; Texture: Mechanical Ground (NDD 2); Fluid Consistency: Thin (IDDSI 0)  Oral Nutrition Supplement: Boost Plus 3x/da  Intake:  7 Days   28% x 10 meals recorded    Assessment & Plan   Nutrition Diagnosis   Date:  6/3            Updated:  "   Problem Malnutrition  moderate chronic   Etiology Effect of decr intake w wt loss    Signs/Symptoms Wasting   Status: ongoing      Goal / Objectives:   Nutrition to support treatment and Increase intake      Nutrition Intervention      Follow treatment progress, Care plan reviewed, Advise alternate selection, Encourage intake        Monitoring/Evaluation:   Per protocol, I&O, PO intake, Supplement intake, Pertinent labs, Weight, Symptoms    Lenora Nguyen RD  Time Spent:20 min

## 2024-06-08 NOTE — DISCHARGE SUMMARY
[facility] Medicine Services  DISCHARGE SUMMARY    Patient Name: Susan Tucker  : 1935  MRN: [id number]    Date of Admission: 2024  2:05 PM  Date of Discharge: 2024  Primary Care Physician: Damon Townsend PA    Consults       Date and Time Order Name Status Description    2024  8:31 AM Inpatient Palliative Care MD Consult Completed             Hospital Course     Presenting Problem:     Active Hospital Problems    Diagnosis  POA    **Weakness generalized [R53.1]  Yes    Moderate malnutrition [E44.0]  Yes    Community acquired pneumonia of left lower lobe of lung [J18.9]  Unknown    Generalized weakness [R53.1]  Yes    Unwitnessed fall [R29.6]  Unknown    History of diabetes mellitus, type II [Z86.39]  Unknown    Dementia without behavioral disturbance [F03.90]  Unknown    Hypoglycemia [E16.2]  Unknown    Hypokalemia [E87.6]  Yes    Hypertension [I10]  Yes    History of Stroke [I63.9]  Yes    Hyperlipidemia [E78.5]  Yes      Resolved Hospital Problems   No resolved problems to display.          Hospital Course:  Susan Tucker is a 89 y.o. female with PMHx of HTN, HLD, CVA, DM who presents to the ED for evaluation after a fall at her living facility and low blood pressure per transferring documentation.  She was found to be dehydrated and having left lower lobe pneumonia.  Blood pressure improved with IV fluids.  Pneumonia treated with Rocephin and doxycycline and transition to cefdinir and doxycycline upon discharge..  While hospitalized, she was found to have new onset A-fib.  Heart rate was controlled with metoprolol.  Anticoagulation deferred because of her increased risk of fall.  She was discharged back to her long-term care       Weakness  Unwitnessed fall  Hypoglycemia   CT head w/o contrast negative  Continue fall precautions  Follow-up likely secondary to dehydrated / hypoglycemia   My partner reviewed with daughter -- downward trajectory in her functional status  and cognitive function over the last few months   Patient with persistent hypoglycemia. Suspect it is related to poor intake. Liberalized diet, shakes TID and assisting with meals with no significant improvement. Suspect progression of dementia. Reviewed this with daughter -- agreeable to palliative/hospice consult   Patient was evaluated by hospice team and felt not to be candidate at the time being.  She will be discharged to her long-term care facility with palliative care and hospice team with follow her up in 1 month    Fever  LLL PNA   CXR 6/4 with LLL infiltrate   UA  negative   Bcx NGTD; strep/legionella/MRSA -- negative  Initially on rocephin/doxy -- changed to omnicef/doxy to complete 5 days      Hypoxia, improved  Secondary to PNA and volume. S/p lasix.   Hypoxia improved     New Atrial Fibrillation  The patient already on aspirin 81mg PO daily- high fall risk- defer other anticoagulation  Converted back to NSR 6/3/24- no further workup; potassium replaced     JULIA, mild- resolved  Status post luids  Continue lisinopril and lasix     HTN  HLD  Continue home meds     Dementia  Unclear baseline but progressive decline over the last few months per daughter     Hx T2DM  Not on medications  A1c: 5.3     Hx CVA  Continue aspirin       Discharge Follow Up Recommendations for outpatient labs/diagnostics:   Facility physician 1 to 2 days  Hospice follow-up in 1 month    Day of Discharge     HPI:   Patient seen and examined this morning.  Comfortable in bed.  Off oxygen.  Pleasantly confused and cannot contribute to HPI.  No acute events per nursing report        Vital Signs:   Temp:  [97.7 °F (36.5 °C)-98.6 °F (37 °C)] 98.3 °F (36.8 °C)  Heart Rate:  [] 86  Resp:  [16-18] 18  BP: ()/(56-85) 96/64  Flow (L/min):  [1] 1      Physical Exam:  General: Pleasantly confused   Head: Atraumatic and normocephalic  Eyes: No Icterus. No pallor  Ears:  Ears appear intact with no abnormalities noted  Throat: No oral  lesions, no thrush  Neck: Supple, trachea midline  Lungs: Clear to auscultation bilaterally, equal air entry, no wheezing or crackles  Heart:  Normal S1 and S2, no murmur, no gallop, No JVD, no lower extremity swelling  Abdomen:  Soft, no tenderness, no organomegaly, normal bowel sounds, no organomegaly  Extremities: pulses equal bilaterally  Skin: No bleeding, bruising or rash, normal skin turgor and elasticity  Neurologic: Cranial nerves appear intact with no evidence of facial asymmetry, normal motor and sensory functions in all 4 extremities    Pertinent  and/or Most Recent Results     LAB RESULTS:      Lab 06/08/24  0419 06/05/24  0457 06/04/24  0524 06/03/24  0723 06/02/24  0413 06/01/24  1728   WBC 5.26 4.59 5.77 4.05 4.07 4.46   HEMOGLOBIN 11.6* 11.4* 11.2* 11.7* 11.8* 15.1   HEMATOCRIT 34.0 34.4 33.3* 36.3 35.2 45.2   PLATELETS 295 173 177 174 208 232   NEUTROS ABS  --   --   --   --  2.68 3.19   IMMATURE GRANS (ABS)  --   --   --   --  0.01 0.01   LYMPHS ABS  --   --   --   --  0.88 0.88   MONOS ABS  --   --   --   --  0.46 0.30   EOS ABS  --   --   --   --  0.01 0.03   MCV 89.2 91.7 89.3 93.8 90.7 92.4   PROCALCITONIN  --   --  0.15  --   --  0.15   LACTATE  --   --   --   --   --  0.9         Lab 06/08/24  0419 06/06/24  1116 06/05/24  1803 06/05/24  0457 06/04/24  0524 06/03/24  0723 06/02/24  0911 06/02/24  0413 06/01/24  2142 06/01/24  1728   SODIUM 132*  --   --  135* 135* 136  --  142   < > 135*   POTASSIUM 3.7 3.8 3.5 3.5 4.1 5.1   < > 3.0*   < > 2.5*   CHLORIDE 104  --   --  106 106 106  --  107   < > 92*   CO2 21.0*  --   --  20.0* 19.0* 20.0*  --  26.0   < > 23.0   ANION GAP 7.0  --   --  9.0 10.0 10.0  --  9.0   < > 20.0*   BUN 10  --   --  6* 5* 6*  --  14   < > 18   CREATININE 0.52*  --   --  0.42* 0.46* 0.51*  --  0.76   < > 1.03*   EGFR 89.5  --   --  94.2 92.2 89.9  --  75.5   < > 52.4*   GLUCOSE 83  --   --  79 98 86  --  109*   < > 61*   CALCIUM 7.7*  --   --  7.5* 7.8* 8.0*  --  8.2*    < > 9.2   IONIZED CALCIUM  --   --   --   --   --  1.22  --  1.20  --  1.23   MAGNESIUM 1.2*  --   --   --   --   --   --  2.0  --  1.7   PHOSPHORUS  --   --   --   --   --   --   --  2.3*  --  2.9   HEMOGLOBIN A1C  --   --   --   --   --   --   --   --   --  5.30   TSH  --   --   --   --   --   --   --   --   --  1.510    < > = values in this interval not displayed.         Lab 06/04/24  0524 06/03/24  0723 06/02/24  0413 06/01/24  2142 06/01/24  1728   TOTAL PROTEIN 4.9* 5.5* 5.7* 6.2 7.0   ALBUMIN 3.0* 3.0* 3.0* 3.5 3.6   GLOBULIN 1.9 2.5 2.7 2.7 3.4   ALT (SGPT) 19 17 18 24 23   AST (SGOT) 42* 38* 35* 49* 49*   BILIRUBIN 0.6 0.6 0.7 0.9 1.0   ALK PHOS 37* 36* 37* 42 45                     Brief Urine Lab Results  (Last result in the past 365 days)        Color   Clarity   Blood   Leuk Est   Nitrite   Protein   CREAT   Urine HCG        06/04/24 1630 Yellow   Clear   Negative   Trace   Negative   Negative                 Microbiology Results (last 10 days)       Procedure Component Value - Date/Time    MRSA Screen, PCR (Inpatient) - Swab, Nares [[id number]]  (Normal) Collected: 06/04/24 1630    Lab Status: Final result Specimen: Swab from Nares Updated: 06/04/24 1752     MRSA PCR Negative    Narrative:      The negative predictive value of this diagnostic test is high and should only be used to consider de-escalating anti-MRSA therapy. A positive result may indicate colonization with MRSA and must be correlated clinically.  MRSA Negative    S. Pneumo Ag Urine or CSF - Urine, Urine, Clean Catch [[id number]]  (Normal) Collected: 06/04/24 1629    Lab Status: Final result Specimen: Urine, Clean Catch Updated: 06/05/24 0712     Strep Pneumo Ag Negative    Legionella Antigen, Urine - Urine, Urine, Clean Catch [[id number]]  (Normal) Collected: 06/04/24 1629    Lab Status: Final result Specimen: Urine, Clean Catch Updated: 06/05/24 0712     LEGIONELLA ANTIGEN, URINE Negative    Blood Culture - Blood, Hand, Right [[id number]]   (Normal) Collected: 06/04/24 1317    Lab Status: Preliminary result Specimen: Blood from Hand, Right Updated: 06/07/24 1330     Blood Culture No growth at 3 days    Narrative:      Less than seven (7) mL's of blood was collected.  Insufficient quantity may yield false negative results.    Blood Culture - Blood, Arm, Right [[id number]]  (Normal) Collected: 06/04/24 1317    Lab Status: Preliminary result Specimen: Blood from Arm, Right Updated: 06/07/24 1330     Blood Culture No growth at 3 days    Narrative:      Less than seven (7) mL's of blood was collected.  Insufficient quantity may yield false negative results.    Gastrointestinal Panel, PCR - Stool, Per Rectum [[id number]]  (Normal) Collected: 06/02/24 2203    Lab Status: Final result Specimen: Stool from Per Rectum Updated: 06/03/24 0908     Campylobacter Not Detected     Plesiomonas shigelloides Not Detected     Salmonella Not Detected     Vibrio Not Detected     Vibrio cholerae Not Detected     Yersinia enterocolitica Not Detected     Enteroaggregative E. coli (EAEC) Not Detected     Enteropathogenic E. coli (EPEC) Not Detected     Enterotoxigenic E. coli (ETEC) lt/st Not Detected     Shiga-like toxin-producing E. coli (STEC) stx1/stx2 Not Detected     Shigella/Enteroinvasive E. coli (EIEC) Not Detected     Cryptosporidium Not Detected     Cyclospora cayetanensis Not Detected     Entamoeba histolytica Not Detected     Giardia lamblia Not Detected     Adenovirus F40/41 Not Detected     Astrovirus Not Detected     Norovirus GI/GII Not Detected     Rotavirus A Not Detected     Sapovirus (I, II, IV or V) Not Detected    Clostridioides difficile Toxin - Stool, Per Rectum [[id number]]  (Normal) Collected: 06/02/24 2203    Lab Status: Final result Specimen: Stool from Per Rectum Updated: 06/03/24 0838    Narrative:      The following orders were created for panel order Clostridioides difficile Toxin - Stool, Per Rectum.  Procedure                                Abnormality         Status                     ---------                               -----------         ------                     Clostridioides difficile...[[id number]]  Normal              Final result                 Please view results for these tests on the individual orders.    Clostridioides difficile Toxin, PCR - Stool, Per Rectum [[id number]]  (Normal) Collected: 06/02/24 2203    Lab Status: Final result Specimen: Stool from Per Rectum Updated: 06/03/24 0838     Toxigenic C. difficile by PCR Not Detected    Narrative:      The result indicates the absence of toxigenic C. difficile from stool specimen.             XR Chest 1 View    Result Date: 6/6/2024  XR CHEST 1 VW Date of Exam: 6/6/2024 10:34 PM EDT Indication: SOB Comparison: Chest radiograph 6/4/2024. Findings: Borderline-enlarged cardiac silhouette. Thoracic aortic calcifications. Perihilar predominant interstitial opacities, new/increased. Small bilateral pleural effusions, new on the right and increased on the left. Increased left lower lobe consolidation. No pneumothorax. Osseous structures are unchanged.     Impression: New perihilar interstitial opacities suggestive of pulmonary vascular congestion/interstitial edema, as well as new/increased small bilateral pleural effusions. Additionally, there is increased left lower lobe consolidation, which could represent atelectasis and/or pneumonia. Electronically Signed: Salvatore Feldman MD  6/6/2024 10:41 PM EDT  Workstation ID: [id number]    FL Video Swallow With Speech Single Contrast    Result Date: 6/6/2024  FL VIDEO SWALLOW W SPEECH SINGLE-CONTRAST Date of Exam: 6/6/2024 9:36 AM EDT Indication: dysphagia.   Comparison: None available. Technique:   The speech pathologist administered food and/or liquid mixed with barium to the patient with cine/video imaging.  Imaging assistance was provided to the speech pathologist and an image was saved. Fluoroscopic Time: 1 minute 18 seconds Number of Images: 9  associated fluoroscopic loops were saved Findings: A single occurrence of aspiration was seen during fluoroscopic guided modified barium swallowing series. Please see speech therapy report for full details and recommendations.     Impression: Fluoroscopy provided for modified barium swallow. A single occurrence aspiration was seen during swallowing evaluation. Please see speech therapy report for full details and recommendations. Report dictated by: Rachel Sarmiento PA-c  I have personally reviewed this case and agree with the findings above: Electronically Signed: Rubén Persaud MD  6/6/2024 4:52 PM EDT  Workstation ID: [id number]    XR Chest 1 View    Result Date: 6/4/2024  XR CHEST 1 VW Date of Exam: 6/4/2024 8:24 AM EDT Indication: fever Comparison: 6/1/2024. Findings: There is new infiltrate of the left lower lobe with obscured left hemidiaphragm and small left effusion. Heart and pulmonary vessels appear within normal limits for technique. The right lung is clear. There is no right effusion.     Impression: New left lower lobe infiltrate may represent atelectasis and/or pneumonia, with small left effusion. Electronically Signed: Betsy Oquendo MD  6/4/2024 8:40 AM EDT  Workstation ID: [id number]    CT Head Without Contrast    Result Date: 6/1/2024  CT HEAD WO CONTRAST Date of Exam: 6/1/2024 8:21 PM EDT Indication: unwitnessed fall; confusion - unknown baseline. Comparison: 11/8/2022. Technique: Axial CT images were obtained of the head without contrast administration.  Automated exposure control and iterative construction methods were used. Findings: There is no evidence of hemorrhage. There is no mass effect or midline shift. There is no extracerebral collection. Ventricles are normal in size and configuration for patient's stated age.  Posterior fossa is within normal limits. Calvarium and skull base appear intact.   Visualized sinuses show no air fluid levels. Visualized orbits are unremarkable.     Impression:  No acute intracranial process. Electronically Signed: Yady Villegas MD  6/1/2024 8:25 PM EDT  Workstation ID: [id number]    XR Chest 1 View    Result Date: 6/1/2024  XR CHEST 1 VW Date of Exam: 6/1/2024 8:08 PM EDT Indication: cough, oxygen sat 91 room air Comparison: 11/7/2022. Findings: There are no airspace consolidations. No pleural fluid. No pneumothorax. The pulmonary vasculature appears within normal limits. The cardiac and mediastinal silhouette appear unremarkable. No acute osseous abnormality identified.     Impression: No acute cardiopulmonary process. Electronically Signed: Yady Villegas MD  6/1/2024 8:19 PM EDT  Workstation ID: [id number]     Results for orders placed during the hospital encounter of 03/24/22    Duplex Carotid Ultrasound CAR    Interpretation Summary  · Proximal right internal carotid artery plaque without significant stenosis.  · Right internal carotid artery stenosis of 0-49%.  · Proximal left internal carotid artery plaque without significant stenosis.  · Left internal carotid artery stenosis of 0-49%.  · Nominal antegrade bilateral vertebral artery flow demonstrated.      Results for orders placed during the hospital encounter of 03/24/22    Duplex Carotid Ultrasound CAR    Interpretation Summary  · Proximal right internal carotid artery plaque without significant stenosis.  · Right internal carotid artery stenosis of 0-49%.  · Proximal left internal carotid artery plaque without significant stenosis.  · Left internal carotid artery stenosis of 0-49%.  · Nominal antegrade bilateral vertebral artery flow demonstrated.      Results for orders placed during the hospital encounter of 03/24/22    Adult Transthoracic Echo Complete W/ Cont if Necessary Per Protocol    Interpretation Summary  · Estimated left ventricular EF = 60%  · No hemodynamically significant valvular heart disease      Plan for Follow-up of Pending Labs/Results:   Pending Labs       Order Current Status    Blood Culture -  Blood, Arm, Right Preliminary result    Blood Culture - Blood, Hand, Right Preliminary result          Discharge Details        Discharge Medications        New Medications        Instructions Start Date   albuterol sulfate  (90 Base) MCG/ACT inhaler  Commonly known as: PROVENTIL HFA;VENTOLIN HFA;PROAIR HFA   2 puffs, Inhalation, Every 4 Hours PRN      cefdinir 300 MG capsule  Commonly known as: OMNICEF   300 mg, Oral, Every 12 Hours Scheduled      doxycycline 100 MG capsule  Commonly known as: MONODOX   100 mg, Oral, Every 12 Hours Scheduled      metoprolol tartrate 25 MG tablet  Commonly known as: LOPRESSOR   12.5 mg, Oral, Every 12 Hours Scheduled      nystatin 120783 UNIT/GM powder  Commonly known as: MYCOSTATIN   Topical, Every 12 Hours Scheduled             Changes to Medications        Instructions Start Date   acetaminophen 325 MG tablet  Commonly known as: TYLENOL  What changed: when to take this   650 mg, Oral, Every 4 Hours PRN             Continue These Medications        Instructions Start Date   aspirin 81 MG EC tablet   81 mg, Oral, Daily      fenofibrate 145 MG tablet  Commonly known as: TRICOR   145 mg, Oral, Daily      furosemide 20 MG tablet  Commonly known as: LASIX   Take 2 tablets by mouth Daily.      lisinopril 20 MG tablet  Commonly known as: PRINIVIL,ZESTRIL   20 mg, Oral, Daily      melatonin 5 MG tablet tablet   5 mg, Oral, Nightly PRN      ondansetron 4 MG tablet  Commonly known as: ZOFRAN   4 mg, Oral, Every 6 Hours PRN      vitamin B-12 1000 MCG tablet  Commonly known as: CYANOCOBALAMIN   1,000 mcg, Oral, Daily               Allergies   Allergen Reactions    Amoxicillin Hives     Has tolerated Zosyn    Atorvastatin Nausea Only    Cephalexin Rash     Has tolerated Zosyn         Discharge Disposition:  Long Term Care (DC - External)    Diet:  Hospital:  Diet Order   Procedures    Diet: Regular/House; Texture: Mechanical Ground (NDD 2); Fluid Consistency: Thin (IDDSI 0)       Diet  Instructions       Diet: Regular/House Diet; Mechanical Ground (NDD 2); Thin (IDDSI 0)      Discharge Diet: Regular/House Diet    Texture: Mechanical Ground (NDD 2)    Fluid Consistency: Thin (IDDSI 0)             Activity:  Activity Instructions       Activity as Tolerated              Restrictions or Other Recommendations:  None        CODE STATUS:    Code Status and Medical Interventions:   Ordered at: 06/01/24 1941     Medical Intervention Limits:    NO intubation (DNI)     Code Status (Patient has no pulse and is not breathing):    No CPR (Do Not Attempt to Resuscitate)     Medical Interventions (Patient has pulse or is breathing):    Limited Support       Future Appointments   Date Time Provider Department Center   6/8/2024 11:30 AM MED 11  [facility] None       Additional Instructions for the Follow-ups that You Need to Schedule       Ambulatory Referral to Home Health   As directed      Resume home health    Order Comments: Resume home health    Face to Face Visit Date: 6/6/2024   Follow-up provider for Plan of Care?: I treated the patient in an acute care facility and will not continue treatment after discharge.   Follow-up provider: BRONWYN DOHERTY [[id number]]   Reason/Clinical Findings: sp hospital stay   Describe mobility limitations that make leaving home difficult: weakness, confusion   Nursing/Therapeutic Services Requested: Skilled Nursing Physical Therapy   Skilled nursing orders: Medication education Neurovascular assessments   PT orders: Therapeutic exercise Gait Training Transfer training Strengthening Home safety assessment   Weight Bearing Status: As Tolerated   Frequency: 1 Week 1                      Meagan Mccann MD  06/08/24      Time Spent on Discharge:  I spent  25  minutes on this discharge activity which included: face-to-face encounter with the patient, reviewing the data in the system, coordination of the care with the nursing staff as well as consultants, documentation, and  entering orders.

## 2024-06-08 NOTE — DISCHARGE PLACEMENT REQUEST
"To: [facility]  From: Nilda Denny,        Susan Palomino (89 y.o. Female)       Date of Birth   1935    Social Security Number       Address   [address] [id number]    Home Phone   [phone]    MRN   [id number]       P[employer]alism   Denominational    Marital Status                               Admission Date   6/1/24    Admission Type   Emergency    Admitting Provider   Meagan Mccann MD    Attending Provider   Meagan Mccann MD    Department, Room/Bed   [address], S528/1       Discharge Date       Discharge Disposition   Long Term Care (DC - External)    Discharge Destination                                 Attending Provider: Meagan Mccann MD    Allergies: Amoxicillin, Atorvastatin, Cephalexin    Isolation: None   Infection: None   Code Status: No CPR    Ht: 157.5 cm (62\")   Wt: 50.1 kg (110 lb 6.4 oz)    Admission Cmt: None   Principal Problem: Weakness generalized [R53.1]                   Active Insurance as of 6/1/2024       Primary Coverage       Payor Plan Insurance Group Employer/Plan Group    MEDICARE MEDICARE A & B        Payor Plan Address Payor Plan Phone Number Payor Plan Fax Number Effective Dates    [address] [phone]  4/1/2001 - None Entered    [facility] [id number]         Subscriber Name Subscriber Birth Date Member ID       SUSAN PALOMINO 1935 [id number]               Secondary Coverage       Payor Plan Insurance Group Employer/Plan Group    AARP MC SUP AARP HEALTH CARE OPTIONS        Payor Plan Address Payor Plan Phone Number Payor Plan Fax Number Effective Dates    [facility] [phone]  1/1/2018 - None Entered    [address]       [facility] [address]         Subscriber Name Subscriber Birth Date Member ID       SUSAN PALOMINO 1935 [id number]                     Emergency Contacts        (Rel.) Home Phone Work Phone Mobile Phone    SEBASTIAN MASON(HCS) (Daughter) [phone] " -- [phone]    JOSE SORIANO (Daughter) [phone] -- [phone]    ESTELITA PALOMINO (Son) [phone] -- [phone]    NATE PALOMINO (Son) [phone] -- [phone]                 Discharge Summary        Meagan Mccann MD at 24 0927              [facility] Medicine Services  DISCHARGE SUMMARY    Patient Name: Susan Palomino  : 1935  MRN: [id number]    Date of Admission: 2024  2:05 PM  Date of Discharge: 2024  Primary Care Physician: Damon Townsend PA    Consults       Date and Time Order Name Status Description    2024  8:31 AM Inpatient Palliative Care MD Consult Completed             Hospital Course     Presenting Problem:     Active Hospital Problems    Diagnosis  POA    **Weakness generalized [R53.1]  Yes    Moderate malnutrition [E44.0]  Yes    Community acquired pneumonia of left lower lobe of lung [J18.9]  Unknown    Generalized weakness [R53.1]  Yes    Unwitnessed fall [R29.6]  Unknown    History of diabetes mellitus, type II [Z86.39]  Unknown    Dementia without behavioral disturbance [F03.90]  Unknown    Hypoglycemia [E16.2]  Unknown    Hypokalemia [E87.6]  Yes    Hypertension [I10]  Yes    History of Stroke [I63.9]  Yes    Hyperlipidemia [E78.5]  Yes      Resolved Hospital Problems   No resolved problems to display.          Hospital Course:  Susan Palomino is a 89 y.o. female with PMHx of HTN, HLD, CVA, DM who presents to the ED for evaluation after a fall at her living facility and low blood pressure per transferring documentation.  She was found to be dehydrated and having left lower lobe pneumonia.  Blood pressure improved with IV fluids.  Pneumonia treated with Rocephin and doxycycline and transition to cefdinir and doxycycline upon discharge..  While hospitalized, she was found to have new onset A-fib.  Heart rate was controlled with metoprolol.  Anticoagulation deferred because of her increased risk of fall.  She was discharged  back to her long-term care       Weakness  Unwitnessed fall  Hypoglycemia   CT head w/o contrast negative  Continue fall precautions  Follow-up likely secondary to dehydrated / hypoglycemia   My partner reviewed with daughter -- downward trajectory in her functional status and cognitive function over the last few months   Patient with persistent hypoglycemia. Suspect it is related to poor intake. Liberalized diet, shakes TID and assisting with meals with no significant improvement. Suspect progression of dementia. Reviewed this with daughter -- agreeable to palliative/hospice consult   Patient was evaluated by hospice team and felt not to be candidate at the time being.  She will be discharged to her long-term care facility with palliative care and hospice team with follow her up in 1 month    Fever  LLL PNA   CXR 6/4 with LLL infiltrate   UA  negative   Bcx NGTD; strep/legionella/MRSA -- negative  Initially on rocephin/doxy -- changed to omnicef/doxy to complete 5 days      Hypoxia, improved  Secondary to PNA and volume. S/p lasix.   Hypoxia improved     New Atrial Fibrillation  The patient already on aspirin 81mg PO daily- high fall risk- defer other anticoagulation  Converted back to NSR 6/3/24- no further workup; potassium replaced     JULIA, mild- resolved  Status post luids  Continue lisinopril and lasix     HTN  HLD  Continue home meds     Dementia  Unclear baseline but progressive decline over the last few months per daughter     Hx T2DM  Not on medications  A1c: 5.3     Hx CVA  Continue aspirin       Discharge Follow Up Recommendations for outpatient labs/diagnostics:   Facility physician 1 to 2 days  Hospice follow-up in 1 month    Day of Discharge     HPI:   Patient seen and examined this morning.  Comfortable in bed.  Off oxygen.  Pleasantly confused and cannot contribute to HPI.  No acute events per nursing report        Vital Signs:   Temp:  [97.7 °F (36.5 °C)-98.6 °F (37 °C)] 98.3 °F (36.8 °C)  Heart  Rate:  [] 86  Resp:  [16-18] 18  BP: ()/(56-85) 96/64  Flow (L/min):  [1] 1      Physical Exam:  General: Pleasantly confused   Head: Atraumatic and normocephalic  Eyes: No Icterus. No pallor  Ears:  Ears appear intact with no abnormalities noted  Throat: No oral lesions, no thrush  Neck: Supple, trachea midline  Lungs: Clear to auscultation bilaterally, equal air entry, no wheezing or crackles  Heart:  Normal S1 and S2, no murmur, no gallop, No JVD, no lower extremity swelling  Abdomen:  Soft, no tenderness, no organomegaly, normal bowel sounds, no organomegaly  Extremities: pulses equal bilaterally  Skin: No bleeding, bruising or rash, normal skin turgor and elasticity  Neurologic: Cranial nerves appear intact with no evidence of facial asymmetry, normal motor and sensory functions in all 4 extremities    Pertinent  and/or Most Recent Results     LAB RESULTS:      Lab 06/08/24  0419 06/05/24  0457 06/04/24  0524 06/03/24  0723 06/02/24  0413 06/01/24  1728   WBC 5.26 4.59 5.77 4.05 4.07 4.46   HEMOGLOBIN 11.6* 11.4* 11.2* 11.7* 11.8* 15.1   HEMATOCRIT 34.0 34.4 33.3* 36.3 35.2 45.2   PLATELETS 295 173 177 174 208 232   NEUTROS ABS  --   --   --   --  2.68 3.19   IMMATURE GRANS (ABS)  --   --   --   --  0.01 0.01   LYMPHS ABS  --   --   --   --  0.88 0.88   MONOS ABS  --   --   --   --  0.46 0.30   EOS ABS  --   --   --   --  0.01 0.03   MCV 89.2 91.7 89.3 93.8 90.7 92.4   PROCALCITONIN  --   --  0.15  --   --  0.15   LACTATE  --   --   --   --   --  0.9         Lab 06/08/24  0419 06/06/24  1116 06/05/24  1803 06/05/24  0457 06/04/24  0524 06/03/24  0723 06/02/24  0911 06/02/24  0413 06/01/24  2142 06/01/24  1818   SODIUM 132*  --   --  135* 135* 136  --  142   < > 135*   POTASSIUM 3.7 3.8 3.5 3.5 4.1 5.1   < > 3.0*   < > 2.5*   CHLORIDE 104  --   --  106 106 106  --  107   < > 92*   CO2 21.0*  --   --  20.0* 19.0* 20.0*  --  26.0   < > 23.0   ANION GAP 7.0  --   --  9.0 10.0 10.0  --  9.0   < > 20.0*    BUN 10  --   --  6* 5* 6*  --  14   < > 18   CREATININE 0.52*  --   --  0.42* 0.46* 0.51*  --  0.76   < > 1.03*   EGFR 89.5  --   --  94.2 92.2 89.9  --  75.5   < > 52.4*   GLUCOSE 83  --   --  79 98 86  --  109*   < > 61*   CALCIUM 7.7*  --   --  7.5* 7.8* 8.0*  --  8.2*   < > 9.2   IONIZED CALCIUM  --   --   --   --   --  1.22  --  1.20  --  1.23   MAGNESIUM 1.2*  --   --   --   --   --   --  2.0  --  1.7   PHOSPHORUS  --   --   --   --   --   --   --  2.3*  --  2.9   HEMOGLOBIN A1C  --   --   --   --   --   --   --   --   --  5.30   TSH  --   --   --   --   --   --   --   --   --  1.510    < > = values in this interval not displayed.         Lab 06/04/24  0524 06/03/24  0723 06/02/24  0413 06/01/24  2142 06/01/24  1728   TOTAL PROTEIN 4.9* 5.5* 5.7* 6.2 7.0   ALBUMIN 3.0* 3.0* 3.0* 3.5 3.6   GLOBULIN 1.9 2.5 2.7 2.7 3.4   ALT (SGPT) 19 17 18 24 23   AST (SGOT) 42* 38* 35* 49* 49*   BILIRUBIN 0.6 0.6 0.7 0.9 1.0   ALK PHOS 37* 36* 37* 42 45                     Brief Urine Lab Results  (Last result in the past 365 days)        Color   Clarity   Blood   Leuk Est   Nitrite   Protein   CREAT   Urine HCG        06/04/24 1630 Yellow   Clear   Negative   Trace   Negative   Negative                 Microbiology Results (last 10 days)       Procedure Component Value - Date/Time    MRSA Screen, PCR (Inpatient) - Swab, Nares [[id number]]  (Normal) Collected: 06/04/24 1630    Lab Status: Final result Specimen: Swab from Nares Updated: 06/04/24 1752     MRSA PCR Negative    Narrative:      The negative predictive value of this diagnostic test is high and should only be used to consider de-escalating anti-MRSA therapy. A positive result may indicate colonization with MRSA and must be correlated clinically.  MRSA Negative    S. Pneumo Ag Urine or CSF - Urine, Urine, Clean Catch [[id number]]  (Normal) Collected: 06/04/24 2409    Lab Status: Final result Specimen: Urine, Clean Catch Updated: 06/05/24 0712     Strep Pneumo Ag  Negative    Legionella Antigen, Urine - Urine, Urine, Clean Catch [[id number]]  (Normal) Collected: 06/04/24 1629    Lab Status: Final result Specimen: Urine, Clean Catch Updated: 06/05/24 0712     LEGIONELLA ANTIGEN, URINE Negative    Blood Culture - Blood, Hand, Right [[id number]]  (Normal) Collected: 06/04/24 1317    Lab Status: Preliminary result Specimen: Blood from Hand, Right Updated: 06/07/24 1330     Blood Culture No growth at 3 days    Narrative:      Less than seven (7) mL's of blood was collected.  Insufficient quantity may yield false negative results.    Blood Culture - Blood, Arm, Right [[id number]]  (Normal) Collected: 06/04/24 1317    Lab Status: Preliminary result Specimen: Blood from Arm, Right Updated: 06/07/24 1330     Blood Culture No growth at 3 days    Narrative:      Less than seven (7) mL's of blood was collected.  Insufficient quantity may yield false negative results.    Gastrointestinal Panel, PCR - Stool, Per Rectum [[id number]]  (Normal) Collected: 06/02/24 2203    Lab Status: Final result Specimen: Stool from Per Rectum Updated: 06/03/24 0908     Campylobacter Not Detected     Plesiomonas shigelloides Not Detected     Salmonella Not Detected     Vibrio Not Detected     Vibrio cholerae Not Detected     Yersinia enterocolitica Not Detected     Enteroaggregative E. coli (EAEC) Not Detected     Enteropathogenic E. coli (EPEC) Not Detected     Enterotoxigenic E. coli (ETEC) lt/st Not Detected     Shiga-like toxin-producing E. coli (STEC) stx1/stx2 Not Detected     Shigella/Enteroinvasive E. coli (EIEC) Not Detected     Cryptosporidium Not Detected     Cyclospora cayetanensis Not Detected     Entamoeba histolytica Not Detected     Giardia lamblia Not Detected     Adenovirus F40/41 Not Detected     Astrovirus Not Detected     Norovirus GI/GII Not Detected     Rotavirus A Not Detected     Sapovirus (I, II, IV or V) Not Detected    Clostridioides difficile Toxin - Stool, Per Rectum [[id number]]   (Normal) Collected: 06/02/24 2203    Lab Status: Final result Specimen: Stool from Per Rectum Updated: 06/03/24 0838    Narrative:      The following orders were created for panel order Clostridioides difficile Toxin - Stool, Per Rectum.  Procedure                               Abnormality         Status                     ---------                               -----------         ------                     Clostridioides difficile...[[id number]]  Normal              Final result                 Please view results for these tests on the individual orders.    Clostridioides difficile Toxin, PCR - Stool, Per Rectum [[id number]]  (Normal) Collected: 06/02/24 2203    Lab Status: Final result Specimen: Stool from Per Rectum Updated: 06/03/24 0838     Toxigenic C. difficile by PCR Not Detected    Narrative:      The result indicates the absence of toxigenic C. difficile from stool specimen.             XR Chest 1 View    Result Date: 6/6/2024  XR CHEST 1 VW Date of Exam: 6/6/2024 10:34 PM EDT Indication: SOB Comparison: Chest radiograph 6/4/2024. Findings: Borderline-enlarged cardiac silhouette. Thoracic aortic calcifications. Perihilar predominant interstitial opacities, new/increased. Small bilateral pleural effusions, new on the right and increased on the left. Increased left lower lobe consolidation. No pneumothorax. Osseous structures are unchanged.     Impression: New perihilar interstitial opacities suggestive of pulmonary vascular congestion/interstitial edema, as well as new/increased small bilateral pleural effusions. Additionally, there is increased left lower lobe consolidation, which could represent atelectasis and/or pneumonia. Electronically Signed: Salvatore Feldman MD  6/6/2024 10:41 PM EDT  Workstation ID: [id number]    FL Video Swallow With Speech Single Contrast    Result Date: 6/6/2024  FL VIDEO SWALLOW W SPEECH SINGLE-CONTRAST Date of Exam: 6/6/2024 9:36 AM EDT Indication: dysphagia.   Comparison: None  available. Technique:   The speech pathologist administered food and/or liquid mixed with barium to the patient with cine/video imaging.  Imaging assistance was provided to the speech pathologist and an image was saved. Fluoroscopic Time: 1 minute 18 seconds Number of Images: 9 associated fluoroscopic loops were saved Findings: A single occurrence of aspiration was seen during fluoroscopic guided modified barium swallowing series. Please see speech therapy report for full details and recommendations.     Impression: Fluoroscopy provided for modified barium swallow. A single occurrence aspiration was seen during swallowing evaluation. Please see speech therapy report for full details and recommendations. Report dictated by: Rachel Sarmiento PA-c  I have personally reviewed this case and agree with the findings above: Electronically Signed: Rubén Persaud MD  6/6/2024 4:52 PM EDT  Workstation ID: [id number]    XR Chest 1 View    Result Date: 6/4/2024  XR CHEST 1 VW Date of Exam: 6/4/2024 8:24 AM EDT Indication: fever Comparison: 6/1/2024. Findings: There is new infiltrate of the left lower lobe with obscured left hemidiaphragm and small left effusion. Heart and pulmonary vessels appear within normal limits for technique. The right lung is clear. There is no right effusion.     Impression: New left lower lobe infiltrate may represent atelectasis and/or pneumonia, with small left effusion. Electronically Signed: Betsy Oquendo MD  6/4/2024 8:40 AM EDT  Workstation ID: [id number]    CT Head Without Contrast    Result Date: 6/1/2024  CT HEAD WO CONTRAST Date of Exam: 6/1/2024 8:21 PM EDT Indication: unwitnessed fall; confusion - unknown baseline. Comparison: 11/8/2022. Technique: Axial CT images were obtained of the head without contrast administration.  Automated exposure control and iterative construction methods were used. Findings: There is no evidence of hemorrhage. There is no mass effect or midline shift. There is no  extracerebral collection. Ventricles are normal in size and configuration for patient's stated age.  Posterior fossa is within normal limits. Calvarium and skull base appear intact.   Visualized sinuses show no air fluid levels. Visualized orbits are unremarkable.     Impression: No acute intracranial process. Electronically Signed: Yady Villegas MD  6/1/2024 8:25 PM EDT  Workstation ID: [id number]    XR Chest 1 View    Result Date: 6/1/2024  XR CHEST 1 VW Date of Exam: 6/1/2024 8:08 PM EDT Indication: cough, oxygen sat 91 room air Comparison: 11/7/2022. Findings: There are no airspace consolidations. No pleural fluid. No pneumothorax. The pulmonary vasculature appears within normal limits. The cardiac and mediastinal silhouette appear unremarkable. No acute osseous abnormality identified.     Impression: No acute cardiopulmonary process. Electronically Signed: Yady Villegas MD  6/1/2024 8:19 PM EDT  Workstation ID: [id number]     Results for orders placed during the hospital encounter of 03/24/22    Duplex Carotid Ultrasound CAR    Interpretation Summary  · Proximal right internal carotid artery plaque without significant stenosis.  · Right internal carotid artery stenosis of 0-49%.  · Proximal left internal carotid artery plaque without significant stenosis.  · Left internal carotid artery stenosis of 0-49%.  · Nominal antegrade bilateral vertebral artery flow demonstrated.      Results for orders placed during the hospital encounter of 03/24/22    Duplex Carotid Ultrasound CAR    Interpretation Summary  · Proximal right internal carotid artery plaque without significant stenosis.  · Right internal carotid artery stenosis of 0-49%.  · Proximal left internal carotid artery plaque without significant stenosis.  · Left internal carotid artery stenosis of 0-49%.  · Nominal antegrade bilateral vertebral artery flow demonstrated.      Results for orders placed during the hospital encounter of 03/24/22    Adult Transthoracic  Echo Complete W/ Cont if Necessary Per Protocol    Interpretation Summary  · Estimated left ventricular EF = 60%  · No hemodynamically significant valvular heart disease      Plan for Follow-up of Pending Labs/Results:   Pending Labs       Order Current Status    Blood Culture - Blood, Arm, Right Preliminary result    Blood Culture - Blood, Hand, Right Preliminary result          Discharge Details        Discharge Medications        New Medications        Instructions Start Date   albuterol sulfate  (90 Base) MCG/ACT inhaler  Commonly known as: PROVENTIL HFA;VENTOLIN HFA;PROAIR HFA   2 puffs, Inhalation, Every 4 Hours PRN      cefdinir 300 MG capsule  Commonly known as: OMNICEF   300 mg, Oral, Every 12 Hours Scheduled      doxycycline 100 MG capsule  Commonly known as: MONODOX   100 mg, Oral, Every 12 Hours Scheduled      metoprolol tartrate 25 MG tablet  Commonly known as: LOPRESSOR   12.5 mg, Oral, Every 12 Hours Scheduled      nystatin 311694 UNIT/GM powder  Commonly known as: MYCOSTATIN   Topical, Every 12 Hours Scheduled             Changes to Medications        Instructions Start Date   acetaminophen 325 MG tablet  Commonly known as: TYLENOL  What changed: when to take this   650 mg, Oral, Every 4 Hours PRN             Continue These Medications        Instructions Start Date   aspirin 81 MG EC tablet   81 mg, Oral, Daily      fenofibrate 145 MG tablet  Commonly known as: TRICOR   145 mg, Oral, Daily      furosemide 20 MG tablet  Commonly known as: LASIX   Take 2 tablets by mouth Daily.      lisinopril 20 MG tablet  Commonly known as: PRINIVIL,ZESTRIL   20 mg, Oral, Daily      melatonin 5 MG tablet tablet   5 mg, Oral, Nightly PRN      ondansetron 4 MG tablet  Commonly known as: ZOFRAN   4 mg, Oral, Every 6 Hours PRN      vitamin B-12 1000 MCG tablet  Commonly known as: CYANOCOBALAMIN   1,000 mcg, Oral, Daily               Allergies   Allergen Reactions    Amoxicillin Hives     Has tolerated Zosyn     Atorvastatin Nausea Only    Cephalexin Rash     Has tolerated Zosyn         Discharge Disposition:  Long Term Care (DC - External)    Diet:  Hospital:  Diet Order   Procedures    Diet: Regular/House; Texture: Mechanical Ground (NDD 2); Fluid Consistency: Thin (IDDSI 0)       Diet Instructions       Diet: Regular/House Diet; Mechanical Ground (NDD 2); Thin (IDDSI 0)      Discharge Diet: Regular/House Diet    Texture: Mechanical Ground (NDD 2)    Fluid Consistency: Thin (IDDSI 0)             Activity:  Activity Instructions       Activity as Tolerated              Restrictions or Other Recommendations:  None        CODE STATUS:    Code Status and Medical Interventions:   Ordered at: 06/01/24 1941     Medical Intervention Limits:    NO intubation (DNI)     Code Status (Patient has no pulse and is not breathing):    No CPR (Do Not Attempt to Resuscitate)     Medical Interventions (Patient has pulse or is breathing):    Limited Support       Future Appointments   Date Time Provider Department Center   6/8/2024 11:30 AM MED 11  [facility] None       Additional Instructions for the Follow-ups that You Need to Schedule       Ambulatory Referral to Home Health   As directed      Resume home health    Order Comments: Resume home health    Face to Face Visit Date: 6/6/2024   Follow-up provider for Plan of Care?: I treated the patient in an acute care facility and will not continue treatment after discharge.   Follow-up provider: BRONWYN DOHERTY [[id number]]   Reason/Clinical Findings: sp hospital stay   Describe mobility limitations that make leaving home difficult: weakness, confusion   Nursing/Therapeutic Services Requested: Skilled Nursing Physical Therapy   Skilled nursing orders: Medication education Neurovascular assessments   PT orders: Therapeutic exercise Gait Training Transfer training Strengthening Home safety assessment   Weight Bearing Status: As Tolerated   Frequency: 1 Week 1                      Meagan Olmos  MD Siobhan  06/08/24      Time Spent on Discharge:  I spent  25  minutes on this discharge activity which included: face-to-face encounter with the patient, reviewing the data in the system, coordination of the care with the nursing staff as well as consultants, documentation, and entering orders.            Electronically signed by Meagan Mccann MD at 06/08/24 0998

## 2024-06-08 NOTE — PROGRESS NOTES
Pt will d/c home w/ palliative care & would like [facility] to f/u in 1 mth on 7/8/24. Referral has been sent to Hospice Intake.

## 2024-06-08 NOTE — NURSING NOTE
Called [facility] twice, no one was available to take report either time.  Discharge summary faxed by case management

## 2024-06-08 NOTE — PLAN OF CARE
Goal Outcome Evaluation:      Patient had no acute events during shift  RA, NSR, confused only oriented to self  Continues to have diarrhea  Skin care performed   Hypoglycemia- see MAR - Seeresults

## 2024-06-09 LAB
BACTERIA SPEC AEROBE CULT: NORMAL
BACTERIA SPEC AEROBE CULT: NORMAL

## 2024-06-20 LAB
QT INTERVAL: 336 MS
QTC INTERVAL: 408 MS

## 2024-10-24 NOTE — PLAN OF CARE
Pt resting well and appears to be comfortable. Pt has not used the PCA pump as of yet but is sleeping. Will continue to monitor pt.    Price (Do Not Change): 0.00 Detail Level: Simple Instructions: This plan will send the code FBSE to the PM system.  DO NOT or CHANGE the price.

## (undated) DEVICE — BLD SCLPL SS NO24 STRL

## (undated) DEVICE — GLV SURG PREMIERPRO MIC LTX PF SZ7.5 BRN

## (undated) DEVICE — MEDI-VAC YANKAUER SUCTION HANDLE W/BULBOUS TIP: Brand: CARDINAL HEALTH

## (undated) DEVICE — SUT PDS LP 1 TP1 96IN VIO PDP880GA

## (undated) DEVICE — MEDI-VAC NON-CONDUCTIVE SUCTION TUBING: Brand: CARDINAL HEALTH

## (undated) DEVICE — SUT VIC 3/0 TIES J104T

## (undated) DEVICE — DEV LK WIREGUIDE FUSN OLYMP SCP

## (undated) DEVICE — GLV SURG PREMIERPRO MIC LTX PF SZ8 BRN

## (undated) DEVICE — WIREGUIDED RETRIEVAL BASKET: Brand: TRAPEZOID RX

## (undated) DEVICE — SOL LR 1000ML

## (undated) DEVICE — CANNULA,OXY,ADULT,SUPERSOFT,W/7'TUB,UC: Brand: MEDLINE

## (undated) DEVICE — DEV OPN LIGASURE CRV 180D 36MM 13.5CM  1P/U

## (undated) DEVICE — DRSNG WND GZ PAD BORDERED 4X8IN STRL

## (undated) DEVICE — PROVIDES A STERILE INTERFACE BETWEEN THE OPERATING ROOM SURGICAL LAMPS (NON-STERILE) AND THE SURGEON OR NURSE (STERILE).: Brand: STERION®CLAMP COVER FABRIC

## (undated) DEVICE — SUT SILK 3/0 SH CR8 18IN C013D

## (undated) DEVICE — GW JAG STR .035IN 450CM

## (undated) DEVICE — AIRWY 90MM NO9

## (undated) DEVICE — APPL CHLORAPREP W/TINT 26ML BLU

## (undated) DEVICE — SUT PDS 2 MONO 3/0 VIL 27IN Z305H

## (undated) DEVICE — SPHINCTEROTOME: Brand: DREAMTOME™ RX 44

## (undated) DEVICE — LEX GENERAL ABDOMINAL SPLIT: Brand: MEDLINE INDUSTRIES, INC.

## (undated) DEVICE — COVER,LIGHT HANDLE,FLX,1/PK: Brand: MEDLINE INDUSTRIES, INC.